# Patient Record
Sex: FEMALE | Race: WHITE | NOT HISPANIC OR LATINO | Employment: OTHER | URBAN - METROPOLITAN AREA
[De-identification: names, ages, dates, MRNs, and addresses within clinical notes are randomized per-mention and may not be internally consistent; named-entity substitution may affect disease eponyms.]

---

## 2017-05-27 ENCOUNTER — HOSPITAL ENCOUNTER (OUTPATIENT)
Facility: CLINIC | Age: 82
Setting detail: OBSERVATION
Discharge: HOME | End: 2017-05-28
Attending: EMERGENCY MEDICINE | Admitting: INTERNAL MEDICINE
Payer: MEDICARE

## 2017-05-27 ENCOUNTER — APPOINTMENT (OUTPATIENT)
Dept: CT IMAGING | Facility: CLINIC | Age: 82
End: 2017-05-27
Attending: EMERGENCY MEDICINE
Payer: MEDICARE

## 2017-05-27 ENCOUNTER — APPOINTMENT (OUTPATIENT)
Dept: MRI IMAGING | Facility: CLINIC | Age: 82
End: 2017-05-27
Attending: INTERNAL MEDICINE
Payer: MEDICARE

## 2017-05-27 DIAGNOSIS — R53.1 WEAKNESS: ICD-10-CM

## 2017-05-27 DIAGNOSIS — M54.2 NECK PAIN: Primary | ICD-10-CM

## 2017-05-27 LAB
ALBUMIN SERPL-MCNC: 2.8 G/DL (ref 3.4–5)
ALBUMIN UR-MCNC: NEGATIVE MG/DL
ALP SERPL-CCNC: 85 U/L (ref 40–150)
ALT SERPL W P-5'-P-CCNC: 42 U/L (ref 0–50)
ANION GAP SERPL CALCULATED.3IONS-SCNC: 11 MMOL/L (ref 3–14)
APPEARANCE UR: CLEAR
APTT PPP: 34 SEC (ref 22–37)
AST SERPL W P-5'-P-CCNC: 22 U/L (ref 0–45)
BASOPHILS # BLD AUTO: 0 10E9/L (ref 0–0.2)
BASOPHILS NFR BLD AUTO: 0.2 %
BILIRUB SERPL-MCNC: 0.7 MG/DL (ref 0.2–1.3)
BILIRUB UR QL STRIP: NEGATIVE
BUN SERPL-MCNC: 11 MG/DL (ref 7–30)
CALCIUM SERPL-MCNC: 9.4 MG/DL (ref 8.5–10.1)
CHLORIDE SERPL-SCNC: 100 MMOL/L (ref 94–109)
CO2 SERPL-SCNC: 26 MMOL/L (ref 20–32)
COLOR UR AUTO: ABNORMAL
CREAT SERPL-MCNC: 0.79 MG/DL (ref 0.52–1.04)
DIFFERENTIAL METHOD BLD: NORMAL
EOSINOPHIL # BLD AUTO: 0.3 10E9/L (ref 0–0.7)
EOSINOPHIL NFR BLD AUTO: 2.9 %
ERYTHROCYTE [DISTWIDTH] IN BLOOD BY AUTOMATED COUNT: 13.6 % (ref 10–15)
GFR SERPL CREATININE-BSD FRML MDRD: 69 ML/MIN/1.7M2
GLUCOSE SERPL-MCNC: 110 MG/DL (ref 70–99)
GLUCOSE UR STRIP-MCNC: NEGATIVE MG/DL
HCT VFR BLD AUTO: 36.6 % (ref 35–47)
HGB BLD-MCNC: 12.9 G/DL (ref 11.7–15.7)
HGB UR QL STRIP: NEGATIVE
IMM GRANULOCYTES # BLD: 0.1 10E9/L (ref 0–0.4)
IMM GRANULOCYTES NFR BLD: 0.7 %
INR PPP: 1.54 (ref 0.86–1.14)
INTERPRETATION ECG - MUSE: NORMAL
KETONES UR STRIP-MCNC: NEGATIVE MG/DL
LEUKOCYTE ESTERASE UR QL STRIP: NEGATIVE
LYMPHOCYTES # BLD AUTO: 0.9 10E9/L (ref 0.8–5.3)
LYMPHOCYTES NFR BLD AUTO: 9.3 %
MCH RBC QN AUTO: 32.7 PG (ref 26.5–33)
MCHC RBC AUTO-ENTMCNC: 35.2 G/DL (ref 31.5–36.5)
MCV RBC AUTO: 93 FL (ref 78–100)
MONOCYTES # BLD AUTO: 0.4 10E9/L (ref 0–1.3)
MONOCYTES NFR BLD AUTO: 3.8 %
NEUTROPHILS # BLD AUTO: 7.9 10E9/L (ref 1.6–8.3)
NEUTROPHILS NFR BLD AUTO: 83.1 %
NITRATE UR QL: NEGATIVE
NRBC # BLD AUTO: 0 10*3/UL
NRBC BLD AUTO-RTO: 0 /100
NT-PROBNP SERPL-MCNC: 1091 PG/ML (ref 0–1800)
PH UR STRIP: 7.5 PH (ref 5–7)
PLATELET # BLD AUTO: 363 10E9/L (ref 150–450)
POTASSIUM SERPL-SCNC: 3.6 MMOL/L (ref 3.4–5.3)
PROT SERPL-MCNC: 6.9 G/DL (ref 6.8–8.8)
RBC # BLD AUTO: 3.95 10E12/L (ref 3.8–5.2)
RBC #/AREA URNS AUTO: 0 /HPF (ref 0–2)
SODIUM SERPL-SCNC: 137 MMOL/L (ref 133–144)
SP GR UR STRIP: 1.01 (ref 1–1.03)
SQUAMOUS #/AREA URNS AUTO: 1 /HPF (ref 0–1)
TROPONIN I SERPL-MCNC: 0.02 UG/L (ref 0–0.04)
URN SPEC COLLECT METH UR: ABNORMAL
UROBILINOGEN UR STRIP-MCNC: NORMAL MG/DL (ref 0–2)
WBC # BLD AUTO: 9.5 10E9/L (ref 4–11)
WBC #/AREA URNS AUTO: <1 /HPF (ref 0–2)

## 2017-05-27 PROCEDURE — 96375 TX/PRO/DX INJ NEW DRUG ADDON: CPT

## 2017-05-27 PROCEDURE — 71260 CT THORAX DX C+: CPT

## 2017-05-27 PROCEDURE — 96361 HYDRATE IV INFUSION ADD-ON: CPT

## 2017-05-27 PROCEDURE — 25000132 ZZH RX MED GY IP 250 OP 250 PS 637: Mod: GY | Performed by: INTERNAL MEDICINE

## 2017-05-27 PROCEDURE — 99220 ZZC INITIAL OBSERVATION CARE,LEVL III: CPT | Performed by: INTERNAL MEDICINE

## 2017-05-27 PROCEDURE — A9270 NON-COVERED ITEM OR SERVICE: HCPCS | Mod: GY | Performed by: INTERNAL MEDICINE

## 2017-05-27 PROCEDURE — 85025 COMPLETE CBC W/AUTO DIFF WBC: CPT | Performed by: EMERGENCY MEDICINE

## 2017-05-27 PROCEDURE — 70498 CT ANGIOGRAPHY NECK: CPT

## 2017-05-27 PROCEDURE — 81001 URINALYSIS AUTO W/SCOPE: CPT | Performed by: EMERGENCY MEDICINE

## 2017-05-27 PROCEDURE — 83880 ASSAY OF NATRIURETIC PEPTIDE: CPT | Performed by: EMERGENCY MEDICINE

## 2017-05-27 PROCEDURE — 85610 PROTHROMBIN TIME: CPT | Performed by: EMERGENCY MEDICINE

## 2017-05-27 PROCEDURE — 85730 THROMBOPLASTIN TIME PARTIAL: CPT | Performed by: EMERGENCY MEDICINE

## 2017-05-27 PROCEDURE — 96374 THER/PROPH/DIAG INJ IV PUSH: CPT

## 2017-05-27 PROCEDURE — 87086 URINE CULTURE/COLONY COUNT: CPT | Performed by: EMERGENCY MEDICINE

## 2017-05-27 PROCEDURE — 70450 CT HEAD/BRAIN W/O DYE: CPT | Mod: XS

## 2017-05-27 PROCEDURE — 99285 EMERGENCY DEPT VISIT HI MDM: CPT | Mod: 25

## 2017-05-27 PROCEDURE — 72141 MRI NECK SPINE W/O DYE: CPT

## 2017-05-27 PROCEDURE — 87040 BLOOD CULTURE FOR BACTERIA: CPT | Mod: 91 | Performed by: INTERNAL MEDICINE

## 2017-05-27 PROCEDURE — 25000128 H RX IP 250 OP 636: Performed by: EMERGENCY MEDICINE

## 2017-05-27 PROCEDURE — 80053 COMPREHEN METABOLIC PANEL: CPT | Performed by: EMERGENCY MEDICINE

## 2017-05-27 PROCEDURE — 25000125 ZZHC RX 250: Performed by: EMERGENCY MEDICINE

## 2017-05-27 PROCEDURE — G0378 HOSPITAL OBSERVATION PER HR: HCPCS

## 2017-05-27 PROCEDURE — 93005 ELECTROCARDIOGRAM TRACING: CPT

## 2017-05-27 PROCEDURE — 84484 ASSAY OF TROPONIN QUANT: CPT | Performed by: EMERGENCY MEDICINE

## 2017-05-27 PROCEDURE — 36415 COLL VENOUS BLD VENIPUNCTURE: CPT | Performed by: INTERNAL MEDICINE

## 2017-05-27 RX ORDER — ONDANSETRON 2 MG/ML
4 INJECTION INTRAMUSCULAR; INTRAVENOUS EVERY 6 HOURS PRN
Status: DISCONTINUED | OUTPATIENT
Start: 2017-05-27 | End: 2017-05-28 | Stop reason: HOSPADM

## 2017-05-27 RX ORDER — CYCLOBENZAPRINE HCL 5 MG
5 TABLET ORAL 3 TIMES DAILY PRN
Status: DISCONTINUED | OUTPATIENT
Start: 2017-05-27 | End: 2017-05-28 | Stop reason: HOSPADM

## 2017-05-27 RX ORDER — BRIMONIDINE TARTRATE AND TIMOLOL MALEATE 2; 5 MG/ML; MG/ML
1 SOLUTION OPHTHALMIC 2 TIMES DAILY
Status: DISCONTINUED | OUTPATIENT
Start: 2017-05-27 | End: 2017-05-28 | Stop reason: HOSPADM

## 2017-05-27 RX ORDER — MORPHINE SULFATE 4 MG/ML
4 INJECTION, SOLUTION INTRAMUSCULAR; INTRAVENOUS ONCE
Status: COMPLETED | OUTPATIENT
Start: 2017-05-27 | End: 2017-05-27

## 2017-05-27 RX ORDER — PROCHLORPERAZINE MALEATE 5 MG
5 TABLET ORAL EVERY 6 HOURS PRN
Status: DISCONTINUED | OUTPATIENT
Start: 2017-05-27 | End: 2017-05-28 | Stop reason: HOSPADM

## 2017-05-27 RX ORDER — CALCIUM CARBONATE 500 MG/1
500-1000 TABLET, CHEWABLE ORAL
Status: DISCONTINUED | OUTPATIENT
Start: 2017-05-27 | End: 2017-05-28 | Stop reason: HOSPADM

## 2017-05-27 RX ORDER — NALOXONE HYDROCHLORIDE 0.4 MG/ML
.1-.4 INJECTION, SOLUTION INTRAMUSCULAR; INTRAVENOUS; SUBCUTANEOUS
Status: DISCONTINUED | OUTPATIENT
Start: 2017-05-27 | End: 2017-05-28 | Stop reason: HOSPADM

## 2017-05-27 RX ORDER — AMOXICILLIN 250 MG
1-2 CAPSULE ORAL 2 TIMES DAILY PRN
Status: DISCONTINUED | OUTPATIENT
Start: 2017-05-27 | End: 2017-05-28 | Stop reason: HOSPADM

## 2017-05-27 RX ORDER — IOPAMIDOL 755 MG/ML
58 INJECTION, SOLUTION INTRAVASCULAR ONCE
Status: COMPLETED | OUTPATIENT
Start: 2017-05-27 | End: 2017-05-27

## 2017-05-27 RX ORDER — POLYETHYLENE GLYCOL 3350 17 G/17G
17 POWDER, FOR SOLUTION ORAL DAILY PRN
Status: DISCONTINUED | OUTPATIENT
Start: 2017-05-27 | End: 2017-05-28 | Stop reason: HOSPADM

## 2017-05-27 RX ORDER — HYDROCODONE BITARTRATE AND ACETAMINOPHEN 5; 325 MG/1; MG/1
1-2 TABLET ORAL EVERY 8 HOURS PRN
Status: DISCONTINUED | OUTPATIENT
Start: 2017-05-27 | End: 2017-05-28 | Stop reason: HOSPADM

## 2017-05-27 RX ORDER — ONDANSETRON 2 MG/ML
4 INJECTION INTRAMUSCULAR; INTRAVENOUS ONCE
Status: COMPLETED | OUTPATIENT
Start: 2017-05-27 | End: 2017-05-27

## 2017-05-27 RX ORDER — PROCHLORPERAZINE 25 MG
12.5 SUPPOSITORY, RECTAL RECTAL EVERY 12 HOURS PRN
Status: DISCONTINUED | OUTPATIENT
Start: 2017-05-27 | End: 2017-05-28 | Stop reason: HOSPADM

## 2017-05-27 RX ORDER — HYDROMORPHONE HYDROCHLORIDE 1 MG/ML
.3-.5 INJECTION, SOLUTION INTRAMUSCULAR; INTRAVENOUS; SUBCUTANEOUS EVERY 4 HOURS PRN
Status: DISCONTINUED | OUTPATIENT
Start: 2017-05-27 | End: 2017-05-28 | Stop reason: HOSPADM

## 2017-05-27 RX ORDER — LOSARTAN POTASSIUM 50 MG/1
50 TABLET ORAL DAILY
Status: DISCONTINUED | OUTPATIENT
Start: 2017-05-27 | End: 2017-05-28 | Stop reason: HOSPADM

## 2017-05-27 RX ORDER — IOPAMIDOL 755 MG/ML
70 INJECTION, SOLUTION INTRAVASCULAR ONCE
Status: COMPLETED | OUTPATIENT
Start: 2017-05-27 | End: 2017-05-27

## 2017-05-27 RX ORDER — ACETAMINOPHEN 500 MG
1000 TABLET ORAL 3 TIMES DAILY
Status: DISCONTINUED | OUTPATIENT
Start: 2017-05-27 | End: 2017-05-28 | Stop reason: HOSPADM

## 2017-05-27 RX ORDER — SENNOSIDES 8.6 MG
2 TABLET ORAL
COMMUNITY
End: 2019-08-11 | Stop reason: DRUGHIGH

## 2017-05-27 RX ORDER — BRIMONIDINE TARTRATE AND TIMOLOL MALEATE 2; 5 MG/ML; MG/ML
1 SOLUTION OPHTHALMIC 2 TIMES DAILY
COMMUNITY
End: 2018-01-09

## 2017-05-27 RX ORDER — AMIODARONE HYDROCHLORIDE 200 MG/1
200 TABLET ORAL 2 TIMES DAILY
Status: DISCONTINUED | OUTPATIENT
Start: 2017-05-27 | End: 2017-05-28 | Stop reason: HOSPADM

## 2017-05-27 RX ORDER — TIMOLOL MALEATE 5 MG/ML
1 SOLUTION/ DROPS OPHTHALMIC 2 TIMES DAILY
Status: DISCONTINUED | OUTPATIENT
Start: 2017-05-27 | End: 2017-05-28 | Stop reason: HOSPADM

## 2017-05-27 RX ORDER — LATANOPROST 50 UG/ML
1 SOLUTION/ DROPS OPHTHALMIC AT BEDTIME
Status: DISCONTINUED | OUTPATIENT
Start: 2017-05-27 | End: 2017-05-28 | Stop reason: HOSPADM

## 2017-05-27 RX ORDER — FOLIC ACID 1 MG/1
1 TABLET ORAL DAILY
Status: DISCONTINUED | OUTPATIENT
Start: 2017-05-27 | End: 2017-05-28 | Stop reason: HOSPADM

## 2017-05-27 RX ORDER — ONDANSETRON 4 MG/1
4 TABLET, ORALLY DISINTEGRATING ORAL EVERY 6 HOURS PRN
Status: DISCONTINUED | OUTPATIENT
Start: 2017-05-27 | End: 2017-05-28 | Stop reason: HOSPADM

## 2017-05-27 RX ADMIN — ACETAMINOPHEN 1000 MG: 500 TABLET, FILM COATED ORAL at 14:23

## 2017-05-27 RX ADMIN — CALCIUM CARBONATE (ANTACID) CHEW TAB 500 MG 1000 MG: 500 CHEW TAB at 21:28

## 2017-05-27 RX ADMIN — BRIMONIDINE TARTRATE, TIMOLOL MALEATE 1 DROP: 2; 5 SOLUTION/ DROPS OPHTHALMIC at 20:56

## 2017-05-27 RX ADMIN — MORPHINE SULFATE 4 MG: 4 INJECTION, SOLUTION INTRAMUSCULAR; INTRAVENOUS at 10:53

## 2017-05-27 RX ADMIN — CYCLOBENZAPRINE HYDROCHLORIDE 5 MG: 5 TABLET, FILM COATED ORAL at 20:54

## 2017-05-27 RX ADMIN — SENNOSIDES AND DOCUSATE SODIUM 2 TABLET: 8.6; 5 TABLET ORAL at 21:01

## 2017-05-27 RX ADMIN — LOSARTAN POTASSIUM 50 MG: 50 TABLET ORAL at 14:22

## 2017-05-27 RX ADMIN — ACETAMINOPHEN 1000 MG: 500 TABLET, FILM COATED ORAL at 20:46

## 2017-05-27 RX ADMIN — SODIUM CHLORIDE 85 ML: 9 INJECTION, SOLUTION INTRAVENOUS at 10:37

## 2017-05-27 RX ADMIN — TIMOLOL MALEATE 1 DROP: 5 SOLUTION OPHTHALMIC at 20:58

## 2017-05-27 RX ADMIN — SODIUM CHLORIDE 1000 ML: 9 INJECTION, SOLUTION INTRAVENOUS at 12:00

## 2017-05-27 RX ADMIN — IOPAMIDOL 70 ML: 755 INJECTION, SOLUTION INTRAVENOUS at 10:25

## 2017-05-27 RX ADMIN — FOLIC ACID 1 MG: 1 TABLET ORAL at 14:22

## 2017-05-27 RX ADMIN — IOPAMIDOL 58 ML: 755 INJECTION, SOLUTION INTRAVENOUS at 10:37

## 2017-05-27 RX ADMIN — AMIODARONE HYDROCHLORIDE 200 MG: 200 TABLET ORAL at 20:47

## 2017-05-27 RX ADMIN — APIXABAN 2.5 MG: 2.5 TABLET, FILM COATED ORAL at 20:47

## 2017-05-27 RX ADMIN — SODIUM CHLORIDE 100 ML: 9 INJECTION, SOLUTION INTRAVENOUS at 10:25

## 2017-05-27 RX ADMIN — ONDANSETRON 4 MG: 2 SOLUTION INTRAMUSCULAR; INTRAVENOUS at 10:53

## 2017-05-27 ASSESSMENT — PAIN DESCRIPTION - DESCRIPTORS
DESCRIPTORS: ACHING
DESCRIPTORS: ACHING

## 2017-05-27 NOTE — PLAN OF CARE
Problem: Goal Outcome Summary  Goal: Goal Outcome Summary  Outcome: No Change  PRIOR TO DISCHARGE     Comments:   -diagnostic tests and consults completed and resulted - not met. MRI and ortho consult pending.   -vital signs normal or at patient baseline - partially met. VSS  Was on 2L NC, per patient she is not on O2 at baseline. Weaned to RA at 92%.        Nurse to notify provider when observation goals have been met and patient is ready for discharge.

## 2017-05-27 NOTE — PLAN OF CARE
Problem: Goal Outcome Summary  Goal: Goal Outcome Summary  Outcome: No Change  Patient A&O x4, VSS on 2L NC, tolerating regular diet, not OOB since arrival to unit, IV SL, DTV. Patient reports urinary incontinence, no incident this shift. Patient c/o neck pain, scheduled Tylenol and hot pack provided, patient declined narcotic medication at this time. LS clear, tele NSR with 1st degree AV block. Plan for MRI of cervical spine and ortho consult. Will continue to monitor.

## 2017-05-27 NOTE — IP AVS SNAPSHOT
[facility]    [address] [place] [id number]    Phone:  [phone]                                       After Visit Summary   5/27/2017    Marley Stewart    MRN: [id number]           After Visit Summary Signature Page     I have received my discharge instructions, and my questions have been answered. I have discussed any challenges I see with this plan with the nurse or doctor.    ..........................................................................................................................................  Patient/Patient Representative Signature      ..........................................................................................................................................  Patient Representative Print Name and Relationship to Patient    ..................................................               ................................................  Date                                            Time    ..........................................................................................................................................  Reviewed by Signature/Title    ...................................................              ..............................................  Date                                                            Time

## 2017-05-27 NOTE — PHARMACY-ADMISSION MEDICATION HISTORY
Admission medication history interview status for the 5/27/2017  admission is complete. See EPIC admission navigator for prior to admission medications     Medication history source reliability:Good    Actions taken by pharmacist (provider contacted, etc):Discussed PTA meds with patient     Additional medication history information not noted on PTA med list : Patient started cephalexin 250 mg po TID x 7 days on 5/22/17-5/29/17    Medication reconciliation/reorder completed by provider prior to medication history? No    Time spent in this activity: 20 minutes     Prior to Admission medications    Medication Sig Last Dose Taking? Auth Provider   ACETAMINOPHEN PO Take 650 mg by mouth every 6 hours as needed for pain Past Week at prn Yes Unknown, Entered By History   CEPHALEXIN PO Take 250 mg by mouth 3 times daily For 7 days 5/26/2017 at Unknown time Yes Unknown, Entered By History   sennosides (SENOKOT) 8.6 MG tablet Take 2 tablets by mouth 2 times daily as needed for constipation 5/26/2017 at Unknown time Yes Unknown, Entered By History   AMIODARONE HCL PO Take 200 mg by mouth 2 times daily 5/26/2017 at Unknown time Yes Unknown, Entered By History   brimonidine-timolol (COMBIGAN) 0.2-0.5 % ophthalmic solution Place 1 drop into the right eye 2 times daily 5/26/2017 at Unknown time Yes Unknown, Entered By History   cetirizine (ZYRTEC) 5 MG tablet Take 1 tablet (5 mg) by mouth daily 5/26/2017 at Unknown time Yes Preston Pierce MD   apixaban ANTICOAGULANT (ELIQUIS) 5 MG tablet Take 1 tablet (5 mg) by mouth 2 times daily 5/26/2017 at Unknown time Yes Marino Sandoval MD   furosemide (LASIX) 20 MG tablet Take 1 tablet (20 mg) by mouth every morning 5/26/2017 at Unknown time Yes Marino Sandoval MD   Methotrexate Sodium (METHOTREXATE PO) Take 15 mg by mouth once a week On Wednesdays (6 of the 2.5mg tablets). evenings 5/24/2017 at Unknown time Yes Reported, Patient   LOSARTAN POTASSIUM PO Take 50 mg by mouth daily   5/26/2017 at Unknown time Yes Reported, Patient   FOLIC ACID PO Take 1 mg by mouth daily 5/26/2017 at Unknown time Yes Reported, Patient   calcium carb 1250 mg, 500 mg [place],/vitamin D 200 units (OSCAL WITH D) 500-200 MG-UNIT per tablet Take 1 tablet by mouth 2 times daily (with meals) 5/26/2017 at Unknown time Yes Unknown, Entered By History   multivitamin, therapeutic with minerals (THERA-VIT-M) TABS Take 1 tablet by mouth daily 5/26/2017 at Unknown time Yes Unknown, Entered By History   latanoprost (XALATAN) 0.005 % ophthalmic solution Place 1 drop into the right eye At Bedtime 5/26/2017 at Unknown time Yes Unknown, Entered By History   timolol (TIMOPTIC) 0.5 % ophthalmic solution Place 1 drop into the right eye 2 times daily 5/26/2017 at Unknown time Yes Unknown, Entered By History   carboxymethylcellulose (REFRESH PLUS) 0.5 % SOLN Place 1 drop into both eyes 2 times daily  5/26/2017 at Unknown time Yes Unknown, Entered By History

## 2017-05-27 NOTE — H&P
PRIMARY CARE PHYSICIAN:  Dr. Chandra Ortiz      CHIEF COMPLAINT:  Chills with dyspnea and ongoing neck pain.      HISTORY OF PRESENT ILLNESS:  Marley Stewart is an 84-year-old female patient with history noted below, including rheumatoid arthritis, hypertension, diastolic CHF, atrial fibrillation, stroke history, hypothyroidism, dyslipidemia and chronic spine/disc disease, who presents with the above acute issue.  The patient has noted neck pain mostly on the left side over the past week.  It is in the back of her neck and does radiate up her left side into her head.  She did not really take anything for the pain except for acetaminophen which has not been enough.  Today she was noted by her  to be more pale.  The patient had chills today as well.  The patient's  noted that there is some bluish discoloration of her lips as well.  Given the multitude of symptoms, she was brought to [facility] for further evaluation.      The patient was seen in the ER by Dr. Campos.  She had vitals which showed a temperature 99 degrees, heart rate 87, blood pressure 110/75, respiratory rate 24, O2 saturations 98% on room air.  She had evaluation including EKG which did not show any acute ischemic findings.  She had laboratory evaluation which showed a troponin which was negative, an N-terminal proBNP which was within normal limits.  White count was normal.  She did have a CT head without contrast which did not show any acute findings.  CT angiogram of head and neck was also performed, which did not show any acute findings.  She went on to have a CT chest, PE protocol which did not show any acute findings.  The patient was given morphine for pain and overall given her severe uncontrolled pain, a request for observation admission was made.      The patient continues to have pain in the back of her neck.  She denies any nausea or vomiting.  No chest pain.  She does have chronic shallow breathing which has been going on for  number of years, according to the patient's .  She denies any fevers or cough.  No abdominal pain or bloody stools.  Denies any focal numbness or weakness.      The patient's son does note the patient has had multiple recent hospitalizations over the past few months.  In March of this year she was in [place], [place], and was hospitalized and therefore atrial flutter for which she underwent cardioversion.  Shortly thereafter she was started on amiodarone by her primary local electrophysiology cardiologist.  More recently, about 3 days ago, she was hospitalized at [facility] with issues including back pain and low heart rate.      PAST MEDICAL HISTORY:   1.  Rheumatoid arthritis.  Follows with Dr. Galindo, rheumatologist, but has not seen him in a few years.   2.  Hypertension.   3.  LVH with diastolic CHF.  Last echocardiogram from 03/2017 showed left ventricular EF of 65% -70% with LVH with increased thickness at the apex of a small area of mid septum with normal thickness;  normal right ventricular size and systolic function; mild to moderate MR; mild TR; severe pulmonary hypertension.  Follows with Dr. Vyas through [facility].   4.  Atrial fibrillation/flutter.  History of atrial fibrillation, also recent history of atrial flutter in 03/2017 for which she underwent cardioversion.  She was started on amiodarone shortly thereafter.   5.  Stroke history.  She has had 1 stroke about 3 years ago.   6.  Hypothyroidism.   7.  Dyslipidemia.   8.  Spine/disk disease.  She has a history of spine/disk disease including lumbar spinal stenosis noted to be moderate centrally with lateral moderate to severe lateral recess narrowing at L3-L4 and stable posterior disk protrusion with evidence of endplate osteophytes at L5-S1, contacting the traversing S1 nerve root.  Also there is a stable mild to moderate central canal narrowing with advanced right lateral recess narrowing at  the L4-L5 region with compression of the traversing right L5 nerve root with moderate right foraminal narrowing.  Apparently was scheduled for an epidural steroid injection at one point recently in the lumbar spine, but this improved on its own.  9.  Glaucoma.  10.  Osteoarthritis.  11.  History of Lyme disease 2012.      PAST SURGICAL HISTORY:   1.  History of iron deficiency anemia.   2.  History of herpes zoster.        PAST SURGICAL HISTORY:   1.  Status post  x2.   2.  Status post wisdom teeth extraction.   3.  Status post bilateral cataract surgery.   4.  Status post bunion and hammertoe repair on the left.   5.  Status post revision eyelid surgery.   6.  Status post D&C.   7.  Status post bilateral total knee arthroscopies.   8.  Status post tibia fibula fracture surgeries in 1950s.   9.  Status post femur fracture surgery in 1980s.   10.  Status post trabeculectomy on the right in 2012.   11.  Status post YAG capsulotomy in 2013 on the right.      ALLERGIES:  Amitriptyline, clonazepam and Lasix.      HOME MEDICATIONS:   1.  Acetaminophen 650 mg q.6 h. p.r.n.   2.  Amiodarone 200 mg b.i.d.    3.  Apixaban 500 mg b.i.d.   4.  Combigan eyedrops 0.2-0.5% 1 drop right eye b.i.d.   5.  Calcium carbonate with vitamin D 500 mg/200 units 1 tab b.i.d.   6.  Refresh Plus eyedrops 1 drop both eyes b.i.d.   7.  Cephalexin 250 mg 3 times a day which was started on    8.  Cetirizine 5 mg a day.   9.  Folic acid 1 mg a day.   10.  Furosemide 20 mg every morning.   11.  Xalatan eyedrops 0.005% 1 drop right eye at bedtime.   12.  Losartan 50 mg a day.   13.  Methotrexate 15 mg every Wednesday.   14.  Multivitamin daily.     15.  Sennosides 8.6 mg 2 tabs p.r.n.    16.  Timolol eyedrops 0.5% 1 drop right eye b.i.d.      SOCIAL HISTORY:  The patient does not smoke or drink.  She is .  She has 3 children.  She is retired  and formerly worked as an .      FAMILY HISTORY:  Reviewed and  not felt to be contributory.      REVIEW OF SYSTEMS:  As noted in HPI, otherwise 10-point systems negative.      PHYSICAL EXAMINATION:   VITAL SIGNS:  Temperature 97.9 degrees, heart rate 75, blood pressure 156/64, respiratory rate 18, O2 saturations 97%.   GENERAL:  This is an alert and oriented 84-year-old female patient who is lying in bed.  She is conversant and friendly.  However, she is in discomfort visibly at times.   HEENT:  Pupils equal, round, reactive.  No scleral icterus or conjunctival injection.  Oropharynx reveals no gross erythema or exudate.   NECK:  No bruits, JVD or adenopathy.   HEART:  Regular rate and rhythm without significant murmurs, rubs or gallops.   LUNGS:  Grossly clear, without crackles or wheezes.   ABDOMEN:  Soft, nondistended.  Some discomfort with palpation but no rebound or guarding tenderness.  Positive bowel sounds.  No femoral bruits.   EXTREMITIES:  No edema.  Palpable pedal pulses.   NEUROLOGIC:  No gross focal motor or sensory deficits.      LABORATORY DATA:  BMP is normal.  LFTs were normal except for slightly low albumin 2.8.  N-terminal proBNP was 1091  Troponin 0.0222.  CBC is normal.  INR 1.54.  Urinalysis did not suggest infection.        IMAGING:  As above.      EKG, which I personally reviewed, showed sinus rhythm with first degree AV block and signs of elevation with repolarization abnormality.      ASSESSMENT AND PLAN:  This is an 84-year-old female patient with history including rheumatoid arthritis, hypertension, left ventricular hypertrophy with diastolic congestive heart failure, atrial fibrillation/flutter, stroke history, dyslipidemia, hypothyroidism and chronic spine/disk disease including spinal stenosis who presents with chills with worsened dyspnea and ongoing neck pain.      1.  Neck pain.  The patient does have a history of arthritis as well as spine/disk disease including in the lumbar spine with spinal stenosis and disk disease with MRI recently in  "05/2017 as described above.  On this occasion she did have imaging evaluation including CT head without contrast as well as CT angiogram of head and neck and CT PE protocol which were all negative for any acute findings.  Suspect most likely related to her underlying cervical spine/disk disease.  She does have rheumatoid arthritis and is immunosuppressed with methotrexate and with chills this raises the concern for infection, but I feel this is probably less likely.  She does have a normal white count and is afebrile.  Plan to order MRI of the neck.  We will ask Orthopedic Surgery to see the patient.  Order scheduled acetaminophen 1000 mg 3 times a day.  Will order p.r.n. Norco 1 tablet every 8 hours.  Will order p.r.n. IV Dilaudid for breakthrough pain.  Will order p.r.n. Flexeril.  The patient did note a reluctance to take stronger pain medications, but, the problem with this was not wanting to mask any other serious disorder; and after further discussion of the workup we are pursuing, she is agreeable to take the medications as needed.  2.  Hypertension.  Prior to admission regimen consists of losartan 50 mg a day.  Continue losartan.    3.  Left ventricular hypotrophy with diastolic congestive heart failure.  Echocardiogram as described above in 03/2017 with left ventricular EF of 65%-70% with left ventricular hypotrophy; normal right ventricular size and systolic function; mild to moderate mitral regurgitation; mild tricuspid regurgitation and severe hypertension.  We will continue losartan.  Hold prior to admission furosemide for now given she appears clinically somewhat \"dry.\"  Monitor I's and O's and daily weights.    4.  Atrial fibrillation/flutter with stroke history.  The patient has a history of atrial flutter in this March of this year for which she was cardioverted.  She is maintained on amiodarone.  Continue amiodarone and apixaban.    5.  Recent dysuria/urinary tract infection.  The patient was " started on Keflex which sounds like empirically for urinary tract infection.  She has been on Keflex since .  Urinalysis today does not suggest ongoing infection.  Stop Keflex.     PROPHYLAXIS:  Pneumo boots animation and ambulation.      CODE STATUS:  Full code.         MICHELLE ALAN JR., MD             D: 2017 14:07   T: 2017 15:52   MT: [id number]      Name:     DWAINE CASTRO   MRN:      [id number]        Account:      [id number]   :      1932           Admitted:     [id number]      Document: [id number]       cc: Chandra Ortiz MD

## 2017-05-27 NOTE — ED PROVIDER NOTES
CHIEF COMPLAINT:  Neck pain, weakness.      HISTORY OF PRESENT ILLNESS:  Marley Stewart is an 84-year-old female presenting by [facility] at 9:47 a.m. with reported chief complaint of left-sided neck pain radiating to the head for 2 days, generally feeling weak with nausea but no vomiting, some shortness of breath but no chest pain, abdominal pain, no fevers or cough.  The patient herself is a poor historian; she is very anxious and not feeling well.  Medics requested a stab room for concern about EKG changes specifically the inverted T-waves in the anterolateral leads and questionable ST elevation in V2 and V3.  The patient's vital signs were reportedly normal in the field      PAST MEDICAL HISTORY:  Hypertension, arthritis, atrial fibrillation, thyroid disease, CVA, rheumatoid arthritis, glaucoma, spinal stenosis, peptic ulcer disease, CHF, mitral regurgitation, tricuspid regurgitation, aortic regurgitation, pulmonic valve regurgitation.      PAST SURGICAL HISTORY:  Orthopedic surgery.      SOCIAL HISTORY:  The patient is a nonsmoker, uses alcohol occasionally, denies illicit drug use, lives at home with her .      ALLERGIES:  Amitriptyline, clonazepam and Lasix.      MEDICATIONS:  The patient is not aware of her current medications; past medical history shows her to be on Eliquis, diltiazem, Zyrtec, methotrexate, folic acid, levothyroxine, multivitamin, Dilantin, Timoptic.        The patient does have a history of A-fib and A-flutter and is reportedly on Eliquis; she states she was cardioverted 2 months ago in [place] in the Emergency Department, she does not think she is always in A-fib.      PHYSICAL EXAMINATION:   VITAL SIGNS:  Temperature 99 orally, respiratory rate 24, blood pressure 170/75, oxygen saturations 98% on room air, heart rate 81 beats a minute, repeat oxygen saturations 95% on room air.   CONSTITUTIONAL:  Frail appearing 84-year-old female lying supine in bed, mild tachypnea.   HEENT:   Pupils equal, round, react to light, extraocular movements are intact.  Mucous membranes are moist.   CARDIOVASCULAR:  Regular rate and rhythm, no murmurs appreciated.   RESPIRATORY:  Slightly diminished breath sounds bilaterally but no wheezes, rales or rhonchi, good air movement, appears to be mildly tachypneic, possibly hyperventilating.   ABDOMEN:  Bowel sounds are present, abdomen is soft, no focal tenderness.   MUSCULOSKELETAL:  The patient indicates left-sided neck pain, normal range of motion, no neck stiffness or torticollis.   NECK:  No carotid bruits, no adenopathy or masses.   PSYCH:  Very anxious.   NEUROLOGIC:  The patient is alert, she is oriented x3, cranial nerves II-XII are grossly intact, she appears to be generally weak but no evidence of focal neurologic deficits.      EKG INTERPRETATION:  EKG shows a rate of 90 beats per minute, this is a sinus rhythm, she does have LVH with repolarization abnormality, this was seen previously an EKG on 2016.      DIAGNOSTIC TESTIN.  Urinalysis catheterized specimen is negative, urine culture is pending.   2.  White blood cell count is 9.5, hemoglobin 12.9, hematocrit 36.6, platelets are 363.   3.  INR is 1.54, PTT is 34.   4.  Sodium is 137, potassium 3.6, chloride is 100, bicarb is 26, glucose is 110, BUN is 11, creatinine is 0.79.   5.  Troponin is 0.022, BNP is 1091.   6.  CT of the head without contrast.  Impression; no acute pathology, no bleed, mass or acute infarct are seen, atrophy of the brain, white matter changes consistent with small vessel ischemic disease, chronic area of encephalomalacia are present in left inferior cerebellum and right occipital lobe, these are unchanged.   7.  CTA of the head and neck.  Impression; no occluded intracranial vessels or high-grade intracranial vascular stenosis, no significant stenoses is seen at either carotid bifurcation, no arterial dissection.     8.  CT of the chest, PE protocol.  Impression; no  evidence of PE or other findings to suggest an etiology of the patient's dyspnea.      ET INTERVENTION.  This patient was brought to [facility] for her concern about EKG changes, she was placed on a cardiac monitor, pulse oximeter, peripheral IV was established.  An EKG was obtained immediately which shows no acute change from previous EKG, vital signs were noted to be normal, patient was sent to CT.      MEDICAL DECISION MAKING:  Marley Stewart is an 84-year-old female being brought in for weakness and left-sided neck pain that radiates up to the head.  It was reported that she is on Eliquis due to atrial fibrillation so intracranial catastrophe as well as carotid dissection was certainly in the differential, CT was therefore performed immediately; CT shows no acute abnormalities.  I did CT chest as she has been in [place] recently, PE again in the diagnosis as she was tachypneic.  Everything appears to be normal; white blood cell count, hemoglobin normal, she does not have any evidence of urinary tract infection, cardiac enzyme is within normal limits, EKG again is unchanged acutely from previous.  Her  did arrive a while later and states that she has been feeling unwell for months and that he is very tired of going to the doctors with her neck pain and not finding anything wrong. She did have low back pain about a month or so ago, had an MRI which showed some chronic changes, she was scheduled for an epidural shot however, her back pain resolved so she never received this.  She has never had neck MRI although she reportedly now had these symptoms for 2 to 3 months, I certainly considered a disk herniation in her neck after her  came in and elaborated more on the history.  At this point the patient feels unwell enough to go home, there is no acute medical need for what she needs for acute intervention but I will admit her for pain control and MRI may be indicated for the neck to further evaluate for  disk herniation which could be the cause of her pain.  In any case she does not have any radicular symptoms such as focal weakness or numbness of the left upper extremity.  She does not have any concerning risk factors for diskitis, osteomyelitis and again her white blood cell count is normal, I will admit to an observation bed.      DIAGNOSES:     1.  Neck pain.   2.  Weakness.         BERNA CARNEY MD             D: 2017 14:43   T: 2017 18:30   MT: [id number]      Name:     DWAINE CASTRO   MRN:      [id number]        Account:      [id number]   :      1932           Visit Date:   2017      Document: [id number]

## 2017-05-27 NOTE — ED NOTES
[facility]  ED Nurse Handoff Report    ED Chief complaint: Generalized Weakness (left arm pain, neck pain, ekg changes. )      ED Diagnosis:   Final diagnoses:   Weakness       Code Status: Full Code    Allergies:   Allergies   Allergen Reactions     Amitriptyline      Clonazepam Other (See Comments)     Somnolence at 0.5 mg dose     Latex Rash       Activity level - Baseline/Home:  Independent    Activity Level - Current:   Stand with Assist     Needed?: No    Isolation: No  Infection: Not Applicable    Bariatric?: No    Vital Signs:   Vitals:    05/27/17 1115 05/27/17 1130 05/27/17 1145 05/27/17 1200   BP: 125/59 134/62 134/61 128/66   Resp: 9 18 22 8   Temp:       TempSrc:       SpO2: 95% 95% 95% 94%   Weight:       Height:           Cardiac Rhythm: ,   Cardiac  Cardiac Rhythm: Atrial fibrillation    Pain level: 0-10 Pain Scale: 8    Is this patient confused?: No    Patient Report: Initial Complaint: weakness at home sats 88 on RA, on 2l/nc sats mid 90s  Focused Assessment: pt with neck pain on lt side, no injury, head and neck ct neg,  Chest ct neg. Pt incont of lg amt of urine and this is normal for her at home. Pt amb at home indep. Pt with hx of afib, has spurts of afib and nsr in 70s. VSS. Pt alert and oriented  Tests Performed: labs, cts, urine  Abnormal Results: see results  Treatments provided: meds, o2    Family Comments:  here    OBS brochure/video discussed/provided to patient: Yes    ED Medications:   Medications   0.9% sodium chloride BOLUS (not administered)   iopamidol (ISOVUE-370) solution 70 mL (70 mLs Intravenous Given 5/27/17 1025)   sodium chloride 0.9 % for CT scan flush dose 100 mL (100 mLs Intravenous Given 5/27/17 1025)   iopamidol (ISOVUE-370) solution 58 mL (58 mLs Intravenous Given 5/27/17 1037)   sodium chloride 0.9 % for CT scan flush dose 85 mL (85 mLs Intravenous Given 5/27/17 1037)   ondansetron (ZOFRAN) injection 4 mg (4 mg Intravenous Given  5/27/17 1053)   morphine (PF) injection 4 mg (4 mg Intravenous Given 5/27/17 1053)       Drips infusing?:  No      ED NURSE PHONE NUMBER: [phone]

## 2017-05-27 NOTE — CONSULTS
[facility]  Orthopaedics/Foot and Ankle Surgery Consultation         Griffin Cueva MD    Marley Stewart MRN# [id number]   YOB: 1932 Age: 84 year old      Date of Admission:  5/27/2017  Date of Consult: 5/27/2017           Assessment and Plan:   83 y/o F with increasing neck and L arm pain and weakness over the past few weeks.  The patient presented to the emergency department this evening after noting shortness of breath and increased discomfort.  Imaging workup thus far has been negative for any acute cerebral event and troponin was within normal limits.  CT and MRI scans demonstrate chronic appearing degenerative disc disease involving predominantly C4-5 and C5-6 with left-sided foraminal stenosis.  There is no concern for an acute herniated disc or other acute cervical spine process  1. Recommend continued use of a heat pack and muscle relaxant for pain relief.  Corticosteroid injections could be considered in the future if needed though I do not suspect that these will be required more acutely given the patient's relief of discomfort already with a muscle relaxant.  2. Appreciate hospitalist co-management.  Agree with current pain regimen.  3. Would recommend follow-up in 1-2 weeks with [facility] cervical spine specialists ([phone] for appointment).  Stable for discharge to home from ortho standpoint.  As no further orthopedic intervention anticipated, I will sign off from the patient's care at this point but please feel free to contact me with any further questions or concerns during the patient's hospitalization.            Code Status:   Full Code         Primary Care Physician:   Chandra Ortiz [phone]         Requesting Physician:      Dr. Hurt         Chief Complaint:   neck and L arm pain    History is obtained from the patient and medical chart.         History of Present Illness:   Marley Stewart is a 84 year old female who presented to the ED on 5/27/17  with increasing pain in her neck and L arm.  The patient denies any injury to the neck or left shoulder with her increasing discomfort over the past few weeks.  The patient denies any prior history of neck problems.  The patient denies any numbness, tingling, or radiating pain in the left arm associated with her worsening discomfort.  The patient has noted some relief of mild musculoskeletal discomfort with a heat pack in the past.  The patient denies significant weakness in either upper extremity or lower extremity associated with her neck pain.  The patient had noted some chills and was somewhat short of breath at home earlier today which prompted her presentation to the emergency department.  Workup thus far has been negative for an acute vascular or cardiac process.    Due to the patient's persistent discomfort, an MRI scan was ordered and the patient was admitted to the observation unit for workup.  The patient has noted some relief of symptoms with a muscle relaxant.  The patient localizes most discomfort at this point over the paraspinal muscles around the cervical spine.           Past Medical History:     Patient Active Problem List   Diagnosis     Atrial fibrillation with RVR (H)     CHF (congestive heart failure) (H)     Stroke (H)     Respiratory failure (H)     UTI (urinary tract infection)     CVA (cerebral vascular accident) (H)     Pulmonic valve regurgitation     Aortic regurgitation     Tricuspid regurgitation     Mitral regurgitation     Hypertension     Neck pain     Vertigo     Acute respiratory failure with hypoxia (H)     Weakness generalized      Past Medical History:   Diagnosis Date     A-fib (H)      Aortic regurgitation     1-2+ per 4/2015 Echo     Arthritis      CHF (congestive heart failure) (H)     EF 65-70% on 4/2015 Echo     CVA (cerebral vascular accident) (H)      Glaucoma      Hypertension      Mitral regurgitation     2+ per 4/2015 Echo     PUD (peptic ulcer disease)       Pulmonic valve regurgitation     1+ per 4/2015 Echo     RA (rheumatoid arthritis) (H)      Spinal stenosis      Thyroid disease      Tricuspid regurgitation     2+ per 4/2015 Echo             Past Surgical History:     Past Surgical History:   Procedure Laterality Date     ORTHOPEDIC SURGERY              Home Medications:     Prior to Admission medications    Medication Sig Last Dose Taking? Auth Provider   ACETAMINOPHEN PO Take 650 mg by mouth every 6 hours as needed for pain Past Week at prn Yes Unknown, Entered By History   CEPHALEXIN PO Take 250 mg by mouth 3 times daily For 7 days 5/26/2017 at Unknown time Yes Unknown, Entered By History   sennosides (SENOKOT) 8.6 MG tablet Take 2 tablets by mouth 2 times daily as needed for constipation 5/26/2017 at Unknown time Yes Unknown, Entered By History   AMIODARONE HCL PO Take 200 mg by mouth 2 times daily 5/26/2017 at Unknown time Yes Unknown, Entered By History   brimonidine-timolol (COMBIGAN) 0.2-0.5 % ophthalmic solution Place 1 drop into the right eye 2 times daily 5/26/2017 at Unknown time Yes Unknown, Entered By History   cetirizine (ZYRTEC) 5 MG tablet Take 1 tablet (5 mg) by mouth daily 5/26/2017 at Unknown time Yes Preston Pierce MD   apixaban ANTICOAGULANT (ELIQUIS) 5 MG tablet Take 1 tablet (5 mg) by mouth 2 times daily 5/26/2017 at Unknown time Yes Marino Sandoval MD   furosemide (LASIX) 20 MG tablet Take 1 tablet (20 mg) by mouth every morning 5/26/2017 at Unknown time Yes Marino Sandoval MD   Methotrexate Sodium (METHOTREXATE PO) Take 15 mg by mouth once a week On Wednesdays (6 of the 2.5mg tablets). evenings 5/24/2017 at Unknown time Yes Reported, Patient   LOSARTAN POTASSIUM PO Take 50 mg by mouth daily  5/26/2017 at Unknown time Yes Reported, Patient   FOLIC ACID PO Take 1 mg by mouth daily 5/26/2017 at Unknown time Yes Reported, Patient   calcium carb 1250 mg, 500 mg [place],/vitamin D 200 units (OSCAL WITH D) 500-200 MG-UNIT per tablet Take 1  tablet by mouth 2 times daily (with meals) 5/26/2017 at Unknown time Yes Unknown, Entered By History   multivitamin, therapeutic with minerals (THERA-VIT-M) TABS Take 1 tablet by mouth daily 5/26/2017 at Unknown time Yes Unknown, Entered By History   latanoprost (XALATAN) 0.005 % ophthalmic solution Place 1 drop into the right eye At Bedtime 5/26/2017 at Unknown time Yes Unknown, Entered By History   timolol (TIMOPTIC) 0.5 % ophthalmic solution Place 1 drop into the right eye 2 times daily 5/26/2017 at Unknown time Yes Unknown, Entered By History   carboxymethylcellulose (REFRESH PLUS) 0.5 % SOLN Place 1 drop into both eyes 2 times daily  5/26/2017 at Unknown time Yes Unknown, Entered By History            Current Medications:           acetaminophen  1,000 mg Oral TID     amiodarone (PACERONE/CODARONE) tablet 200 mg  200 mg Oral BID     brimonidine-timolol  1 drop Right Eye BID     folic acid (FOLVITE) tablet 1 mg  1 mg Oral Daily     latanoprost  1 drop Right Eye At Bedtime     losartan (COZAAR) tablet 50 mg  50 mg Oral Daily     [START ON 5/31/2017] methotrexate tablet CHEMO 15 mg  15 mg Oral Weekly     timolol  1 drop Right Eye BID     apixaban ANTICOAGULANT  2.5 mg Oral BID     naloxone, senna-docusate, polyethylene glycol, ondansetron **OR** ondansetron, prochlorperazine **OR** prochlorperazine **OR** prochlorperazine, HYDROmorphone, HYDROcodone-acetaminophen, cyclobenzaprine         Allergies:     Allergies   Allergen Reactions     Amitriptyline      Clonazepam Other (See Comments)     Somnolence at 0.5 mg dose     Latex Rash            Social History:     Social History   Substance Use Topics     Smoking status: Never Smoker     Smokeless tobacco: Not on file     Alcohol use Yes      Comment: occasional              Family History:     Family History   Problem Relation Age of Onset     CEREBROVASCULAR DISEASE Mother               Review of Systems:   The 10 point Review of Systems is negative other than  "noted in the HPI            Physical Exam:   Blood pressure 156/64, temperature 97.9  F (36.6  C), temperature source Axillary, resp. rate 18, height 1.613 m (5' 3.5\"), weight 59.6 kg (131 lb 8 oz), SpO2 97 %.  131 lbs 8 oz    Constitutional:   Awake, alert, cooperative, no apparent distress, and appears stated age.     Lungs:   No increased work of breathing, good air exchange.  On 2 L of oxygen by nasal cannula.       Musculoskeletal:   Mild tenderness with palpation over the left paraspinal cervical muscles without tenderness directly over the spinous processes.  There is no tenderness distally in the shoulder.  The patient demonstrates full pain-free range of motion of the left shoulder, elbow, and wrist.  5/5 abduction, elbow flex/ext, thumbs-up, a-ok, and finger spreading.  SILT ax/sr/m/u nn.  Fingers all wwp w/ brisk cap refill.  Symmetric strength throughout the BUE.              Data:   All new lab and imaging data was reviewed.  CT head and CTA obtained on 5/27/17 were reviewed and noted to be without acute vascular injury or bleed.  No significant carotid or intracranial stenosis.  Severe degenerative disc disease noted at C4-5, C5-6, and to a lesser degree C6-7 with limited central canal stenosis.  MRI scan of the cervical spine obtained this evening was also personally reviewed and confirms the presence of chronic-appearing degenerative changes at the C4-5 and C5-6 levels with left-sided foraminal stenosis.  There is no evidence of acute disc herniation or other acute osseous or soft tissue injury involving the cervical spine.    Results for orders placed or performed during the hospital encounter of 05/27/17 (from the past 24 hour(s))   EKG 12 lead   Result Value Ref Range    Interpretation ECG Click View Image link to view waveform and result    CBC with platelets differential   Result Value Ref Range    WBC 9.5 4.0 - 11.0 10e9/L    RBC Count 3.95 3.8 - 5.2 10e12/L    Hemoglobin 12.9 11.7 - 15.7 g/dL "    Hematocrit 36.6 35.0 - 47.0 %    MCV 93 78 - 100 fl    MCH 32.7 26.5 - 33.0 pg    MCHC 35.2 31.5 - 36.5 g/dL    RDW 13.6 10.0 - 15.0 %    Platelet Count 363 150 - 450 10e9/L    Diff Method Automated Method     % Neutrophils 83.1 %    % Lymphocytes 9.3 %    % Monocytes 3.8 %    % Eosinophils 2.9 %    % Basophils 0.2 %    % Immature Granulocytes 0.7 %    Nucleated RBCs 0 0 /100    Absolute Neutrophil 7.9 1.6 - 8.3 10e9/L    Absolute Lymphocytes 0.9 0.8 - 5.3 10e9/L    Absolute Monocytes 0.4 0.0 - 1.3 10e9/L    Absolute Eosinophils 0.3 0.0 - 0.7 10e9/L    Absolute Basophils 0.0 0.0 - 0.2 10e9/L    Abs Immature Granulocytes 0.1 0 - 0.4 10e9/L    Absolute Nucleated RBC 0.0    INR   Result Value Ref Range    INR 1.54 (H) 0.86 - 1.14   Partial thromboplastin time   Result Value Ref Range    PTT 34 22 - 37 sec   Comprehensive metabolic panel   Result Value Ref Range    Sodium 137 133 - 144 mmol/L    Potassium 3.6 3.4 - 5.3 mmol/L    Chloride 100 94 - 109 mmol/L    Carbon Dioxide 26 20 - 32 mmol/L    Anion Gap 11 3 - 14 mmol/L    Glucose 110 (H) 70 - 99 mg/dL    Urea Nitrogen 11 7 - 30 mg/dL    Creatinine 0.79 0.52 - 1.04 mg/dL    GFR Estimate 69 >60 mL/min/1.7m2    GFR Estimate If Black 84 >60 mL/min/1.7m2    Calcium 9.4 8.5 - 10.1 mg/dL    Bilirubin Total 0.7 0.2 - 1.3 mg/dL    Albumin 2.8 (L) 3.4 - 5.0 g/dL    Protein Total 6.9 6.8 - 8.8 g/dL    Alkaline Phosphatase 85 40 - 150 U/L    ALT 42 0 - 50 U/L    AST 22 0 - 45 U/L   Troponin I   Result Value Ref Range    Troponin I ES 0.022 0.000 - 0.045 ug/L   Nt probnp inpatient (BNP)   Result Value Ref Range    N-Terminal Pro BNP Inpatient 1091 0 - 1800 pg/mL   CT Head w/o Contrast    Narrative    CT HEAD WITHOUT CONTRAST   5/27/2017 10:29 AM     HISTORY: Head and neck pain, patient on eliquis    TECHNIQUE: Axial images of the head without IV contrast material.  Radiation dose for this scan was reduced using automated exposure  control, adjustment of the mA and/or kV  according to patient size, or  iterative reconstruction technique.    COMPARISON: CT dated 11/to/2016    FINDINGS:  There is generalized atrophy of the brain.  Areas of low  attenuation are present in the white matter of the cerebral  hemispheres that are consistent with small vessel ischemic disease in  this age patient. Chronic areas of encephalomalacia are again seen in  the left inferior cerebellum and right occipital lobe. There is no  evidence of intracranial hemorrhage, mass, acute infarct or anomaly.  The visualized portions of the sinuses and mastoids appear normal.  There is no evidence of trauma.      Impression    IMPRESSION:   1. No acute pathology. No bleed, mass, or acute infarcts are seen.  2. Atrophy of the brain.  White matter changes consistent with small  vessel ischemic disease.   3. Chronic areas of encephalomalacia are present in the left inferior  cerebellum and right occipital lobe these are unchanged.    BASHIR GARRETT MD   CT Head Neck Angio w/o & w Contrast    Narrative    CT ANGIOGRAM OF THE HEAD AND NECK  5/27/2017 10:33 AM     HISTORY: Headache and dizziness.    TECHNIQUE:  Precontrast localizing scans were followed by CT  angiography with an injection of 70 mL IV contrast with scans through  the head and neck.  Images were transferred to a separate 3-D  workstation where multiplanar reformations and 3-D images were  created.  Estimates of carotid stenoses are made relative to the  distal internal carotid artery diameters except as noted. Radiation  dose for this scan was reduced using automated exposure control,  adjustment of the mA and/or kV according to patient size, or iterative  reconstruction technique.    COMPARISON: None.    FINDINGS: Estimates of stenosis at the carotid bifurcations are  relative to the distal internal carotids.    Arch: Normal arch anatomy. Calcified atherosclerotic plaque is seen in  the arch of the aorta.    Neck:  Right Carotid:  The right common carotid  artery is normal.  Calcified  atherosclerotic plaque is seen at the right carotid bifurcation. The  stenosis is less than 50%..  The right internal carotid artery is  negative.     Left Carotid:  The left common carotid artery is unremarkable.   The  left carotid bifurcation appears normal.  The left internal carotid is  negative.      Vertebrals:  The vertebral arteries are normal.    Head:  Right Carotid:No occluded vessels are seen. .    Left Carotid:  No occluded vessels are identified. .    Baslilar:  The basilar artery and its branches appear normal.     Miscellaneous: The venous sinuses appear patent.      Impression    IMPRESSION:   1. No occluded intracranial vessels or high-grade intracranial  vascular stenosis.  2. No significant stenosis is seen at either carotid bifurcation.  3. No arterial dissection.    BASHIR GARRETT MD   CT Chest Pulmonary Embolism w Contrast    Narrative    Exam: CT CHEST PULMONARY EMBOLISM W CONTRAST 5/27/2017 10:45 AM    Comparison: 5/7/2015     Clinical History: Dyspnea    Technique: Volumetric helical acquisition of the chest were obtained  following pulmonary embolism protocol. Three-dimensional (3D)  post-processed angiographic images were reconstructed, archived to  PACS and used in interpretation of this study. Radiation dose for this  scan was reduced using automated exposure control, adjustment of the  mA and/or kV according to patient size, or iterative reconstruction  technique.    Contrast: 58 mL Isovue-370    Findings:  Contrast bolus timing is adequate for evaluation for pulmonary emboli.  There is no evidence of pulmonary emboli.  There is no evidence of  right heart strain.     Dependent atelectasis. Lungs are otherwise clear. No evidence of  pleural effusion is noted.    The heart size and great vessels are normal in caliber.  Atherosclerotic calcifications in the coronary arteries. No evidence  of axillary, mediastinal or hilar lymphadenopathy is seen.     Upper  abdomen: Evaluation of the upper abdomen is limited by contrast  bolus timing and coverage.    Bones: No concerning lytic or sclerotic lesions.      Impression    Impression: No evidence of pulmonary embolus or other findings to  suggest an etiology of the patient's dyspnea.    RAO VILLARREAL   UA with Microscopic   Result Value Ref Range    Color Urine Straw     Appearance Urine Clear     Glucose Urine Negative NEG mg/dL    Bilirubin Urine Negative NEG    Ketones Urine Negative NEG mg/dL    Specific Gravity Urine 1.015 1.003 - 1.035    Blood Urine Negative NEG    pH Urine 7.5 (H) 5.0 - 7.0 pH    Protein Albumin Urine Negative NEG mg/dL    Urobilinogen mg/dL Normal 0.0 - 2.0 mg/dL    Nitrite Urine Negative NEG    Leukocyte Esterase Urine Negative NEG    Source Catheterized Urine     WBC Urine <1 0 - 2 /HPF    RBC Urine 0 0 - 2 /HPF    Squamous Epithelial /HPF Urine 1 0 - 1 /HPF   Urine Culture Aerobic Bacterial   Result Value Ref Range    Specimen Description Catheterized Urine     Special Requests Specimen received in preservative     Culture Micro Pending     Micro Report Status Pending    Blood culture   Result Value Ref Range    Specimen Description Blood Left Arm     Special Requests Aerobic and anaerobic bottles received     Culture Micro No growth after 3 hours     Micro Report Status Pending    Blood culture   Result Value Ref Range    Specimen Description Blood Right Hand     Culture Micro No growth after 3 hours     Micro Report Status Pending    MR Cervical Spine w/o Contrast    Narrative    MR CERVICAL SPINE WITHOUT CONTRAST   5/27/2017 7:15 PM     HISTORY: Neck pain, evaluate for disc disease, stenosis.    TECHNIQUE:  Multiplanar multisequence MRI of the cervical spine  without gadolinium contrast.     COMPARISON:  None.    FINDINGS:  The spinal cord appears normal. The paraspinal soft tissues  appear normal.  The bone marrow has normal signal intensity.    C2-C3:  Minimal bulge. Central canal normal.  Mild degenerative change  in the facet joints.    C3-C4:  Mild annular disc bulge. Moderate degenerative change right  facet joint. Central canal adequate. Moderate-severe right foraminal  stenosis.    C4-C5:  Degeneration of the disc. Loss of disc space height. Diffuse  annular disc bulge with associated posterior osteophytes. Central  canal mildly narrowed. Moderate right and moderate-severe left  foraminal stenosis due to loss of disc space height and uncinate  spurs.    C5-C6:  Degeneration of the disc. Loss of disc space height. Mild  annular bulge with associated posterior osteophytes. Central canal  mildly narrowed. Moderate right and moderate-severe left foraminal  stenosis due to loss of disc space height and uncinate spur.    C6-C7:  Degeneration of the disc. Central canal normal. Mild-moderate  bilateral foraminal stenosis due to loss of disc space height.    C7-T1: Normal disc, facet joints, spinal canal and neural foramina.      Impression    IMPRESSION:    1. Multilevel degenerative change.  2. No focal left-sided disc herniations are seen.  3. The most significant left-sided finding appears to be at the C4-5  level and C5-C6 levels where there is moderate-severe left foraminal  stenosis due to loss of disc space height and uncinate spurs.     BASHIR GARRETT MD

## 2017-05-27 NOTE — IP AVS SNAPSHOT
MRN:[id number]                      After Visit Summary   5/27/2017    Marley Stewart    MRN: [id number]           Thank you!     Thank you for choosing [facility] for your care. Our goal is always to provide you with excellent care. Hearing back from our patients is one way we can continue to improve our services. Please take a few minutes to complete the written survey that you may receive in the mail after you visit with us. Thank you!        Patient Information     Date Of Birth          7/17/1932        About your hospital stay     You were admitted on:  May 27, 2017 You last received care in the:  [facility]    You were discharged on:  May 28, 2017        Reason for your hospital stay       You were here for evaluation of neck pain.                  Who to Call     For medical emergencies, please call 911.  For non-urgent questions about your medical care, please call your primary care provider or clinic, [phone]          Attending Provider     Provider Specialty    Hanna Campos MD Emergency Medicine    [facility], Az William MD Internal Medicine       Primary Care Provider Office Phone # Fax #    Chandra Tiffany Ortiz [phone]       [facility] [address]  [facility] [address]        After Care Instructions     Activity       Your activity upon discharge: activity as tolerated. You should use your walker with ambulation.            Diet       Follow this diet upon discharge: Orders Placed This Encounter      Regular Diet Adult            Discharge Instructions       Use Tylenol at home for pain control. You can take a maximum of 3000mg daily and can divide that into doses of 650mg every six hours or 1000mg every 8 hours. We have been dosing this every 8 hours in the hospital.     Use flexeril as needed for discomfort at home. This can make you drowsy so be careful when taking it. You should not drive or consume alcohol with this medication.  "    Apply warm packs to your neck at home to help relax your muscles.                  Follow-up Appointments     Follow-up and recommended labs and tests        Follow up with [facility] with a cervical spine specialist. Call [phone] to make an appointment for the next 1-2 weeks.   Follow up with your primary doctor sooner if needed    You will receive a call from Physical Therapy to schedule an appointment at your preferred location.                  Additional Services     Physical Therapy Referral       *This therapy referral will be filtered to a centralized scheduling office at [facility] and the patient will receive a call to schedule an appointment at a [facility] location most convenient for them. *     [facility] provides Physical Therapy evaluation and treatment and many specialty services across the [facility] system.  If requesting a specialty program, please choose from the list below.    If you have not heard from the scheduling office within 2 business days, please call [phone] for all locations, with the exception of [place], please call [phone].  Treatment: Evaluation & Treatment  Special Instructions/Modalities: Evaluate and treat as indicated  Special Programs: None    Please be aware that coverage of these services is subject to the terms and limitations of your health insurance plan.  Call member services at your health plan with any benefit or coverage questions.      **Note to Provider:  If you are referring outside of [facility] for the therapy appointment, please list the name of the location in the \"special instructions\" above, print the referral and give to the patient to schedule the appointment.                  Pending Results     Date and Time Order Name Status Description    5/27/2017 1337 Blood culture Preliminary     5/27/2017 1337 Blood culture Preliminary     5/27/2017 1000 Urine Culture Aerobic Bacterial Preliminary " "            Statement of Approval     Ordered          17 1126  I have reviewed and agree with all the recommendations and orders detailed in this document.  EFFECTIVE NOW     Approved and electronically signed by:  Enriqueta Christie PA-C             Admission Information     Date & Time Provider Department Dept. Phone    2017 Az Hurt MD [facility] [phone]      Your Vitals Were     Blood Pressure Temperature Respirations Height Weight Pulse Oximetry    127/59 (BP Location: Left arm) 98.6  F (37  C) (Oral) 18 1.613 m (5' 3.5\") 59.6 kg (131 lb 8 oz) 92%    BMI (Body Mass Index)                   22.93 kg/m2           MyChart Information     [facility] lets you send messages to your doctor, view your test results, renew your prescriptions, schedule appointments and more. To sign up, go to [email or web address] . Click on \"Log in\" on the left side of the screen, which will take you to the Welcome page. Then click on \"Sign up Now\" on the right side of the page.     You will be asked to enter the access code listed below, as well as some personal information. Please follow the directions to create your username and password.     Your access code is: [email or web address]  Expires: 2017 11:27 AM     Your access code will  in 90 days. If you need help or a new code, please call your [facility] clinic or [phone].        Care EveryWhere ID     This is your Care EveryWhere ID. This could be used by other organizations to access your [facility] medical records  [id number]           Review of your medicines      START taking        Dose / Directions    cyclobenzaprine 5 MG tablet   Commonly known as:  FLEXERIL   Used for:  Neck pain        Dose:  5 mg   Take 1 tablet (5 mg) by mouth 3 times daily as needed for muscle spasms   Quantity:  21 tablet   Refills:  0         CONTINUE these medicines which may have CHANGED, or have new prescriptions. If we are uncertain of the size of " tablets/capsules you have at home, strength may be listed as something that might have changed.        Dose / Directions    acetaminophen 500 MG tablet   Commonly known as:  TYLENOL   This may have changed:    - medication strength  - how much to take  - when to take this  - reasons to take this   Used for:  Neck pain        Dose:  1000 mg   Take 2 tablets (1,000 mg) by mouth 3 times daily   Refills:  0         CONTINUE these medicines which have NOT CHANGED        Dose / Directions    AMIODARONE HCL PO        Dose:  200 mg   Take 200 mg by mouth 2 times daily   Refills:  0       apixaban ANTICOAGULANT 5 MG tablet   Commonly known as:  ELIQUIS   Used for:  Atrial fibrillation (H)        Dose:  5 mg   Take 1 tablet (5 mg) by mouth 2 times daily   Quantity:  60 tablet   Refills:  0       brimonidine-timolol 0.2-0.5 % ophthalmic solution   Commonly known as:  COMBIGAN        Dose:  1 drop   Place 1 drop into the right eye 2 times daily   Refills:  0       calcium carb 1250 mg (500 mg [place])/vitamin D 200 units 500-200 MG-UNIT per tablet   Commonly known as:  OSCAL with D        Dose:  1 tablet   Take 1 tablet by mouth 2 times daily (with meals)   Refills:  0       carboxymethylcellulose 0.5 % Soln ophthalmic solution   Commonly known as:  REFRESH PLUS        Dose:  1 drop   Place 1 drop into both eyes 2 times daily   Refills:  0       cetirizine 5 MG tablet   Commonly known as:  zyrTEC   Used for:  Nasal congestion        Dose:  5 mg   Take 1 tablet (5 mg) by mouth daily   Quantity:  15 tablet   Refills:  0       FOLIC ACID PO        Dose:  1 mg   Take 1 mg by mouth daily   Refills:  0       furosemide 20 MG tablet   Commonly known as:  LASIX   Used for:  Acute on chronic diastolic congestive heart failure (H)        Dose:  20 mg   Take 1 tablet (20 mg) by mouth every morning   Quantity:  30 tablet   Refills:  0       latanoprost 0.005 % ophthalmic solution   Commonly known as:  XALATAN        Dose:  1 drop   Place 1  drop into the right eye At Bedtime   Refills:  0       LOSARTAN POTASSIUM PO        Dose:  50 mg   Take 50 mg by mouth daily   Refills:  0       METHOTREXATE PO   Indication:  Rheumatoid Arthritis        Dose:  15 mg   Take 15 mg by mouth once a week On Wednesdays (6 of the 2.5mg tablets). evenings   Refills:  0       multivitamin, therapeutic with minerals Tabs tablet        Dose:  1 tablet   Take 1 tablet by mouth daily   Refills:  0       sennosides 8.6 MG tablet   Commonly known as:  SENOKOT        Dose:  2 tablet   Take 2 tablets by mouth 2 times daily as needed for constipation   Refills:  0       timolol 0.5 % ophthalmic solution   Commonly known as:  TIMOPTIC        Dose:  1 drop   Place 1 drop into the right eye 2 times daily   Refills:  0         STOP taking     CEPHALEXIN PO                Where to get your medicines      Some of these will need a paper prescription and others can be bought over the counter. Ask your nurse if you have questions.     Bring a paper prescription for each of these medications     cyclobenzaprine 5 MG tablet       You don't need a prescription for these medications     acetaminophen 500 MG tablet                Protect others around you: Learn how to safely use, store and throw away your medicines at www.disposemymeds.org.             Medication List: This is a list of all your medications and when to take them. Check marks below indicate your daily home schedule. Keep this list as a reference.      Medications           Morning Afternoon Evening Bedtime As Needed    acetaminophen 500 MG tablet   Commonly known as:  TYLENOL   Take 2 tablets (1,000 mg) by mouth 3 times daily   Last time this was given:  1,000 mg on 5/28/2017  7:40 AM                                AMIODARONE HCL PO   Take 200 mg by mouth 2 times daily   Last time this was given:  200 mg on 5/28/2017  7:40 AM                                apixaban ANTICOAGULANT 5 MG tablet   Commonly known as:  ELIQUIS   Take 1  tablet (5 mg) by mouth 2 times daily   Last time this was given:  2.5 mg on 5/28/2017  7:40 AM                                brimonidine-timolol 0.2-0.5 % ophthalmic solution   Commonly known as:  COMBIGAN   Place 1 drop into the right eye 2 times daily   Last time this was given:  1 drop on 5/28/2017  7:40 AM                                calcium carb 1250 mg (500 mg [place])/vitamin D 200 units 500-200 MG-UNIT per tablet   Commonly known as:  OSCAL with D   Take 1 tablet by mouth 2 times daily (with meals)                                carboxymethylcellulose 0.5 % Soln ophthalmic solution   Commonly known as:  REFRESH PLUS   Place 1 drop into both eyes 2 times daily                                cetirizine 5 MG tablet   Commonly known as:  zyrTEC   Take 1 tablet (5 mg) by mouth daily                                cyclobenzaprine 5 MG tablet   Commonly known as:  FLEXERIL   Take 1 tablet (5 mg) by mouth 3 times daily as needed for muscle spasms   Last time this was given:  5 mg on 5/28/2017  5:37 AM                                FOLIC ACID PO   Take 1 mg by mouth daily   Last time this was given:  1 mg on 5/28/2017  7:40 AM                                furosemide 20 MG tablet   Commonly known as:  LASIX   Take 1 tablet (20 mg) by mouth every morning                                latanoprost 0.005 % ophthalmic solution   Commonly known as:  XALATAN   Place 1 drop into the right eye At Bedtime   Last time this was given:  1 drop on 5/28/2017 12:41 AM                                LOSARTAN POTASSIUM PO   Take 50 mg by mouth daily   Last time this was given:  50 mg on 5/28/2017  7:40 AM                                METHOTREXATE PO   Take 15 mg by mouth once a week On Wednesdays (6 of the 2.5mg tablets). evenings                                multivitamin, therapeutic with minerals Tabs tablet   Take 1 tablet by mouth daily                                sennosides 8.6 MG tablet   Commonly known as:  SENOKOT    Take 2 tablets by mouth 2 times daily as needed for constipation                                timolol 0.5 % ophthalmic solution   Commonly known as:  TIMOPTIC   Place 1 drop into the right eye 2 times daily   Last time this was given:  1 drop on 5/27/2017  8:58 PM

## 2017-05-27 NOTE — ED NOTES
Pt has been incont of large amts of urine many times as well as used bedpain with large amt of urine, pt states this is normal for her at home. Pain 7/10, to lt neck after morphine

## 2017-05-27 NOTE — PLAN OF CARE
Problem: Goal Outcome Summary  Goal: Goal Outcome Summary  Outcome: No Change  PRIOR TO DISCHARGE     Comments:   -diagnostic tests and consults completed and resulted - not met. MRI and ortho consult pending.   -vital signs normal or at patient baseline - not met. VSS on 2L NC, per patient she is not on O2 at baseline.        Nurse to notify provider when observation goals have been met and patient is ready for discharge.

## 2017-05-27 NOTE — ED NOTES
Pt was brought to [facility] [address] 1 due to 12 lead variances per EMS. SOB,  said her color is off. C/o pain in her left side of neck and left arm . VSS, placed on o2 2 liters, NC, sats were 90-88% room air. Taken to CT of head and neck . Returned to ED room 3.  in room, placed patient backl onto monitor and reported to RN taking patient.

## 2017-05-27 NOTE — ED NOTES
Bed: ST01  Expected date:   Expected time:   Means of arrival:   Comments:  May 1 weakness with EKG changes.  84 female

## 2017-05-27 NOTE — PLAN OF CARE
Problem: Goal Outcome Summary  Goal: Goal Outcome Summary  Outcome: No Change  Pt a/o.VSS.continues to c/o neck pain. Heat pack given. Chronic baseline SOB. On 92% RA. Tolerating diet. Voiding. MRI checklist done. Pt taken down to MRI. NSR with PVC on tele. Hx of afib. Will monitor.

## 2017-05-28 VITALS
HEIGHT: 64 IN | RESPIRATION RATE: 18 BRPM | WEIGHT: 131.5 LBS | DIASTOLIC BLOOD PRESSURE: 59 MMHG | BODY MASS INDEX: 22.45 KG/M2 | TEMPERATURE: 98.6 F | OXYGEN SATURATION: 92 % | SYSTOLIC BLOOD PRESSURE: 127 MMHG

## 2017-05-28 LAB
ANION GAP SERPL CALCULATED.3IONS-SCNC: 7 MMOL/L (ref 3–14)
BACTERIA SPEC CULT: NO GROWTH
BASOPHILS # BLD AUTO: 0 10E9/L (ref 0–0.2)
BASOPHILS NFR BLD AUTO: 0.2 %
BUN SERPL-MCNC: 20 MG/DL (ref 7–30)
CALCIUM SERPL-MCNC: 9.3 MG/DL (ref 8.5–10.1)
CHLORIDE SERPL-SCNC: 100 MMOL/L (ref 94–109)
CO2 SERPL-SCNC: 28 MMOL/L (ref 20–32)
CREAT SERPL-MCNC: 0.83 MG/DL (ref 0.52–1.04)
DIFFERENTIAL METHOD BLD: ABNORMAL
EOSINOPHIL # BLD AUTO: 0.7 10E9/L (ref 0–0.7)
EOSINOPHIL NFR BLD AUTO: 8.7 %
ERYTHROCYTE [DISTWIDTH] IN BLOOD BY AUTOMATED COUNT: 14 % (ref 10–15)
GFR SERPL CREATININE-BSD FRML MDRD: 65 ML/MIN/1.7M2
GLUCOSE SERPL-MCNC: 91 MG/DL (ref 70–99)
HCT VFR BLD AUTO: 32.9 % (ref 35–47)
HGB BLD-MCNC: 11.2 G/DL (ref 11.7–15.7)
IMM GRANULOCYTES # BLD: 0.1 10E9/L (ref 0–0.4)
IMM GRANULOCYTES NFR BLD: 1.2 %
LYMPHOCYTES # BLD AUTO: 1 10E9/L (ref 0.8–5.3)
LYMPHOCYTES NFR BLD AUTO: 12.3 %
Lab: NORMAL
MCH RBC QN AUTO: 31.8 PG (ref 26.5–33)
MCHC RBC AUTO-ENTMCNC: 34 G/DL (ref 31.5–36.5)
MCV RBC AUTO: 94 FL (ref 78–100)
MICRO REPORT STATUS: NORMAL
MONOCYTES # BLD AUTO: 0.8 10E9/L (ref 0–1.3)
MONOCYTES NFR BLD AUTO: 9.4 %
NEUTROPHILS # BLD AUTO: 5.6 10E9/L (ref 1.6–8.3)
NEUTROPHILS NFR BLD AUTO: 68.2 %
NRBC # BLD AUTO: 0 10*3/UL
NRBC BLD AUTO-RTO: 0 /100
PLATELET # BLD AUTO: 350 10E9/L (ref 150–450)
POTASSIUM SERPL-SCNC: 4 MMOL/L (ref 3.4–5.3)
RBC # BLD AUTO: 3.52 10E12/L (ref 3.8–5.2)
SODIUM SERPL-SCNC: 135 MMOL/L (ref 133–144)
SPECIMEN SOURCE: NORMAL
WBC # BLD AUTO: 8.2 10E9/L (ref 4–11)

## 2017-05-28 PROCEDURE — A9270 NON-COVERED ITEM OR SERVICE: HCPCS | Mod: GY | Performed by: INTERNAL MEDICINE

## 2017-05-28 PROCEDURE — 80048 BASIC METABOLIC PNL TOTAL CA: CPT | Performed by: INTERNAL MEDICINE

## 2017-05-28 PROCEDURE — 36415 COLL VENOUS BLD VENIPUNCTURE: CPT | Performed by: INTERNAL MEDICINE

## 2017-05-28 PROCEDURE — 99217 ZZC OBSERVATION CARE DISCHARGE: CPT | Performed by: INTERNAL MEDICINE

## 2017-05-28 PROCEDURE — 25000132 ZZH RX MED GY IP 250 OP 250 PS 637: Mod: GY | Performed by: INTERNAL MEDICINE

## 2017-05-28 PROCEDURE — G0378 HOSPITAL OBSERVATION PER HR: HCPCS

## 2017-05-28 PROCEDURE — 85025 COMPLETE CBC W/AUTO DIFF WBC: CPT | Performed by: INTERNAL MEDICINE

## 2017-05-28 RX ORDER — ACETAMINOPHEN 500 MG
1000 TABLET ORAL 3 TIMES DAILY
COMMUNITY
Start: 2017-05-28 | End: 2018-01-09

## 2017-05-28 RX ORDER — CYCLOBENZAPRINE HCL 5 MG
5 TABLET ORAL 3 TIMES DAILY PRN
Qty: 21 TABLET | Refills: 0 | Status: SHIPPED | OUTPATIENT
Start: 2017-05-28 | End: 2019-01-04

## 2017-05-28 RX ADMIN — LATANOPROST 1 DROP: 50 SOLUTION/ DROPS OPHTHALMIC at 00:41

## 2017-05-28 RX ADMIN — ACETAMINOPHEN 1000 MG: 500 TABLET, FILM COATED ORAL at 07:40

## 2017-05-28 RX ADMIN — APIXABAN 2.5 MG: 2.5 TABLET, FILM COATED ORAL at 07:40

## 2017-05-28 RX ADMIN — CYCLOBENZAPRINE HYDROCHLORIDE 5 MG: 5 TABLET, FILM COATED ORAL at 11:41

## 2017-05-28 RX ADMIN — BRIMONIDINE TARTRATE, TIMOLOL MALEATE 1 DROP: 2; 5 SOLUTION/ DROPS OPHTHALMIC at 07:40

## 2017-05-28 RX ADMIN — LOSARTAN POTASSIUM 50 MG: 50 TABLET ORAL at 07:40

## 2017-05-28 RX ADMIN — AMIODARONE HYDROCHLORIDE 200 MG: 200 TABLET ORAL at 07:40

## 2017-05-28 RX ADMIN — FOLIC ACID 1 MG: 1 TABLET ORAL at 07:40

## 2017-05-28 RX ADMIN — CYCLOBENZAPRINE HYDROCHLORIDE 5 MG: 5 TABLET, FILM COATED ORAL at 05:37

## 2017-05-28 ASSESSMENT — PAIN DESCRIPTION - DESCRIPTORS: DESCRIPTORS: ACHING

## 2017-05-28 NOTE — PROGRESS NOTES
ADDENDUM:  Case discussed with Enriqueta Christie PA-C. Chart reviewed and patient examined. Her note reflects our joint assessment and plans.    Vitals, medications, labs, imaging reviewed.    Pt doing better. More comfortable today on exam.     ASSESSMENT AND PLAN:  - As per Enriqueta Christie's note.  - D/c home with f/u Ortho Spine.    Az Hurt Jr., MD  [phone] (p)

## 2017-05-28 NOTE — PLAN OF CARE
Problem: Discharge Planning  Goal: Discharge Planning (Adult, OB, Behavioral, Peds)  Outcome: No Change  Comments: -diagnostic tests and consults completed and resulted - not met  -vital signs normal or at patient baseline - Met  -Nurse to notify provider when observation goals have been met and patient is ready for discharge.

## 2017-05-28 NOTE — PLAN OF CARE
Problem: Discharge Planning  Goal: Discharge Planning (Adult, OB, Behavioral, Peds)  Outcome: Improving  diagnostic tests and consults completed and resulted  Partially met  -vital signs normal or at patient baseline - Met  -Nurse to notify provider when observation goals have been met and patient is ready for discharge

## 2017-05-28 NOTE — PLAN OF CARE
Problem: Discharge Planning  Goal: Discharge Planning (Adult, OB, Behavioral, Peds)  Outcome: Improving  diagnostic tests and consults completed and resulted ; met  -vital signs normal or at patient baseline - Met  -Nurse to notify provider when observation goals have been met and patient is ready for discharge

## 2017-05-28 NOTE — PLAN OF CARE
Problem: Goal Outcome Summary  Goal: Goal Outcome Summary  Outcome: Adequate for Discharge Date Met:  05/28/17  Pt doing well. A/o. VSS. Pain controlled wit tylenol, flexeril, and heat packs. Tolerating reg diet. Up SBA with walker. Ok to dc home. Dc instructions reviewed with pt. Verbalizes understanding. PIV removed. Take home meds given. Verified 5 medication rights.  Copy signed. Dc home with .

## 2017-05-28 NOTE — PLAN OF CARE
Problem: Goal Outcome Summary  Goal: Goal Outcome Summary  Outcome: Improving  Patient was A &O. C/o some pain at neck. PRN Flexeril and heat pack  administered. Declined any Narcotics this shift. SBA for transfer. Regular diet. VSS. continuous tele monitoring. Sinus felix with first degree AV Block.

## 2017-05-28 NOTE — PLAN OF CARE
Problem: Goal Outcome Summary  Goal: Goal Outcome Summary  Outcome: No Change  A&Ox4, up to the Bathroom with SBA and walker. Diet regular. Pain Tele NSR with AVB.  Patient has SOB when ambulated to the bathroom but O2 sats are 90%. Chronic SOB is baseline. MRI done and ortho consult done.  Pain in neck  receiving flexeril and scheduled Tylenol. Warm packs to the neck are helping with pain. Patient has not been incontinent of urine.

## 2017-05-29 NOTE — DISCHARGE SUMMARY
[facility]    Discharge Summary  Hospitalist    Date of Admission:  5/27/2017  Date of Discharge:  5/28/2017 11:44 AM  Discharging Provider: Enriqueta Christie PA-C    Discharge Diagnoses   Left-sided neck pain  Chronic dyspnea  Hypertension  Rheumatoid arthritis  Left ventricular hypertrophy   Diastolic CHF  Atrial fibrillation  Hypothyroidism  Chronic low back pain    History of Present Illness   Marley Stewart is an 84 year old female with past medical history significant for rheumatoid arthritis, hypertension, diastolic CHF, atrial fibrillation, stroke history, hypothyroidism, dyslipidemia and chronic spine/disc disease who presented to the Emergency Department for evaluation of left-sided neck pain and chills. The patient reported her pain started around a week prior to admission. The discomfort was mostly localized to the left side of her neck and occasionally radiated up towards the back of her head. Her  also felt the day of admission that she seemed more pale and noted a bluish color to her lips. She was thus brought to the Emergency Department for evaluation. See H&P by Az Hurt for additional information.     On day of discharge patient is feeling better. Neck pain is improved and controlled with flexeril and tylenol. She is still feeling a little fatigued but reports this is at baseline. She denies any shortness of breath or chills. She is able to ambulate with a walker.     Hospital Course   Marley Stewart was admitted on 5/27/2017.  The following problems were addressed during her hospitalization:    1.  Neck pain.  The patient does have a history of arthritis as well as spine/disk disease including in the lumbar spine with spinal stenosis and disk disease.  On this occasion she did have imaging evaluation including CT head without contrast as well as CT angiogram of head and neck and CT PE protocol which were all negative for any acute findings.  Suspect her discomfort most  likely related to her underlying cervical spine/disk disease with component of muscle spasm. MRI of the cervical spine was ordered which showed multilevel degenerative changes and C4-5 and C5-6 moderate to severe left foraminal stenosis. Patient was evaluated by Ortho Spine who agreed with management with flexeril and heat. Recommended follow up in 1-2 weeks with cervical spine specialist to discuss possible GEOFF. Discharged on regimen of scheduled tylenol with prn flexeril.     2.  Hypertension. Continued PTA Losartan.      3.  Left ventricular hypotrophy with diastolic congestive heart failure.  Echocardiogram in 03/2017 with left ventricular EF of 65%-70% with left ventricular hypotrophy; normal right ventricular size and systolic function; mild to moderate mitral regurgitation; mild tricuspid regurgitation and severe hypertension.  Losartan was continued during admission, patient advised to resume lasix at discharge.      4.  Atrial fibrillation/flutter with stroke history.  The patient has a history of atrial flutter in this March of this year for which she was cardioverted.  She is maintained on amiodarone.  Continued amiodarone and apixaban.     5.  Recent dysuria/urinary tract infection.  The patient was started on Keflex which sounds like empirically for urinary tract infection.  She has been on Keflex since 05/22.  Urinalysis on admission does not suggest ongoing infection so Kelfex was discontinued.     This patient was seen and examined with Dr. Hurt who agrees with the above plan.    Enriqueta Christie PA-C    Significant Results and Procedures   See below    Code Status   Full code       Primary Care Physician   Chandra Ortiz    Physical Exam                      Vitals:    05/27/17 0958 05/27/17 1257   Weight: 63.5 kg (140 lb) 59.6 kg (131 lb 8 oz)     Vital Signs with Ranges          Constitutional: Alert and oriented, sitting up in bed. No acute distress. Appears comfortable and is  appropriately conversant.   ENT: normal cephalic, moist mucous membranes  Respiratory: Lungs clear to auscultation bilaterally, no increased work of breathing or wheezing. No crackles.   Cardiovascular: Regular rate and rhythm, no murmur. No LE edema   MSK:  Patient moves all four extremities. Palpable muscle spasm left cervical muscles with mild tenderness to palpation. ROM of neck is intact.   Neurologic. Cranial nerves 2-12 grossly intact. No focal deficits.   Skin/Integumen: warm, dry      Discharge Disposition   Discharged to home  Condition at discharge: Stable    Consultations This Hospital Stay   ORTHOPEDIC SURGERY IP CONSULT    Time Spent on this Encounter   I, Enriqueta Christie, personally saw the patient today and spent greater than 30 minutes discharging this patient.    Discharge Orders     Physical Therapy Referral     Reason for your hospital stay   You were here for evaluation of neck pain.     Follow-up and recommended labs and tests    Follow up with [facility] with a cervical spine specialist. Call [phone] to make an appointment for the next 1-2 weeks.   Follow up with your primary doctor sooner if needed    You will receive a call from Physical Therapy to schedule an appointment at your preferred location.     Activity   Your activity upon discharge: activity as tolerated. You should use your walker with ambulation.     Discharge Instructions   Use Tylenol at home for pain control. You can take a maximum of 3000mg daily and can divide that into doses of 650mg every six hours or 1000mg every 8 hours. We have been dosing this every 8 hours in the hospital.     Use flexeril as needed for discomfort at home. This can make you drowsy so be careful when taking it. You should not drive or consume alcohol with this medication.     Apply warm packs to your neck at home to help relax your muscles.     Full Code     Diet   Follow this diet upon discharge: Orders Placed This Encounter      Regular Diet Adult       Discharge Medications   Discharge Medication List as of 5/28/2017 11:27 AM      START taking these medications    Details   cyclobenzaprine (FLEXERIL) 5 MG tablet Take 1 tablet (5 mg) by mouth 3 times daily as needed for muscle spasms, Disp-21 tablet, R-0, Local Print         CONTINUE these medications which have CHANGED    Details   acetaminophen (TYLENOL) 500 MG tablet Take 2 tablets (1,000 mg) by mouth 3 times daily, OTC         CONTINUE these medications which have NOT CHANGED    Details   sennosides (SENOKOT) 8.6 MG tablet Take 2 tablets by mouth 2 times daily as needed for constipation, Historical      AMIODARONE HCL PO Take 200 mg by mouth 2 times daily, Historical      brimonidine-timolol (COMBIGAN) 0.2-0.5 % ophthalmic solution Place 1 drop into the right eye 2 times daily, Historical      cetirizine (ZYRTEC) 5 MG tablet Take 1 tablet (5 mg) by mouth daily, Disp-15 tablet, R-0, E-Prescribe      apixaban ANTICOAGULANT (ELIQUIS) 5 MG tablet Take 1 tablet (5 mg) by mouth 2 times daily, Disp-60 tablet, R-0, E-Prescribe      furosemide (LASIX) 20 MG tablet Take 1 tablet (20 mg) by mouth every morning, Disp-30 tablet, R-0, E-Prescribe      Methotrexate Sodium (METHOTREXATE PO) Take 15 mg by mouth once a week On Wednesdays (6 of the 2.5mg tablets). evenings, Historical      LOSARTAN POTASSIUM PO Take 50 mg by mouth daily , Historical      FOLIC ACID PO Take 1 mg by mouth daily, Historical      calcium carb 1250 mg, 500 mg Lummi,/vitamin D 200 units (OSCAL WITH D) 500-200 MG-UNIT per tablet Take 1 tablet by mouth 2 times daily (with meals), Historical      multivitamin, therapeutic with minerals (THERA-VIT-M) TABS Take 1 tablet by mouth daily, Historical      latanoprost (XALATAN) 0.005 % ophthalmic solution Place 1 drop into the right eye At Bedtime, Historical      timolol (TIMOPTIC) 0.5 % ophthalmic solution Place 1 drop into the right eye 2 times daily, Historical       carboxymethylcellulose (REFRESH PLUS) 0.5 % SOLN Place 1 drop into both eyes 2 times daily , Historical         STOP taking these medications       CEPHALEXIN PO Comments:   Reason for Stopping:             Allergies   Allergies   Allergen Reactions     Amitriptyline      Clonazepam Other (See Comments)     Somnolence at 0.5 mg dose     Latex Rash     Data   Most Recent 3 CBC's:  Recent Labs   Lab Test  05/28/17   0546  05/27/17   0955  11/03/16   0826   WBC  8.2  9.5  9.4   HGB  11.2*  12.9  12.1   MCV  94  93  92   PLT  350  363  248      Most Recent 3 BMP's:  Recent Labs   Lab Test  05/28/17   0546  05/27/17   0955  11/03/16   0826   NA  135  137  137   POTASSIUM  4.0  3.6  4.2   CHLORIDE  100  100  104   CO2  28  26  26   BUN  20  11  12   CR  0.83  0.79  0.75   ANIONGAP  7  11  7   LONNIE  9.3  9.4  8.7   GLC  91  110*  92     Most Recent 2 LFT's:  Recent Labs   Lab Test  05/27/17   0955  12/05/15   0624   AST  22  24   ALT  42  34   ALKPHOS  85  65   BILITOTAL  0.7  0.6     Most Recent INR's and Anticoagulation Dosing History:  Anticoagulation Dose History     Recent Dosing and Labs Latest Ref Rng & Units 5/27/2017    INR 0.86 - 1.14 1.54(H)        Most Recent 3 Troponin's:  Recent Labs   Lab Test  05/27/17   0955  11/02/16   1042  12/05/15   0624   TROPI  0.022  0.026  0.028     Most Recent Cholesterol Panel:  Recent Labs   Lab Test  12/04/15   2210   CHOL  168   LDL  96   HDL  40*   TRIG  159*     Most Recent 6 Bacteria Isolates From Any Culture (See EPIC Reports for Culture Details):  Recent Labs   Lab Test  05/27/17   1513  05/27/17   1507  05/27/17   1049  11/02/16   1059  05/07/15   0831  05/07/15   0825   CULT  No growth after 2 days  No growth after 2 days  No growth  10,000 to 50,000 colonies/mL mixed urogenital tony Susceptibility testing not   routinely done    No growth  No growth     Most Recent TSH, T4 and A1c Labs:  Recent Labs   Lab Test  11/02/16   1042   04/28/15   1455   TSH  0.54   < >   --     A1C   --    --   5.6    < > = values in this interval not displayed.     Results for orders placed or performed during the hospital encounter of 05/27/17   CT Chest Pulmonary Embolism w Contrast    Narrative    Exam: CT CHEST PULMONARY EMBOLISM W CONTRAST 5/27/2017 10:45 AM    Comparison: 5/7/2015     Clinical History: Dyspnea    Technique: Volumetric helical acquisition of the chest were obtained  following pulmonary embolism protocol. Three-dimensional (3D)  post-processed angiographic images were reconstructed, archived to  PACS and used in interpretation of this study. Radiation dose for this  scan was reduced using automated exposure control, adjustment of the  mA and/or kV according to patient size, or iterative reconstruction  technique.    Contrast: 58 mL Isovue-[id number]    Findings:  Contrast bolus timing is adequate for evaluation for pulmonary emboli.  There is no evidence of pulmonary emboli.  There is no evidence of  right heart strain.     Dependent atelectasis. Lungs are otherwise clear. No evidence of  pleural effusion is noted.    The heart size and great vessels are normal in caliber.  Atherosclerotic calcifications in the coronary arteries. No evidence  of axillary, mediastinal or hilar lymphadenopathy is seen.     Upper abdomen: Evaluation of the upper abdomen is limited by contrast  bolus timing and coverage.    Bones: No concerning lytic or sclerotic lesions.      Impression    Impression: No evidence of pulmonary embolus or other findings to  suggest an etiology of the patient's dyspnea.    RAO VILLARREAL   CT Head w/o Contrast    Narrative    CT HEAD WITHOUT CONTRAST   5/27/2017 10:29 AM     HISTORY: Head and neck pain, patient on eliquis    TECHNIQUE: Axial images of the head without IV contrast material.  Radiation dose for this scan was reduced using automated exposure  control, adjustment of the mA and/or kV according to patient size, or  iterative reconstruction technique.    COMPARISON: CT  dated 11/to/2016    FINDINGS:  There is generalized atrophy of the brain.  Areas of low  attenuation are present in the white matter of the cerebral  hemispheres that are consistent with small vessel ischemic disease in  this age patient. Chronic areas of encephalomalacia are again seen in  the left inferior cerebellum and right occipital lobe. There is no  evidence of intracranial hemorrhage, mass, acute infarct or anomaly.  The visualized portions of the sinuses and mastoids appear normal.  There is no evidence of trauma.      Impression    IMPRESSION:   1. No acute pathology. No bleed, mass, or acute infarcts are seen.  2. Atrophy of the brain.  White matter changes consistent with small  vessel ischemic disease.   3. Chronic areas of encephalomalacia are present in the left inferior  cerebellum and right occipital lobe these are unchanged.    BASHIR GARRETT MD   CT Head Neck Angio w/o & w Contrast    Narrative    CT ANGIOGRAM OF THE HEAD AND NECK  5/27/2017 10:33 AM     HISTORY: Headache and dizziness.    TECHNIQUE:  Precontrast localizing scans were followed by CT  angiography with an injection of 70 mL IV contrast with scans through  the head and neck.  Images were transferred to a separate 3-D  workstation where multiplanar reformations and 3-D images were  created.  Estimates of carotid stenoses are made relative to the  distal internal carotid artery diameters except as noted. Radiation  dose for this scan was reduced using automated exposure control,  adjustment of the mA and/or kV according to patient size, or iterative  reconstruction technique.    COMPARISON: None.    FINDINGS: Estimates of stenosis at the carotid bifurcations are  relative to the distal internal carotids.    Arch: Normal arch anatomy. Calcified atherosclerotic plaque is seen in  the arch of the aorta.    Neck:  Right Carotid:  The right common carotid artery is normal.  Calcified  atherosclerotic plaque is seen at the right carotid  bifurcation. The  stenosis is less than 50%..  The right internal carotid artery is  negative.     Left Carotid:  The left common carotid artery is unremarkable.   The  left carotid bifurcation appears normal.  The left internal carotid is  negative.      Vertebrals:  The vertebral arteries are normal.    Head:  Right Carotid:No occluded vessels are seen. .    Left Carotid:  No occluded vessels are identified. .    Baslilar:  The basilar artery and its branches appear normal.     Miscellaneous: The venous sinuses appear patent.      Impression    IMPRESSION:   1. No occluded intracranial vessels or high-grade intracranial  vascular stenosis.  2. No significant stenosis is seen at either carotid bifurcation.  3. No arterial dissection.    BASHIR GARRETT MD   MR Cervical Spine w/o Contrast    Narrative    MR CERVICAL SPINE WITHOUT CONTRAST   5/27/2017 7:15 PM     HISTORY: Neck pain, evaluate for disc disease, stenosis.    TECHNIQUE:  Multiplanar multisequence MRI of the cervical spine  without gadolinium contrast.     COMPARISON:  None.    FINDINGS:  The spinal cord appears normal. The paraspinal soft tissues  appear normal.  The bone marrow has normal signal intensity.    C2-C3:  Minimal bulge. Central canal normal. Mild degenerative change  in the facet joints.    C3-C4:  Mild annular disc bulge. Moderate degenerative change right  facet joint. Central canal adequate. Moderate-severe right foraminal  stenosis.    C4-C5:  Degeneration of the disc. Loss of disc space height. Diffuse  annular disc bulge with associated posterior osteophytes. Central  canal mildly narrowed. Moderate right and moderate-severe left  foraminal stenosis due to loss of disc space height and uncinate  spurs.    C5-C6:  Degeneration of the disc. Loss of disc space height. Mild  annular bulge with associated posterior osteophytes. Central canal  mildly narrowed. Moderate right and moderate-severe left foraminal  stenosis due to loss of disc space  height and uncinate spur.    C6-C7:  Degeneration of the disc. Central canal normal. Mild-moderate  bilateral foraminal stenosis due to loss of disc space height.    C7-T1: Normal disc, facet joints, spinal canal and neural foramina.      Impression    IMPRESSION:    1. Multilevel degenerative change.  2. No focal left-sided disc herniations are seen.  3. The most significant left-sided finding appears to be at the C4-5  level and C5-C6 levels where there is moderate-severe left foraminal  stenosis due to loss of disc space height and uncinate spurs.     BASHIR GARRETT MD

## 2017-06-02 LAB
BACTERIA SPEC CULT: NO GROWTH
BACTERIA SPEC CULT: NO GROWTH
Lab: NORMAL
MICRO REPORT STATUS: NORMAL
MICRO REPORT STATUS: NORMAL
SPECIMEN SOURCE: NORMAL
SPECIMEN SOURCE: NORMAL

## 2018-01-09 ENCOUNTER — APPOINTMENT (OUTPATIENT)
Dept: GENERAL RADIOLOGY | Facility: CLINIC | Age: 83
DRG: 194 | End: 2018-01-09
Attending: EMERGENCY MEDICINE
Payer: MEDICARE

## 2018-01-09 ENCOUNTER — HOSPITAL ENCOUNTER (INPATIENT)
Facility: CLINIC | Age: 83
LOS: 3 days | Discharge: HOME HEALTH | DRG: 194 | End: 2018-01-13
Attending: EMERGENCY MEDICINE | Admitting: INTERNAL MEDICINE
Payer: MEDICARE

## 2018-01-09 DIAGNOSIS — J18.9 PNEUMONIA OF LEFT LOWER LOBE DUE TO INFECTIOUS ORGANISM: ICD-10-CM

## 2018-01-09 LAB
ANION GAP SERPL CALCULATED.3IONS-SCNC: 6 MMOL/L (ref 3–14)
BASOPHILS # BLD AUTO: 0 10E9/L (ref 0–0.2)
BASOPHILS NFR BLD AUTO: 0.1 %
BUN SERPL-MCNC: 26 MG/DL (ref 7–30)
CALCIUM SERPL-MCNC: 9.1 MG/DL (ref 8.5–10.1)
CHLORIDE SERPL-SCNC: 101 MMOL/L (ref 94–109)
CO2 SERPL-SCNC: 29 MMOL/L (ref 20–32)
CREAT SERPL-MCNC: 0.95 MG/DL (ref 0.52–1.04)
DIFFERENTIAL METHOD BLD: ABNORMAL
EOSINOPHIL # BLD AUTO: 0 10E9/L (ref 0–0.7)
EOSINOPHIL NFR BLD AUTO: 0 %
ERYTHROCYTE [DISTWIDTH] IN BLOOD BY AUTOMATED COUNT: 14 % (ref 10–15)
FLUAV+FLUBV AG SPEC QL: NEGATIVE
FLUAV+FLUBV AG SPEC QL: NEGATIVE
GFR SERPL CREATININE-BSD FRML MDRD: 56 ML/MIN/1.7M2
GLUCOSE SERPL-MCNC: 139 MG/DL (ref 70–99)
HCT VFR BLD AUTO: 41 % (ref 35–47)
HGB BLD-MCNC: 14.2 G/DL (ref 11.7–15.7)
IMM GRANULOCYTES # BLD: 0.1 10E9/L (ref 0–0.4)
IMM GRANULOCYTES NFR BLD: 0.6 %
LYMPHOCYTES # BLD AUTO: 2 10E9/L (ref 0.8–5.3)
LYMPHOCYTES NFR BLD AUTO: 8.6 %
MCH RBC QN AUTO: 33 PG (ref 26.5–33)
MCHC RBC AUTO-ENTMCNC: 34.6 G/DL (ref 31.5–36.5)
MCV RBC AUTO: 95 FL (ref 78–100)
MONOCYTES # BLD AUTO: 2.1 10E9/L (ref 0–1.3)
MONOCYTES NFR BLD AUTO: 9 %
NEUTROPHILS # BLD AUTO: 19.3 10E9/L (ref 1.6–8.3)
NEUTROPHILS NFR BLD AUTO: 81.7 %
NRBC # BLD AUTO: 0 10*3/UL
NRBC BLD AUTO-RTO: 0 /100
NT-PROBNP SERPL-MCNC: 1509 PG/ML (ref 0–1800)
PLATELET # BLD AUTO: 338 10E9/L (ref 150–450)
POTASSIUM SERPL-SCNC: 4.4 MMOL/L (ref 3.4–5.3)
RBC # BLD AUTO: 4.3 10E12/L (ref 3.8–5.2)
SODIUM SERPL-SCNC: 136 MMOL/L (ref 133–144)
SPECIMEN SOURCE: NORMAL
WBC # BLD AUTO: 23.7 10E9/L (ref 4–11)

## 2018-01-09 PROCEDURE — 87804 INFLUENZA ASSAY W/OPTIC: CPT | Performed by: EMERGENCY MEDICINE

## 2018-01-09 PROCEDURE — 80048 BASIC METABOLIC PNL TOTAL CA: CPT | Performed by: EMERGENCY MEDICINE

## 2018-01-09 PROCEDURE — 27210995 ZZH RX 272: Performed by: EMERGENCY MEDICINE

## 2018-01-09 PROCEDURE — 12000000 ZZH R&B MED SURG/OB

## 2018-01-09 PROCEDURE — 71046 X-RAY EXAM CHEST 2 VIEWS: CPT

## 2018-01-09 PROCEDURE — 25000132 ZZH RX MED GY IP 250 OP 250 PS 637: Performed by: EMERGENCY MEDICINE

## 2018-01-09 PROCEDURE — 85025 COMPLETE CBC W/AUTO DIFF WBC: CPT | Performed by: EMERGENCY MEDICINE

## 2018-01-09 PROCEDURE — 99285 EMERGENCY DEPT VISIT HI MDM: CPT | Mod: 25

## 2018-01-09 PROCEDURE — 87040 BLOOD CULTURE FOR BACTERIA: CPT | Performed by: EMERGENCY MEDICINE

## 2018-01-09 PROCEDURE — 83880 ASSAY OF NATRIURETIC PEPTIDE: CPT | Performed by: EMERGENCY MEDICINE

## 2018-01-09 PROCEDURE — 25000125 ZZHC RX 250: Performed by: EMERGENCY MEDICINE

## 2018-01-09 PROCEDURE — 94640 AIRWAY INHALATION TREATMENT: CPT

## 2018-01-09 PROCEDURE — 96365 THER/PROPH/DIAG IV INF INIT: CPT

## 2018-01-09 PROCEDURE — 25000128 H RX IP 250 OP 636: Performed by: EMERGENCY MEDICINE

## 2018-01-09 PROCEDURE — 96375 TX/PRO/DX INJ NEW DRUG ADDON: CPT

## 2018-01-09 RX ORDER — ALBUTEROL SULFATE 90 UG/1
2 AEROSOL, METERED RESPIRATORY (INHALATION) EVERY 6 HOURS PRN
COMMUNITY
End: 2019-01-04

## 2018-01-09 RX ORDER — IPRATROPIUM BROMIDE AND ALBUTEROL SULFATE 2.5; .5 MG/3ML; MG/3ML
3 SOLUTION RESPIRATORY (INHALATION) ONCE
Status: COMPLETED | OUTPATIENT
Start: 2018-01-09 | End: 2018-01-09

## 2018-01-09 RX ORDER — LABETALOL HYDROCHLORIDE 5 MG/ML
10 INJECTION, SOLUTION INTRAVENOUS ONCE
Status: DISCONTINUED | OUTPATIENT
Start: 2018-01-09 | End: 2018-01-13 | Stop reason: HOSPADM

## 2018-01-09 RX ORDER — FLUOCINONIDE TOPICAL SOLUTION USP, 0.05% 0.5 MG/ML
SOLUTION TOPICAL DAILY PRN
COMMUNITY
End: 2019-01-04

## 2018-01-09 RX ORDER — CEFUROXIME AXETIL 500 MG/1
500 TABLET ORAL 2 TIMES DAILY
Status: ON HOLD | COMMUNITY
Start: 2018-01-05 | End: 2018-01-13

## 2018-01-09 RX ORDER — GUAIFENESIN AND CODEINE PHOSPHATE 100; 10 MG/5ML; MG/5ML
5 SOLUTION ORAL EVERY 6 HOURS PRN
COMMUNITY
End: 2019-01-04

## 2018-01-09 RX ORDER — ACETAMINOPHEN 500 MG
1000 TABLET ORAL ONCE
Status: COMPLETED | OUTPATIENT
Start: 2018-01-09 | End: 2018-01-09

## 2018-01-09 RX ADMIN — AZITHROMYCIN 500 MG: 500 INJECTION, POWDER, LYOPHILIZED, FOR SOLUTION INTRAVENOUS at 21:27

## 2018-01-09 RX ADMIN — CEFTRIAXONE SODIUM 2 G: 10 INJECTION, POWDER, FOR SOLUTION INTRAVENOUS at 21:27

## 2018-01-09 RX ADMIN — IPRATROPIUM BROMIDE AND ALBUTEROL SULFATE 3 ML: .5; 3 SOLUTION RESPIRATORY (INHALATION) at 19:55

## 2018-01-09 RX ADMIN — ACETAMINOPHEN 1000 MG: 500 TABLET, FILM COATED ORAL at 20:57

## 2018-01-09 ASSESSMENT — ENCOUNTER SYMPTOMS
FATIGUE: 1
SHORTNESS OF BREATH: 1
FEVER: 1
WHEEZING: 1
COUGH: 1

## 2018-01-09 NOTE — Clinical Note
Admitting Physician: CARLY TIWARI [[id number]]   Special needs: Isolation [1]   Bed request comments: in-med bed

## 2018-01-09 NOTE — IP AVS SNAPSHOT
MRN:[id number]                      After Visit Summary   1/9/2018    Marley Setwart    MRN: [id number]           Thank you!     Thank you for choosing [facility] for your care. Our goal is always to provide you with excellent care. Hearing back from our patients is one way we can continue to improve our services. Please take a few minutes to complete the written survey that you may receive in the mail after you visit with us. Thank you!        Patient Information     Date Of Birth          7/17/1932        Designated Caregiver       Most Recent Value    Caregiver    Will someone help with your care after discharge? yes    Name of designated caregiver Fly    Phone number of caregiver [phone]    Caregiver address [place]      About your hospital stay     You were admitted on:  January 10, 2018 You last received care in the:  [place] [address] [facility]    You were discharged on:  January 13, 2018        Reason for your hospital stay       You presented with sob, cough and fever. Found to have a left lower lobe Pneumonia.                  Who to Call     For medical emergencies, please call 911.  For non-urgent questions about your medical care, please call your primary care provider or clinic, [phone]          Attending Provider     Provider Specialty    Bandar Loja MD Emergency Medicine    [facility], Lukas Hoyt MD Internal Medicine       Primary Care Provider Office Phone # Fax #    Chandra Tiffany Ortiz [phone]       When to contact your care team       Call your primary doctor if you have any of the following:  increased shortness of breath or recurrent fever > 101.0.                  After Care Instructions     Activity       Your activity upon discharge: activity as tolerated            Diet       Follow this diet upon discharge:   Regular Diet Adult                  Follow-up Appointments     Follow Up and recommended labs and tests       Follow up with primary  "care provider, Chandra Ortiz, within 7 days to evaluate medication change and for hospital follow- up.  No follow up labs or test are needed. Will need to follow for ongoing improvement.  Holding Methrotrexate until recovered and seen by PCP.                  Additional Services     Home Care PT Referral for Hospital Discharge       PT to eval and treat    Your provider has ordered home care - physical therapy. If you have not been contacted within 2 days of your discharge please call the department phone number listed on the top of this document.                  Further instructions from your care team       [facility] [phone] -- Home Physical Therapy    Pending Results     Date and Time Order Name Status Description    1/9/2018 1941 Blood culture Preliminary     1/9/2018 1941 Blood culture Preliminary             Statement of Approval     Ordered          01/13/18 0748  I have reviewed and agree with all the recommendations and orders detailed in this document.  EFFECTIVE NOW     Approved and electronically signed by:  Chente Pelayo MD             Admission Information     Date & Time Provider Department Dept. Phone    1/9/2018 Lukas Weber MD [place] [address] [facility] [phone]      Your Vitals Were     Blood Pressure Pulse Temperature Respirations Height Weight    166/61 (BP Location: Right arm) 60 95.8  F (35.4  C) (Axillary) 18 1.613 m (5' 3.5\") 66.4 kg (146 lb 6.4 oz)    Pulse Oximetry BMI (Body Mass Index)                97% 25.53 kg/m2          MyChart Information     [employer] gives you secure access to your electronic health record. If you see a primary care provider, you can also send messages to your care team and make appointments. If you have questions, please call your primary care clinic.  If you do not have a primary care provider, please call [phone] and they will assist you.        Care EveryWhere ID     This is your Care EveryWhere ID. This could " be used by other organizations to access your [facility] medical records  [id number]        Equal Access to Services     ALECIA MIRELES : Hadii nehal Dennison, wahalida jr, jaceyta kakimberlyrox wilburnarnoldorox, margarette daleanooplauri amador. So [facility] [phone].    ATENCIÓN: Si habla español, tiene a borjas disposición servicios gratuitos de asistencia lingüística. Llame al [phone].    We comply with applicable federal civil rights laws and [place] laws. We do not discriminate on the basis of race, color, national origin, age, disability, sex, sexual orientation, or gender identity.               Review of your medicines      START taking        Dose / Directions    amoxicillin-clavulanate 875-125 MG per tablet   Commonly known as:  AUGMENTIN        Dose:  1 tablet   Take 1 tablet by mouth 2 times daily for 4 days   Quantity:  8 tablet   Refills:  0       benzonatate 100 MG capsule   Commonly known as:  TESSALON        Dose:  100 mg   Take 1 capsule (100 mg) by mouth 3 times daily as needed for cough   Quantity:  21 capsule   Refills:  0       ipratropium - albuterol 0.5 mg/2.5 mg/3 mL 0.5-2.5 (3) MG/3ML neb solution   Commonly known as:  DUONEB        Dose:  3 mL   Take 1 vial (3 mLs) by nebulization every 4 hours (while awake) for 10 days   Quantity:  150 mL   Refills:  0         CONTINUE these medicines which may have CHANGED, or have new prescriptions. If we are uncertain of the size of tablets/capsules you have at home, strength may be listed as something that might have changed.        Dose / Directions    predniSONE 10 MG tablet   Commonly known as:  DELTASONE   This may have changed:    - medication strength  - how to take this  - when to take this  - additional instructions        Take 20 mg (2 tabs) daily x 3 days, then 1 tab (10 mg) daily x 2 days then stop.   Quantity:  8 tablet   Refills:  0         CONTINUE these medicines which have NOT CHANGED        Dose / Directions    ACETAMINOPHEN  PO        Dose:  500-1000 mg   Take 500-1,000 mg by mouth every 8 hours as needed for pain   Refills:  0       albuterol 108 (90 BASE) MCG/ACT Inhaler   Commonly known as:  PROAIR HFA/PROVENTIL HFA/VENTOLIN HFA        Dose:  2 puff   Inhale 2 puffs into the lungs every 6 hours as needed for shortness of breath / dyspnea or wheezing   Refills:  0       AMIODARONE HCL PO        Dose:  200 mg   Take 200 mg by mouth daily   Refills:  0       apixaban ANTICOAGULANT 5 MG tablet   Commonly known as:  ELIQUIS   Used for:  Atrial fibrillation (H)        Dose:  5 mg   Take 1 tablet (5 mg) by mouth 2 times daily   Quantity:  60 tablet   Refills:  0       Calcium carb-Vitamin D 500 mg [place]-200 units 500-200 MG-UNIT per tablet   Commonly known as:  OSCAL with D;Oyster Shell Calcium        Dose:  1 tablet   Take 1 tablet by mouth 2 times daily (with meals)   Refills:  0       carboxymethylcellulose 0.5 % Soln ophthalmic solution   Commonly known as:  REFRESH PLUS        Dose:  1 drop   Place 1 drop into both eyes 2 times daily   Refills:  0       cyclobenzaprine 5 MG tablet   Commonly known as:  FLEXERIL   Used for:  Neck pain        Dose:  5 mg   Take 1 tablet (5 mg) by mouth 3 times daily as needed for muscle spasms   Quantity:  21 tablet   Refills:  0       fluocinonide 0.05 % solution   Commonly known as:  LIDEX        Apply topically daily as needed (to scalp)   Refills:  0       FOLIC ACID PO        Dose:  1 mg   Take 1 mg by mouth daily   Refills:  0       furosemide 20 MG tablet   Commonly known as:  LASIX   Used for:  Acute on chronic diastolic congestive heart failure (H)        Dose:  20 mg   Take 1 tablet (20 mg) by mouth every morning   Quantity:  30 tablet   Refills:  0       guaiFENesin-codeine 100-10 MG/5ML Soln   Commonly known as:  ROBITUSSIN AC        Dose:  5 mL   Take 5 mLs by mouth every 6 hours as needed for cough   Refills:  0       latanoprost 0.005 % ophthalmic solution   Commonly known as:  XALATAN         Dose:  1 drop   Place 1 drop into the right eye At Bedtime   Refills:  0       LOSARTAN POTASSIUM PO        Dose:  50 mg   Take 50 mg by mouth daily   Refills:  0       multivitamin, therapeutic with minerals Tabs tablet        Dose:  1 tablet   Take 1 tablet by mouth daily   Refills:  0       sennosides 8.6 MG tablet   Commonly known as:  SENOKOT        Dose:  2 tablet   Take 2 tablets by mouth 2 times daily as needed for constipation   Refills:  0       timolol 0.5 % ophthalmic solution   Commonly known as:  TIMOPTIC        Dose:  1 drop   Place 1 drop into the right eye 2 times daily   Refills:  0       TOPROL XL PO        Dose:  25 mg   Take 25 mg by mouth daily   Refills:  0         STOP taking     cefuroxime 500 MG tablet   Commonly known as:  CEFTIN           METHOTREXATE PO                Where to get your medicines      These medications were sent to [facility] - [address]  [address] [place] [address]     Phone:  [phone]     amoxicillin-clavulanate 875-125 MG per tablet    benzonatate 100 MG capsule    ipratropium - albuterol 0.5 mg/2.5 mg/3 mL 0.5-2.5 (3) MG/3ML neb solution    predniSONE 10 MG tablet               ANTIBIOTIC INSTRUCTION     You've Been Prescribed an Antibiotic - Now What?  Your healthcare team thinks that you or your loved one might have an infection. Some infections can be treated with antibiotics, which are powerful, life-saving drugs. Like all medications, antibiotics have side effects and should only be used when necessary. There are some important things you should know about your antibiotic treatment.      Your healthcare team may run tests before you start taking an antibiotic.    Your team may take samples (e.g., from your blood, urine or other areas) to run tests to look for bacteria. These test can be important to determine if you need an antibiotic at all and, if you do, which antibiotic will work best.      Within a  few days, your healthcare team might change or even stop your antibiotic.    Your team may start you on an antibiotic while they are working to find out what is making you sick.    Your team might change your antibiotic because test results show that a different antibiotic would be better to treat your infection.    In some cases, once your team has more information, they learn that you do not need an antibiotic at all. They may find out that you don't have an infection, or that the antibiotic you're taking won't work against your infection. For example, an infection caused by a virus can't be treated with antibiotics. Staying on an antibiotic when you don't need it is more likely to be harmful than helpful.      You may experience side effects from your antibiotic.    Like all medications, antibiotics have side effects. Some of these can be serious.    Let you healthcare team know if you have any known allergies when you are admitted to the hospital.    One significant side effect of nearly all antibiotics is the risk of severe and sometimes deadly diarrhea caused by Clostridium difficile (C. Difficile). This occurs when a person takes antibiotics because some good germs are destroyed. Antibiotic use allows C. diificile to take over, putting patients at high risk for this serious infection.    As a patient or caregiver, it is important to understand your or your loved one's antibiotic treatment. It is especially important for caregivers to speak up when patients can't speak for themselves. Here are some important questions to ask your healthcare team.    What infection is this antibiotic treating and how do you know I have that infection?    What side effects might occur from this antibiotic?    How long will I need to take this antibiotic?    Is it safe to take this antibiotic with other medications or supplements (e.g., vitamins) that I am taking?     Are there any special directions I need to know about taking  this antibiotic? For example, should I take it with food?    How will I be monitored to know whether my infection is responding to the antibiotic?    What tests may help to make sure the right antibiotic is prescribed for me?      Information provided by:  www.cdc.gov/getsmart  U.S. Department of Health and Human Services  Centers for disease Control and Prevention  National Center for Emerging and Zoonotic Infectious Diseases  Division of Healthcare Quality Promotion         Protect others around you: Learn how to safely use, store and throw away your medicines at www.disposemymeds.org.             Medication List: This is a list of all your medications and when to take them. Check marks below indicate your daily home schedule. Keep this list as a reference.      Medications           Morning Afternoon Evening Bedtime As Needed    ACETAMINOPHEN PO   Take 500-1,000 mg by mouth every 8 hours as needed for pain   Last time this was given:  650 mg on 1/12/2018  2:15 PM                                   albuterol 108 (90 BASE) MCG/ACT Inhaler   Commonly known as:  PROAIR HFA/PROVENTIL HFA/VENTOLIN HFA   Inhale 2 puffs into the lungs every 6 hours as needed for shortness of breath / dyspnea or wheezing                                   AMIODARONE HCL PO   Take 200 mg by mouth daily   Last time this was given:  200 mg on 1/13/2018  8:20 AM            1/14/18                       amoxicillin-clavulanate 875-125 MG per tablet   Commonly known as:  AUGMENTIN   Take 1 tablet by mouth 2 times daily for 4 days                    1/13/18               apixaban ANTICOAGULANT 5 MG tablet   Commonly known as:  ELIQUIS   Take 1 tablet (5 mg) by mouth 2 times daily   Last time this was given:  5 mg on 1/13/2018  8:20 AM                    1/13/18               benzonatate 100 MG capsule   Commonly known as:  TESSALON   Take 1 capsule (100 mg) by mouth 3 times daily as needed for cough   Last time this was given:  100 mg on 1/12/2018  10:09 PM                                   Calcium carb-Vitamin D 500 mg [place]-200 units 500-200 MG-UNIT per tablet   Commonly known as:  OSCAL with D;Oyster Shell Calcium   Take 1 tablet by mouth 2 times daily (with meals)   Last time this was given:  1 tablet on 1/13/2018  8:20 AM                    1/13/18               carboxymethylcellulose 0.5 % Soln ophthalmic solution   Commonly known as:  REFRESH PLUS   Place 1 drop into both eyes 2 times daily   Last time this was given:  1 drop on 1/13/2018  8:20 AM                    1/13/18               cyclobenzaprine 5 MG tablet   Commonly known as:  FLEXERIL   Take 1 tablet (5 mg) by mouth 3 times daily as needed for muscle spasms   Last time this was given:  5 mg on 1/11/2018 11:55 PM                                   fluocinonide 0.05 % solution   Commonly known as:  LIDEX   Apply topically daily as needed (to scalp)                                   FOLIC ACID PO   Take 1 mg by mouth daily   Last time this was given:  1 mg on 1/13/2018  8:20 AM            1/14/18                       furosemide 20 MG tablet   Commonly known as:  LASIX   Take 1 tablet (20 mg) by mouth every morning   Last time this was given:  20 mg on 1/13/2018  8:20 AM            1/14/18                       guaiFENesin-codeine 100-10 MG/5ML Soln   Commonly known as:  ROBITUSSIN AC   Take 5 mLs by mouth every 6 hours as needed for cough                                   ipratropium - albuterol 0.5 mg/2.5 mg/3 mL 0.5-2.5 (3) MG/3ML neb solution   Commonly known as:  DUONEB   Take 1 vial (3 mLs) by nebulization every 4 hours (while awake) for 10 days   Last time this was given:  3 mLs on 1/13/2018  7:24 AM                1/13/18 at 4pm                   latanoprost 0.005 % ophthalmic solution   Commonly known as:  XALATAN   Place 1 drop into the right eye At Bedtime   Last time this was given:  1 drop on 1/12/2018  9:25 PM                        1/13/18           LOSARTAN POTASSIUM PO   Take 50  mg by mouth daily   Last time this was given:  50 mg on 1/13/2018  8:20 AM            1/14/18                       multivitamin, therapeutic with minerals Tabs tablet   Take 1 tablet by mouth daily   Last time this was given:  1 tablet on 1/13/2018  8:20 AM            1/14/18                       predniSONE 10 MG tablet   Commonly known as:  DELTASONE   Take 20 mg (2 tabs) daily x 3 days, then 1 tab (10 mg) daily x 2 days then stop.   Last time this was given:  20 mg on 1/13/2018  8:20 AM            1/14/18                       sennosides 8.6 MG tablet   Commonly known as:  SENOKOT   Take 2 tablets by mouth 2 times daily as needed for constipation   Last time this was given:  2 tablets on 1/11/2018 10:28 AM                                   timolol 0.5 % ophthalmic solution   Commonly known as:  TIMOPTIC   Place 1 drop into the right eye 2 times daily   Last time this was given:  1 drop on 1/13/2018  8:21 AM            1/14/18                       TOPROL XL PO   Take 25 mg by mouth daily   Last time this was given:  25 mg on 1/13/2018  8:20 AM            1/14/18

## 2018-01-09 NOTE — IP AVS SNAPSHOT
[address]    [place] [address]    Phone:  [phone]                                       After Visit Summary   1/9/2018    Marley Stewart    MRN: [id number]           After Visit Summary Signature Page     I have received my discharge instructions, and my questions have been answered. I have discussed any challenges I see with this plan with the nurse or doctor.    ..........................................................................................................................................  Patient/Patient Representative Signature      ..........................................................................................................................................  Patient Representative Print Name and Relationship to Patient    ..................................................               ................................................  Date                                            Time    ..........................................................................................................................................  Reviewed by Signature/Title    ...................................................              ..............................................  Date                                                            Time

## 2018-01-10 PROBLEM — J18.9 PNEUMONIA: Status: ACTIVE | Noted: 2018-01-10

## 2018-01-10 LAB
ERYTHROCYTE [DISTWIDTH] IN BLOOD BY AUTOMATED COUNT: 14.3 % (ref 10–15)
HCT VFR BLD AUTO: 35.9 % (ref 35–47)
HGB BLD-MCNC: 12.4 G/DL (ref 11.7–15.7)
MCH RBC QN AUTO: 33.1 PG (ref 26.5–33)
MCHC RBC AUTO-ENTMCNC: 34.5 G/DL (ref 31.5–36.5)
MCV RBC AUTO: 96 FL (ref 78–100)
PLATELET # BLD AUTO: 248 10E9/L (ref 150–450)
PROCALCITONIN SERPL-MCNC: 21.88 NG/ML
RBC # BLD AUTO: 3.75 10E12/L (ref 3.8–5.2)
WBC # BLD AUTO: 19.3 10E9/L (ref 4–11)

## 2018-01-10 PROCEDURE — 25000132 ZZH RX MED GY IP 250 OP 250 PS 637: Mod: GY | Performed by: INTERNAL MEDICINE

## 2018-01-10 PROCEDURE — 85027 COMPLETE CBC AUTOMATED: CPT | Performed by: INTERNAL MEDICINE

## 2018-01-10 PROCEDURE — 84145 PROCALCITONIN (PCT): CPT | Performed by: INTERNAL MEDICINE

## 2018-01-10 PROCEDURE — 99223 1ST HOSP IP/OBS HIGH 75: CPT | Mod: AI | Performed by: INTERNAL MEDICINE

## 2018-01-10 PROCEDURE — A9270 NON-COVERED ITEM OR SERVICE: HCPCS | Mod: GY | Performed by: INTERNAL MEDICINE

## 2018-01-10 PROCEDURE — 27210995 ZZH RX 272: Performed by: INTERNAL MEDICINE

## 2018-01-10 PROCEDURE — 12000000 ZZH R&B MED SURG/OB

## 2018-01-10 PROCEDURE — 25000128 H RX IP 250 OP 636: Performed by: INTERNAL MEDICINE

## 2018-01-10 PROCEDURE — 36415 COLL VENOUS BLD VENIPUNCTURE: CPT | Performed by: INTERNAL MEDICINE

## 2018-01-10 RX ORDER — ONDANSETRON 2 MG/ML
4 INJECTION INTRAMUSCULAR; INTRAVENOUS EVERY 6 HOURS PRN
Status: DISCONTINUED | OUTPATIENT
Start: 2018-01-10 | End: 2018-01-13 | Stop reason: HOSPADM

## 2018-01-10 RX ORDER — AMOXICILLIN 250 MG
2 CAPSULE ORAL 2 TIMES DAILY PRN
Status: DISCONTINUED | OUTPATIENT
Start: 2018-01-10 | End: 2018-01-13 | Stop reason: HOSPADM

## 2018-01-10 RX ORDER — NALOXONE HYDROCHLORIDE 0.4 MG/ML
.1-.4 INJECTION, SOLUTION INTRAMUSCULAR; INTRAVENOUS; SUBCUTANEOUS
Status: DISCONTINUED | OUTPATIENT
Start: 2018-01-09 | End: 2018-01-13 | Stop reason: HOSPADM

## 2018-01-10 RX ORDER — METOPROLOL SUCCINATE 25 MG/1
25 TABLET, EXTENDED RELEASE ORAL DAILY
Status: DISCONTINUED | OUTPATIENT
Start: 2018-01-10 | End: 2018-01-13 | Stop reason: HOSPADM

## 2018-01-10 RX ORDER — FUROSEMIDE 20 MG
20 TABLET ORAL EVERY MORNING
Status: DISCONTINUED | OUTPATIENT
Start: 2018-01-10 | End: 2018-01-10

## 2018-01-10 RX ORDER — CARBOXYMETHYLCELLULOSE SODIUM 5 MG/ML
1 SOLUTION/ DROPS OPHTHALMIC 2 TIMES DAILY
Status: DISCONTINUED | OUTPATIENT
Start: 2018-01-10 | End: 2018-01-13 | Stop reason: HOSPADM

## 2018-01-10 RX ORDER — TIMOLOL MALEATE 5 MG/ML
1 SOLUTION/ DROPS OPHTHALMIC 2 TIMES DAILY
Status: DISCONTINUED | OUTPATIENT
Start: 2018-01-10 | End: 2018-01-13 | Stop reason: HOSPADM

## 2018-01-10 RX ORDER — MULTIPLE VITAMINS W/ MINERALS TAB 9MG-400MCG
1 TAB ORAL DAILY
Status: DISCONTINUED | OUTPATIENT
Start: 2018-01-10 | End: 2018-01-13 | Stop reason: HOSPADM

## 2018-01-10 RX ORDER — SODIUM CHLORIDE 9 MG/ML
INJECTION, SOLUTION INTRAVENOUS CONTINUOUS
Status: ACTIVE | OUTPATIENT
Start: 2018-01-10 | End: 2018-01-10

## 2018-01-10 RX ORDER — CYCLOBENZAPRINE HCL 5 MG
5 TABLET ORAL 3 TIMES DAILY PRN
Status: DISCONTINUED | OUTPATIENT
Start: 2018-01-09 | End: 2018-01-13 | Stop reason: HOSPADM

## 2018-01-10 RX ORDER — SENNOSIDES 8.6 MG
2 TABLET ORAL 2 TIMES DAILY PRN
Status: DISCONTINUED | OUTPATIENT
Start: 2018-01-09 | End: 2018-01-13 | Stop reason: HOSPADM

## 2018-01-10 RX ORDER — AMOXICILLIN 250 MG
1 CAPSULE ORAL 2 TIMES DAILY PRN
Status: DISCONTINUED | OUTPATIENT
Start: 2018-01-10 | End: 2018-01-13 | Stop reason: HOSPADM

## 2018-01-10 RX ORDER — LATANOPROST 50 UG/ML
1 SOLUTION/ DROPS OPHTHALMIC AT BEDTIME
Status: DISCONTINUED | OUTPATIENT
Start: 2018-01-10 | End: 2018-01-13 | Stop reason: HOSPADM

## 2018-01-10 RX ORDER — AMIODARONE HYDROCHLORIDE 200 MG/1
200 TABLET ORAL DAILY
Status: DISCONTINUED | OUTPATIENT
Start: 2018-01-10 | End: 2018-01-13 | Stop reason: HOSPADM

## 2018-01-10 RX ORDER — FOLIC ACID 1 MG/1
1 TABLET ORAL DAILY
Status: DISCONTINUED | OUTPATIENT
Start: 2018-01-10 | End: 2018-01-13 | Stop reason: HOSPADM

## 2018-01-10 RX ORDER — ONDANSETRON 4 MG/1
4 TABLET, ORALLY DISINTEGRATING ORAL EVERY 6 HOURS PRN
Status: DISCONTINUED | OUTPATIENT
Start: 2018-01-10 | End: 2018-01-13 | Stop reason: HOSPADM

## 2018-01-10 RX ORDER — ACETAMINOPHEN 500 MG
500-1000 TABLET ORAL EVERY 8 HOURS PRN
Status: DISCONTINUED | OUTPATIENT
Start: 2018-01-09 | End: 2018-01-13 | Stop reason: HOSPADM

## 2018-01-10 RX ORDER — BISACODYL 10 MG
10 SUPPOSITORY, RECTAL RECTAL DAILY PRN
Status: DISCONTINUED | OUTPATIENT
Start: 2018-01-10 | End: 2018-01-13 | Stop reason: HOSPADM

## 2018-01-10 RX ORDER — CODEINE PHOSPHATE AND GUAIFENESIN 10; 100 MG/5ML; MG/5ML
5 SOLUTION ORAL EVERY 6 HOURS PRN
Status: DISCONTINUED | OUTPATIENT
Start: 2018-01-09 | End: 2018-01-13 | Stop reason: HOSPADM

## 2018-01-10 RX ORDER — ALBUTEROL SULFATE 90 UG/1
2 AEROSOL, METERED RESPIRATORY (INHALATION) EVERY 6 HOURS PRN
Status: DISCONTINUED | OUTPATIENT
Start: 2018-01-09 | End: 2018-01-13 | Stop reason: HOSPADM

## 2018-01-10 RX ORDER — ACETAMINOPHEN 325 MG/1
650 TABLET ORAL EVERY 4 HOURS PRN
Status: DISCONTINUED | OUTPATIENT
Start: 2018-01-10 | End: 2018-01-13 | Stop reason: HOSPADM

## 2018-01-10 RX ORDER — LOSARTAN POTASSIUM 50 MG/1
50 TABLET ORAL DAILY
Status: DISCONTINUED | OUTPATIENT
Start: 2018-01-10 | End: 2018-01-13 | Stop reason: HOSPADM

## 2018-01-10 RX ADMIN — APIXABAN 5 MG: 5 TABLET, FILM COATED ORAL at 01:00

## 2018-01-10 RX ADMIN — LATANOPROST 1 DROP: 50 SOLUTION OPHTHALMIC at 01:43

## 2018-01-10 RX ADMIN — TIMOLOL MALEATE 1 DROP: 5 SOLUTION/ DROPS OPHTHALMIC at 21:06

## 2018-01-10 RX ADMIN — GUAIFENESIN AND CODEINE PHOSPHATE 5 ML: 100; 10 SOLUTION ORAL at 21:06

## 2018-01-10 RX ADMIN — METOPROLOL SUCCINATE 25 MG: 25 TABLET, EXTENDED RELEASE ORAL at 07:53

## 2018-01-10 RX ADMIN — GUAIFENESIN AND CODEINE PHOSPHATE 5 ML: 100; 10 SOLUTION ORAL at 01:41

## 2018-01-10 RX ADMIN — APIXABAN 5 MG: 5 TABLET, FILM COATED ORAL at 07:53

## 2018-01-10 RX ADMIN — CARBOXYMETHYLCELLULOSE SODIUM 1 DROP: 5 SOLUTION/ DROPS OPHTHALMIC at 07:53

## 2018-01-10 RX ADMIN — LATANOPROST 1 DROP: 50 SOLUTION OPHTHALMIC at 22:50

## 2018-01-10 RX ADMIN — FOLIC ACID 1 MG: 1 TABLET ORAL at 07:52

## 2018-01-10 RX ADMIN — LOSARTAN POTASSIUM 50 MG: 50 TABLET ORAL at 07:52

## 2018-01-10 RX ADMIN — Medication 1 LOZENGE: at 17:42

## 2018-01-10 RX ADMIN — AMIODARONE HYDROCHLORIDE 200 MG: 200 TABLET ORAL at 07:53

## 2018-01-10 RX ADMIN — GUAIFENESIN AND CODEINE PHOSPHATE 5 ML: 100; 10 SOLUTION ORAL at 07:51

## 2018-01-10 RX ADMIN — OYSTER SHELL CALCIUM WITH VITAMIN D 1 TABLET: 500; 200 TABLET, FILM COATED ORAL at 07:53

## 2018-01-10 RX ADMIN — CEFTRIAXONE 2 G: 10 INJECTION, POWDER, FOR SOLUTION INTRAVENOUS at 22:50

## 2018-01-10 RX ADMIN — CARBOXYMETHYLCELLULOSE SODIUM 1 DROP: 5 SOLUTION/ DROPS OPHTHALMIC at 21:06

## 2018-01-10 RX ADMIN — APIXABAN 5 MG: 5 TABLET, FILM COATED ORAL at 21:06

## 2018-01-10 RX ADMIN — AZITHROMYCIN MONOHYDRATE 250 MG: 500 INJECTION, POWDER, LYOPHILIZED, FOR SOLUTION INTRAVENOUS at 21:06

## 2018-01-10 RX ADMIN — GUAIFENESIN AND CODEINE PHOSPHATE 5 ML: 100; 10 SOLUTION ORAL at 14:12

## 2018-01-10 RX ADMIN — MULTIPLE VITAMINS W/ MINERALS TAB 1 TABLET: TAB at 07:53

## 2018-01-10 RX ADMIN — SODIUM CHLORIDE: 9 INJECTION, SOLUTION INTRAVENOUS at 01:01

## 2018-01-10 RX ADMIN — TIMOLOL MALEATE 1 DROP: 5 SOLUTION/ DROPS OPHTHALMIC at 07:58

## 2018-01-10 NOTE — PROGRESS NOTES
[facility]  Hospitalist Progress Note  Date of Service (when I saw the patient): 01/10/2018    Andrae Dowd MD  [facility]  Pager [phone]    Assessment & Plan   85-year-old female who has PMH of rheumatoid arthritis for which she is on methotrexate, who has been ill now for almost a little over a week who has had symptoms of upper respiratory tract infection and was initially treated with Augmentin for 3 days and then switched over to Ceftin for another 4 days without much improvement, now with chest x-ray findings of a left lower lobe infiltrate, being admitted for further treatment as inpatient.       1.  Left lower lobe pneumonia, suspect community-acquired pneumonia.    - WBC down from 22 on admit to 19 this AM  - Procalcitonin is significantly high at 22, taken in context, her vitals are stable, she is stable on 3L NC, WBC is trending down  - I think we will continue with current plan, leave on Gen Med, continue IV Ceftriaxone and Azithromycin  - Gentle IVFs for today  - watch vitals closely, no need for IMSU or ICU at this time  - add robitussin with codeine    2.  History of paroxysmal atrial fibrillation:    - continued on her amiodarone and Eliquis.    - remains rate controlled and BP remains stable    3.  History of congestive heart failure and cardiomyopathy:    - The patient's EF is unknown.    - Is on low dose Lasix PTA but being held now due to clinical appearance of dehydration  - gently hydrate her at 75 mL an hour until tonight, consider resumption of Lasix in AM if stable BP and Cr    4.  Hypertension:    - continue the patient on Toprol and Cozaar.     5.  Glaucoma:    - We will continue the patient on her eyedrops.     DVT Prophy: Eliquis    Code status: FULL.       DISPOSITION:  Anticipate 2-3 day stay for weaning off O2, therapy eval for safe DC plan    Andrae Dowd    Interval History   Patient reports cough is better, SOB improving, no new  complaints, still feels very weak.    -Data reviewed today: I reviewed all new labs and imaging results over the last 24 hours. I personally reviewed no images or EKG's today.    Physical Exam   Temp: 98.9  F (37.2  C) Temp src: Oral BP: 130/56   Heart Rate: 52 Resp: 16 SpO2: 96 % O2 Device: Nasal cannula Oxygen Delivery: 3 LPM  Vitals:    01/09/18 2205   Weight: 66.2 kg (145 lb 15.1 oz)     Vital Signs with Ranges  Temp:  [97.6  F (36.4  C)-100.6  F (38.1  C)] 98.9  F (37.2  C)  Heart Rate:  [52-69] 52  Resp:  [16-20] 16  BP: (122-207)/() 130/56  SpO2:  [93 %-97 %] 96 %  I/O last 3 completed shifts:  In: 424 [P.O.:120; I.V.:304]  Out: -     Constitutional: NAD, afebrile, A+Ox4  Respiratory: Coarse bilateral bases, normal WOB  Cardiovascular: RRR, no murmur  GI: S, NT, ND, normal BS  Skin/Integumen: Dry, warm, no edema      Medications     - MEDICATION INSTRUCTIONS -       NaCl 75 mL/hr at 01/10/18 0101       amiodarone (PACERONE/CODARONE) tablet 200 mg  200 mg Oral Daily     apixaban ANTICOAGULANT  5 mg Oral BID     Calcium carb-Vitamin D 500 mg [place]-200 units  1 tablet Oral BID w/meals     Carboxymethylcellulose Sod PF  1 drop Both Eyes BID     folic acid (FOLVITE) tablet 1 mg  1 mg Oral Daily     latanoprost  1 drop Right Eye At Bedtime     losartan (COZAAR) tablet 50 mg  50 mg Oral Daily     metoprolol (TOPROL-XL) 24 hr tablet 25 mg  25 mg Oral Daily     multivitamin, therapeutic with minerals  1 tablet Oral Daily     timolol  1 drop Right Eye BID     cefTRIAXone  2 g Intravenous Q24H     azithromycin  250 mg Intravenous Q24H     labetalol  10 mg Intravenous Once       Data     Recent Labs  Lab 01/10/18  0719 01/09/18  1949   WBC 19.3* 23.7*   HGB 12.4 14.2   MCV 96 95    338   NA  --  136   POTASSIUM  --  4.4   CHLORIDE  --  101   CO2  --  29   BUN  --  26   CR  --  0.95   ANIONGAP  --  6   LNONIE  --  9.1   GLC  --  139*       Recent Results (from the past 24 hour(s))   XR Chest 2 Views    Narrative     CHEST TWO VIEWS  1/9/2018 8:44 PM     COMPARISON: Two view chest x-ray 11/2/2016.    HISTORY: Shortness of breath.       Impression    IMPRESSION: There is airspace opacity in the left lower lobe that may  represent pneumonia. The lungs are otherwise clear. There is no  pleural effusion or pneumothorax. Heart size is normal with no  evidence for congestive failure.    WAYNE BROWN MD

## 2018-01-10 NOTE — PLAN OF CARE
Problem: Patient Care Overview  Goal: Plan of Care/Patient Progress Review  Outcome: No Change  A/o x4. VSS on 3L, sating 97%. CHERRY. Denied pain throughout shift. LS dim. Frequent-nonproductive cough. Gave prn robitussin x1-effective. BS active, +flatus. BM on night shift. Voiding adequately via bathroom. Ambulates with assist of 1. Per pt report-pt ambulates with cane at home. IVF-infusing NS 75cc/hr. On IV abx. Continue to monitor.

## 2018-01-10 NOTE — ED PROVIDER NOTES
History     Chief Complaint:  Cough    HPI   Marley Stewart is a 85 year old female taking Eliquis who presents with a cough. The patient's daughter reports that the patient had a viral upper respiratory infection last week. She gave this virus to her  who was admitted to the hospital for COPD exacerbation. The patient's cough has persisted and worsened and she feels very fatigued. Today the patient did begin spitting up some blood following coughing. Of note the patient was seen in clinic 7 days ago and started on Augmentin which was then switched to Ceftin four days ago. She was also started on inhalers and prednisone and has had no improvement. The patient also had a negative chest x ray at that time. She denies having had any fevers but did record a fever here in the emergency department.    Allergies:  Amitriptyline  Clonazepam  Latex    Medications:    Flexeril  Senokot  Amiodarone  Combigan  Zyrtec  Eliquis  Lasix  Methotrexate  Losartan  Folic acid    Past Medical History:    Atrial fibrillation  Aortic regurgitation  Arthritis  CHF  CVA  Glaucoma  Hypertension   Mitral regurgitation  PUD  Pulmonic valve regurgitation  RA  Spinal stenosis  Thyroid disease  Tricuspid regurgitation     Past Surgical History:    Orthopedic surgery    Family History:    Cerebrovascular disease    Social History:  The patient was accompanied to the ED by her daughter and son.  Smoking Status: No  Smokeless Tobacco: No  Alcohol Use: Yes   Marital Status:   [2]    Review of Systems   Constitutional: Positive for fatigue and fever.   Respiratory: Positive for cough, shortness of breath and wheezing.    All other systems reviewed and are negative.    Physical Exam   Vitals:  Patient Vitals for the past 24 hrs:   BP Temp Temp src Heart Rate Resp SpO2   01/09/18 2114 - 100.6  F (38.1  C) Oral - - -   01/09/18 2103 158/69 - - 69 20 96 %        Physical Exam  GENERAL: well developed, pleasant  HEAD: atraumatic  EYES:  pupils reactive, extraocular muscles intact, conjunctivae normal  ENT:  mucus membranes moist  NECK:  trachea midline, normal range of motion  RESPIRATORY: no tachypnea, Coughing throughout the entirety of my exam. Course breath sounds in bases bilaterally, right worse than left. Wheezing is noted  CVS: normal S1/S2, no murmurs, intact distal pulses  ABDOMEN: soft, nontender, nondistention  MUSCULOSKELETAL: no deformities  SKIN: warm and dry, no acute rashes or ulceration. No peripheral edema  NEURO: GCS 15, cranial nerves intact, alert and oriented x3  PSYCH:  Mood/affect normal    Emergency Department Course     Imaging:  Radiology findings were communicated with the patient who voiced understanding of the findings.  XR chest 2 views:  IMPRESSION: There is airspace opacity in the left lower lobe that may  represent pneumonia. The lungs are otherwise clear. There is no  pleural effusion or pneumothorax. Heart size is normal with no  evidence for congestive failure.  Reading per radiology.     Laboratory:  Laboratory findings were communicated with the patient who voiced understanding of the findings.  CBC: WBC: 23.7(H), Neutrophil: 19.3(H), Monocytes: 2.1(H), o/w WNL (HGB 14.2, )  BMP: Glucose: 139(H), GFR: 56(L), o/w WNL (Creatinine 0.95)  Blood culture: pending   Blood culture: pending  BNP: 1509  Influenza A/B antigen: Negative    Interventions:  1955 Duoneb, 3 mL, nebulization    2057 Tylenol 1000 mg oral  2127 Rocephin 2 g IV  2127 Azithromax 500 mg IV  Labetalol 10 mg IV    Emergency Department Course:  Nursing notes and vitals reviewed.  I performed an exam of the patient as documented above.   IV was inserted and blood was drawn for laboratory testing, results above.  The patient was sent for a XR while in the emergency department, results above.      2055 I spoke with Dr. Lopez regarding the patient    I discussed the treatment plan with the patient. They expressed understanding of this plan and  consented to admission. I discussed the patient with Dr. Weber, who will admit the patient to a monitored bed for further evaluation and treatment.     I personally reviewed the laboratory results with the Patient and answered all related questions prior to admission.    Impression & Plan      Medical Decision Making:  Marley Stewart is a 85 year old female who presents to the emergency department today with cough after being on a course of Augmentin and Ceftin. Patient is coughing throughout the entire exam. Patient has had some wheezing as well. Labs show an elevated white count, possible infectious verses prednisone related. Chest x ray reads with pneumonia as above. Patient was given duonebs and antibiotics. I spoke with he hospitalist regarding admission. Patient is also noted to be hypertensive, but repeat testing does not require immediate treatment.    Diagnosis:    ICD-10-CM    1. Pneumonia of left lower lobe due to infectious organism (H) J18.1 Blood culture        Disposition:   Admitted     CMS Diagnoses: None     Scribe Disclosure:  Reji ORTIZ, am serving as a scribe at 7:33 PM on 1/9/2018 to document services personally performed by Bandar Loaj MD, based on my observations and the provider's statements to me.    EMERGENCY DEPARTMENT       Bandar Loja MD  01/10/18 0019

## 2018-01-10 NOTE — ED NOTES
[facility]  ED Nurse Handoff Report    ED Chief complaint: Cough (Cough for 2 weeks, seen at pcp last week. Given abx and steriods. Not improving)      ED Diagnosis:   Final diagnoses:   Pneumonia of left lower lobe due to infectious organism (H)       Code Status: DNR / DNI    Allergies:   Allergies   Allergen Reactions     Amitriptyline      Clonazepam Other (See Comments)     Somnolence at 0.5 mg dose     Latex Rash       Activity level - Baseline/Home:  Independent    Activity Level - Current:   Stand with Assist     Needed?: No    Isolation: No, frequent cough  Infection: Not Applicable  Other     Bariatric?: No    Vital Signs:   Vitals:    01/09/18 2103 01/09/18 2114   BP: 158/69    Resp: 20    Temp:  100.6  F (38.1  C)   TempSrc:  Oral   SpO2: 96%        Cardiac Rhythm: ,        Pain level:      Is this patient confused?: No    Patient Report: Initial Complaint: Marley Stewart is a 85 year old female taking Eliquis who presents with a cough. Pt and pt's daughter state she had a URI last week. Pt states her  was admitted to the hospital for COPD exacerbation. Since last week, pt states her cough has worsened and has had increased fatigue. Pt and son note that today, she coughed up some blood. Pt was seen in clinic 7 days ago and started on Augmentin which was then switched to Ceftin four days ago. She was also started on inhalers and prednisone and has had no improvement. Pt had a negative CXR at that time.   Focused Assessment: Cardiac: WDL, denies CP  Resp: Frequent cough, SOB, expiratory wheezes  Neuro: A&Ox3, HA, fatigue  Tests Performed: Lab, CXR  Abnormal Results: WBC: 23.7, Absolute Neutrophil: 19.3, GFR: 56  Treatments provided: Tylenol 1000mg, rocephin 2g/20ml, Zithromax 500mg/250ml    Family Comments: Family at bedside    OBS brochure/video discussed/provided to patient: No    ED Medications:   Medications   azithromycin (ZITHROMAX) 500 mg in NaCl 0.9 % 250 mL  intermittent infusion (500 mg Intravenous New Bag 1/9/18 2127)   labetalol (NORMODYNE/TRANDATE) injection 10 mg (not administered)   ipratropium - albuterol 0.5 mg/2.5 mg/3 mL (DUONEB) neb solution 3 mL (3 mLs Nebulization Given 1/9/18 1955)   acetaminophen (TYLENOL) tablet 1,000 mg (1,000 mg Oral Given 1/9/18 2057)   cefTRIAXone (ROCEPHIN) 2 g in 20 mL SWFI Premix Syringe (2 g Intravenous Given 1/9/18 2127)       Drips infusing?:  Yes      ED NURSE PHONE NUMBER: *[id number]

## 2018-01-10 NOTE — PLAN OF CARE
Problem: Patient Care Overview  Goal: Plan of Care/Patient Progress Review  Outcome: Improving  A&Ox4. VSS on 1LPM NC; weaned from 3LPM NC. Up with SB to BR. Tolerating regular diet. Denies pain. Frequent, nonproductive cough. Robitussin prn given with slight relief. IV infusing NS at 75mL/hr. Lung sounds diminished. Continue O2 weaning efforts. Discharge TBD.    0525-7766: Throat lozenge given for sore throat. Minimal nausea reported, declined zofran. No other changes.
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
## 2018-01-10 NOTE — PHARMACY-ADMISSION MEDICATION HISTORY
Admission medication history interview status for the 1/9/2018  admission is complete. See EPIC admission navigator for prior to admission medications     Medication history source reliability:Good    Actions taken by pharmacist (provider contacted, etc):removed Combigan, Zyrtec     Additional medication history information not noted on PTA med list :None    Medication reconciliation/reorder completed by provider prior to medication history? No    Time spent in this activity: 20 minutes    Prior to Admission medications    Medication Sig Last Dose Taking? Auth Provider   guaiFENesin-codeine (ROBITUSSIN AC) 100-10 MG/5ML SOLN Take 5 mLs by mouth every 6 hours as needed for cough prn Yes Unknown, Entered By History   cefuroxime (CEFTIN) 500 MG tablet Take 500 mg by mouth 2 times daily x10 days 1/9/2018 at am Yes Unknown, Entered By History   PREDNISONE PO Take by mouth daily Starting 1/5/2018. Prednisone 40 mg qd x4d, 30mg qd x3d, 20mg qd x 2d, 10mg x1day 1/9/2018 at am Yes Unknown, Entered By History   albuterol (PROAIR HFA/PROVENTIL HFA/VENTOLIN HFA) 108 (90 BASE) MCG/ACT Inhaler Inhale 2 puffs into the lungs every 6 hours as needed for shortness of breath / dyspnea or wheezing prn Yes Unknown, Entered By History   ACETAMINOPHEN PO Take 500-1,000 mg by mouth every 8 hours as needed for pain prn Yes Unknown, Entered By History   Metoprolol Succinate (TOPROL XL PO) Take 25 mg by mouth daily 1/9/2018 at am Yes Unknown, Entered By History   fluocinonide (LIDEX) 0.05 % solution Apply topically daily as needed (to scalp) prn Yes Unknown, Entered By History   cyclobenzaprine (FLEXERIL) 5 MG tablet Take 1 tablet (5 mg) by mouth 3 times daily as needed for muscle spasms prn Yes Enriqueta Christie PA-C   sennosides (SENOKOT) 8.6 MG tablet Take 2 tablets by mouth 2 times daily as needed for constipation prn Yes Unknown, Entered By History   AMIODARONE HCL PO Take 200 mg by mouth daily  1/9/2018 at am Yes Unknown, Entered By  History   apixaban ANTICOAGULANT (ELIQUIS) 5 MG tablet Take 1 tablet (5 mg) by mouth 2 times daily 1/9/2018 at am Yes Marino Sandoval MD   furosemide (LASIX) 20 MG tablet Take 1 tablet (20 mg) by mouth every morning 1/8/2018 at am Yes Marino Sandoval MD   Methotrexate Sodium (METHOTREXATE PO) Take 15 mg by mouth once a week On Wednesdays (6 of the 2.5mg tablets). evenings 1/3/2018 Yes Reported, Patient   LOSARTAN POTASSIUM PO Take 50 mg by mouth daily  1/9/2018 at am Yes Reported, Patient   FOLIC ACID PO Take 1 mg by mouth daily 1/9/2018 at am Yes Reported, Patient   calcium carb 1250 mg, 500 mg [place],/vitamin D 200 units (OSCAL WITH D) 500-200 MG-UNIT per tablet Take 1 tablet by mouth 2 times daily (with meals) 1/9/2018 at am Yes Unknown, Entered By History   multivitamin, therapeutic with minerals (THERA-VIT-M) TABS Take 1 tablet by mouth daily 1/9/2018 at am Yes Unknown, Entered By History   latanoprost (XALATAN) 0.005 % ophthalmic solution Place 1 drop into the right eye At Bedtime 1/8/2018 at pm Yes Unknown, Entered By History   timolol (TIMOPTIC) 0.5 % ophthalmic solution Place 1 drop into the right eye 2 times daily 1/9/2018 at am Yes Unknown, Entered By History   carboxymethylcellulose (REFRESH PLUS) 0.5 % SOLN Place 1 drop into both eyes 2 times daily  1/9/2018 at am Yes Unknown, Entered By History

## 2018-01-11 ENCOUNTER — APPOINTMENT (OUTPATIENT)
Dept: OCCUPATIONAL THERAPY | Facility: CLINIC | Age: 83
DRG: 194 | End: 2018-01-11
Payer: MEDICARE

## 2018-01-11 LAB
ANION GAP SERPL CALCULATED.3IONS-SCNC: 8 MMOL/L (ref 3–14)
BASOPHILS # BLD AUTO: 0 10E9/L (ref 0–0.2)
BASOPHILS NFR BLD AUTO: 0.1 %
BUN SERPL-MCNC: 10 MG/DL (ref 7–30)
CALCIUM SERPL-MCNC: 8.3 MG/DL (ref 8.5–10.1)
CHLORIDE SERPL-SCNC: 100 MMOL/L (ref 94–109)
CO2 SERPL-SCNC: 27 MMOL/L (ref 20–32)
CREAT SERPL-MCNC: 0.72 MG/DL (ref 0.52–1.04)
DIFFERENTIAL METHOD BLD: ABNORMAL
EOSINOPHIL # BLD AUTO: 0.4 10E9/L (ref 0–0.7)
EOSINOPHIL NFR BLD AUTO: 2.1 %
ERYTHROCYTE [DISTWIDTH] IN BLOOD BY AUTOMATED COUNT: 13.9 % (ref 10–15)
GFR SERPL CREATININE-BSD FRML MDRD: 77 ML/MIN/1.7M2
GLUCOSE SERPL-MCNC: 107 MG/DL (ref 70–99)
HCT VFR BLD AUTO: 34.8 % (ref 35–47)
HGB BLD-MCNC: 12.3 G/DL (ref 11.7–15.7)
IMM GRANULOCYTES # BLD: 0.1 10E9/L (ref 0–0.4)
IMM GRANULOCYTES NFR BLD: 0.8 %
LACTATE BLD-SCNC: 1.2 MMOL/L (ref 0.7–2)
LYMPHOCYTES # BLD AUTO: 1.7 10E9/L (ref 0.8–5.3)
LYMPHOCYTES NFR BLD AUTO: 10.2 %
MCH RBC QN AUTO: 33.3 PG (ref 26.5–33)
MCHC RBC AUTO-ENTMCNC: 35.3 G/DL (ref 31.5–36.5)
MCV RBC AUTO: 94 FL (ref 78–100)
MONOCYTES # BLD AUTO: 1.3 10E9/L (ref 0–1.3)
MONOCYTES NFR BLD AUTO: 7.9 %
NEUTROPHILS # BLD AUTO: 13.3 10E9/L (ref 1.6–8.3)
NEUTROPHILS NFR BLD AUTO: 78.9 %
NRBC # BLD AUTO: 0 10*3/UL
NRBC BLD AUTO-RTO: 0 /100
PLATELET # BLD AUTO: 247 10E9/L (ref 150–450)
POTASSIUM SERPL-SCNC: 3.5 MMOL/L (ref 3.4–5.3)
RBC # BLD AUTO: 3.69 10E12/L (ref 3.8–5.2)
SODIUM SERPL-SCNC: 135 MMOL/L (ref 133–144)
WBC # BLD AUTO: 16.8 10E9/L (ref 4–11)

## 2018-01-11 PROCEDURE — 25000132 ZZH RX MED GY IP 250 OP 250 PS 637: Mod: GY | Performed by: HOSPITALIST

## 2018-01-11 PROCEDURE — 27210995 ZZH RX 272: Performed by: INTERNAL MEDICINE

## 2018-01-11 PROCEDURE — 99233 SBSQ HOSP IP/OBS HIGH 50: CPT | Performed by: INTERNAL MEDICINE

## 2018-01-11 PROCEDURE — 40000275 ZZH STATISTIC RCP TIME EA 10 MIN

## 2018-01-11 PROCEDURE — 36415 COLL VENOUS BLD VENIPUNCTURE: CPT | Performed by: INTERNAL MEDICINE

## 2018-01-11 PROCEDURE — 97535 SELF CARE MNGMENT TRAINING: CPT | Mod: GO

## 2018-01-11 PROCEDURE — 40000133 ZZH STATISTIC OT WARD VISIT

## 2018-01-11 PROCEDURE — A9270 NON-COVERED ITEM OR SERVICE: HCPCS | Mod: GY | Performed by: INTERNAL MEDICINE

## 2018-01-11 PROCEDURE — 25000132 ZZH RX MED GY IP 250 OP 250 PS 637: Mod: GY | Performed by: INTERNAL MEDICINE

## 2018-01-11 PROCEDURE — 94640 AIRWAY INHALATION TREATMENT: CPT | Mod: 76

## 2018-01-11 PROCEDURE — 94640 AIRWAY INHALATION TREATMENT: CPT

## 2018-01-11 PROCEDURE — 25000125 ZZHC RX 250: Performed by: INTERNAL MEDICINE

## 2018-01-11 PROCEDURE — 97166 OT EVAL MOD COMPLEX 45 MIN: CPT | Mod: GO

## 2018-01-11 PROCEDURE — 97530 THERAPEUTIC ACTIVITIES: CPT | Mod: GO

## 2018-01-11 PROCEDURE — 85025 COMPLETE CBC W/AUTO DIFF WBC: CPT | Performed by: INTERNAL MEDICINE

## 2018-01-11 PROCEDURE — 83605 ASSAY OF LACTIC ACID: CPT | Performed by: INTERNAL MEDICINE

## 2018-01-11 PROCEDURE — 25000128 H RX IP 250 OP 636: Performed by: INTERNAL MEDICINE

## 2018-01-11 PROCEDURE — 12000000 ZZH R&B MED SURG/OB

## 2018-01-11 PROCEDURE — 80048 BASIC METABOLIC PNL TOTAL CA: CPT | Performed by: INTERNAL MEDICINE

## 2018-01-11 PROCEDURE — A9270 NON-COVERED ITEM OR SERVICE: HCPCS | Mod: GY | Performed by: HOSPITALIST

## 2018-01-11 RX ORDER — IPRATROPIUM BROMIDE AND ALBUTEROL SULFATE 2.5; .5 MG/3ML; MG/3ML
3 SOLUTION RESPIRATORY (INHALATION)
Status: DISCONTINUED | OUTPATIENT
Start: 2018-01-11 | End: 2018-01-13 | Stop reason: HOSPADM

## 2018-01-11 RX ORDER — FUROSEMIDE 20 MG
20 TABLET ORAL ONCE
Status: COMPLETED | OUTPATIENT
Start: 2018-01-11 | End: 2018-01-11

## 2018-01-11 RX ORDER — BENZONATATE 100 MG/1
100 CAPSULE ORAL 3 TIMES DAILY PRN
Status: DISCONTINUED | OUTPATIENT
Start: 2018-01-11 | End: 2018-01-13 | Stop reason: HOSPADM

## 2018-01-11 RX ORDER — HYDRALAZINE HYDROCHLORIDE 20 MG/ML
10 INJECTION INTRAMUSCULAR; INTRAVENOUS EVERY 4 HOURS PRN
Status: DISCONTINUED | OUTPATIENT
Start: 2018-01-11 | End: 2018-01-13 | Stop reason: HOSPADM

## 2018-01-11 RX ADMIN — GUAIFENESIN AND CODEINE PHOSPHATE 5 ML: 100; 10 SOLUTION ORAL at 05:16

## 2018-01-11 RX ADMIN — APIXABAN 5 MG: 5 TABLET, FILM COATED ORAL at 08:00

## 2018-01-11 RX ADMIN — IPRATROPIUM BROMIDE AND ALBUTEROL SULFATE 3 ML: .5; 3 SOLUTION RESPIRATORY (INHALATION) at 12:36

## 2018-01-11 RX ADMIN — CARBOXYMETHYLCELLULOSE SODIUM 1 DROP: 5 SOLUTION/ DROPS OPHTHALMIC at 08:00

## 2018-01-11 RX ADMIN — CYCLOBENZAPRINE HYDROCHLORIDE 5 MG: 5 TABLET, FILM COATED ORAL at 00:23

## 2018-01-11 RX ADMIN — MULTIPLE VITAMINS W/ MINERALS TAB 1 TABLET: TAB at 08:00

## 2018-01-11 RX ADMIN — Medication 2 LOZENGE: at 16:44

## 2018-01-11 RX ADMIN — AZITHROMYCIN MONOHYDRATE 250 MG: 500 INJECTION, POWDER, LYOPHILIZED, FOR SOLUTION INTRAVENOUS at 21:57

## 2018-01-11 RX ADMIN — ACETAMINOPHEN 500 MG: 500 TABLET, FILM COATED ORAL at 07:57

## 2018-01-11 RX ADMIN — CEFTRIAXONE 2 G: 10 INJECTION, POWDER, FOR SOLUTION INTRAVENOUS at 21:57

## 2018-01-11 RX ADMIN — BENZONATATE 100 MG: 100 CAPSULE, LIQUID FILLED ORAL at 23:55

## 2018-01-11 RX ADMIN — Medication 1 ML: at 21:55

## 2018-01-11 RX ADMIN — OYSTER SHELL CALCIUM WITH VITAMIN D 1 TABLET: 500; 200 TABLET, FILM COATED ORAL at 08:00

## 2018-01-11 RX ADMIN — TIMOLOL MALEATE 1 DROP: 5 SOLUTION/ DROPS OPHTHALMIC at 21:57

## 2018-01-11 RX ADMIN — FUROSEMIDE 20 MG: 20 TABLET ORAL at 12:02

## 2018-01-11 RX ADMIN — GUAIFENESIN AND CODEINE PHOSPHATE 5 ML: 100; 10 SOLUTION ORAL at 18:06

## 2018-01-11 RX ADMIN — APIXABAN 5 MG: 5 TABLET, FILM COATED ORAL at 21:57

## 2018-01-11 RX ADMIN — AMIODARONE HYDROCHLORIDE 200 MG: 200 TABLET ORAL at 08:00

## 2018-01-11 RX ADMIN — GUAIFENESIN 10 ML: 100 SOLUTION ORAL at 21:57

## 2018-01-11 RX ADMIN — TIMOLOL MALEATE 1 DROP: 5 SOLUTION/ DROPS OPHTHALMIC at 08:00

## 2018-01-11 RX ADMIN — LOSARTAN POTASSIUM 50 MG: 50 TABLET ORAL at 08:00

## 2018-01-11 RX ADMIN — IPRATROPIUM BROMIDE AND ALBUTEROL SULFATE 3 ML: .5; 3 SOLUTION RESPIRATORY (INHALATION) at 23:29

## 2018-01-11 RX ADMIN — CYCLOBENZAPRINE HYDROCHLORIDE 5 MG: 5 TABLET, FILM COATED ORAL at 23:55

## 2018-01-11 RX ADMIN — GUAIFENESIN AND CODEINE PHOSPHATE 5 ML: 100; 10 SOLUTION ORAL at 23:58

## 2018-01-11 RX ADMIN — SENNOSIDES 2 TABLET: 8.6 TABLET, FILM COATED ORAL at 10:28

## 2018-01-11 RX ADMIN — IPRATROPIUM BROMIDE AND ALBUTEROL SULFATE 3 ML: .5; 3 SOLUTION RESPIRATORY (INHALATION) at 19:46

## 2018-01-11 RX ADMIN — ONDANSETRON 4 MG: 2 SOLUTION INTRAMUSCULAR; INTRAVENOUS at 16:44

## 2018-01-11 RX ADMIN — OYSTER SHELL CALCIUM WITH VITAMIN D 1 TABLET: 500; 200 TABLET, FILM COATED ORAL at 18:06

## 2018-01-11 RX ADMIN — Medication 2 LOZENGE: at 05:17

## 2018-01-11 RX ADMIN — GUAIFENESIN AND CODEINE PHOSPHATE 5 ML: 100; 10 SOLUTION ORAL at 12:02

## 2018-01-11 RX ADMIN — METOPROLOL SUCCINATE 25 MG: 25 TABLET, EXTENDED RELEASE ORAL at 08:00

## 2018-01-11 RX ADMIN — FOLIC ACID 1 MG: 1 TABLET ORAL at 08:00

## 2018-01-11 ASSESSMENT — ACTIVITIES OF DAILY LIVING (ADL): PREVIOUS_RESPONSIBILITIES: MEAL PREP;HOUSEKEEPING;LAUNDRY;MEDICATION MANAGEMENT;FINANCES;DRIVING

## 2018-01-11 NOTE — PLAN OF CARE
Problem: Patient Care Overview  Goal: Plan of Care/Patient Progress Review  OT: Eval complete and Tx initiated. Pt admitted for pneumonia. Prior to admit, pt lives in house with  and reports mod I-I with ADL/IADLs, including household mgmt, med mgmt, and driving.    Discharge Planner PT   Patient plan for discharge: Home    Current status: Pt required min A for toilet transfer and standing at sink for grooming/hygiene tasks. O2 sats dropped to 89% on room air during activity; thus, reapplied 1L.     Barriers to return to prior living situation: Stairs to enter and within home; Bathtub; Fall risk; Supplemental O2 needs at this time    Recommendations for discharge: Anticipate discharge home with increased A from family for ADL/IADLs as needed and home OT    Rationale for recommendations: Pt limited by impaired safety, pain, and decreased balance and activity tolerance; thus, OT will proceed with daily intervention. Pt reports adequate A from  following discharge home. If level of recommended A is not available at home, pt may need short TCU stay at discharge.        Entered by: Jagdish Quijano 01/11/2018 8:48 AM

## 2018-01-11 NOTE — PLAN OF CARE
Problem: Patient Care Overview  Goal: Plan of Care/Patient Progress Review  PT: Orders received and chart reviewed. Per discussion with OT, patient presented with poor tolerance to OT session recommending PT evaluation hold until tomorrow.

## 2018-01-11 NOTE — PROGRESS NOTES
01/11/18 0753   Quick Adds   Type of Visit Initial Occupational Therapy Evaluation   Living Environment   Lives With spouse   Living Arrangements house   Home Accessibility stairs to enter home;stairs within home;tub/shower is not walk in;grab bars present (bathtub)   Number of Stairs to Enter Home 7   Number of Stairs Within Home 7   Transportation Available car;family or friend will provide  (Pt still drives; however, family can provide for now.)   Self-Care   Dominant Hand right   Usual Activity Tolerance good   Current Activity Tolerance fair   Equipment Currently Used at Home shower chair;grab bar   Functional Level Prior   Ambulation 0-->independent   Transferring 0-->independent   Toileting 0-->independent  (comfort height toilet)   Bathing 1-->assistive equipment   Dressing 0-->independent   Eating 0-->independent   Communication 0-->understands/communicates without difficulty   Swallowing 0-->swallows foods/liquids without difficulty   Cognition 0 - no cognition issues reported   Fall history within last six months no   General Information   Onset of Illness/Injury or Date of Surgery - Date 01/09/18   Referring Physician Andrae Dowd MD   Patient/Family Goals Statement Pt plans to discharge home.    Additional Occupational Profile Info/Pertinent History of Current Problem Admitted for pneumonia   Precautions/Limitations fall precautions;oxygen therapy device and L/min  (On 1L at eval. No supplemental O2 at baseline. )   Cognitive Status Examination   Orientation orientation to person, place and time   Level of Consciousness alert   Able to Follow Commands WNL/WFL   Personal Safety (Cognitive) at risk behaviors demonstrated   Visual Perception   Visual Perception Wears glasses   Pain Assessment   Patient Currently in Pain Yes, see Vital Sign flowsheet  (chest cavity due to coughing)   Range of Motion (ROM)   ROM Quick Adds No deficits were identified   Mobility   Bed Mobility Bed mobility skill:  Rolling/Turning;Bed mobility skill: Scooting/Bridging;Bed mobility skill: Sit to supine;Bed mobility skill: Supine to sit;Bed mobility analysis   Bed Mobility Skill: Rolling/Turning   Level of [place] - Bed Mobility Skill Rolling Turning stand-by assist   Bed Mobility Skill: Scooting/Bridging   Level of [place]: Scooting/Bridging stand-by assist   Bed Mobility Skill: Sit to Supine   Level of [place]: Sit/Supine stand-by assist   Bed Mobility Skill: Supine to Sit   Level of [place]: Supine/Sit stand-by assist   Bed Mobility Analysis   Impairments Contributing to Impaired Bed Mobility impaired balance;pain   Transfer Skills   Transfer Transfer Safety Analysis Bed/Chair;Transfer Skill: Stand to Sit;Transfer Safety Analysis Sit/Stand   Transfer Skill: Bed to Chair/Chair to Bed   Level of [place]: Bed to Chair minimum assist (75% patients effort)   Transfer Safety Analysis Bed/Chair   Transfer Safety Concerns Noted decreased balance during turns;losing balance backward   Impairments Contributing to Impaired Transfers impaired balance   Transfer Skill: Sit to Stand   Level of [place]: Sit/Stand minimum assist (75% patients effort)   Transfer Safety Analysis Sit/Stand   Transfer Safety Concerns Noted: Sit/Stand decreased balance during turns;losing balance backward   Impaired Transfers: Sit/Stand impaired balance   Toilet Transfer   Toilet Transfer Toilet Transfer Skill;Toilet Transfer Safety Analysis   Transfer Skill: Toilet Transfer   Level of [place]: Toilet minimum assist (75% patients effort)   Transfer Safety Analysis Toilet   Transfer Safety Concerns Noted: Toilet decreased balance during turns;losing balance backward   Transfer Safety Analysis Toilet impaired balance   Upper Body Dressing   Level of [place]: Dress Upper Body minimum assist (75% patients effort)   Lower Body Dressing   Level of [place]: Dress Lower Body minimum assist (75% patients effort)   Toileting  "  Level of [place]: Toilet stand-by assist   Grooming   Level of [place]: Grooming minimum assist (75% patients effort)   Eating/Self Feeding   Level of [place]: Eating independent   Instrumental Activities of Daily Living (IADL)   Previous Responsibilities meal prep;housekeeping;laundry;medication management;finances;driving   IADL Comments Pt and  share IADLs. Pt uses pillbox for med mgmt.    Activities of Daily Living Analysis   Impairments Contributing to Impaired Activities of Daily Living balance impaired;pain   General Therapy Interventions   Planned Therapy Interventions ADL retraining;transfer training   Clinical Impression   Criteria for Skilled Therapeutic Interventions Met yes, treatment indicated   OT Diagnosis Decreased I and safety with ADLs   Influenced by the following impairments Impaired safety; Decreased balance and activity tolerance; Pain   Assessment of Occupational Performance 3-5 Performance Deficits   Identified Performance Deficits Dressing; bathing; Toileting; IADLs   Clinical Decision Making (Complexity) Moderate complexity   Therapy Frequency daily   Predicted Duration of Therapy Intervention (days/wks) 3 days   Anticipated Discharge Disposition Home with Assist;Home with Home Therapy   Risks and Benefits of Treatment have been explained. Yes   Patient, Family & other staff in agreement with plan of care Yes   [facility] TM \"6 Clicks\"   2016, Trustees of [facility], under license to [employer].  All rights reserved.   6 Clicks Short Forms Daily Activity Inpatient Short Form   [facility]  \"6 Clicks\" Daily Activity Inpatient Short Form   1. Putting on and taking off regular lower body clothing? 3 - A Little   2. Bathing (including washing, rinsing, drying)? 3 - A Little   3. Toileting, which includes using toilet, bedpan or urinal? 3 - A Little   4. Putting on and taking off regular upper body clothing? 4 - None   5. Taking care " of personal grooming such as brushing teeth? 3 - A Little   6. Eating meals? 4 - None   Daily Activity Raw Score (Score out of 24.Lower scores equate to lower levels of function) 20   Total Evaluation Time   Total Evaluation Time (Minutes) 8

## 2018-01-11 NOTE — PLAN OF CARE
Problem: Pneumonia (Adult)  Goal: Signs and Symptoms of Listed Potential Problems Will be Absent, Minimized or Managed (Pneumonia)  Signs and symptoms of listed potential problems will be absent, minimized or managed by discharge/transition of care (reference Pneumonia (Adult) CPG).  Outcome: Improving  A&Ox4. VSS on 1LPM NC. Up with SB to BR. Tolerating regular diet, appetite poor. Denies pain. Frequent, nonproductive cough. Robitussin prn given with slight relief seems to last 4 hours. IV SL except ATB. Lung sounds diminished. Continue O2 weaning efforts. Discharge TBD.

## 2018-01-11 NOTE — PLAN OF CARE
Problem: Patient Care Overview  Goal: Plan of Care/Patient Progress Review  A&Ox4. VSS on 1LPM NC. Up with SBA to BR.  C/O of muscle spasms during coughing, flexeril given with relief. Frequent, nonproductive cough. Robitussin and throat gayle. prn given with some relief . PIV SL . Lung sounds diminished. Continue O2 weaning efforts during the day. Will cont to monitor.

## 2018-01-11 NOTE — PLAN OF CARE
Problem: Patient Care Overview  Goal: Plan of Care/Patient Progress Review  A/ox4. Tmax 102, other VSS. Weaned to room air. Tylenol given for fever, effective. Gave incentive spirometer, encouraging use. Up with SBA to BR. Denies pain. Has frequent, nonproductive cough. Unable to collect sputum sample. Robitussin and throat gayle. prn given with some relief. Poor appetite. Senna given for complaints of constipation. Continue abx. Will cont to monitor.

## 2018-01-11 NOTE — PROGRESS NOTES
[facility]    Hospitalist Progress Note    Date of Service (when I saw the patient): 01/11/2018    Assessment & Plan   Marley Stewart is a 85 year old female who has PMH of rheumatoid arthritis for which she is on methotrexate, A-fib on Eliquis, CVA, htn, and cardiomyopathy who has been ill now for almost a little over a week who has had symptoms of upper respiratory tract infection and was initially treated with Augmentin for 3 days and then switched over to Ceftin for another 4 days without much improvement, now with chest x-ray findings of a left lower lobe infiltrate, being admitted for further treatment as inpatient.       Left lower lobe pneumonia, suspect community-acquired pneumonia.    WBC down trending 22-19->16. Procalcitonin significantly high at 22.  Saturating well on 1-2 L NC since admission.   - Still with a fever of 102 1/11.   - Blood cultures with NGTD.   - Sputum cx and gram stain pending. Unable to produce any sputum thus far.    - C/w Rocephin and Azithro for now. Will recheck procal in am to ensure downward trend.   - IVF stopped 1/10.   - Encourage IS and out of bed to chair.   - C/w Robitussin with codeine prn.  - scheduled duonebs ordered 1/11.      History of paroxysmal atrial fibrillation:    - continued on her amiodarone and Eliquis.    - remains rate controlled and BP remains stable     History of congestive heart failure and cardiomyopathy:    - The patient's EF is unknown.      - Is on low dose Lasix PTA but being held now due to clinical appearance of dehydration  - Gently hydrated her at 75 mL an hour stopped 1/10.   - Will give a dose of her home 20 mg lasix 1/11 and re-eval in am for re-dosing.     Hypertension:    - continue the patient on Toprol and Cozaar.   - Prn Hydralazine ordered for some hypertension overnight.      Glaucoma:    - We will continue the patient on her eyedrops.      DVT Prophy: Eliquis  Code status: FULL.       DISPOSITION:  Pending ongoing  "improvement. Progress has been slow. Encourage IS and out of bed to chair. Anticipate another 2 days.        Chente Pelayo       Interval History   Afebrile overnight but did spike to 102 this am. Feels a little better compared to admit but overall still feeling \"lowsy\".  + cough but not productive. A little hypertensvie overnight. Did not sleep well due to her roommate keeping her up.      -Data reviewed today: I reviewed all new labs and imaging results over the last 24 hours. I personally reviewed the chest x-ray image(s) showing LLL PNA. .    Physical Exam   Temp: 99.8  F (37.7  C) Temp src: Oral BP: 162/72   Heart Rate: 66 Resp: 18 SpO2: 92 % O2 Device: Nasal cannula Oxygen Delivery: 1 LPM  Vitals:    01/09/18 2205 01/11/18 0646   Weight: 66.2 kg (145 lb 15.1 oz) 67.5 kg (148 lb 13 oz)     Vital Signs with Ranges  Temp:  [97.8  F (36.6  C)-102  F (38.9  C)] 99.8  F (37.7  C)  Heart Rate:  [58-76] 66  Resp:  [16-18] 18  BP: (144-195)/(56-79) 162/72  SpO2:  [89 %-96 %] 92 %  I/O last 3 completed shifts:  In: 1313 [P.O.:660; I.V.:653]  Out: 1075 [Urine:1075]    Gen: Patient in no acute distress.  Appears comfortable but tired.    Heart:  S1S2+, regular rate and rhythm, No murmurs.  Lungs:  Coarse BS with decreased bs at left base with light rhonchi and faint rales. No wheezing.   Abdomen:  Soft, non tender, non distended, bowel sounds positive.  Extremities:  No edema.    Medications     - MEDICATION INSTRUCTIONS -         amiodarone (PACERONE/CODARONE) tablet 200 mg  200 mg Oral Daily     apixaban ANTICOAGULANT  5 mg Oral BID     Calcium carb-Vitamin D 500 mg [place]-200 units  1 tablet Oral BID w/meals     Carboxymethylcellulose Sod PF  1 drop Both Eyes BID     folic acid (FOLVITE) tablet 1 mg  1 mg Oral Daily     latanoprost  1 drop Right Eye At Bedtime     losartan (COZAAR) tablet 50 mg  50 mg Oral Daily     metoprolol (TOPROL-XL) 24 hr tablet 25 mg  25 mg Oral Daily     multivitamin, therapeutic with " minerals  1 tablet Oral Daily     timolol  1 drop Right Eye BID     cefTRIAXone  2 g Intravenous Q24H     azithromycin  250 mg Intravenous Q24H     labetalol  10 mg Intravenous Once       Data     Recent Labs  Lab 01/11/18  0650 01/10/18  0719 01/09/18  1949   WBC 16.8* 19.3* 23.7*   HGB 12.3 12.4 14.2   MCV 94 96 95    248 338     --  136   POTASSIUM 3.5  --  4.4   CHLORIDE 100  --  101   CO2 27  --  29   BUN 10  --  26   CR 0.72  --  0.95   ANIONGAP 8  --  6   LONNIE 8.3*  --  9.1   *  --  139*       No results found for this or any previous visit (from the past 24 hour(s)).

## 2018-01-12 ENCOUNTER — APPOINTMENT (OUTPATIENT)
Dept: PHYSICAL THERAPY | Facility: CLINIC | Age: 83
DRG: 194 | End: 2018-01-12
Attending: INTERNAL MEDICINE
Payer: MEDICARE

## 2018-01-12 ENCOUNTER — APPOINTMENT (OUTPATIENT)
Dept: OCCUPATIONAL THERAPY | Facility: CLINIC | Age: 83
DRG: 194 | End: 2018-01-12
Payer: MEDICARE

## 2018-01-12 LAB
BACTERIA SPEC CULT: ABNORMAL
GRAM STN SPEC: ABNORMAL
Lab: ABNORMAL
PROCALCITONIN SERPL-MCNC: 7.09 NG/ML
SPECIMEN SOURCE: ABNORMAL
SPECIMEN SOURCE: ABNORMAL
WBC # BLD AUTO: 14.6 10E9/L (ref 4–11)

## 2018-01-12 PROCEDURE — 40000133 ZZH STATISTIC OT WARD VISIT

## 2018-01-12 PROCEDURE — 12000000 ZZH R&B MED SURG/OB

## 2018-01-12 PROCEDURE — 97530 THERAPEUTIC ACTIVITIES: CPT | Mod: GP | Performed by: PHYSICAL THERAPIST

## 2018-01-12 PROCEDURE — 40000275 ZZH STATISTIC RCP TIME EA 10 MIN

## 2018-01-12 PROCEDURE — 97116 GAIT TRAINING THERAPY: CPT | Mod: GP | Performed by: PHYSICAL THERAPIST

## 2018-01-12 PROCEDURE — 40000193 ZZH STATISTIC PT WARD VISIT: Performed by: PHYSICAL THERAPIST

## 2018-01-12 PROCEDURE — A9270 NON-COVERED ITEM OR SERVICE: HCPCS | Mod: GY | Performed by: INTERNAL MEDICINE

## 2018-01-12 PROCEDURE — 25000132 ZZH RX MED GY IP 250 OP 250 PS 637: Mod: GY | Performed by: INTERNAL MEDICINE

## 2018-01-12 PROCEDURE — 99233 SBSQ HOSP IP/OBS HIGH 50: CPT | Performed by: INTERNAL MEDICINE

## 2018-01-12 PROCEDURE — 27210995 ZZH RX 272: Performed by: INTERNAL MEDICINE

## 2018-01-12 PROCEDURE — 36415 COLL VENOUS BLD VENIPUNCTURE: CPT | Performed by: INTERNAL MEDICINE

## 2018-01-12 PROCEDURE — 25000128 H RX IP 250 OP 636: Performed by: INTERNAL MEDICINE

## 2018-01-12 PROCEDURE — 85048 AUTOMATED LEUKOCYTE COUNT: CPT | Performed by: INTERNAL MEDICINE

## 2018-01-12 PROCEDURE — 94640 AIRWAY INHALATION TREATMENT: CPT | Mod: 76

## 2018-01-12 PROCEDURE — A9270 NON-COVERED ITEM OR SERVICE: HCPCS | Mod: GY | Performed by: HOSPITALIST

## 2018-01-12 PROCEDURE — 84145 PROCALCITONIN (PCT): CPT | Performed by: INTERNAL MEDICINE

## 2018-01-12 PROCEDURE — 25000132 ZZH RX MED GY IP 250 OP 250 PS 637: Mod: GY | Performed by: HOSPITALIST

## 2018-01-12 PROCEDURE — 94640 AIRWAY INHALATION TREATMENT: CPT

## 2018-01-12 PROCEDURE — 97161 PT EVAL LOW COMPLEX 20 MIN: CPT | Mod: GP | Performed by: PHYSICAL THERAPIST

## 2018-01-12 PROCEDURE — 87205 SMEAR GRAM STAIN: CPT | Performed by: INTERNAL MEDICINE

## 2018-01-12 PROCEDURE — 97530 THERAPEUTIC ACTIVITIES: CPT | Mod: GO

## 2018-01-12 PROCEDURE — 97535 SELF CARE MNGMENT TRAINING: CPT | Mod: GO

## 2018-01-12 PROCEDURE — 25000125 ZZHC RX 250: Performed by: INTERNAL MEDICINE

## 2018-01-12 RX ORDER — PREDNISONE 20 MG/1
20 TABLET ORAL DAILY
Status: DISCONTINUED | OUTPATIENT
Start: 2018-01-12 | End: 2018-01-13 | Stop reason: HOSPADM

## 2018-01-12 RX ORDER — FUROSEMIDE 20 MG
20 TABLET ORAL DAILY
Status: DISCONTINUED | OUTPATIENT
Start: 2018-01-12 | End: 2018-01-13 | Stop reason: HOSPADM

## 2018-01-12 RX ADMIN — MULTIPLE VITAMINS W/ MINERALS TAB 1 TABLET: TAB at 08:58

## 2018-01-12 RX ADMIN — FOLIC ACID 1 MG: 1 TABLET ORAL at 08:58

## 2018-01-12 RX ADMIN — IPRATROPIUM BROMIDE AND ALBUTEROL SULFATE 3 ML: .5; 3 SOLUTION RESPIRATORY (INHALATION) at 23:06

## 2018-01-12 RX ADMIN — GUAIFENESIN 10 ML: 100 SOLUTION ORAL at 21:25

## 2018-01-12 RX ADMIN — APIXABAN 5 MG: 5 TABLET, FILM COATED ORAL at 08:58

## 2018-01-12 RX ADMIN — METOPROLOL SUCCINATE 25 MG: 25 TABLET, EXTENDED RELEASE ORAL at 08:51

## 2018-01-12 RX ADMIN — ACETAMINOPHEN 1000 MG: 500 TABLET, FILM COATED ORAL at 06:38

## 2018-01-12 RX ADMIN — CEFTRIAXONE 2 G: 10 INJECTION, POWDER, FOR SOLUTION INTRAVENOUS at 21:58

## 2018-01-12 RX ADMIN — AMIODARONE HYDROCHLORIDE 200 MG: 200 TABLET ORAL at 08:58

## 2018-01-12 RX ADMIN — OYSTER SHELL CALCIUM WITH VITAMIN D 1 TABLET: 500; 200 TABLET, FILM COATED ORAL at 08:58

## 2018-01-12 RX ADMIN — CARBOXYMETHYLCELLULOSE SODIUM 1 DROP: 5 SOLUTION/ DROPS OPHTHALMIC at 08:58

## 2018-01-12 RX ADMIN — OYSTER SHELL CALCIUM WITH VITAMIN D 1 TABLET: 500; 200 TABLET, FILM COATED ORAL at 17:47

## 2018-01-12 RX ADMIN — SENNOSIDES AND DOCUSATE SODIUM 2 TABLET: 8.6; 5 TABLET ORAL at 20:39

## 2018-01-12 RX ADMIN — IPRATROPIUM BROMIDE AND ALBUTEROL SULFATE 3 ML: .5; 3 SOLUTION RESPIRATORY (INHALATION) at 19:51

## 2018-01-12 RX ADMIN — LATANOPROST 1 DROP: 50 SOLUTION OPHTHALMIC at 21:25

## 2018-01-12 RX ADMIN — IPRATROPIUM BROMIDE AND ALBUTEROL SULFATE 3 ML: .5; 3 SOLUTION RESPIRATORY (INHALATION) at 07:30

## 2018-01-12 RX ADMIN — PREDNISONE 20 MG: 20 TABLET ORAL at 14:16

## 2018-01-12 RX ADMIN — AZITHROMYCIN MONOHYDRATE 250 MG: 500 INJECTION, POWDER, LYOPHILIZED, FOR SOLUTION INTRAVENOUS at 20:39

## 2018-01-12 RX ADMIN — TIMOLOL MALEATE 1 DROP: 5 SOLUTION/ DROPS OPHTHALMIC at 09:00

## 2018-01-12 RX ADMIN — CARBOXYMETHYLCELLULOSE SODIUM 1 DROP: 5 SOLUTION/ DROPS OPHTHALMIC at 20:39

## 2018-01-12 RX ADMIN — FUROSEMIDE 20 MG: 20 TABLET ORAL at 14:16

## 2018-01-12 RX ADMIN — BENZONATATE 100 MG: 100 CAPSULE, LIQUID FILLED ORAL at 22:09

## 2018-01-12 RX ADMIN — ACETAMINOPHEN 650 MG: 325 TABLET, FILM COATED ORAL at 14:15

## 2018-01-12 RX ADMIN — TIMOLOL MALEATE 1 DROP: 5 SOLUTION/ DROPS OPHTHALMIC at 20:39

## 2018-01-12 RX ADMIN — LOSARTAN POTASSIUM 50 MG: 50 TABLET ORAL at 08:58

## 2018-01-12 RX ADMIN — IPRATROPIUM BROMIDE AND ALBUTEROL SULFATE 3 ML: .5; 3 SOLUTION RESPIRATORY (INHALATION) at 15:21

## 2018-01-12 RX ADMIN — GUAIFENESIN AND CODEINE PHOSPHATE 5 ML: 100; 10 SOLUTION ORAL at 06:38

## 2018-01-12 RX ADMIN — APIXABAN 5 MG: 5 TABLET, FILM COATED ORAL at 20:39

## 2018-01-12 NOTE — PLAN OF CARE
Problem: Patient Care Overview  Goal: Plan of Care/Patient Progress Review  Discharge Planner OT   Patient plan for discharge: home  Current status: Pt required CGA with FWW for functional transfers. Required SBA with FWW for morning ADL routine standing at sink. O2 stats at 93% on room air following activity.  Barriers to return to prior living situation: Safety concerns; fall risk  Recommendations for discharge: Home with A for ADLs from  and home OT  Rationale for recommendations: Pt is still limited by limited balance and activity tolerance, however she is improving.       Entered by: Wesley Danielson 01/12/2018 8:23 AM

## 2018-01-12 NOTE — PLAN OF CARE
Problem: Patient Care Overview  Goal: Individualization & Mutuality  Outcome: Improving  Low degree fever, 100 tylenol given.  Feel tired, encourage walking ambulated.  Lung sounds diminished , dyspnea on exertion, non productive cough, sputum and gram stain pending. Wbc trending [place] from 22 to 14.6.   Started on prednisone and Lasix today.  Has a rash on upper lip.   Ambulated.  Oxygen level 92 to 93% room air.

## 2018-01-12 NOTE — PROGRESS NOTES
[facility]    Hospitalist Progress Note    Date of Service (when I saw the patient): 01/12/2018    Assessment & Plan   Marley Stewart is a 85 year old female who has PMH of rheumatoid arthritis for which she is on methotrexate, A-fib on Eliquis, CVA, htn, and cardiomyopathy who has been ill now for almost a little over a week who has had symptoms of upper respiratory tract infection and was initially treated with Augmentin for 3 days and then switched over to Ceftin for another 4 days without much improvement, now with chest x-ray findings of a left lower lobe infiltrate, being admitted for further treatment as inpatient.       Left lower lobe pneumonia, community-acquired pneumonia.    WBC down trending 22-19->14. Procalcitonin significantly high at 22 on admit. Was requiring 1-2 L NC since admission. Fever of 102 1/11.   - Fever improving and Tmax 100 in last 24 hours. Procal downtrending.    - Blood cultures NGTD.   - Sputum cx and gram stain pending. Unable to produce any sputum thus far.  - Primary complaints is nagging cough. Started on Prednisone 20 mg daily.     - C/w Rocephin and Azithro for now.   - Encourage IS and out of bed to chair.   - C/w Robitussin with codeine, tessalon pearls prn.  - scheduled duonebs     History of paroxysmal atrial fibrillation:    - continued on her amiodarone and Eliquis.    - remains rate controlled and BP remains stable     History of congestive heart failure and cardiomyopathy:    - The patient's EF is unknown. Is on low dose Lasix PTA but held now due to clinical appearance of dehydration  - Gently hydrated her at 75 mL an hour stopped 1/10.   - Resumde lasix 20 mg daily 1/11.      Hypertension:    - continue the patient on Toprol and Cozaar.   - Prn Hydralazine ordered for some hypertension overnight.      Glaucoma:    - We will continue the patient on her eyedrops.      DVT Prophy: Eliquis  Code status: FULL.       DISPOSITION: Pending continued  improvement. Lasix resumed. Prednisone started. Anticipate d/c home 1/13.       Chente Pelayo       Interval History   Low grade temp of 100 overnight. Ongoing cough. Did not sleep well again last night due to her roommate. A change of bed request was made.     -Data reviewed today: I reviewed all new labs and imaging results over the last 24 hours. I personally reviewed the chest x-ray image(s) showing LLL PNA. .    Physical Exam   Temp: 100  F (37.8  C) Temp src: Oral BP: 155/60 Pulse: 88 Heart Rate: 76 Resp: 20 SpO2: 92 % O2 Device: None (Room air) Oxygen Delivery: 1 LPM  Vitals:    01/09/18 2205 01/11/18 0646 01/12/18 0612   Weight: 66.2 kg (145 lb 15.1 oz) 67.5 kg (148 lb 13 oz) 66.5 kg (146 lb 9.7 oz)     Vital Signs with Ranges  Temp:  [96.4  F (35.8  C)-100  F (37.8  C)] 100  F (37.8  C)  Pulse:  [84-88] 88  Heart Rate:  [58-76] 76  Resp:  [18-20] 20  BP: (127-163)/(53-67) 155/60  SpO2:  [90 %-96 %] 92 %  I/O last 3 completed shifts:  In: 360 [P.O.:360]  Out: 575 [Urine:575]    Gen: Patient in no acute distress.  Appears comfortable but tired.    Heart:  S1S2+, regular rate and rhythm, No murmurs.  Lungs:  Coarse BS with decreased bs at left base with light rhonchi and faint rales. No wheezing.   Abdomen:  Soft, non tender, non distended, bowel sounds positive.  Extremities:  No edema.    Medications     - MEDICATION INSTRUCTIONS -         ipratropium - albuterol 0.5 mg/2.5 mg/3 mL  3 mL Nebulization Q4H While awake     amiodarone (PACERONE/CODARONE) tablet 200 mg  200 mg Oral Daily     apixaban ANTICOAGULANT  5 mg Oral BID     Calcium carb-Vitamin D 500 mg [place]-200 units  1 tablet Oral BID w/meals     Carboxymethylcellulose Sod PF  1 drop Both Eyes BID     folic acid (FOLVITE) tablet 1 mg  1 mg Oral Daily     latanoprost  1 drop Right Eye At Bedtime     losartan (COZAAR) tablet 50 mg  50 mg Oral Daily     metoprolol (TOPROL-XL) 24 hr tablet 25 mg  25 mg Oral Daily     multivitamin, therapeutic  with minerals  1 tablet Oral Daily     timolol  1 drop Right Eye BID     cefTRIAXone  2 g Intravenous Q24H     azithromycin  250 mg Intravenous Q24H     labetalol  10 mg Intravenous Once       Data     Recent Labs  Lab 01/12/18  0647 01/11/18  0650 01/10/18  0719 01/09/18  1949   WBC 14.6* 16.8* 19.3* 23.7*   HGB  --  12.3 12.4 14.2   MCV  --  94 96 95   PLT  --  247 248 338   NA  --  135  --  136   POTASSIUM  --  3.5  --  4.4   CHLORIDE  --  100  --  101   CO2  --  27  --  29   BUN  --  10  --  26   CR  --  0.72  --  0.95   ANIONGAP  --  8  --  6   LONNIE  --  8.3*  --  9.1   GLC  --  107*  --  139*       No results found for this or any previous visit (from the past 24 hour(s)).

## 2018-01-12 NOTE — PLAN OF CARE
Problem: Patient Care Overview  Goal: Plan of Care/Patient Progress Review  Discharge Planner PT    PT:  Patient admitted with pneumonia after several week history of generalized weakness.  Per  has been tired and unmotivated to mobilize a lot around the house.  Takes frequent naps.  Now admitted with pneumonia.  At baseline patient does not use AD but does have 4WW and SEC.  Lives with  who is the main one who drives and gets groceries.  Per  and patient, patient does drive though hasn't as much as of late.   Patient plan for discharge: home  Current status: Sit to and from stand with SBA.  Sit to supine I'lly. SBA provided for patient as she managed in the bathroom.  No LOB noted.  Patient spent time toileting and at the sink cleaning up and rinsing mouth.  At end of session discussed activites, including L/E exercises in supine and sitting.  Patient too fatigued to perform at this time. Patient ambulated 150' with WW and SBA, cues to stand erect x 1.  Slightly fatigued following but felt good to be up ambulating.  Declined trying stairs stating she is still too weak.  No LOB noted with WW.  Patient in agreement that WW  is beneficial at this time and patient has 4WW at home. .   Barriers to return to prior living situation: None; feel  and patient can manage with patient's current level of function  Recommendations for discharge: Recommend home PT.  Rationale for recommendations: Given patient's deconditioned state for awhile prior to admit, as well as, decreased strength given pneumonia diagnosis, feel patient will benefit from skill PT at home to increase strength and functional mobility.         Entered by: Heena Cortes 01/12/2018 2:41 PM

## 2018-01-12 NOTE — PLAN OF CARE
Problem: Patient Care Overview  Goal: Discharge Needs Assessment  Outcome: No Change  COPY BELOW FOR END OF SHIFT NOTE  Shift Update: VSS, LGT tonight, incessant cough, frustrated and exhausted! Earplugs at bedside, Sched nebs(call RT so she gets them) answer light promptly, claimed light was 2hrs last night and was incont x3. LS dim and crackles, poor PO, drinking water, has hot tea at bedside and chloraseptic spray-ANYTHING for cough. Wants to sleep if possible.

## 2018-01-12 NOTE — PLAN OF CARE
Problem: Patient Care Overview  Goal: Plan of Care/Patient Progress Review  VSS on 1 L O2.  Frustrated with incessant cough and states not getting and rest d/t roommate, earplugs at bedside. Sched nebs, states that she is not getting them. LS dim and crackles, poor PO, drinking water, has  chloraseptic spray, gave PRN ativan, prn robitussin with codeine and teselon kacey. Will continue to monitor.

## 2018-01-12 NOTE — PROGRESS NOTES
" 01/12/18 1354   Quick Adds   Type of Visit Initial PT Evaluation   Living Environment   Lives With spouse   Living Arrangements house   Home Accessibility stairs (1 railing present);stairs to enter home;stairs within home   Number of Stairs to Enter Home (3)   Number of Stairs Within Home (8)   Stair Railings at Home inside, present on left side   Transportation Available car;family or friend will provide   Living Environment Comment split-level home   Self-Care   Usual Activity Tolerance moderate   Current Activity Tolerance fair   Regular Exercise no   Equipment Currently Used at Home none  (has WW and SEC)   Activity/Exercise/Self-Care Comment self-cars but has been taking two naps a day for several weeks to month.  Per , patient is fatigued all the time; worse since lyndon pneumnia   Functional Level Prior   Ambulation 0-->independent   Transferring 0-->independent   Toileting 0-->independent   Bathing 0-->independent   Dressing 0-->independent   Eating 0-->independent   Communication 0-->understands/communicates without difficulty   Swallowing 0-->swallows foods/liquids without difficulty   Cognition 0 - no cognition issues reported   Fall history within last six months no   Which of the above functional risks had a recent onset or change? none   Prior Functional Level Comment patient has been independent with all functional mobility.  Per patient and  she has been very tired, even for several months, takes two naps a day and doesn't want to move a lot.  Daughter-in-law who is a retired nurse comes to assist with motivating patient to move around more.  Per  and patient, she does drive but it has been more limited as of late.     General Information   Onset of Illness/Injury or Date of Surgery - Date 01/09/18   Patient/Family Goals Statement \"Stop coughing.\"   Pertinent History of Current Problem (include personal factors and/or comorbidities that impact the POC) Admitted with " weakness and physical deconditioning, diagnosed with pnemonia   Precautions/Limitations fall precautions   General Observations Sitting up in chair; coughing.  Agreeable to participate.   Cognitive Status Examination   Orientation orientation to person, place and time   Level of Consciousness alert   Follows Commands and Answers Questions 100% of the time   Personal Safety and Judgment intact   Memory intact   Cognitive Comment no conerns noted   Pain Assessment   Patient Currently in Pain No   Integumentary/Edema   Integumentary/Edema no deficits were identifed   Posture    Posture Forward head position   Range of Motion (ROM)   ROM Quick Adds No deficits were identified   Strength   Strength Comments generally 4/5 throughout   Bed Mobility   Bed Mobility Comments Sit to supine I'lly without use of rail.   Transfer Skills   Transfer Comments Sit to and from stand with SBA.   Gait   Gait Comments Ambulated 20' or eval with WW and SBA, cues to stand upright.     Balance   Balance Comments No gross LOB noted.   Sensory Examination   Sensory Perception Comments not assessed   Coordination   Coordination no deficits were identified   Muscle Tone   Muscle Tone no deficits were identified   General Therapy Interventions   Planned Therapy Interventions gait training;strengthening   Clinical Impression   Criteria for Skilled Therapeutic Intervention yes, treatment indicated   PT Diagnosis decreased functional mobility   Influenced by the following impairments cough, generalized weakness and deconditioning   Functional limitations due to impairments decreased independence in functional mobility and inreased assistance of AD   Clinical Presentation Stable/Uncomplicated   Clinical Presentation Rationale per clinical judgment   Clinical Decision Making (Complexity) Low complexity   Therapy Frequency` daily   Predicted Duration of Therapy Intervention (days/wks) 2 days   Anticipated Equipment Needs at Discharge (use WW)  "  Anticipated Discharge Disposition Home with Assist;Home with Home Therapy   Risk & Benefits of therapy have been explained Yes   Patient, Family & other staff in agreement with plan of care Yes   [facility] AM-PAC  \"6 Clicks\" V.2 Basic Mobility Inpatient Short Form   1. Turning from your back to your side while in a flat bed without using bedrails? 4 - None   2. Moving from lying on your back to sitting on the side of a flat bed without using bedrails? 4 - None   3. Moving to and from a bed to a chair (including a wheelchair)? 3 - A Little   4. Standing up from a chair using your arms (e.g., wheelchair, or bedside chair)? 3 - A Little   5. To walk in hospital room? 3 - A Little   6. Climbing 3-5 steps with a railing? 3 - A Little   Basic Mobility Raw Score (Score out of 24.Lower scores equate to lower levels of function) 20   Total Evaluation Time   Total Evaluation Time (Minutes) 20     "

## 2018-01-13 ENCOUNTER — APPOINTMENT (OUTPATIENT)
Dept: PHYSICAL THERAPY | Facility: CLINIC | Age: 83
DRG: 194 | End: 2018-01-13
Payer: MEDICARE

## 2018-01-13 VITALS
DIASTOLIC BLOOD PRESSURE: 61 MMHG | WEIGHT: 146.4 LBS | OXYGEN SATURATION: 95 % | SYSTOLIC BLOOD PRESSURE: 166 MMHG | TEMPERATURE: 95.8 F | RESPIRATION RATE: 18 BRPM | BODY MASS INDEX: 25 KG/M2 | HEART RATE: 60 BPM | HEIGHT: 64 IN

## 2018-01-13 PROCEDURE — 94640 AIRWAY INHALATION TREATMENT: CPT | Mod: 76

## 2018-01-13 PROCEDURE — 99239 HOSP IP/OBS DSCHRG MGMT >30: CPT | Performed by: INTERNAL MEDICINE

## 2018-01-13 PROCEDURE — 25000132 ZZH RX MED GY IP 250 OP 250 PS 637: Mod: GY | Performed by: INTERNAL MEDICINE

## 2018-01-13 PROCEDURE — 94640 AIRWAY INHALATION TREATMENT: CPT

## 2018-01-13 PROCEDURE — 40000193 ZZH STATISTIC PT WARD VISIT

## 2018-01-13 PROCEDURE — 97116 GAIT TRAINING THERAPY: CPT | Mod: GP

## 2018-01-13 PROCEDURE — A9270 NON-COVERED ITEM OR SERVICE: HCPCS | Mod: GY | Performed by: INTERNAL MEDICINE

## 2018-01-13 PROCEDURE — 25000125 ZZHC RX 250: Performed by: INTERNAL MEDICINE

## 2018-01-13 PROCEDURE — 40000275 ZZH STATISTIC RCP TIME EA 10 MIN

## 2018-01-13 RX ORDER — PREDNISONE 10 MG/1
TABLET ORAL
Qty: 8 TABLET | Refills: 0 | Status: SHIPPED | OUTPATIENT
Start: 2018-01-13 | End: 2019-01-04

## 2018-01-13 RX ORDER — AZITHROMYCIN 250 MG/1
250 TABLET, FILM COATED ORAL ONCE
Status: COMPLETED | OUTPATIENT
Start: 2018-01-13 | End: 2018-01-13

## 2018-01-13 RX ORDER — PREDNISONE 20 MG/1
20 TABLET ORAL DAILY
Qty: 3 TABLET | Refills: 0 | Status: SHIPPED | OUTPATIENT
Start: 2018-01-13 | End: 2018-01-13

## 2018-01-13 RX ORDER — IPRATROPIUM BROMIDE AND ALBUTEROL SULFATE 2.5; .5 MG/3ML; MG/3ML
3 SOLUTION RESPIRATORY (INHALATION)
Qty: 150 ML | Refills: 0 | Status: SHIPPED | OUTPATIENT
Start: 2018-01-13 | End: 2019-01-04

## 2018-01-13 RX ORDER — BENZONATATE 100 MG/1
100 CAPSULE ORAL 3 TIMES DAILY PRN
Qty: 21 CAPSULE | Refills: 0 | Status: SHIPPED | OUTPATIENT
Start: 2018-01-13 | End: 2018-01-20

## 2018-01-13 RX ADMIN — APIXABAN 5 MG: 5 TABLET, FILM COATED ORAL at 08:20

## 2018-01-13 RX ADMIN — AZITHROMYCIN 250 MG: 250 TABLET, FILM COATED ORAL at 08:38

## 2018-01-13 RX ADMIN — IPRATROPIUM BROMIDE AND ALBUTEROL SULFATE 3 ML: .5; 3 SOLUTION RESPIRATORY (INHALATION) at 07:24

## 2018-01-13 RX ADMIN — FOLIC ACID 1 MG: 1 TABLET ORAL at 08:20

## 2018-01-13 RX ADMIN — CARBOXYMETHYLCELLULOSE SODIUM 1 DROP: 5 SOLUTION/ DROPS OPHTHALMIC at 08:20

## 2018-01-13 RX ADMIN — IPRATROPIUM BROMIDE AND ALBUTEROL SULFATE 3 ML: .5; 3 SOLUTION RESPIRATORY (INHALATION) at 12:38

## 2018-01-13 RX ADMIN — TIMOLOL MALEATE 1 DROP: 5 SOLUTION/ DROPS OPHTHALMIC at 08:21

## 2018-01-13 RX ADMIN — PREDNISONE 20 MG: 20 TABLET ORAL at 08:20

## 2018-01-13 RX ADMIN — MULTIPLE VITAMINS W/ MINERALS TAB 1 TABLET: TAB at 08:20

## 2018-01-13 RX ADMIN — METOPROLOL SUCCINATE 25 MG: 25 TABLET, EXTENDED RELEASE ORAL at 08:20

## 2018-01-13 RX ADMIN — LOSARTAN POTASSIUM 50 MG: 50 TABLET ORAL at 08:20

## 2018-01-13 RX ADMIN — FUROSEMIDE 20 MG: 20 TABLET ORAL at 08:20

## 2018-01-13 RX ADMIN — AMIODARONE HYDROCHLORIDE 200 MG: 200 TABLET ORAL at 08:20

## 2018-01-13 RX ADMIN — OYSTER SHELL CALCIUM WITH VITAMIN D 1 TABLET: 500; 200 TABLET, FILM COATED ORAL at 08:20

## 2018-01-13 NOTE — DISCHARGE SUMMARY
[facility]    Discharge Summary  Hospitalist    Date of Admission:  1/9/2018  Date of Discharge:  1/13/2018  Discharging Provider: Chente Pelayo  Date of Service (when I saw the patient): 01/13/18    Discharge Diagnoses     Left lower lobe community-acquired pneumonia - Suspected bacterial (Unspecified)      History of Present Illness   Marley Stewart is a 85 year old female who has PMH of rheumatoid arthritis for which she is on methotrexate, A-fib on Eliquis, CVA, htn, and cardiomyopathy who has been ill now for almost a little over a week who has had symptoms of upper respiratory tract infection and was initially treated with Augmentin for 3 days and then switched over to Ceftin for another 4 days without much improvement, now with chest x-ray findings of a left lower lobe infiltrate.    Hospital Course   Marley Stewart was admitted on 1/9/2018.  The following problems were addressed during her hospitalization:        Left lower lobe pneumonia, community-acquired pneumonia.    WBC 22, Fever 105, Procalcitonin significantly high at 22 on admit. Was requiring 1-2 L NC on admission. Treated with Rocephin and Azithromycin and Prednisone 20 mg daily on admission with improvement over her 4 day stay. Her Sputum and blood cultures were negative.  She was saturating well on RA with resolution of the above symptoms except for ongoing but improving cough on discharge. She was transitioned to po Augmentin on discharge for another 4 days and a short prednisone taper. She was to follow up with her pcp in one week to evaluate her ongoing improvement.           Chente Pelayo        Significant Results and Procedures   See below    Pending Results   These results will be followed up by PCP  Unresulted Labs Ordered in the Past 30 Days of this Admission     Date and Time Order Name Status Description    1/9/2018 1941 Blood culture Preliminary     1/9/2018 1941 Blood culture Preliminary            Code Status   Full Code       Primary Care Physician   Chandra Ortiz    Physical Exam   Temp: 95.8  F (35.4  C) Temp src: Axillary BP: 193/74 Pulse: 60 Heart Rate: 62 Resp: 18 SpO2: 97 % O2 Device: None (Room air)    Vitals:    01/11/18 0646 01/12/18 0612 01/13/18 0154   Weight: 67.5 kg (148 lb 13 oz) 66.5 kg (146 lb 9.7 oz) 66.4 kg (146 lb 6.4 oz)     Vital Signs with Ranges  Temp:  [95.7  F (35.4  C)-100  F (37.8  C)] 95.8  F (35.4  C)  Pulse:  [60-88] 60  Heart Rate:  [57-62] 62  Resp:  [18-20] 18  BP: (154-193)/(60-74) 193/74  SpO2:  [92 %-97 %] 97 %  I/O last 3 completed shifts:  In: 600 [P.O.:600]  Out: 2050 [Urine:2050]    Gen: Patient in no acute distress.  Appears comfortable.  Heart:  S1S2+, regular rate and rhythm, No murmurs.  Lungs:  Clear to auscultation at apices, Mildly coarse at left base, no wheezing, no rales. MIl  Abdomen:  Soft, non tender, non distended, bowel sounds positive.  Extremities:  No edema.    Discharge Disposition   Discharged to home  Condition at discharge: Stable    Consultations This Hospital Stay   PHYSICAL THERAPY ADULT IP CONSULT  OCCUPATIONAL THERAPY ADULT IP CONSULT    Time Spent on this Encounter   I, Chente Pelayo, personally saw the patient today and spent greater than 30 minutes discharging this patient.    Discharge Orders     Home Care PT Referral for Hospital Discharge     Reason for your hospital stay   You presented with sob, cough and fever. Found to have a left lower lobe Pneumonia.     Activity   Your activity upon discharge: activity as tolerated     When to contact your care team   Call your primary doctor if you have any of the following:  increased shortness of breath or recurrent fever > 101.0.     MD face to face encounter   Documentation of Face to Face and Certification for Home Health Services    I certify that patient: Marley Stewart is under my care and that I, or a nurse practitioner or physician's assistant working with me, had a  face-to-face encounter that meets the physician face-to-face encounter requirements with this patient on: 1/13/2018.    This encounter with the patient was in whole, or in part, for the following medical condition, which is the primary reason for home health care: Pneumonia.    I certify that, based on my findings, the following services are medically necessary home health services: Physical Therapy.    My clinical findings support the need for the above services because: Physical Therapy Services are needed to assess and treat the following functional impairments: balance and strength.    Further, I certify that my clinical findings support that this patient is homebound (i.e. absences from home require considerable and taxing effort and are for medical reasons or [employer] services or infrequently or of short duration when for other reasons) because: Requires assistance of another person or specialized equipment to access medical services because patient: Is unable to operate assistive equipment on their own...    Based on the above findings. I certify that this patient is confined to the home and needs intermittent skilled nursing care, physical therapy and/or speech therapy.  The patient is under my care, and I have initiated the establishment of the plan of care.  This patient will be followed by a physician who will periodically review the plan of care.  Physician/Provider to provide follow up care: Chandra Ortiz    Attending hospital physician (the Medicare certified [facility] provider): Chente Pelayo  Physician Signature: See electronic signature associated with these discharge orders.  Date: 1/13/2018     Follow Up and recommended labs and tests   Follow up with primary care provider, Chandra Ortiz, within 7 days to evaluate medication change and for hospital follow- up.  No follow up labs or test are needed. Will need to follow for ongoing improvement.  Holding Methrotrexate until  recovered and seen by PCP.     Full Code     Diet   Follow this diet upon discharge:   Regular Diet Adult       Discharge Medications   Current Discharge Medication List      START taking these medications    Details   benzonatate (TESSALON) 100 MG capsule Take 1 capsule (100 mg) by mouth 3 times daily as needed for cough  Qty: 21 capsule, Refills: 0    Associated Diagnoses: Pneumonia of left lower lobe due to infectious organism (H)      amoxicillin-clavulanate (AUGMENTIN) 875-125 MG per tablet Take 1 tablet by mouth 2 times daily for 4 days  Qty: 8 tablet, Refills: 0    Associated Diagnoses: Pneumonia of left lower lobe due to infectious organism (H)      ipratropium - albuterol 0.5 mg/2.5 mg/3 mL (DUONEB) 0.5-2.5 (3) MG/3ML neb solution Take 1 vial (3 mLs) by nebulization every 4 hours (while awake) for 10 days  Qty: 150 mL, Refills: 0    Associated Diagnoses: Pneumonia of left lower lobe due to infectious organism (H)         CONTINUE these medications which have CHANGED    Details   predniSONE (DELTASONE) 10 MG tablet Take 20 mg (2 tabs) daily x 3 days, then 1 tab (10 mg) daily x 2 days then stop.  Qty: 8 tablet, Refills: 0    Associated Diagnoses: Pneumonia of left lower lobe due to infectious organism (H)         CONTINUE these medications which have NOT CHANGED    Details   guaiFENesin-codeine (ROBITUSSIN AC) 100-10 MG/5ML SOLN Take 5 mLs by mouth every 6 hours as needed for cough      albuterol (PROAIR HFA/PROVENTIL HFA/VENTOLIN HFA) 108 (90 BASE) MCG/ACT Inhaler Inhale 2 puffs into the lungs every 6 hours as needed for shortness of breath / dyspnea or wheezing      ACETAMINOPHEN PO Take 500-1,000 mg by mouth every 8 hours as needed for pain      Metoprolol Succinate (TOPROL XL PO) Take 25 mg by mouth daily      fluocinonide (LIDEX) 0.05 % solution Apply topically daily as needed (to scalp)      cyclobenzaprine (FLEXERIL) 5 MG tablet Take 1 tablet (5 mg) by mouth 3 times daily as needed for muscle  spasms  Qty: 21 tablet, Refills: 0    Associated Diagnoses: Neck pain      sennosides (SENOKOT) 8.6 MG tablet Take 2 tablets by mouth 2 times daily as needed for constipation      AMIODARONE HCL PO Take 200 mg by mouth daily       apixaban ANTICOAGULANT (ELIQUIS) 5 MG tablet Take 1 tablet (5 mg) by mouth 2 times daily  Qty: 60 tablet, Refills: 0    Associated Diagnoses: Atrial fibrillation (H)      furosemide (LASIX) 20 MG tablet Take 1 tablet (20 mg) by mouth every morning  Qty: 30 tablet, Refills: 0    Associated Diagnoses: Acute on chronic diastolic congestive heart failure (H)      LOSARTAN POTASSIUM PO Take 50 mg by mouth daily       FOLIC ACID PO Take 1 mg by mouth daily      calcium carb 1250 mg, 500 mg Robinson,/vitamin D 200 units (OSCAL WITH D) 500-200 MG-UNIT per tablet Take 1 tablet by mouth 2 times daily (with meals)      multivitamin, therapeutic with minerals (THERA-VIT-M) TABS Take 1 tablet by mouth daily      latanoprost (XALATAN) 0.005 % ophthalmic solution Place 1 drop into the right eye At Bedtime      timolol (TIMOPTIC) 0.5 % ophthalmic solution Place 1 drop into the right eye 2 times daily      carboxymethylcellulose (REFRESH PLUS) 0.5 % SOLN Place 1 drop into both eyes 2 times daily          STOP taking these medications       cefuroxime (CEFTIN) 500 MG tablet Comments:   Reason for Stopping:         Methotrexate Sodium (METHOTREXATE PO) Comments:   Reason for Stopping:             Allergies   Allergies   Allergen Reactions     Amitriptyline      Clonazepam Other (See Comments)     Somnolence at 0.5 mg dose     Latex Rash     Data   Most Recent 3 CBC's:  Recent Labs   Lab Test  01/12/18   0647  01/11/18   0650  01/10/18   0719  01/09/18 1949   WBC  14.6*  16.8*  19.3*  23.7*   HGB   --   12.3  12.4  14.2   MCV   --   94  96  95   PLT   --   247  248  338      Most Recent 3 BMP's:  Recent Labs   Lab Test  01/11/18   0650  01/09/18 1949 05/28/17   0546   NA  135  136  135   POTASSIUM  3.5  4.4   4.0   CHLORIDE  100  101  100   CO2  27  29  28   BUN  10  26  20   CR  0.72  0.95  0.83   ANIONGAP  8  6  7   LONNIE  8.3*  9.1  9.3   GLC  107*  139*  91     Most Recent 2 LFT's:  Recent Labs   Lab Test  05/27/17   0955  12/05/15   0624   AST  22  24   ALT  42  34   ALKPHOS  85  65   BILITOTAL  0.7  0.6     Most Recent INR's and Anticoagulation Dosing History:  Anticoagulation Dose History     Recent Dosing and Labs Latest Ref Rng & Units 5/27/2017    INR 0.86 - 1.14 1.54(H)        Most Recent 3 Troponin's:  Recent Labs   Lab Test  05/27/17   0955  11/02/16   1042  12/05/15   0624   TROPI  0.022  0.026  0.028     Most Recent Cholesterol Panel:  Recent Labs   Lab Test  12/04/15   2210   CHOL  168   LDL  96   HDL  40*   TRIG  159*     Most Recent 6 Bacteria Isolates From Any Culture (See EPIC Reports for Culture Details):  Recent Labs   Lab Test  01/12/18   2134  01/09/18   2102  01/09/18   1949  05/27/17   1513  05/27/17   1507  05/27/17   1049   CULT  >10 Squamous epithelial cells/low power field indicates oral contamination. Please   recollect.  *  Canceled, Test credited  Notification of test cancellation was given to  Yun Mittal RN, @5131 01/12/18.DH.    No growth after 4 days  No growth after 4 days  No growth  No growth  No growth     Most Recent TSH, T4 and A1c Labs:  Recent Labs   Lab Test  11/02/16   1042   04/28/15   1455   TSH  0.54   < >   --    A1C   --    --   5.6    < > = values in this interval not displayed.       Results for orders placed or performed during the hospital encounter of 01/09/18   XR Chest 2 Views    Narrative    CHEST TWO VIEWS  1/9/2018 8:44 PM     COMPARISON: Two view chest x-ray 11/2/2016.    HISTORY: Shortness of breath.       Impression    IMPRESSION: There is airspace opacity in the left lower lobe that may  represent pneumonia. The lungs are otherwise clear. There is no  pleural effusion or pneumothorax. Heart size is normal with no  evidence for congestive  failure.    WAYNE BROWN MD

## 2018-01-13 NOTE — PROGRESS NOTES
Social Work Services Discharge Note      Patient Name:  Marley Stewart     Anticipated Discharge Date: 1/13/18     Discharge Disposition:    Home with [facility]- PT        Additional Services/Equipment Arranged: [facility] Home PT     Patient / Family response to discharge plan:  Agreement     Persons notified of above discharge plan: Pt, FV      Amanda Wallace MSW, [facility]  Phone [phone]

## 2018-01-13 NOTE — PLAN OF CARE
Problem: Patient Care Overview  Goal: Plan of Care/Patient Progress Review  Discharge Planner PT   Patient plan for discharge: home, pt reports she may discharge today (no notes in chart indicating at this time)  Current status: Pt IND with sit<>stand and toilet transfers, IND with pericares and grooming at sink. Pt ambulates with FWW 1x215' and SBA progressing to ModIND. Pt refuses any attempts at stairs (reports does not feel comfortable as they are concrete) increased time providing education for modifications for ascend/descending stairs to accommodate for weakness and decreased activity tolerance.  Barriers to return to prior living situation: decreased activity tolerance  Recommendations for discharge: home with assist and HHPT, pt also requesting home RN (discussed with SW)  Rationale for recommendations: pt would benefit from HHPT to progress strength and activity tolerance to maximize safety and IND with functional mobility towards PLOF.       Entered by: [place] Campuzano 01/13/2018 10:24 AM         Physical Therapy Discharge Summary    Reason for therapy discharge:    Discharged to home with home therapy.    Progress towards therapy goal(s). See goals on Care Plan in [facility] health record for goal details.  Goals partially met.  Barriers to achieving goals:   discharge from facility.    Therapy recommendation(s):    Continued therapy is recommended.  Rationale/Recommendations:  see above.

## 2018-01-13 NOTE — PLAN OF CARE
Problem: Patient Care Overview  Goal: Plan of Care/Patient Progress Review  Outcome: Adequate for Discharge Date Met: 01/13/18    Patient discharged at 1:05 PM to Discharged to home  IV was discontinued. Pain at time of discharge was 0/10. Belongings returned to patient.  Discharge instructions and medications reviewed with patient, son and .  Patient verbalized understanding and all questions were answered.  Prescriptions given to patient.  At time of discharge, patient condition was stable and left the unit escorted by CNA.

## 2018-01-13 NOTE — PLAN OF CARE
Problem: Patient Care Overview  Goal: Plan of Care/Patient Progress Review  Outcome: No Change   Pt A&Ox4. VSS on RA. Up with SB to BR. Regular diet. Denies pain. Frequent, nonproductive cough. Robitussin prn given with slight relief seems to last 4 hours. IV SL except ATB. Lung sounds diminished. Discharge TBD. Will continue to monitor.

## 2018-01-13 NOTE — PLAN OF CARE
Problem: Patient Care Overview  Goal: Plan of Care/Patient Progress Review  Outcome: No Change  A&Ox4. VSS on RA. Denies pain. Up with SBA to BR. Regular diet. Frequent, nonproductive cough. Sputum sample contaminated, new specimen cup placed in room, pt feels she cannot cough anything up. LS diminished. Discharge pending. Will continue to monitor.

## 2018-01-15 LAB
BACTERIA SPEC CULT: NO GROWTH
BACTERIA SPEC CULT: NO GROWTH
Lab: NORMAL
Lab: NORMAL
SPECIMEN SOURCE: NORMAL
SPECIMEN SOURCE: NORMAL

## 2018-06-27 ENCOUNTER — HOSPITAL ENCOUNTER (EMERGENCY)
Facility: CLINIC | Age: 83
Discharge: HOME | End: 2018-06-27
Attending: EMERGENCY MEDICINE | Admitting: EMERGENCY MEDICINE
Payer: MEDICARE

## 2018-06-27 ENCOUNTER — APPOINTMENT (OUTPATIENT)
Dept: CT IMAGING | Facility: CLINIC | Age: 83
End: 2018-06-27
Attending: EMERGENCY MEDICINE
Payer: MEDICARE

## 2018-06-27 VITALS
HEART RATE: 59 BPM | DIASTOLIC BLOOD PRESSURE: 78 MMHG | HEIGHT: 64 IN | SYSTOLIC BLOOD PRESSURE: 173 MMHG | OXYGEN SATURATION: 97 % | RESPIRATION RATE: 16 BRPM | TEMPERATURE: 97.5 F

## 2018-06-27 DIAGNOSIS — R10.31 RLQ ABDOMINAL PAIN: ICD-10-CM

## 2018-06-27 DIAGNOSIS — K57.32 DIVERTICULITIS OF LARGE INTESTINE WITHOUT PERFORATION OR ABSCESS WITHOUT BLEEDING: ICD-10-CM

## 2018-06-27 LAB
ALBUMIN SERPL-MCNC: 3.7 G/DL (ref 3.4–5)
ALBUMIN SERPL-MCNC: 4 G/DL (ref 3.4–5)
ALBUMIN UR-MCNC: NEGATIVE MG/DL
ALP SERPL-CCNC: 79 U/L (ref 40–150)
ALP SERPL-CCNC: 84 U/L (ref 40–150)
ALT SERPL W P-5'-P-CCNC: 36 U/L (ref 0–50)
ALT SERPL W P-5'-P-CCNC: 37 U/L (ref 0–50)
ANION GAP SERPL CALCULATED.3IONS-SCNC: 5 MMOL/L (ref 3–14)
ANION GAP SERPL CALCULATED.3IONS-SCNC: 6 MMOL/L (ref 3–14)
APPEARANCE UR: CLEAR
APTT PPP: 28 SEC (ref 22–37)
AST SERPL W P-5'-P-CCNC: 23 U/L (ref 0–45)
AST SERPL W P-5'-P-CCNC: 29 U/L (ref 0–45)
BASOPHILS # BLD AUTO: 0 10E9/L (ref 0–0.2)
BASOPHILS NFR BLD AUTO: 0.4 %
BILIRUB SERPL-MCNC: 0.4 MG/DL (ref 0.2–1.3)
BILIRUB SERPL-MCNC: 0.6 MG/DL (ref 0.2–1.3)
BILIRUB UR QL STRIP: NEGATIVE
BUN SERPL-MCNC: 18 MG/DL (ref 7–30)
BUN SERPL-MCNC: 19 MG/DL (ref 7–30)
CALCIUM SERPL-MCNC: 9.4 MG/DL (ref 8.5–10.1)
CALCIUM SERPL-MCNC: 9.7 MG/DL (ref 8.5–10.1)
CHLORIDE SERPL-SCNC: 105 MMOL/L (ref 94–109)
CHLORIDE SERPL-SCNC: 105 MMOL/L (ref 94–109)
CO2 SERPL-SCNC: 30 MMOL/L (ref 20–32)
CO2 SERPL-SCNC: 31 MMOL/L (ref 20–32)
COLOR UR AUTO: YELLOW
CREAT SERPL-MCNC: 1.11 MG/DL (ref 0.52–1.04)
CREAT SERPL-MCNC: 1.11 MG/DL (ref 0.52–1.04)
DIFFERENTIAL METHOD BLD: ABNORMAL
EOSINOPHIL # BLD AUTO: 0.3 10E9/L (ref 0–0.7)
EOSINOPHIL NFR BLD AUTO: 3.9 %
ERYTHROCYTE [DISTWIDTH] IN BLOOD BY AUTOMATED COUNT: 14.2 % (ref 10–15)
GFR SERPL CREATININE-BSD FRML MDRD: 47 ML/MIN/1.7M2
GFR SERPL CREATININE-BSD FRML MDRD: 47 ML/MIN/1.7M2
GLUCOSE SERPL-MCNC: 110 MG/DL (ref 70–99)
GLUCOSE SERPL-MCNC: 119 MG/DL (ref 70–99)
GLUCOSE UR STRIP-MCNC: NEGATIVE MG/DL
HCT VFR BLD AUTO: 39.8 % (ref 35–47)
HGB BLD-MCNC: 14 G/DL (ref 11.7–15.7)
HGB UR QL STRIP: ABNORMAL
HYALINE CASTS #/AREA URNS LPF: 6 /LPF (ref 0–2)
IMM GRANULOCYTES # BLD: 0 10E9/L (ref 0–0.4)
IMM GRANULOCYTES NFR BLD: 0.5 %
INR PPP: 1.29 (ref 0.86–1.14)
KETONES UR STRIP-MCNC: NEGATIVE MG/DL
LACTATE BLD-SCNC: 1.3 MMOL/L (ref 0.7–2)
LEUKOCYTE ESTERASE UR QL STRIP: NEGATIVE
LIPASE SERPL-CCNC: 194 U/L (ref 73–393)
LIPASE SERPL-CCNC: 199 U/L (ref 73–393)
LYMPHOCYTES # BLD AUTO: 2.3 10E9/L (ref 0.8–5.3)
LYMPHOCYTES NFR BLD AUTO: 30.5 %
MCH RBC QN AUTO: 33.9 PG (ref 26.5–33)
MCHC RBC AUTO-ENTMCNC: 35.2 G/DL (ref 31.5–36.5)
MCV RBC AUTO: 96 FL (ref 78–100)
MONOCYTES # BLD AUTO: 0.7 10E9/L (ref 0–1.3)
MONOCYTES NFR BLD AUTO: 8.9 %
MUCOUS THREADS #/AREA URNS LPF: PRESENT /LPF
NEUTROPHILS # BLD AUTO: 4.1 10E9/L (ref 1.6–8.3)
NEUTROPHILS NFR BLD AUTO: 55.8 %
NITRATE UR QL: NEGATIVE
NRBC # BLD AUTO: 0 10*3/UL
NRBC BLD AUTO-RTO: 0 /100
PH UR STRIP: 6 PH (ref 5–7)
PLATELET # BLD AUTO: 261 10E9/L (ref 150–450)
POTASSIUM SERPL-SCNC: 3.9 MMOL/L (ref 3.4–5.3)
POTASSIUM SERPL-SCNC: 4.1 MMOL/L (ref 3.4–5.3)
PROT SERPL-MCNC: 7 G/DL (ref 6.8–8.8)
PROT SERPL-MCNC: 7.4 G/DL (ref 6.8–8.8)
RBC # BLD AUTO: 4.13 10E12/L (ref 3.8–5.2)
RBC #/AREA URNS AUTO: 2 /HPF (ref 0–2)
SODIUM SERPL-SCNC: 141 MMOL/L (ref 133–144)
SODIUM SERPL-SCNC: 141 MMOL/L (ref 133–144)
SOURCE: ABNORMAL
SP GR UR STRIP: 1.01 (ref 1–1.03)
SQUAMOUS #/AREA URNS AUTO: <1 /HPF (ref 0–1)
UROBILINOGEN UR STRIP-MCNC: NORMAL MG/DL (ref 0–2)
WBC # BLD AUTO: 7.4 10E9/L (ref 4–11)
WBC #/AREA URNS AUTO: 3 /HPF (ref 0–5)

## 2018-06-27 PROCEDURE — 83690 ASSAY OF LIPASE: CPT | Performed by: EMERGENCY MEDICINE

## 2018-06-27 PROCEDURE — 81001 URINALYSIS AUTO W/SCOPE: CPT | Performed by: EMERGENCY MEDICINE

## 2018-06-27 PROCEDURE — 99285 EMERGENCY DEPT VISIT HI MDM: CPT | Mod: 25

## 2018-06-27 PROCEDURE — 25000128 H RX IP 250 OP 636: Performed by: EMERGENCY MEDICINE

## 2018-06-27 PROCEDURE — A9270 NON-COVERED ITEM OR SERVICE: HCPCS | Mod: GY | Performed by: EMERGENCY MEDICINE

## 2018-06-27 PROCEDURE — 83605 ASSAY OF LACTIC ACID: CPT | Performed by: EMERGENCY MEDICINE

## 2018-06-27 PROCEDURE — 25000125 ZZHC RX 250: Performed by: EMERGENCY MEDICINE

## 2018-06-27 PROCEDURE — 25000132 ZZH RX MED GY IP 250 OP 250 PS 637: Mod: GY | Performed by: EMERGENCY MEDICINE

## 2018-06-27 PROCEDURE — 85025 COMPLETE CBC W/AUTO DIFF WBC: CPT | Performed by: EMERGENCY MEDICINE

## 2018-06-27 PROCEDURE — 96360 HYDRATION IV INFUSION INIT: CPT | Mod: 59

## 2018-06-27 PROCEDURE — 74177 CT ABD & PELVIS W/CONTRAST: CPT

## 2018-06-27 PROCEDURE — 80053 COMPREHEN METABOLIC PANEL: CPT | Performed by: EMERGENCY MEDICINE

## 2018-06-27 RX ORDER — IOPAMIDOL 755 MG/ML
73 INJECTION, SOLUTION INTRAVASCULAR ONCE
Status: COMPLETED | OUTPATIENT
Start: 2018-06-27 | End: 2018-06-27

## 2018-06-27 RX ORDER — CIPROFLOXACIN 500 MG/1
500 TABLET, FILM COATED ORAL ONCE
Status: COMPLETED | OUTPATIENT
Start: 2018-06-27 | End: 2018-06-27

## 2018-06-27 RX ORDER — ONDANSETRON 2 MG/ML
4 INJECTION INTRAMUSCULAR; INTRAVENOUS EVERY 30 MIN PRN
Status: DISCONTINUED | OUTPATIENT
Start: 2018-06-27 | End: 2018-06-27 | Stop reason: HOSPADM

## 2018-06-27 RX ORDER — METRONIDAZOLE 500 MG/1
500 TABLET ORAL 2 TIMES DAILY
Qty: 20 TABLET | Refills: 0 | Status: SHIPPED | OUTPATIENT
Start: 2018-06-27 | End: 2019-02-10

## 2018-06-27 RX ORDER — CIPROFLOXACIN 500 MG/1
500 TABLET, FILM COATED ORAL 2 TIMES DAILY
Qty: 20 TABLET | Refills: 0 | Status: SHIPPED | OUTPATIENT
Start: 2018-06-27 | End: 2019-02-10

## 2018-06-27 RX ORDER — ONDANSETRON 4 MG/1
4 TABLET, ORALLY DISINTEGRATING ORAL EVERY 8 HOURS PRN
Qty: 20 TABLET | Refills: 0 | Status: SHIPPED | OUTPATIENT
Start: 2018-06-27 | End: 2019-02-10

## 2018-06-27 RX ORDER — METRONIDAZOLE 500 MG/1
500 TABLET ORAL ONCE
Status: COMPLETED | OUTPATIENT
Start: 2018-06-27 | End: 2018-06-27

## 2018-06-27 RX ADMIN — SODIUM CHLORIDE 62 ML: 9 INJECTION, SOLUTION INTRAVENOUS at 17:19

## 2018-06-27 RX ADMIN — CIPROFLOXACIN HYDROCHLORIDE 500 MG: 500 TABLET, FILM COATED ORAL at 18:28

## 2018-06-27 RX ADMIN — IOPAMIDOL 73 ML: 755 INJECTION, SOLUTION INTRAVENOUS at 17:18

## 2018-06-27 RX ADMIN — METRONIDAZOLE 500 MG: 500 TABLET ORAL at 18:28

## 2018-06-27 RX ADMIN — SODIUM CHLORIDE, POTASSIUM CHLORIDE, SODIUM LACTATE AND CALCIUM CHLORIDE 1000 ML: 600; 310; 30; 20 INJECTION, SOLUTION INTRAVENOUS at 16:59

## 2018-06-27 ASSESSMENT — ENCOUNTER SYMPTOMS
VOMITING: 0
NAUSEA: 1
ABDOMINAL PAIN: 1
SHORTNESS OF BREATH: 0
FEVER: 0

## 2018-06-27 NOTE — ED AVS SNAPSHOT
Emergency Department    [address] [facility] [address]    Phone:  [phone]    Fax:  [phone]                                       Marley Stewart   MRN: [id number]    Department:   Emergency Department   Date of Visit:  6/27/2018           Patient Information     Date Of Birth          7/17/1932        Your diagnoses for this visit were:     Diverticulitis of large intestine without perforation or abscess without bleeding     RLQ abdominal pain        You were seen by Isaiah Looney MD.      Follow-up Information     Follow up with Chandra Ortiz MD. Schedule an appointment as soon as possible for a visit in 2 days.    Specialty:  Family Practice    Why:  For close follow up    Contact information:    [address]   [address]  [facility] [address] [phone]          Go to  Emergency Department.    Specialty:  EMERGENCY MEDICINE    Why:  If symptoms worsen    Contact information:    [address] [facility] [address] [phone]        Discharge Instructions         TODAY YOU TOOK THIS MEDICATIONS:  Ciprofloxacin 500 mg at 6:30 pm.  MetroNIDAZOLE (FLAGYL) 500 mg at 6:30 pm.      Diverticulitis    Some people get pouches along the wall of the colon as they get older. The pouches, called diverticuli, usually cause no symptoms. If the pouches become blocked, you can get an infection. This infection is called diverticulitis. It causes pain in your lower abdomen and fever. If not treated, it can become a serious condition, causing an abscess to form inside the pouch. The abscess may block the intestinal tract even or rupture, spreading infection throughout the abdomen.  When treatment is started early, oral antibiotics alone may be enough to cure diverticulitis. This method is tried first. But, if you don't improve or if your condition gets worse while using oral antibiotics, you may need to be admitted to the hospital for IV antibiotics. Severe cases may  require surgery.  Home care  The following guidelines will help you care for yourself at home:    During the acute illness, rest and follow your healthcare provider's instructions about diet. Sometimes you will need to follow a clear liquid diet to rest your bowel. Once your symptoms are better, you may be told to follow a low-fiber diet for some time. Include foods like:  ? Flake cereal, mashed potatoes, pancakes, waffles, pasta, white bread, rice, applesauce, bananas, eggs, fish, poultry, tofu, and cooked soft vegetables    Take antibiotics exactly as instructed. Don't miss any doses or stop taking the medication, even if you feel better.    Monitor your temperature and tell your healthcare provider if you have rising temperatures.  Preventing future attacks  Once you have an episode of diverticulitis, you are at risk for having it again. After you have recovered from this episode, you may be able to lower your risk by eating a high-fiber diet (20 gm/day to 35 gm/day of fiber). This cleans out the colon pouches that already exist and may prevent new ones from forming. Foods high in fiber include fresh fruits and edible peelings, raw or lightly cooked vegetables, whole grain cereals and breads, dried beans and peas, and bran.  Other steps that can help prevent future attacks include:    Take your medicines, such as antibiotics, as your healthcare provider says.    Drink 6 to 8 glasses of water every day, unless told otherwise.    Use a heating pad or hot water bottle to help abdominal cramping or pain.    Begin an exercise program. Ask your healthcare provider how to get started. You can benefit from simple activities such as walking or gardening.    Treat diarrhea with a bland diet. Start with liquids only; then slowly add fiber over time.    Watch for changes in your bowel movements (constipation to diarrhea). Avoid constipation by eating a high fiber diet and taking a stool softener if needed.    Get plenty of  rest and sleep.  Follow-up care  Follow up with your healthcare provider as advised or sooner if you are not getting better in the next 2 days.  When to seek medical advice  Call your healthcare provider right away if any of these occur:    Fever of 100.4 F (38 C) or higher, or as directed by your healthcare provider    Repeated vomiting or swelling of the abdomen    Weakness, dizziness, light-headedness    Pain in your abdomen that gets worse, severe, or spreads to your back    Pain that moves to the right lower abdomen    Rectal bleeding (stools that are red, black or maroon color)    Unexpected vaginal bleeding  Date Last Reviewed: 9/1/2016 2000-2017 [employer] [address] [place] [address], [place] [address]. All rights reserved. This information is not intended as a substitute for professional medical care. Always follow your healthcare professional's instructions.          24 Hour Appointment Hotline       To make an appointment at any [facility], call [phone] ([phone]). If you don't have a family doctor or clinic, we will help you find one. [facility] are conveniently located to serve the needs of you and your family.             Review of your medicines      START taking        Dose / Directions Last dose taken    ciprofloxacin 500 MG tablet   Commonly known as:  CIPRO   Dose:  500 mg   Quantity:  20 tablet        Take 1 tablet (500 mg) by mouth 2 times daily for 10 days   Refills:  0        metroNIDAZOLE 500 MG tablet   Commonly known as:  FLAGYL   Dose:  500 mg   Quantity:  20 tablet        Take 1 tablet (500 mg) by mouth 2 times daily for 10 days   Refills:  0        ondansetron 4 MG ODT tab   Commonly known as:  ZOFRAN ODT   Dose:  4 mg   Quantity:  20 tablet        Take 1 tablet (4 mg) by mouth every 8 hours as needed for nausea or vomiting   Refills:  0          Our records show that you are taking the medicines listed below. If these are incorrect, please call your  family doctor or clinic.        Dose / Directions Last dose taken    ACETAMINOPHEN PO   Dose:  500-1000 mg        Take 500-1,000 mg by mouth every 8 hours as needed for pain   Refills:  0        albuterol 108 (90 Base) MCG/ACT Inhaler   Commonly known as:  PROAIR HFA/PROVENTIL HFA/VENTOLIN HFA   Dose:  2 puff        Inhale 2 puffs into the lungs every 6 hours as needed for shortness of breath / dyspnea or wheezing   Refills:  0        AMIODARONE HCL PO   Dose:  200 mg        Take 200 mg by mouth daily   Refills:  0        apixaban ANTICOAGULANT 5 MG tablet   Commonly known as:  ELIQUIS   Dose:  5 mg   Quantity:  60 tablet        Take 1 tablet (5 mg) by mouth 2 times daily   Refills:  0        Calcium carb-Vitamin D 500 mg [place]-200 units 500-200 MG-UNIT per tablet   Commonly known as:  OSCAL with D;Oyster Shell Calcium   Dose:  1 tablet        Take 1 tablet by mouth 2 times daily (with meals)   Refills:  0        carboxymethylcellulose 0.5 % Soln ophthalmic solution   Commonly known as:  REFRESH PLUS   Dose:  1 drop        Place 1 drop into both eyes 2 times daily   Refills:  0        cyclobenzaprine 5 MG tablet   Commonly known as:  FLEXERIL   Dose:  5 mg   Quantity:  21 tablet        Take 1 tablet (5 mg) by mouth 3 times daily as needed for muscle spasms   Refills:  0        fluocinonide 0.05 % solution   Commonly known as:  LIDEX        Apply topically daily as needed (to scalp)   Refills:  0        FOLIC ACID PO   Dose:  1 mg        Take 1 mg by mouth daily   Refills:  0        furosemide 20 MG tablet   Commonly known as:  LASIX   Dose:  20 mg   Quantity:  30 tablet        Take 1 tablet (20 mg) by mouth every morning   Refills:  0        guaiFENesin-codeine 100-10 MG/5ML Soln   Commonly known as:  ROBITUSSIN AC   Dose:  5 mL        Take 5 mLs by mouth every 6 hours as needed for cough   Refills:  0        ipratropium - albuterol 0.5 mg/2.5 mg/3 mL 0.5-2.5 (3) MG/3ML neb solution   Commonly known as:  DUONEB    Dose:  3 mL   Quantity:  150 mL        Take 1 vial (3 mLs) by nebulization every 4 hours (while awake) for 10 days   Refills:  0        latanoprost 0.005 % ophthalmic solution   Commonly known as:  XALATAN   Dose:  1 drop        Place 1 drop into the right eye At Bedtime   Refills:  0        LOSARTAN POTASSIUM PO   Dose:  50 mg        Take 50 mg by mouth daily   Refills:  0        multivitamin, therapeutic with minerals Tabs tablet   Dose:  1 tablet        Take 1 tablet by mouth daily   Refills:  0        predniSONE 10 MG tablet   Commonly known as:  DELTASONE   Quantity:  8 tablet        Take 20 mg (2 tabs) daily x 3 days, then 1 tab (10 mg) daily x 2 days then stop.   Refills:  0        sennosides 8.6 MG tablet   Commonly known as:  SENOKOT   Dose:  2 tablet        Take 2 tablets by mouth 2 times daily as needed for constipation   Refills:  0        timolol 0.5 % ophthalmic solution   Commonly known as:  TIMOPTIC   Dose:  1 drop        Place 1 drop into the right eye 2 times daily   Refills:  0        TOPROL XL PO   Dose:  25 mg        Take 25 mg by mouth daily   Refills:  0                Prescriptions were sent or printed at these locations (3 Prescriptions)                   Other Prescriptions                Printed at Department/Unit printer (3 of 3)         ciprofloxacin (CIPRO) 500 MG tablet               metroNIDAZOLE (FLAGYL) 500 MG tablet               ondansetron (ZOFRAN ODT) 4 MG ODT tab                Procedures and tests performed during your visit     Procedure/Test Number of Times Performed    CBC with platelets + differential 1    CT Abdomen Pelvis w Contrast 1    Cardiac Continuous Monitoring 1    Comprehensive metabolic panel 2    Give 20 ounces of water 15 minutes before CT of abdomen 1    INR 1    Lactic acid whole blood 1    Lipase 2    Partial thromboplastin time 1    Peripheral IV: Standard 1    Pulse oximetry nursing 1    UA reflex to Microscopic and Culture 1      Orders Needing  Specimen Collection     None      Pending Results     Date and Time Order Name Status Description    6/27/2018 1620 CT Abdomen Pelvis w Contrast Preliminary             Pending Culture Results     No orders found from 6/25/2018 to 6/28/2018.            Pending Results Instructions     If you had any lab results that were not finalized at the time of your Discharge, you can call the ED Lab Result RN at [phone]. You will be contacted by this team for any positive Lab results or changes in treatment. The nurses are available 7 days a week from 10A to 6:30P.  You can leave a message 24 hours per day and they will return your call.        Test Results From Your Hospital Stay        6/27/2018  4:35 PM      Component Results     Component Value Ref Range & Units Status    Sodium 141 133 - 144 mmol/L Final    Potassium 4.1 3.4 - 5.3 mmol/L Final    Chloride 105 94 - 109 mmol/L Final    Carbon Dioxide 31 20 - 32 mmol/L Final    Anion Gap 5 3 - 14 mmol/L Final    Glucose 119 (H) 70 - 99 mg/dL Final    Urea Nitrogen 18 7 - 30 mg/dL Final    Creatinine 1.11 (H) 0.52 - 1.04 mg/dL Final    GFR Estimate 47 (L) >60 mL/min/1.7m2 Final    Non  GFR Calc    GFR Estimate If Black 56 (L) >60 mL/min/1.7m2 Final    African American GFR Calc    Calcium 9.7 8.5 - 10.1 mg/dL Final    Bilirubin Total 0.6 0.2 - 1.3 mg/dL Final    Albumin 4.0 3.4 - 5.0 g/dL Final    Protein Total 7.4 6.8 - 8.8 g/dL Final    Alkaline Phosphatase 84 40 - 150 U/L Final    ALT 37 0 - 50 U/L Final    AST 23 0 - 45 U/L Final         6/27/2018  4:33 PM      Component Results     Component Value Ref Range & Units Status    Lipase 199 73 - 393 U/L Final         6/27/2018  4:51 PM      Component Results     Component Value Ref Range & Units Status    INR 1.29 (H) 0.86 - 1.14 Final         6/27/2018  4:51 PM      Component Results     Component Value Ref Range & Units Status    PTT 28 22 - 37 sec Final         6/27/2018  5:11 PM      Component Results      Component Value Ref Range & Units Status    Sodium 141 133 - 144 mmol/L Final    Potassium 3.9 3.4 - 5.3 mmol/L Final    Chloride 105 94 - 109 mmol/L Final    Carbon Dioxide 30 20 - 32 mmol/L Final    Anion Gap 6 3 - 14 mmol/L Final    Glucose 110 (H) 70 - 99 mg/dL Final    Urea Nitrogen 19 7 - 30 mg/dL Final    Creatinine 1.11 (H) 0.52 - 1.04 mg/dL Final    GFR Estimate 47 (L) >60 mL/min/1.7m2 Final    Non  GFR Calc    GFR Estimate If Black 56 (L) >60 mL/min/1.7m2 Final    African American GFR Calc    Calcium 9.4 8.5 - 10.1 mg/dL Final    Bilirubin Total 0.4 0.2 - 1.3 mg/dL Final    Albumin 3.7 3.4 - 5.0 g/dL Final    Protein Total 7.0 6.8 - 8.8 g/dL Final    Alkaline Phosphatase 79 40 - 150 U/L Final    ALT 36 0 - 50 U/L Final    AST 29 0 - 45 U/L Final         6/27/2018  5:09 PM      Component Results     Component Value Ref Range & Units Status    Lipase 194 73 - 393 U/L Final         6/27/2018  4:48 PM      Component Results     Component Value Ref Range & Units Status    Lactic Acid 1.3 0.7 - 2.0 mmol/L Final         6/27/2018  5:21 PM      Component Results     Component Value Ref Range & Units Status    Color Urine Yellow  Final    Appearance Urine Clear  Final    Glucose Urine Negative NEG^Negative mg/dL Final    Bilirubin Urine Negative NEG^Negative Final    Ketones Urine Negative NEG^Negative mg/dL Final    Specific Gravity Urine 1.015 1.003 - 1.035 Final    Blood Urine Trace (A) NEG^Negative Final    pH Urine 6.0 5.0 - 7.0 pH Final    Protein Albumin Urine Negative NEG^Negative mg/dL Final    Urobilinogen mg/dL Normal 0.0 - 2.0 mg/dL Final    Nitrite Urine Negative NEG^Negative Final    Leukocyte Esterase Urine Negative NEG^Negative Final    Source Midstream Urine  Final    RBC Urine 2 0 - 2 /HPF Final    WBC Urine 3 0 - 5 /HPF Final    Squamous Epithelial /HPF Urine <1 0 - 1 /HPF Final    Mucous Urine Present (A) NEG^Negative /LPF Final    Hyaline Casts 6 (H) 0 - 2 /LPF Final          6/27/2018  5:56 PM      Narrative     CT ABDOMEN AND PELVIS WITH CONTRAST   6/27/2018 5:27 PM     HISTORY: Right lower quadrant pain, evaluate for appendicitis vs  colitis.    TECHNIQUE:   73mL Isovue-370. Radiation dose for this scan was reduced  using automated exposure control, adjustment of the mA and/or kV  according to patient size, or iterative reconstruction technique.    COMPARISON: None.    FINDINGS: There is mild atelectasis or fibrosis in the lung bases.  There is diffuse fatty infiltration of the liver. No focal hepatic  lesions. The spleen and pancreas are unremarkable. There is bilateral  adrenal gland thickening. There is a 5.6 cm cyst in the lower pole of  the right kidney. The right kidney is hypertrophic relative to the  left. The left kidney is unremarkable. A small calcification in the  right pelvis adjacent to the right common iliac vessels is likely a  vascular calcification (image 45). There is no evidence of bowel  obstruction. The appendix is not seen. There are multiple colonic  diverticula. Mild pericolonic inflammatory change in the right colon  which may represent mild diverticulitis. No convincing bowel wall  thickening. No fluid collections are identified. There are probable  uterine fibroids. There is no free intraperitoneal air.  Atherosclerotic changes of the aorta but no evidence of aneurysm.        Impression     IMPRESSION:   1. Mild pericolonic inflammatory changes of the right colon suspicious  for mild diverticulitis. No evidence of bowel obstruction or abscess.  2. Fatty infiltration of the liver.  3. Large right renal cyst.         6/27/2018  5:17 PM      Component Results     Component Value Ref Range & Units Status    WBC 7.4 4.0 - 11.0 10e9/L Final    RBC Count 4.13 3.8 - 5.2 10e12/L Final    Hemoglobin 14.0 11.7 - 15.7 g/dL Final    Hematocrit 39.8 35.0 - 47.0 % Final    MCV 96 78 - 100 fl Final    MCH 33.9 (H) 26.5 - 33.0 pg Final    MCHC 35.2 31.5 - 36.5 g/dL Final     RDW 14.2 10.0 - 15.0 % Final    Platelet Count 261 150 - 450 10e9/L Final    Diff Method Automated Method  Final    % Neutrophils 55.8 % Final    % Lymphocytes 30.5 % Final    % Monocytes 8.9 % Final    % Eosinophils 3.9 % Final    % Basophils 0.4 % Final    % Immature Granulocytes 0.5 % Final    Nucleated RBCs 0 0 /100 Final    Absolute Neutrophil 4.1 1.6 - 8.3 10e9/L Final    Absolute Lymphocytes 2.3 0.8 - 5.3 10e9/L Final    Absolute Monocytes 0.7 0.0 - 1.3 10e9/L Final    Absolute Eosinophils 0.3 0.0 - 0.7 10e9/L Final    Absolute Basophils 0.0 0.0 - 0.2 10e9/L Final    Abs Immature Granulocytes 0.0 0 - 0.4 10e9/L Final    Absolute Nucleated RBC 0.0  Final                Clinical Quality Measure: Blood Pressure Screening     Your blood pressure was checked while you were in the emergency department today. The last reading we obtained was  BP: 173/78 . Please read the guidelines below about what these numbers mean and what you should do about them.  If your systolic blood pressure (the top number) is less than 120 and your diastolic blood pressure (the bottom number) is less than 80, then your blood pressure is normal. There is nothing more that you need to do about it.  If your systolic blood pressure (the top number) is 120-139 or your diastolic blood pressure (the bottom number) is 80-89, your blood pressure may be higher than it should be. You should have your blood pressure rechecked within a year by a primary care provider.  If your systolic blood pressure (the top number) is 140 or greater or your diastolic blood pressure (the bottom number) is 90 or greater, you may have high blood pressure. High blood pressure is treatable, but if left untreated over time it can put you at risk for heart attack, stroke, or kidney failure. You should have your blood pressure rechecked by a primary care provider within the next 4 weeks.  If your provider in the emergency department today gave you specific instructions  to follow-up with your doctor or provider even sooner than that, you should follow that instruction and not wait for up to 4 weeks for your follow-up visit.        Thank you for choosing [facility]       Thank you for choosing [facility] for your care. Our goal is always to provide you with excellent care. Hearing back from our patients is one way we can continue to improve our services. Please take a few minutes to complete the written survey that you may receive in the mail after you visit with us. Thank you!        [employer] Information     [employer] gives you secure access to your electronic health record. If you see a primary care provider, you can also send messages to your care team and make appointments. If you have questions, please call your primary care clinic.  If you do not have a primary care provider, please call [phone] and they will assist you.        Care EveryWhere ID     This is your Care EveryWhere ID. This could be used by other organizations to access your [facility] medical records  [id number]        Equal Access to Services     ALECIA MIRELES : Hernando Dennison, nila norris, slim pope, margarette amador. So [facility] [phone].    ATENCIÓN: Si habla español, tiene a borjas disposición servicios gratuitos de asistencia lingüística. Llame al [phone].    We comply with applicable federal civil rights laws and [place] laws. We do not discriminate on the basis of race, color, national origin, age, disability, sex, sexual orientation, or gender identity.            After Visit Summary       This is your record. Keep this with you and show to your community pharmacist(s) and doctor(s) at your next visit.

## 2018-06-27 NOTE — ED PROVIDER NOTES
History     Chief Complaint:  Abdominal Pain       HPI   Marley Stewart is a 85 year old female who presents to the emergency department today for evaluation of abdominal pain. The patient reports for the last couple of days she has been having diffuse abdominal pain. She notes her pain is greatest in the RLQ and she is concerned for appendicitis. Her abdominal pain worsened so she presented to the emergency department for further evaluation. She reports today the pain has concentrated on her right side. She says her pain worsens with movement. Additionally, she notes she fell a couple of weeks ago landing on her back, and originally she attributed her abdominal pain to this. She endorses nausea but no vomiting. She denies chest pain, shortness of breath, emesis, and fever. She denies urinary symptoms, diarrhea, BRBPR. She has been having normal bowel movements.    Allergies:  Amitriptyline  Clonazepam  Latex        Medications:    albuterol (PROAIR HFA/PROVENTIL HFA/VENTOLIN HFA) 108 (90 BASE) MCG/ACT Inhaler  AMIODARONE HCL PO  apixaban ANTICOAGULANT (ELIQUIS) 5 MG tablet  calcium carb 1250 mg, 500 mg [place],/vitamin D 200 units (OSCAL WITH D) 500-200 MG-UNIT per tablet  carboxymethylcellulose (REFRESH PLUS) 0.5 % SOLN  cyclobenzaprine (FLEXERIL) 5 MG tablet  fluocinonide (LIDEX) 0.05 % solution  FOLIC ACID PO  furosemide (LASIX) 20 MG tablet  guaiFENesin-codeine (ROBITUSSIN AC) 100-10 MG/5ML SOLN  ipratropium - albuterol 0.5 mg/2.5 mg/3 mL (DUONEB) 0.5-2.5 (3) MG/3ML neb solution  latanoprost (XALATAN) 0.005 % ophthalmic solution  LOSARTAN POTASSIUM PO  Metoprolol Succinate (TOPROL XL PO)  multivitamin, therapeutic with minerals (THERA-VIT-M) TABS  predniSONE (DELTASONE) 10 MG tablet  sennosides (SENOKOT) 8.6 MG tablet  timolol (TIMOPTIC) 0.5 % ophthalmic solution    Past Medical History:    A-fib  Aortic regurgitation  Arthritis  CHF  CVA  Glaucoma  Hypertension  PUD  Mitral regurgitation  Pulmonic valve  "regurgitation  RA  Spinal stenosis  Thyroid disease  Tricuspid regurgitation      Past Surgical History:    Orthopedic Surgery      Family History:    Cerebrovascular disease      Social History:  The patient was accompanied to the ED by .  Smoking Status: Never Smoker  Smokeless Tobacco: Never Used  Alcohol Use: Yes   Marital Status:   [2]       Review of Systems   Constitutional: Negative for fever.   Respiratory: Negative for shortness of breath.    Cardiovascular: Negative for chest pain.   Gastrointestinal: Positive for abdominal pain (right) and nausea. Negative for vomiting.   All other systems reviewed and are negative.      Physical Exam   First Vitals:  BP: 160/53  Pulse: 59  Temp: 97.5  F (36.4  C)  Resp: 16  Height: 161.3 cm (5' 3.5\")  SpO2: 96 %      Physical Exam  General: Nontoxic. Appears mildly uncomfortable.   Head:  Scalp, face, and head appear normal  Eyes:  Pupils are equal, round, and reactive to light    Conjunctivae non-injected and sclerae white  ENT:    The external nose is normal    Pinnae are normal    The oropharynx is normal, mucous membranes moist    Uvula is in the midline  Neck:  Normal range of motion    There is no rigidity noted    Trachea is in the midline  CV:  Regular rate and rhythm     Normal S1/S2, no S3/S4    No murmur or rub  Resp:  Lungs are clear and equal bilaterally    There is no tachypnea    No increased work of breathing    No rales, wheezing, or rhonchi  GI:  Abdomen is soft, no rigidity or guarding    Mild distension. No mass    Diffuse tenderness with focal tenderness to palpation in the RLQ and suprapubic region. [place] neg. [place] neg. No rebound tenderness.  MS:  Normal muscular tone    Symmetric motor strength    No lower extremity edema  Skin:  No rash or acute skin lesions noted  Neuro: Awake and alert    Speech is normal and fluent    Moves all extremities spontaneously  Psych:  Normal affect.  Appropriate interactions.     Emergency " Department Course     Imaging:  Radiology findings were communicated with the patient who voiced understanding of the findings.    CT Abdomen/Pelvis w Contrast:  IMPRESSION:   1. Mild pericolonic inflammatory changes of the right colon suspicious  for mild diverticulitis. No evidence of bowel obstruction or abscess.  2. Fatty infiltration of the liver.  3. Large right renal cyst.  Report per radiology       Laboratory:  Laboratory findings were communicated with the patient who voiced understanding of the findings.    CBC: WBC 7.4, HGB 14.0,    INR: 1.29 (H)   1) Lipase: 199  2)Lipase: 194   Lactic Acid: 1.3   CMP: Glucose 119 (H), Creatinine 1.11 (H), GFR Estimate 47 (L) o/w WNL.    UA: Urine Blood Trace, Mucous Urine Present, Hyaline Casts 6 (H) o/w WNL      Interventions:  1659 NS Bolus 1,000mL IV   1719 Saline 62 mLs IV     Emergency Department Course:  Nursing notes and vitals reviewed.  1612: I performed an exam of the patient as documented above.   IV was inserted and blood was drawn for laboratory testing, results above.   The patient provided a urine sample here in the emergency department. This was sent for laboratory testing, findings above.   The patient was sent for a CT Abdomen/Pelvis while in the emergency department, results above.  1757 Patient rechecked and updated.    Findings and plan explained to the Patient. Patient discharged home with instructions regarding supportive care, medications, and reasons to return. The importance of close follow-up was reviewed.   I personally reviewed the laboratory and imaging results with the Patient and answered all related questions prior to discharge.   Impression & Plan      Medical Decision Making:  Marley Stewart is a 85 year old female who presents for evaluation of abdominal pain.  A broad differential diagnosis was considered and CT confirms mild right sided probable diverticulitis without abscess or perforation; this appears consistent with the  history and physical exam so I doubt another underlying etiology is also present.  Appendicitis also considered but felt to be less likely. Patient is afebrile and without signs of systemic infection or sepsis. The patient's pain has been controlled by interventions in the emergency department.  This represents uncomplicated diverticulitis at this time.  There are no signs of sepsis, shock or bacteremia. No abscess or perforation.  The natural history of this problem was discussed, and I educated the patient regarding the symptoms and signs that should prompt return to the Emergency Department. Initial dose of abx given in the ED prior to discharge. Patient will be continued on cipro/flagyl. I recommended close follow up and recheck with her PCP in 2-3 days. I counseled her to return immediately to the ED for any worsening of her condition.  This would include worsening fevers, chills, vomiting, and more intense pain.  The patient is to take antibiotics and pain medications as directed.  Patient agreeable with plan of care and was discharged in stable condition.    Diagnosis:    ICD-10-CM    1. Diverticulitis of large intestine without perforation or abscess without bleeding K57.32    2. RLQ abdominal pain R10.31        Disposition:  Discharged to home with the below prescription.    Discharge Medications:  New Prescriptions    CIPROFLOXACIN (CIPRO) 500 MG TABLET    Take 1 tablet (500 mg) by mouth 2 times daily for 10 days    METRONIDAZOLE (FLAGYL) 500 MG TABLET    Take 1 tablet (500 mg) by mouth 2 times daily for 10 days       Scribe Disclosure:  I, Janis Brambila, am serving as a scribe at 4:21 PM on 6/27/2018 to document services personally performed by Isaiah Looney MD based on my observations and the provider's statements to me.    Janis Brambila  6/27/2018    EMERGENCY DEPARTMENT       Isaiah Looney MD  06/28/18 [id number]

## 2018-06-27 NOTE — ED AVS SNAPSHOT
Emergency Department    [address] [place] [id number]    Phone:  [phone]    Fax:  [phone]                                       Marley Stewart   MRN: [id number]    Department:   Emergency Department   Date of Visit:  6/27/2018           After Visit Summary Signature Page     I have received my discharge instructions, and my questions have been answered. I have discussed any challenges I see with this plan with the nurse or doctor.    ..........................................................................................................................................  Patient/Patient Representative Signature      ..........................................................................................................................................  Patient Representative Print Name and Relationship to Patient    ..................................................               ................................................  Date                                            Time    ..........................................................................................................................................  Reviewed by Signature/Title    ...................................................              ..............................................  Date                                                            Time

## 2018-06-28 NOTE — ED NOTES
[facility] received a call about concerns between interactions of Amio with the antibiotics prescribed.  There are moderate concerns for increased arrhythmia between these. Will change abx to augmentin as there are no known interactions.  MD Mary Garcia, Miesha Kaufman MD  06/28/18 7239

## 2018-07-23 ENCOUNTER — HOSPITAL ENCOUNTER (EMERGENCY)
Facility: CLINIC | Age: 83
Discharge: HOME | End: 2018-07-23
Attending: EMERGENCY MEDICINE | Admitting: EMERGENCY MEDICINE
Payer: MEDICARE

## 2018-07-23 ENCOUNTER — APPOINTMENT (OUTPATIENT)
Dept: CT IMAGING | Facility: CLINIC | Age: 83
End: 2018-07-23
Attending: EMERGENCY MEDICINE
Payer: MEDICARE

## 2018-07-23 VITALS
SYSTOLIC BLOOD PRESSURE: 171 MMHG | RESPIRATION RATE: 18 BRPM | WEIGHT: 150 LBS | OXYGEN SATURATION: 97 % | HEIGHT: 63 IN | DIASTOLIC BLOOD PRESSURE: 98 MMHG | TEMPERATURE: 97.8 F | BODY MASS INDEX: 26.58 KG/M2

## 2018-07-23 DIAGNOSIS — M54.50 ACUTE BILATERAL LOW BACK PAIN WITHOUT SCIATICA: ICD-10-CM

## 2018-07-23 LAB
ALBUMIN SERPL-MCNC: 4.1 G/DL (ref 3.4–5)
ALBUMIN UR-MCNC: NEGATIVE MG/DL
ALP SERPL-CCNC: 89 U/L (ref 40–150)
ALT SERPL W P-5'-P-CCNC: 43 U/L (ref 0–50)
ANION GAP SERPL CALCULATED.3IONS-SCNC: 8 MMOL/L (ref 3–14)
APPEARANCE UR: CLEAR
AST SERPL W P-5'-P-CCNC: 33 U/L (ref 0–45)
BASOPHILS # BLD AUTO: 0 10E9/L (ref 0–0.2)
BASOPHILS NFR BLD AUTO: 0.3 %
BILIRUB SERPL-MCNC: 0.7 MG/DL (ref 0.2–1.3)
BILIRUB UR QL STRIP: NEGATIVE
BUN SERPL-MCNC: 16 MG/DL (ref 7–30)
CALCIUM SERPL-MCNC: 9.7 MG/DL (ref 8.5–10.1)
CHLORIDE SERPL-SCNC: 104 MMOL/L (ref 94–109)
CO2 SERPL-SCNC: 29 MMOL/L (ref 20–32)
COLOR UR AUTO: NORMAL
CREAT SERPL-MCNC: 1.03 MG/DL (ref 0.52–1.04)
DIFFERENTIAL METHOD BLD: ABNORMAL
EOSINOPHIL # BLD AUTO: 0.2 10E9/L (ref 0–0.7)
EOSINOPHIL NFR BLD AUTO: 1.9 %
ERYTHROCYTE [DISTWIDTH] IN BLOOD BY AUTOMATED COUNT: 13.9 % (ref 10–15)
GFR SERPL CREATININE-BSD FRML MDRD: 51 ML/MIN/1.7M2
GLUCOSE SERPL-MCNC: 87 MG/DL (ref 70–99)
GLUCOSE UR STRIP-MCNC: NEGATIVE MG/DL
HCT VFR BLD AUTO: 45.3 % (ref 35–47)
HGB BLD-MCNC: 16.2 G/DL (ref 11.7–15.7)
HGB UR QL STRIP: NEGATIVE
IMM GRANULOCYTES # BLD: 0 10E9/L (ref 0–0.4)
IMM GRANULOCYTES NFR BLD: 0.4 %
KETONES UR STRIP-MCNC: NEGATIVE MG/DL
LEUKOCYTE ESTERASE UR QL STRIP: NEGATIVE
LIPASE SERPL-CCNC: 203 U/L (ref 73–393)
LYMPHOCYTES # BLD AUTO: 1.9 10E9/L (ref 0.8–5.3)
LYMPHOCYTES NFR BLD AUTO: 23.7 %
MCH RBC QN AUTO: 34.2 PG (ref 26.5–33)
MCHC RBC AUTO-ENTMCNC: 35.8 G/DL (ref 31.5–36.5)
MCV RBC AUTO: 96 FL (ref 78–100)
MONOCYTES # BLD AUTO: 0.8 10E9/L (ref 0–1.3)
MONOCYTES NFR BLD AUTO: 9.7 %
NEUTROPHILS # BLD AUTO: 5.1 10E9/L (ref 1.6–8.3)
NEUTROPHILS NFR BLD AUTO: 64 %
NITRATE UR QL: NEGATIVE
NRBC # BLD AUTO: 0 10*3/UL
NRBC BLD AUTO-RTO: 0 /100
PH UR STRIP: 7 PH (ref 5–7)
PLATELET # BLD AUTO: 255 10E9/L (ref 150–450)
POTASSIUM SERPL-SCNC: 3.7 MMOL/L (ref 3.4–5.3)
PROT SERPL-MCNC: 7.6 G/DL (ref 6.8–8.8)
RBC # BLD AUTO: 4.74 10E12/L (ref 3.8–5.2)
SODIUM SERPL-SCNC: 141 MMOL/L (ref 133–144)
SOURCE: NORMAL
SP GR UR STRIP: 1 (ref 1–1.03)
UROBILINOGEN UR STRIP-MCNC: NORMAL MG/DL (ref 0–2)
WBC # BLD AUTO: 7.9 10E9/L (ref 4–11)

## 2018-07-23 PROCEDURE — 25000128 H RX IP 250 OP 636: Performed by: EMERGENCY MEDICINE

## 2018-07-23 PROCEDURE — 99285 EMERGENCY DEPT VISIT HI MDM: CPT | Mod: 25

## 2018-07-23 PROCEDURE — 96375 TX/PRO/DX INJ NEW DRUG ADDON: CPT

## 2018-07-23 PROCEDURE — 72131 CT LUMBAR SPINE W/O DYE: CPT

## 2018-07-23 PROCEDURE — 85025 COMPLETE CBC W/AUTO DIFF WBC: CPT | Performed by: EMERGENCY MEDICINE

## 2018-07-23 PROCEDURE — 80053 COMPREHEN METABOLIC PANEL: CPT | Performed by: EMERGENCY MEDICINE

## 2018-07-23 PROCEDURE — 74177 CT ABD & PELVIS W/CONTRAST: CPT

## 2018-07-23 PROCEDURE — 81003 URINALYSIS AUTO W/O SCOPE: CPT | Performed by: EMERGENCY MEDICINE

## 2018-07-23 PROCEDURE — 25000125 ZZHC RX 250: Performed by: EMERGENCY MEDICINE

## 2018-07-23 PROCEDURE — 96374 THER/PROPH/DIAG INJ IV PUSH: CPT | Mod: 59

## 2018-07-23 PROCEDURE — 25000132 ZZH RX MED GY IP 250 OP 250 PS 637: Mod: GY | Performed by: EMERGENCY MEDICINE

## 2018-07-23 PROCEDURE — 83690 ASSAY OF LIPASE: CPT | Performed by: EMERGENCY MEDICINE

## 2018-07-23 PROCEDURE — A9270 NON-COVERED ITEM OR SERVICE: HCPCS | Mod: GY | Performed by: EMERGENCY MEDICINE

## 2018-07-23 PROCEDURE — 96361 HYDRATE IV INFUSION ADD-ON: CPT

## 2018-07-23 RX ORDER — ACETAMINOPHEN 325 MG/1
975 TABLET ORAL ONCE
Status: COMPLETED | OUTPATIENT
Start: 2018-07-23 | End: 2018-07-23

## 2018-07-23 RX ORDER — ONDANSETRON 2 MG/ML
4 INJECTION INTRAMUSCULAR; INTRAVENOUS EVERY 30 MIN PRN
Status: DISCONTINUED | OUTPATIENT
Start: 2018-07-23 | End: 2018-07-23 | Stop reason: HOSPADM

## 2018-07-23 RX ORDER — SODIUM CHLORIDE 9 MG/ML
1000 INJECTION, SOLUTION INTRAVENOUS CONTINUOUS
Status: DISCONTINUED | OUTPATIENT
Start: 2018-07-23 | End: 2018-07-23 | Stop reason: HOSPADM

## 2018-07-23 RX ORDER — HYDROMORPHONE HYDROCHLORIDE 1 MG/ML
0.5 INJECTION, SOLUTION INTRAMUSCULAR; INTRAVENOUS; SUBCUTANEOUS
Status: COMPLETED | OUTPATIENT
Start: 2018-07-23 | End: 2018-07-23

## 2018-07-23 RX ORDER — IOPAMIDOL 755 MG/ML
75 INJECTION, SOLUTION INTRAVASCULAR ONCE
Status: COMPLETED | OUTPATIENT
Start: 2018-07-23 | End: 2018-07-23

## 2018-07-23 RX ADMIN — Medication 0.5 MG: at 15:10

## 2018-07-23 RX ADMIN — ONDANSETRON 4 MG: 2 INJECTION INTRAMUSCULAR; INTRAVENOUS at 15:10

## 2018-07-23 RX ADMIN — SODIUM CHLORIDE 1000 ML: 9 INJECTION, SOLUTION INTRAVENOUS at 15:01

## 2018-07-23 RX ADMIN — IOPAMIDOL 75 ML: 755 INJECTION, SOLUTION INTRAVENOUS at 15:37

## 2018-07-23 RX ADMIN — ACETAMINOPHEN 975 MG: 325 TABLET, FILM COATED ORAL at 15:09

## 2018-07-23 RX ADMIN — SODIUM CHLORIDE 63 ML: 900 INJECTION, SOLUTION INTRAVENOUS at 15:37

## 2018-07-23 ASSESSMENT — ENCOUNTER SYMPTOMS
HEMATURIA: 0
NAUSEA: 0
ACTIVITY CHANGE: 1
FATIGUE: 0
FLANK PAIN: 1
DYSURIA: 0

## 2018-07-23 NOTE — ED PROVIDER NOTES
History     Chief Complaint:  Back pain     HPI   Marley Stewart is a 86 year old female who presents to the emergency department with concerns for flank pain. The patient reports that after her family reunion two days ago she began feeling nausea and tired. Today she went to see her hairdresser and felt fine but when she came home she began feeling sharp right flank pain, which is exacerbated by movement. She states that her pain is not present when she sits still, but is worse with movement. . She attempted to take a nap but was unable to on account of the pain, and so decided to present to the emergency department. She denies change in bowel consistency, dysuria, hematuria, fever, current nausea, history of kidney stones, falls or recent trauma. Of note, she was seen on June 27th of this year, one month ago, for right sided flank pain and was diagnosed with diverticulitis and treated with Cipro and Flagyl. She denies any known trauma.       Allergies:  Amitiptyline  Clonazepam  Latex      Medications:    Albuterol  Amiodarone  Eliquis  Flexeril  Lidex  Lasix  Xalatan  Toprol  Deltasone  Senokot  Timoptic  Flagyl     Past Medical History:    Afib  Aortic regurgitation  Arthritis  CHF  CVA  Glaucoma  Hypertension  Mitral regurgitation  PUD  Pulmonic valve regurgitation  RA  Spinal stenosis  Thyroid disease  Tricuspid regurgitation   Respiratory failure  Aortic regurgitation  Vertigo  Weakness  Pulmonic valve regurgitation    Past Surgical History:    Orthopedic surgery      Family History:    CVD      Social History:  Marital Status:   [2]  Negative for tobacco use.  Alcohol: rarely  Lives independently   Walks on her own  Primary Dr. Ortiz  Here with  Fly and daughter in law Ashley. Lives in an apartment and walks independently . No home care.       Review of Systems   Constitutional: Positive for activity change. Negative for fatigue.   Gastrointestinal: Negative for nausea.        No change in  "stool consistency   Genitourinary: Positive for flank pain. Negative for dysuria and hematuria.   All other systems reviewed and are negative.      Physical Exam     Patient Vitals for the past 24 hrs:   BP Temp Temp src Heart Rate Resp SpO2 Height Weight   07/23/18 1416 (!) 171/98 97.8  F (36.6  C) Oral 112 18 97 % 1.6 m (5' 3\") 68 kg (150 lb)         Physical Exam  Constitutional:  Oriented to person, place, and time.      Appears well-developed and well-nourished. No pain at rest, has pain with movement.    HENT:   Head:    Normocephalic and atraumatic.   Right Ear:   Tympanic membrane and external ear normal.   Left Ear:   Tympanic membrane and external ear normal.   Mouth/Throat:   Oropharynx is clear and moist.      Mucous membranes are normal.   Eyes:    Conjunctivae normal and EOM are normal.      Pupils are equal, round, and reactive to light.   Neck:    Normal range of motion. Neck supple.   Cardiovascular:  Normal rate, Irregular rhythm, S1 normal and S2 normal.      No gallop and no friction rub. No murmur heard.  Pulmonary/Chest:  Breath sounds normal. No respiratory distress.      No wheezes. No rhonchi. No rales.   Abdominal:   Soft. No hepatosplenomegaly. Diffuse abdominal tenderness without guarding, .      No rebound and no CVA tenderness.   Musculoskeletal:  Normal range of motion. Bilateral upper paralumbar spasm, tender to palpation,   Neurological:   Alert and oriented to person, place, and time. Normal strength.      GCS eye subscore is 4. GCS verbal subscore is 5.      GCS motor subscore is 6.   Skin:    Skin is warm and dry.   Psychiatric:   Normal mood and affect.      Speech is normal and behavior is normal.      Judgment and thought content normal.      Cognition and memory are normal.       Emergency Department Course     Imaging:  Radiographic findings were communicated with the patient who voiced understanding of the findings.    CT Abdomen/Pelvis with IV contrast:   1. No acute " abnormality in the abdomen or pelvis. No definite cause  for abdominal pain is identified.  2. Colonic diverticulosis, without evidence for diverticulitis.  3. Diffuse fatty infiltration of the liver as per radiology.       CT Lumbar Spine without contrast:   1. Degenerative changes.  2. Chronic-appearing disc herniation at L4-L5 causes moderate spinal  canal stenosis and moderate to severe right foraminal stenosis.  3. L3-L4 moderate spinal canal stenosis as per radiology.      Laboratory:     CBC: WBC: 7.9, HGB: 16.2 (H), PLT: 255  CMP:  GFR estimate: 51 (L), o/w WNL (Creatinine: 1.03)    Lipase: 203    UA with Microscopic: All o/w WNL    Interventions:    The patient's symptoms were improved with parenteral narcotics.  1501 NS 1L IV    1509 Tylenol 975 mg IV  1510 Zofran 4 mg IV   Dilaudid 0.5 mg IV    Emergency Department Course:    Nursing notes and vitals reviewed. (1442) I performed an exam of the patient as documented above.     IV inserted. Medicine administered as documented above. Blood drawn. This was sent to the lab for further testing, results above.    The patient was sent for a Abdomen/pelvis CT while in the emergency department, findings above.     (1615) I rechecked the patient and discussed the results of her workup thus far.   (1652) I rechecked the patient and discussed the results of her workup thus far.   (1753) I rechecked the patient and discussed the results of her workup thus far. She is able to ambulate with a cane.     Findings and plan explained to the Patient. Patient discharged home with instructions regarding supportive care, medications, and reasons to return. The importance of close follow-up was reviewed.     I personally reviewed the laboratory results with the Patient and answered all related questions prior to discharge.     Impression & Plan      Medical Decision Making:  The patient is an 86-year-old who comes in with acute back pain today which has been more on the right  side.  The pain is not present at rest, only with movement.  She describes some abdominal pain and has had some nausea and decreased appetite over the weekend as well.  She did have paralumbar spasm which is tender to palpation and was noted to have pain worse with movement.  However in light of recent visit for diverticulitis which is on the right side I did end up ordering a CT here which did not show any significant pathology.  She did feel better with medications given here.  She does not recall any trauma.  I did do a lumbar reconstruction to make sure that they did not see any compression fractures.  She does have disc disease and spinal stenosis both of which are probably more chronic.  I discussed with the patient  and daughter-in-law regarding conservative management.  She will take Tylenol and use heat to the area and follow-up with her primary in 2 days.  I do not see any significant intra-abdominal pathology at this time.  They should follow-up sooner if she is worse.    Diagnosis:    ICD-10-CM    1. Acute bilateral low back pain without sciatica M54.5        Disposition:  discharged to home    Scribe Disclosure:  I, Bandar Valdivia, am serving as a scribe on 7/23/2018 at 2:37 PM to personally document services performed by Liz Matute MD based on my observations and the provider's statements to me.       Bandar Valdivia  7/23/2018    EMERGENCY DEPARTMENT       Liz Matute MD  07/24/18 [id number]

## 2018-07-23 NOTE — ED AVS SNAPSHOT
Emergency Department    [address] [facility] [address]    Phone:  [phone]    Fax:  [phone]                                       Marley Stewart   MRN: [id number]    Department:   Emergency Department   Date of Visit:  7/23/2018           Patient Information     Date Of Birth          7/17/1932        Your diagnoses for this visit were:     Acute bilateral low back pain without sciatica        You were seen by Liz Matute MD.      Follow-up Information     Follow up with Chandra Ortiz MD.    Specialty:  Family Practice    Why:  2 days    Contact information:    [employer] Manager  [address]  [facility] [place] [address] [phone]          Discharge Instructions       Use Tylenol up to 1000 mg three times a day.   Discharge Instructions  Back Pain  You were seen today for back pain. Back pain can have many causes, but most will get better without surgery or other specific treatment. Sometimes there is a herniated ( slipped ) disc. We do not usually do MRI scans to look for these right away, since most herniated discs will get better on their own with time.  Today, we did not find any evidence that your back pain was caused by a serious condition. However, sometimes symptoms develop over time and cannot be found during an emergency visit, so it is very important that you follow up with your primary provider.  Generally, every Emergency Department visit should have a follow-up clinic visit with either a primary or a specialty clinic/provider. Please follow-up as instructed by your emergency provider today.    Return to the Emergency Department if:    You develop a fever with your back pain.     You have weakness or change in sensation in one or both legs.    You lose control of your bowels or bladder, or cannot empty your bladder (cannot pee).    Your pain gets much worse.     Follow-up with your provider:    Unless your pain has completely gone away, please make an appointment  with your provider within one week. Most of the routine care for back pain is available in a clinic and not the Emergency Department. You may need further management of your back pain, such as more pain medication, imaging such as an X-ray or MRI, or physical therapy.    What can I do to help myself?    Remain Active -- People are often afraid that they will hurt their back further or delay recovery by remaining active, but this is one of the best things you can do for your back. In fact, staying in bed for a long time to rest is not recommended. Studies have shown that people with low back pain recover faster when they remain active. Movement helps to bring blood flow to the muscles and relieve muscle spasms as well as preventing loss of muscle strength.    Heat -- Using a heating pad can help with low back pain during the first few weeks. Do not sleep with a heating pad, as you can be burned.     Pain medications - You may take a pain medication such as Tylenol  (acetaminophen), Advil , Motrin  (ibuprofen) or Aleve  (naproxen).  If you were given a prescription for medicine here today, be sure to read all of the information (including the package insert) that comes with your prescription.  This will include important information about the medicine, its side effects, and any warnings that you need to know about.  The pharmacist who fills the prescription can provide more information and answer questions you may have about the medicine.  If you have questions or concerns that the pharmacist cannot address, please call or return to the Emergency Department.   Remember that you can always come back to the Emergency Department if you are not able to see your regular provider in the amount of time listed above, if you get any new symptoms, or if there is anything that worries you.      24 Hour Appointment Hotline       To make an appointment at any [facility], call [phone] ([phone]). If you don't have  a family doctor or clinic, we will help you find one. [place] clinics are conveniently located to serve the needs of you and your family.             Review of your medicines      Our records show that you are taking the medicines listed below. If these are incorrect, please call your family doctor or clinic.        Dose / Directions Last dose taken    ACETAMINOPHEN PO   Dose:  500-1000 mg        Take 500-1,000 mg by mouth every 8 hours as needed for pain   Refills:  0        albuterol 108 (90 Base) MCG/ACT Inhaler   Commonly known as:  PROAIR HFA/PROVENTIL HFA/VENTOLIN HFA   Dose:  2 puff        Inhale 2 puffs into the lungs every 6 hours as needed for shortness of breath / dyspnea or wheezing   Refills:  0        AMIODARONE HCL PO   Dose:  200 mg        Take 200 mg by mouth daily   Refills:  0        apixaban ANTICOAGULANT 5 MG tablet   Commonly known as:  ELIQUIS   Dose:  5 mg   Quantity:  60 tablet        Take 1 tablet (5 mg) by mouth 2 times daily   Refills:  0        Calcium carb-Vitamin D 500 mg [place]-200 units 500-200 MG-UNIT per tablet   Commonly known as:  OSCAL with D;Oyster Shell Calcium   Dose:  1 tablet        Take 1 tablet by mouth 2 times daily (with meals)   Refills:  0        carboxymethylcellulose 0.5 % Soln ophthalmic solution   Commonly known as:  REFRESH PLUS   Dose:  1 drop        Place 1 drop into both eyes 2 times daily   Refills:  0        cyclobenzaprine 5 MG tablet   Commonly known as:  FLEXERIL   Dose:  5 mg   Quantity:  21 tablet        Take 1 tablet (5 mg) by mouth 3 times daily as needed for muscle spasms   Refills:  0        fluocinonide 0.05 % solution   Commonly known as:  LIDEX        Apply topically daily as needed (to scalp)   Refills:  0        FOLIC ACID PO   Dose:  1 mg        Take 1 mg by mouth daily   Refills:  0        furosemide 20 MG tablet   Commonly known as:  LASIX   Dose:  20 mg   Quantity:  30 tablet        Take 1 tablet (20 mg) by mouth every morning   Refills:  0         guaiFENesin-codeine 100-10 MG/5ML Soln   Commonly known as:  ROBITUSSIN AC   Dose:  5 mL        Take 5 mLs by mouth every 6 hours as needed for cough   Refills:  0        ipratropium - albuterol 0.5 mg/2.5 mg/3 mL 0.5-2.5 (3) MG/3ML neb solution   Commonly known as:  DUONEB   Dose:  3 mL   Quantity:  150 mL        Take 1 vial (3 mLs) by nebulization every 4 hours (while awake) for 10 days   Refills:  0        latanoprost 0.005 % ophthalmic solution   Commonly known as:  XALATAN   Dose:  1 drop        Place 1 drop into the right eye At Bedtime   Refills:  0        LOSARTAN POTASSIUM PO   Dose:  50 mg        Take 50 mg by mouth daily   Refills:  0        multivitamin, therapeutic with minerals Tabs tablet   Dose:  1 tablet        Take 1 tablet by mouth daily   Refills:  0        predniSONE 10 MG tablet   Commonly known as:  DELTASONE   Quantity:  8 tablet        Take 20 mg (2 tabs) daily x 3 days, then 1 tab (10 mg) daily x 2 days then stop.   Refills:  0        sennosides 8.6 MG tablet   Commonly known as:  SENOKOT   Dose:  2 tablet        Take 2 tablets by mouth 2 times daily as needed for constipation   Refills:  0        timolol 0.5 % ophthalmic solution   Commonly known as:  TIMOPTIC   Dose:  1 drop        Place 1 drop into the right eye 2 times daily   Refills:  0        TOPROL XL PO   Dose:  25 mg        Take 25 mg by mouth daily   Refills:  0                Procedures and tests performed during your visit     CBC with platelets differential    CT Abdomen Pelvis w Contrast    Comprehensive metabolic panel    Lipase    Lumbar spine CT w/o contrast    UA reflex to Microscopic      Orders Needing Specimen Collection     None      Pending Results     Date and Time Order Name Status Description    7/23/2018 1623 Lumbar spine CT w/o contrast Preliminary             Pending Culture Results     No orders found from 7/21/2018 to 7/24/2018.            Pending Results Instructions     If you had any lab results  that were not finalized at the time of your Discharge, you can call the ED Lab Result RN at [phone]. You will be contacted by this team for any positive Lab results or changes in treatment. The nurses are available 7 days a week from 10A to 6:30P.  You can leave a message 24 hours per day and they will return your call.        Test Results From Your Hospital Stay        7/23/2018  2:51 PM      Component Results     Component Value Ref Range & Units Status    Color Urine Straw  Final    Appearance Urine Clear  Final    Glucose Urine Negative NEG^Negative mg/dL Final    Bilirubin Urine Negative NEG^Negative Final    Ketones Urine Negative NEG^Negative mg/dL Final    Specific Gravity Urine 1.005 1.003 - 1.035 Final    Blood Urine Negative NEG^Negative Final    pH Urine 7.0 5.0 - 7.0 pH Final    Protein Albumin Urine Negative NEG^Negative mg/dL Final    Urobilinogen mg/dL Normal 0.0 - 2.0 mg/dL Final    Nitrite Urine Negative NEG^Negative Final    Leukocyte Esterase Urine Negative NEG^Negative Final    Source Midstream Urine  Final         7/23/2018  3:23 PM      Component Results     Component Value Ref Range & Units Status    WBC 7.9 4.0 - 11.0 10e9/L Final    RBC Count 4.74 3.8 - 5.2 10e12/L Final    Hemoglobin 16.2 (H) 11.7 - 15.7 g/dL Final    Hematocrit 45.3 35.0 - 47.0 % Final    MCV 96 78 - 100 fl Final    MCH 34.2 (H) 26.5 - 33.0 pg Final    MCHC 35.8 31.5 - 36.5 g/dL Final    RDW 13.9 10.0 - 15.0 % Final    Platelet Count 255 150 - 450 10e9/L Final    Diff Method Automated Method  Final    % Neutrophils 64.0 % Final    % Lymphocytes 23.7 % Final    % Monocytes 9.7 % Final    % Eosinophils 1.9 % Final    % Basophils 0.3 % Final    % Immature Granulocytes 0.4 % Final    Nucleated RBCs 0 0 /100 Final    Absolute Neutrophil 5.1 1.6 - 8.3 10e9/L Final    Absolute Lymphocytes 1.9 0.8 - 5.3 10e9/L Final    Absolute Monocytes 0.8 0.0 - 1.3 10e9/L Final    Absolute Eosinophils 0.2 0.0 - 0.7 10e9/L Final     Absolute Basophils 0.0 0.0 - 0.2 10e9/L Final    Abs Immature Granulocytes 0.0 0 - 0.4 10e9/L Final    Absolute Nucleated RBC 0.0  Final         7/23/2018  3:48 PM      Component Results     Component Value Ref Range & Units Status    Sodium 141 133 - 144 mmol/L Final    Potassium 3.7 3.4 - 5.3 mmol/L Final    Chloride 104 94 - 109 mmol/L Final    Carbon Dioxide 29 20 - 32 mmol/L Final    Anion Gap 8 3 - 14 mmol/L Final    Glucose 87 70 - 99 mg/dL Final    Urea Nitrogen 16 7 - 30 mg/dL Final    Creatinine 1.03 0.52 - 1.04 mg/dL Final    GFR Estimate 51 (L) >60 mL/min/1.7m2 Final    Non  GFR Calc    GFR Estimate If Black 61 >60 mL/min/1.7m2 Final    African American GFR Calc    Calcium 9.7 8.5 - 10.1 mg/dL Final    Bilirubin Total 0.7 0.2 - 1.3 mg/dL Final    Albumin 4.1 3.4 - 5.0 g/dL Final    Protein Total 7.6 6.8 - 8.8 g/dL Final    Alkaline Phosphatase 89 40 - 150 U/L Final    ALT 43 0 - 50 U/L Final    AST 33 0 - 45 U/L Final         7/23/2018  3:45 PM      Component Results     Component Value Ref Range & Units Status    Lipase 203 73 - 393 U/L Final         7/23/2018  4:43 PM      Narrative     CT ABDOMEN AND PELVIS WITH CONTRAST  7/23/2018 3:44 PM     HISTORY:  Right flank pain. Diffuse abdominal pain.    TECHNIQUE: 75 mL Isovue-370 IV were administered. After contrast  administration, volumetric helical sections were acquired from the  lung bases to the ischial tuberosities. Coronal images were also  reconstructed. Radiation dose for this scan was reduced using  automated exposure control, adjustment of the mA and/or kV according  to patient size, or iterative reconstruction technique.    COMPARISON: None.     FINDINGS: No bowel obstruction. Colonic diverticulosis, without  convincing evidence for diverticulitis. The appendix is not  visualized; however, there is no significant inflammatory change noted  in the right lower quadrant. A left ovarian cystic lesion measures 2.8  cm, and is not  significantly changed. No free fluid in the pelvis.  Mild atherosclerotic aortoiliac calcification.     Cyst in the lower pole of the right kidney measures 5.5 cm.  Nonobstructing stone in the lower pole of the right kidney measures  0.2 cm. There is diffuse fatty infiltration of the liver. The liver,  gallbladder, spleen, adrenal glands, pancreas, and kidneys are  otherwise unremarkable. No hydronephrosis. Mild fibrotic changes about  the periphery of both lung bases. The visualized lung bases are  otherwise clear. Degenerative changes are noted in the visualized  thoracolumbar spine.        Impression     IMPRESSION:   1. No acute abnormality in the abdomen or pelvis. No definite cause  for abdominal pain is identified.  2. Colonic diverticulosis, without evidence for diverticulitis.  3. Diffuse fatty infiltration of the liver.    CAMMIE SOLIMAN MD         7/23/2018  5:37 PM      Narrative     CT LUMBAR SPINE WITHOUT CONTRAST  7/23/2018 5:24 PM     HISTORY: Back pain.     TECHNIQUE: Axial images of the lumbar spine were reconstructed from  contrast enhanced CT scan of the abdomen. Multiplanar reformations  were performed.  Radiation dose for this scan was reduced using  automated exposure control, adjustment of the mA and/or kV according  to patient size, or iterative reconstruction technique.    COMPARISON: None.    FINDINGS:  There is no fracture or subluxation.  The distal spinal  cord and conus medullaris appear normal.  The paraspinous soft tissues  appear normal.    There is multilevel degenerative disc disease, severe at L5-S1 and  moderate at the other 4 lumbar levels. There is grade 1  spondylolisthesis at L3-L4. No destructive lesion is seen. There is  severe spinal canal stenosis at L4-L5. Right lateral disc herniation  at L4-L5 causes moderate to severe right foraminal stenosis. There is  moderate spinal canal stenosis at L3-L4.        Impression     IMPRESSION:  1. Degenerative changes.  2.  Chronic-appearing disc herniation at L4-L5 causes moderate spinal  canal stenosis and moderate to severe right foraminal stenosis.  3. L3-L4 moderate spinal canal stenosis.                Clinical Quality Measure: Blood Pressure Screening     Your blood pressure was checked while you were in the emergency department today. The last reading we obtained was  BP: (!) 171/98 . Please read the guidelines below about what these numbers mean and what you should do about them.  If your systolic blood pressure (the top number) is less than 120 and your diastolic blood pressure (the bottom number) is less than 80, then your blood pressure is normal. There is nothing more that you need to do about it.  If your systolic blood pressure (the top number) is 120-139 or your diastolic blood pressure (the bottom number) is 80-89, your blood pressure may be higher than it should be. You should have your blood pressure rechecked within a year by a primary care provider.  If your systolic blood pressure (the top number) is 140 or greater or your diastolic blood pressure (the bottom number) is 90 or greater, you may have high blood pressure. High blood pressure is treatable, but if left untreated over time it can put you at risk for heart attack, stroke, or kidney failure. You should have your blood pressure rechecked by a primary care provider within the next 4 weeks.  If your provider in the emergency department today gave you specific instructions to follow-up with your doctor or provider even sooner than that, you should follow that instruction and not wait for up to 4 weeks for your follow-up visit.        Thank you for choosing [facility]       Thank you for choosing [facility] for your care. Our goal is always to provide you with excellent care. Hearing back from our patients is one way we can continue to improve our services. Please take a few minutes to complete the written survey that you may receive in the mail after you visit  with us. Thank you!        [employer] Information     [facility] gives you secure access to your electronic health record. If you see a primary care provider, you can also send messages to your care team and make appointments. If you have questions, please call your primary care clinic.  If you do not have a primary care provider, please call [phone] and they will assist you.        Care EveryWhere ID     This is your Care EveryWhere ID. This could be used by other organizations to access your [facility] medical records  [id number]        Equal Access to Services     ALECIA MIRELES : Hernando Dennison, nila norris, slim waldronalalbert pope, margarette amador. So [facility] [phone].    ATENCIÓN: Si habla español, tiene a borjas disposición servicios gratuitos de asistencia lingüística. Llame al [phone].    We comply with applicable federal civil rights laws and [place] laws. We do not discriminate on the basis of race, color, national origin, age, disability, sex, sexual orientation, or gender identity.            After Visit Summary       This is your record. Keep this with you and show to your community pharmacist(s) and doctor(s) at your next visit.

## 2018-07-23 NOTE — DISCHARGE INSTRUCTIONS
Use Tylenol up to 1000 mg three times a day.   Discharge Instructions  Back Pain  You were seen today for back pain. Back pain can have many causes, but most will get better without surgery or other specific treatment. Sometimes there is a herniated ( slipped ) disc. We do not usually do MRI scans to look for these right away, since most herniated discs will get better on their own with time.  Today, we did not find any evidence that your back pain was caused by a serious condition. However, sometimes symptoms develop over time and cannot be found during an emergency visit, so it is very important that you follow up with your primary provider.  Generally, every Emergency Department visit should have a follow-up clinic visit with either a primary or a specialty clinic/provider. Please follow-up as instructed by your emergency provider today.    Return to the Emergency Department if:    You develop a fever with your back pain.     You have weakness or change in sensation in one or both legs.    You lose control of your bowels or bladder, or cannot empty your bladder (cannot pee).    Your pain gets much worse.     Follow-up with your provider:    Unless your pain has completely gone away, please make an appointment with your provider within one week. Most of the routine care for back pain is available in a clinic and not the Emergency Department. You may need further management of your back pain, such as more pain medication, imaging such as an X-ray or MRI, or physical therapy.    What can I do to help myself?    Remain Active -- People are often afraid that they will hurt their back further or delay recovery by remaining active, but this is one of the best things you can do for your back. In fact, staying in bed for a long time to rest is not recommended. Studies have shown that people with low back pain recover faster when they remain active. Movement helps to bring blood flow to the muscles and relieve muscle  spasms as well as preventing loss of muscle strength.    Heat -- Using a heating pad can help with low back pain during the first few weeks. Do not sleep with a heating pad, as you can be burned.     Pain medications - You may take a pain medication such as Tylenol  (acetaminophen), Advil , Motrin  (ibuprofen) or Aleve  (naproxen).  If you were given a prescription for medicine here today, be sure to read all of the information (including the package insert) that comes with your prescription.  This will include important information about the medicine, its side effects, and any warnings that you need to know about.  The pharmacist who fills the prescription can provide more information and answer questions you may have about the medicine.  If you have questions or concerns that the pharmacist cannot address, please call or return to the Emergency Department.   Remember that you can always come back to the Emergency Department if you are not able to see your regular provider in the amount of time listed above, if you get any new symptoms, or if there is anything that worries you.

## 2018-07-23 NOTE — ED AVS SNAPSHOT
Emergency Department    [address] [place] [address]    Phone:  [phone]    Fax:  [phone]                                       Marley Stewart   MRN: [id number]    Department:   Emergency Department   Date of Visit:  7/23/2018           After Visit Summary Signature Page     I have received my discharge instructions, and my questions have been answered. I have discussed any challenges I see with this plan with the nurse or doctor.    ..........................................................................................................................................  Patient/Patient Representative Signature      ..........................................................................................................................................  Patient Representative Print Name and Relationship to Patient    ..................................................               ................................................  Date                                            Time    ..........................................................................................................................................  Reviewed by Signature/Title    ...................................................              ..............................................  Date                                                            Time

## 2018-12-26 ENCOUNTER — APPOINTMENT (OUTPATIENT)
Dept: CT IMAGING | Facility: CLINIC | Age: 83
End: 2018-12-26
Attending: EMERGENCY MEDICINE
Payer: MEDICARE

## 2018-12-26 ENCOUNTER — HOSPITAL ENCOUNTER (EMERGENCY)
Facility: CLINIC | Age: 83
Discharge: HOME | End: 2018-12-26
Attending: EMERGENCY MEDICINE | Admitting: EMERGENCY MEDICINE
Payer: MEDICARE

## 2018-12-26 VITALS
DIASTOLIC BLOOD PRESSURE: 97 MMHG | TEMPERATURE: 97.6 F | OXYGEN SATURATION: 93 % | HEART RATE: 59 BPM | RESPIRATION RATE: 18 BRPM | SYSTOLIC BLOOD PRESSURE: 121 MMHG | BODY MASS INDEX: 26.4 KG/M2 | HEIGHT: 63 IN | WEIGHT: 149 LBS

## 2018-12-26 DIAGNOSIS — K57.32 DIVERTICULITIS OF COLON: ICD-10-CM

## 2018-12-26 LAB
ALBUMIN SERPL-MCNC: 3.7 G/DL (ref 3.4–5)
ALBUMIN UR-MCNC: NEGATIVE MG/DL
ALP SERPL-CCNC: 84 U/L (ref 40–150)
ALT SERPL W P-5'-P-CCNC: 37 U/L (ref 0–50)
ANION GAP SERPL CALCULATED.3IONS-SCNC: 10 MMOL/L (ref 3–14)
APPEARANCE UR: CLEAR
AST SERPL W P-5'-P-CCNC: 47 U/L (ref 0–45)
BASOPHILS # BLD AUTO: 0 10E9/L (ref 0–0.2)
BASOPHILS NFR BLD AUTO: 0.3 %
BILIRUB SERPL-MCNC: 0.4 MG/DL (ref 0.2–1.3)
BILIRUB UR QL STRIP: NEGATIVE
BUN SERPL-MCNC: 21 MG/DL (ref 7–30)
CALCIUM SERPL-MCNC: 9.2 MG/DL (ref 8.5–10.1)
CHLORIDE SERPL-SCNC: 105 MMOL/L (ref 94–109)
CO2 SERPL-SCNC: 25 MMOL/L (ref 20–32)
COLOR UR AUTO: YELLOW
CREAT BLD-MCNC: 1.2 MG/DL (ref 0.52–1.04)
CREAT SERPL-MCNC: 1.14 MG/DL (ref 0.52–1.04)
DIFFERENTIAL METHOD BLD: ABNORMAL
EOSINOPHIL # BLD AUTO: 0.2 10E9/L (ref 0–0.7)
EOSINOPHIL NFR BLD AUTO: 2.6 %
ERYTHROCYTE [DISTWIDTH] IN BLOOD BY AUTOMATED COUNT: 14 % (ref 10–15)
GFR SERPL CREATININE-BSD FRML MDRD: 43 ML/MIN/{1.73_M2}
GFR SERPL CREATININE-BSD FRML MDRD: 43 ML/MIN/{1.73_M2}
GLUCOSE SERPL-MCNC: 81 MG/DL (ref 70–99)
GLUCOSE UR STRIP-MCNC: NEGATIVE MG/DL
HCT VFR BLD AUTO: 41.6 % (ref 35–47)
HGB BLD-MCNC: 14.6 G/DL (ref 11.7–15.7)
HGB UR QL STRIP: NEGATIVE
IMM GRANULOCYTES # BLD: 0 10E9/L (ref 0–0.4)
IMM GRANULOCYTES NFR BLD: 0.4 %
INTERPRETATION ECG - MUSE: NORMAL
KETONES UR STRIP-MCNC: NEGATIVE MG/DL
LACTATE SERPL-SCNC: 0.8 MMOL/L (ref 0.4–2)
LEUKOCYTE ESTERASE UR QL STRIP: NEGATIVE
LIPASE SERPL-CCNC: 170 U/L (ref 73–393)
LYMPHOCYTES # BLD AUTO: 1.9 10E9/L (ref 0.8–5.3)
LYMPHOCYTES NFR BLD AUTO: 23.7 %
MCH RBC QN AUTO: 33.9 PG (ref 26.5–33)
MCHC RBC AUTO-ENTMCNC: 35.1 G/DL (ref 31.5–36.5)
MCV RBC AUTO: 97 FL (ref 78–100)
MONOCYTES # BLD AUTO: 0.9 10E9/L (ref 0–1.3)
MONOCYTES NFR BLD AUTO: 10.8 %
NEUTROPHILS # BLD AUTO: 5 10E9/L (ref 1.6–8.3)
NEUTROPHILS NFR BLD AUTO: 62.2 %
NITRATE UR QL: NEGATIVE
NRBC # BLD AUTO: 0 10*3/UL
NRBC BLD AUTO-RTO: 0 /100
PH UR STRIP: 5.5 PH (ref 5–7)
PLATELET # BLD AUTO: 280 10E9/L (ref 150–450)
POTASSIUM SERPL-SCNC: 4 MMOL/L (ref 3.4–5.3)
PROT SERPL-MCNC: 7.7 G/DL (ref 6.8–8.8)
RBC # BLD AUTO: 4.31 10E12/L (ref 3.8–5.2)
SODIUM SERPL-SCNC: 140 MMOL/L (ref 133–144)
SOURCE: NORMAL
SP GR UR STRIP: 1.01 (ref 1–1.03)
UROBILINOGEN UR STRIP-MCNC: NORMAL MG/DL (ref 0–2)
WBC # BLD AUTO: 8 10E9/L (ref 4–11)

## 2018-12-26 PROCEDURE — 83605 ASSAY OF LACTIC ACID: CPT | Performed by: EMERGENCY MEDICINE

## 2018-12-26 PROCEDURE — 82565 ASSAY OF CREATININE: CPT

## 2018-12-26 PROCEDURE — 80053 COMPREHEN METABOLIC PANEL: CPT | Performed by: EMERGENCY MEDICINE

## 2018-12-26 PROCEDURE — 25000128 H RX IP 250 OP 636: Performed by: EMERGENCY MEDICINE

## 2018-12-26 PROCEDURE — 96374 THER/PROPH/DIAG INJ IV PUSH: CPT | Mod: 59

## 2018-12-26 PROCEDURE — 74177 CT ABD & PELVIS W/CONTRAST: CPT

## 2018-12-26 PROCEDURE — 81003 URINALYSIS AUTO W/O SCOPE: CPT | Performed by: EMERGENCY MEDICINE

## 2018-12-26 PROCEDURE — 96375 TX/PRO/DX INJ NEW DRUG ADDON: CPT

## 2018-12-26 PROCEDURE — 25000125 ZZHC RX 250: Performed by: EMERGENCY MEDICINE

## 2018-12-26 PROCEDURE — 25000132 ZZH RX MED GY IP 250 OP 250 PS 637: Mod: GY | Performed by: EMERGENCY MEDICINE

## 2018-12-26 PROCEDURE — 96361 HYDRATE IV INFUSION ADD-ON: CPT

## 2018-12-26 PROCEDURE — A9270 NON-COVERED ITEM OR SERVICE: HCPCS | Mod: GY | Performed by: EMERGENCY MEDICINE

## 2018-12-26 PROCEDURE — 99285 EMERGENCY DEPT VISIT HI MDM: CPT | Mod: 25

## 2018-12-26 PROCEDURE — 83690 ASSAY OF LIPASE: CPT | Performed by: EMERGENCY MEDICINE

## 2018-12-26 PROCEDURE — 96376 TX/PRO/DX INJ SAME DRUG ADON: CPT

## 2018-12-26 PROCEDURE — 85025 COMPLETE CBC W/AUTO DIFF WBC: CPT | Performed by: EMERGENCY MEDICINE

## 2018-12-26 RX ORDER — AMOXICILLIN AND CLAVULANATE POTASSIUM 562.5; 437.5; 62.5 MG/1; MG/1; MG/1
2 TABLET, MULTILAYER, EXTENDED RELEASE ORAL 2 TIMES DAILY
Qty: 40 TABLET | Refills: 0 | Status: SHIPPED | OUTPATIENT
Start: 2018-12-26 | End: 2019-01-04

## 2018-12-26 RX ORDER — IOPAMIDOL 755 MG/ML
75 INJECTION, SOLUTION INTRAVASCULAR ONCE
Status: COMPLETED | OUTPATIENT
Start: 2018-12-26 | End: 2018-12-26

## 2018-12-26 RX ORDER — OXYCODONE HYDROCHLORIDE 5 MG/1
5 TABLET ORAL EVERY 6 HOURS PRN
Qty: 12 TABLET | Refills: 0 | Status: SHIPPED | OUTPATIENT
Start: 2018-12-26 | End: 2019-02-10

## 2018-12-26 RX ORDER — METOPROLOL SUCCINATE 25 MG/1
25 TABLET, EXTENDED RELEASE ORAL ONCE
Status: COMPLETED | OUTPATIENT
Start: 2018-12-26 | End: 2018-12-26

## 2018-12-26 RX ORDER — ACETAMINOPHEN 325 MG/1
650 TABLET ORAL ONCE
Status: COMPLETED | OUTPATIENT
Start: 2018-12-26 | End: 2018-12-26

## 2018-12-26 RX ORDER — ONDANSETRON 2 MG/ML
4 INJECTION INTRAMUSCULAR; INTRAVENOUS ONCE
Status: COMPLETED | OUTPATIENT
Start: 2018-12-26 | End: 2018-12-26

## 2018-12-26 RX ORDER — POLYETHYLENE GLYCOL 3350 17 G/17G
1 POWDER, FOR SOLUTION ORAL DAILY
Qty: 527 G | Refills: 0 | Status: SHIPPED | OUTPATIENT
Start: 2018-12-26 | End: 2019-01-04

## 2018-12-26 RX ORDER — MORPHINE SULFATE 4 MG/ML
4 INJECTION, SOLUTION INTRAMUSCULAR; INTRAVENOUS
Status: DISCONTINUED | OUTPATIENT
Start: 2018-12-26 | End: 2018-12-26 | Stop reason: HOSPADM

## 2018-12-26 RX ADMIN — IOPAMIDOL 75 ML: 755 INJECTION, SOLUTION INTRAVENOUS at 15:27

## 2018-12-26 RX ADMIN — SODIUM CHLORIDE, PRESERVATIVE FREE 63 ML: 5 INJECTION INTRAVENOUS at 15:30

## 2018-12-26 RX ADMIN — MORPHINE SULFATE 4 MG: 4 INJECTION INTRAVENOUS at 15:57

## 2018-12-26 RX ADMIN — METOPROLOL SUCCINATE 25 MG: 25 TABLET, EXTENDED RELEASE ORAL at 17:15

## 2018-12-26 RX ADMIN — ONDANSETRON 4 MG: 2 INJECTION INTRAMUSCULAR; INTRAVENOUS at 15:05

## 2018-12-26 RX ADMIN — MORPHINE SULFATE 4 MG: 4 INJECTION INTRAVENOUS at 15:05

## 2018-12-26 RX ADMIN — ACETAMINOPHEN 650 MG: 325 TABLET, FILM COATED ORAL at 16:18

## 2018-12-26 RX ADMIN — SODIUM CHLORIDE 500 ML: 9 INJECTION, SOLUTION INTRAVENOUS at 15:03

## 2018-12-26 ASSESSMENT — ENCOUNTER SYMPTOMS
WEAKNESS: 1
APPETITE CHANGE: 1
ABDOMINAL PAIN: 1
VOMITING: 0
FEVER: 0
NAUSEA: 1
CHILLS: 0

## 2018-12-26 ASSESSMENT — MIFFLIN-ST. JEOR: SCORE: 1084.99

## 2018-12-26 NOTE — DISCHARGE INSTRUCTIONS
You should make an appointment to follow-up with your primary care doctor for recheck later this week or first thing next week.  You must take your antibiotics as prescribed as these will treat the infection in your intestine.  You should return the emergency department if you have worsening pain, fevers, or nausea and/or vomiting to the point you are unable to take your antibiotics.  You can use 650 mg of Tylenol every 4-6 hours for the pain and can add in oxycodone, the stronger pain medicine as needed for severe pain.  If you do take the oxycodone should take the MiraLAX to help keep you from getting constipated.    You do not need to take your metoprolol today as this was given to him otherwise, you can resume taking all of your medications that you normally take in the evening and all your normal medications tomorrow.    Discharge Instructions  Diverticulitis  Your provider has diagnosed you with diverticulitis.  Diverticulitis is an infection of a diverticulum, which is a tiny sack-like structure that protrudes off the wall of the colon (large intestine).  These sacks are created over years of increased pressure in the colon (this is diverticulosis), usually as a result of a diet without enough fruits, vegetables, and whole grains. Because these sacks are small, bacteria can get trapped inside them and cause an infection (this is divericulitis).  This infection often causes abdominal (belly) pain, fever, nausea (sick to stomach), and vomiting (throwing up). Diverticulitis is usually treated at home.  However, sometimes diverticulitis needs treatment in the hospital and may even need surgery.    Generally, every Emergency Department visit should have a follow-up clinic visit with either a primary or a specialty clinic/provider. Please follow-up as instructed by your emergency provider today.    Return to the Emergency Department if:   You get an oral temperature above 102oF or as directed by your provider.  You  "have blood in your stools (bright red or black, tarry stools), or have blood in your vomit.  You keep vomiting or cannot drink liquids or cannot keep your medicine down.  You cannot have a bowel movement or you cannot pass gas.  Your stomach gets bloated or bigger.  You faint or become very weak.  Your pain is too bad to tolerate.  You have new symptoms or anything that worries you.    What can I do to help myself?  Fill any antibiotic prescriptions the provider gave you and take them right away. Be sure to finish the whole antibiotic prescription.  For the first day or two at home drink only clear liquids.  This lets your intestines rest.  If your pain has improved after one or two days, you may start eating mild foods. Soda crackers, toast, plain noodles, gelatin, applesauce and bananas are good first choices.  Avoid foods that have acid, are spicy, fatty or fibrous (such as meats, coarse grains, vegetables). You may start eating these foods again in about 3-4 days when you are better.  Once you are back to normal, eat a high fiber diet of fruits, vegetables and whole grains. Some people think you should avoid eating nuts, seeds, and corn, but there is no definite proof this makes any difference in whether you will get diverticulitis again.   Pain and fever can be treated with Tylenol  (acetaminophen) or you might have been prescribed a pain medication.    Probiotics: If you have been given an antibiotic, you may want to also take a probiotic pill or eat yogurt with live cultures. Probiotics have \"good bacteria\" to help your intestines stay healthy. Studies have shown that probiotics help prevent diarrhea and other intestine problems (including C. diff infection) when you take antibiotics. You can buy these without a prescription in the pharmacy section of the store.  If you were given a prescription for medicine here today, be sure to read all of the information (including the package insert) that comes with your " prescription.  This will include important information about the medicine, its side effects, and any warnings that you need to know about.  The pharmacist who fills the prescription can provide more information and answer questions you may have about the medicine.  If you have questions or concerns that the pharmacist cannot address, please call or return to the Emergency Department.     Remember that you can always come back to the Emergency Department if you are not able to see your regular provider in the amount of time listed above, if you get any new symptoms, or if there is anything that worries you.

## 2018-12-26 NOTE — ED PROVIDER NOTES
History     Chief Complaint:  Abdominal Pain     HPI   Marley Stewart is a 86 year old female, with a history of diverticulitis, peptic ulcer disease, a-fib, and on Eliquis, who presents with her  to the ED for evaluation of right lower quadrant abdominal pain. The patient reports she developed sudden onset of constant abdominal pain 3 days ago. The abdomen is tender to touch. Resting alleviates the pain while movement worsens the pain. Eating does not change the pain. She has never had similar pain before. She has not taken pain medication. The patient notes she has associated mild nausea and weakness. The patient's  reports she also has a decreased appetite. The patient denies any recent travel, sick contacts, injuries, trauma, fever, chills, vomiting, urinary symptoms, or bowel changes.      Allergies:  Amitriptyline  Clonazepam: somnolence   Latex: rash     Medications:    Albuterol inhaler  Amiodarone  Eliquis   Oscal w/ D  Folic acid  Lasix  Duoneb  Losartan  Metoprolol   Thera-vit-m   Senokot    Past Medical History:    A-fib  Aortic regurgitation  Arthritis  CHF  Stroke   Glaucoma  HTN  Mitral regurgitation  Peptic ulcer disease   Pulmonic valve regurgitation  RA  Spinal stenosis  Thyroid disease  Tricuspid regurgitation   Diverticulitis     Past Surgical History:    Knee replacement  Femur plate placement     Family History:    Cerebrovascular disease     Social History:  Smoking status: Never smoker    Alcohol use: Rare  Presents to ED with     Marital Status:   [2]    Review of Systems   Constitutional: Positive for appetite change. Negative for chills and fever.   Gastrointestinal: Positive for abdominal pain and nausea. Negative for vomiting.   Neurological: Positive for weakness.   All other systems reviewed and are negative.    Physical Exam     Patient Vitals for the past 24 hrs:   BP Temp Temp src Pulse Heart Rate Resp SpO2 Height Weight   12/26/18 1743 -- -- -- -- -- --  "93 % -- --   12/26/18 1741 (!) 121/97 -- -- 59 -- -- -- -- --   12/26/18 1504 (!) 120/93 -- -- 124 124 -- -- -- --   12/26/18 1334 (!) 145/103 97.6  F (36.4  C) Oral 117 117 18 96 % 1.6 m (5' 3\") 67.6 kg (149 lb)     Physical Exam  Constitutional: Alert, attentive, GCS 15  HENT:    Nose: Nose normal.    Mouth/Throat: Oropharynx is clear, mucous membranes are moist   Eyes: EOM are normal, anicteric, conjugate gaze  CV: regular rate and rhythm; no murmurs  Chest: Effort normal and breath sounds clear without wheezing or rales, symmetric bilaterally   GI: No distension. No guarding or rebound. Focal right lower quadrant tenderness.   MSK: No LE edema, no tenderness to palpation of BLE.  Neurological: Alert, attentive, moving all extremities equally.   Skin: Skin is warm and dry.    Emergency Department Course     ECG (16:13:54):  Rate 120 bpm. CO interval *. QRS duration 104. QT/QTc 344/486. P-R-T axes * 23 185. Atrial fibrillation with rapid ventricular response. Anteroseptal infarct, age undetermined. LVH. Abnormal ECG. No significant change compared to EKG dated 5/27/17. Interpreted at 1613 by Juan Ferrara MD.    Imaging:  Radiographic findings were communicated with the patient and family who voiced understanding of the findings.    CT Abdomen Pelvis w Contrast  IMPRESSION:  1. Fatty infiltration of the liver. No bowel obstruction,  diverticulitis or appendicitis.  2. No urinary tract calculi or hydronephrosis. Right renal cyst is  stable. Left ovarian cyst is unchanged. No further follow-up required.  As read by Radiology.     Laboratory:  Lactic acid (1639): 0.8    Lipase: 170  Creatinine POCT (1453): Creatinine 1.2(H), GFR estimate 43(L),  CBC: o/w WNL (WBC 8.0, HGB 14.6, )  CMP: GFR estimate 43(L), AST 47(H), Creatinine 1.14(H), o/w WNL     UA: Negative     Interventions:  1503: NS 500mLs Bolus IV  1505: Zofran 4mg IV  1505: Morphine 4mg IV  1557: Morphine 4mg IV  1618: Tylenol 650mg PO  1715: " Metoprolol 25mg PO    Emergency Department Course:  Past medical records, nursing notes, and vitals reviewed.  1559: I performed an exam of the patient and obtained history, as documented above.    IV inserted and blood drawn.    The patient was sent for an abdomen pelvis CT while in the emergency department, findings above.    1653: I spoke with radiology.    1712: I rechecked the patient. Explained findings to patient and .    Findings and plan explained to the Patient and spouse. Patient discharged home with instructions regarding supportive care, medications, and reasons to return. The importance of close follow-up was reviewed.     Impression & Plan    Medical Decision Makin-year-old female with past medical history significant for A. fib, CHF, stroke, aortic regurgitation, peptic ulcer disease and diverticulitis presenting for evaluation of 2 days near constant right lower quadrant pain with mild nausea and weakness but no vomiting, diarrhea or melenic stools.  Vital signs on arrival notable for tachycardia and elevated blood pressure, afebrile.  On exam, patient has focal right lower quadrant tenderness just above the right superior iliac crest most concerning for possible diverticulitis versus appendicitis with lower suspicion for pelvic etiology.  CT scan was obtained which initially showed no clear cause of abdominal pain however upon correlating intact location of her tenderness with radiologist they are able to see a small amount of stranding and very early diverticulitis   Without abscess or perforation.  Patient's white count is within normal limits, UA without evidence of UTI, lactate and lipase within normal limits.  Her pain was improved with morphine, IV fluids Tylenol.  Cr slightly above baseline, 1.2 vs 1.1. She was given her PTA metoprolol due to RVR secondary to A. fib as she had not taken her dose today and her heart rate was in the low 100s at time of discharge.  She was felt  safe for discharge home and desired discharge home with plan for outpatient oral treatment of comp gated diverticulitis.  Return precautions were reviewed including inability to take antibiotics, worsening pain, fevers or inability or drink.  Plan was initially for Augmentin XR, however there chosen pharmacy does not carry this and she was switched to Cipro Flagyl.  I did prescribe short course of oxycodone along with MiraLAX for severe pain.  Recommended close follow-up with PCP for recheck.    Diagnosis:    ICD-10-CM   1. Diverticulitis of colon Acute K57.32     Disposition: Patient discharged to home with       Discharge Medications:   amoxicillin-clavulanate 100-62.5 MG capsule  Commonly known as:  AUGMENTIN   2 TIMES DAILY      Oxycodone 5 MG  Commonly known as:  ROXICODONE  EVERY 6 HOURS PRN     Polyethylene glycol powder  Commonly known as:  MIRALAX   DAILY       Juan Ferrara MD   [employer]  11:46 AM 12/27/18       Hanna Moyer  12/26/2018   EMERGENCY DEPARTMENT     Scribe Disclosure:  I, Hanna Moyer, am serving as a scribe at 3:59 PM on 12/26/2018 to document services personally performed by Juan Ferrara MD based on my observations and the provider's statements to me.        Juan Ferrara MD  12/27/18 [id number]

## 2018-12-26 NOTE — ED AVS SNAPSHOT
Emergency Department  [address] [place] [id number]  Phone:  [phone]  Fax:  [phone]                                    Marley Stewart   MRN: [id number]    Department:   Emergency Department   Date of Visit:  12/26/2018           After Visit Summary Signature Page    I have received my discharge instructions, and my questions have been answered. I have discussed any challenges I see with this plan with the nurse or doctor.    ..........................................................................................................................................  Patient/Patient Representative Signature      ..........................................................................................................................................  Patient Representative Print Name and Relationship to Patient    ..................................................               ................................................  Date                                   Time    ..........................................................................................................................................  Reviewed by Signature/Title    ...................................................              ..............................................  Date                                               Time          22EPIC Rev 08/18

## 2019-01-04 ENCOUNTER — HOSPITAL ENCOUNTER (OUTPATIENT)
Facility: CLINIC | Age: 84
Setting detail: OBSERVATION
Discharge: HOME | End: 2019-01-05
Attending: EMERGENCY MEDICINE | Admitting: INTERNAL MEDICINE
Payer: COMMERCIAL

## 2019-01-04 ENCOUNTER — APPOINTMENT (OUTPATIENT)
Dept: GENERAL RADIOLOGY | Facility: CLINIC | Age: 84
End: 2019-01-04
Payer: COMMERCIAL

## 2019-01-04 DIAGNOSIS — I48.91 ATRIAL FIBRILLATION WITH RVR (H): ICD-10-CM

## 2019-01-04 PROBLEM — R41.3 MEMORY LOSS: Status: ACTIVE | Noted: 2019-01-04

## 2019-01-04 PROBLEM — E06.3 HYPOTHYROIDISM DUE TO HASHIMOTO'S THYROIDITIS: Status: ACTIVE | Noted: 2019-01-04

## 2019-01-04 PROBLEM — N18.30 CRF (CHRONIC RENAL FAILURE), STAGE 3 (MODERATE) (H): Status: ACTIVE | Noted: 2019-01-04

## 2019-01-04 PROBLEM — K57.32 DIVERTICULITIS OF COLON: Status: ACTIVE | Noted: 2019-01-04

## 2019-01-04 LAB
ALBUMIN SERPL-MCNC: 4 G/DL (ref 3.4–5)
ALBUMIN UR-MCNC: NEGATIVE MG/DL
ALP SERPL-CCNC: 81 U/L (ref 40–150)
ALT SERPL W P-5'-P-CCNC: 47 U/L (ref 0–50)
ANION GAP SERPL CALCULATED.3IONS-SCNC: 9 MMOL/L (ref 3–14)
APPEARANCE UR: CLEAR
AST SERPL W P-5'-P-CCNC: 39 U/L (ref 0–45)
BASOPHILS # BLD AUTO: 0 10E9/L (ref 0–0.2)
BASOPHILS NFR BLD AUTO: 0.2 %
BILIRUB SERPL-MCNC: 0.7 MG/DL (ref 0.2–1.3)
BILIRUB UR QL STRIP: NEGATIVE
BUN SERPL-MCNC: 14 MG/DL (ref 7–30)
CALCIUM SERPL-MCNC: 9.6 MG/DL (ref 8.5–10.1)
CHLORIDE SERPL-SCNC: 105 MMOL/L (ref 94–109)
CO2 SERPL-SCNC: 28 MMOL/L (ref 20–32)
COLOR UR AUTO: NORMAL
CREAT SERPL-MCNC: 1.06 MG/DL (ref 0.52–1.04)
DIFFERENTIAL METHOD BLD: ABNORMAL
EOSINOPHIL # BLD AUTO: 0.1 10E9/L (ref 0–0.7)
EOSINOPHIL NFR BLD AUTO: 1.5 %
ERYTHROCYTE [DISTWIDTH] IN BLOOD BY AUTOMATED COUNT: 14.4 % (ref 10–15)
GFR SERPL CREATININE-BSD FRML MDRD: 47 ML/MIN/{1.73_M2}
GLUCOSE SERPL-MCNC: 77 MG/DL (ref 70–99)
GLUCOSE UR STRIP-MCNC: NEGATIVE MG/DL
HCT VFR BLD AUTO: 41.5 % (ref 35–47)
HGB BLD-MCNC: 14.8 G/DL (ref 11.7–15.7)
HGB UR QL STRIP: NEGATIVE
IMM GRANULOCYTES # BLD: 0 10E9/L (ref 0–0.4)
IMM GRANULOCYTES NFR BLD: 0.4 %
INTERPRETATION ECG - MUSE: NORMAL
INTERPRETATION ECG - MUSE: NORMAL
KETONES UR STRIP-MCNC: NEGATIVE MG/DL
LEUKOCYTE ESTERASE UR QL STRIP: NEGATIVE
LIPASE SERPL-CCNC: 102 U/L (ref 73–393)
LYMPHOCYTES # BLD AUTO: 2.4 10E9/L (ref 0.8–5.3)
LYMPHOCYTES NFR BLD AUTO: 25.4 %
MCH RBC QN AUTO: 33.8 PG (ref 26.5–33)
MCHC RBC AUTO-ENTMCNC: 35.7 G/DL (ref 31.5–36.5)
MCV RBC AUTO: 95 FL (ref 78–100)
MONOCYTES # BLD AUTO: 1 10E9/L (ref 0–1.3)
MONOCYTES NFR BLD AUTO: 10.4 %
NEUTROPHILS # BLD AUTO: 6 10E9/L (ref 1.6–8.3)
NEUTROPHILS NFR BLD AUTO: 62.1 %
NITRATE UR QL: NEGATIVE
NRBC # BLD AUTO: 0 10*3/UL
NRBC BLD AUTO-RTO: 0 /100
PH UR STRIP: 7 PH (ref 5–7)
PLATELET # BLD AUTO: 260 10E9/L (ref 150–450)
POTASSIUM SERPL-SCNC: 3.4 MMOL/L (ref 3.4–5.3)
PROT SERPL-MCNC: 7.9 G/DL (ref 6.8–8.8)
RBC # BLD AUTO: 4.38 10E12/L (ref 3.8–5.2)
SODIUM SERPL-SCNC: 142 MMOL/L (ref 133–144)
SOURCE: NORMAL
SP GR UR STRIP: 1 (ref 1–1.03)
T4 FREE SERPL-MCNC: 0.99 NG/DL (ref 0.76–1.46)
TSH SERPL DL<=0.005 MIU/L-ACNC: 9.77 MU/L (ref 0.4–4)
UROBILINOGEN UR STRIP-MCNC: NORMAL MG/DL (ref 0–2)
WBC # BLD AUTO: 9.6 10E9/L (ref 4–11)

## 2019-01-04 PROCEDURE — G0378 HOSPITAL OBSERVATION PER HR: HCPCS

## 2019-01-04 PROCEDURE — 96374 THER/PROPH/DIAG INJ IV PUSH: CPT

## 2019-01-04 PROCEDURE — 84439 ASSAY OF FREE THYROXINE: CPT | Performed by: EMERGENCY MEDICINE

## 2019-01-04 PROCEDURE — 93005 ELECTROCARDIOGRAM TRACING: CPT | Mod: 76

## 2019-01-04 PROCEDURE — 25000125 ZZHC RX 250: Performed by: EMERGENCY MEDICINE

## 2019-01-04 PROCEDURE — 84443 ASSAY THYROID STIM HORMONE: CPT | Performed by: EMERGENCY MEDICINE

## 2019-01-04 PROCEDURE — A9270 NON-COVERED ITEM OR SERVICE: HCPCS | Mod: GY | Performed by: INTERNAL MEDICINE

## 2019-01-04 PROCEDURE — 83690 ASSAY OF LIPASE: CPT | Performed by: EMERGENCY MEDICINE

## 2019-01-04 PROCEDURE — 93005 ELECTROCARDIOGRAM TRACING: CPT

## 2019-01-04 PROCEDURE — 99285 EMERGENCY DEPT VISIT HI MDM: CPT | Mod: 25

## 2019-01-04 PROCEDURE — 80053 COMPREHEN METABOLIC PANEL: CPT | Performed by: EMERGENCY MEDICINE

## 2019-01-04 PROCEDURE — 99219 ZZC INITIAL OBSERVATION CARE,LEVL II: CPT | Performed by: INTERNAL MEDICINE

## 2019-01-04 PROCEDURE — 25000128 H RX IP 250 OP 636: Performed by: EMERGENCY MEDICINE

## 2019-01-04 PROCEDURE — 96376 TX/PRO/DX INJ SAME DRUG ADON: CPT

## 2019-01-04 PROCEDURE — 71046 X-RAY EXAM CHEST 2 VIEWS: CPT

## 2019-01-04 PROCEDURE — 25000125 ZZHC RX 250: Performed by: INTERNAL MEDICINE

## 2019-01-04 PROCEDURE — 85025 COMPLETE CBC W/AUTO DIFF WBC: CPT | Performed by: EMERGENCY MEDICINE

## 2019-01-04 PROCEDURE — 81003 URINALYSIS AUTO W/O SCOPE: CPT | Performed by: EMERGENCY MEDICINE

## 2019-01-04 PROCEDURE — 96361 HYDRATE IV INFUSION ADD-ON: CPT

## 2019-01-04 PROCEDURE — 25000132 ZZH RX MED GY IP 250 OP 250 PS 637: Mod: GY | Performed by: INTERNAL MEDICINE

## 2019-01-04 RX ORDER — TIMOLOL MALEATE 5 MG/ML
1 SOLUTION/ DROPS OPHTHALMIC 2 TIMES DAILY
Status: DISCONTINUED | OUTPATIENT
Start: 2019-01-04 | End: 2019-01-05 | Stop reason: HOSPADM

## 2019-01-04 RX ORDER — SODIUM CHLORIDE 9 MG/ML
1000 INJECTION, SOLUTION INTRAVENOUS CONTINUOUS
Status: DISCONTINUED | OUTPATIENT
Start: 2019-01-04 | End: 2019-01-04

## 2019-01-04 RX ORDER — NALOXONE HYDROCHLORIDE 0.4 MG/ML
.1-.4 INJECTION, SOLUTION INTRAMUSCULAR; INTRAVENOUS; SUBCUTANEOUS
Status: DISCONTINUED | OUTPATIENT
Start: 2019-01-04 | End: 2019-01-05 | Stop reason: HOSPADM

## 2019-01-04 RX ORDER — METOPROLOL TARTRATE 1 MG/ML
5 INJECTION, SOLUTION INTRAVENOUS ONCE
Status: COMPLETED | OUTPATIENT
Start: 2019-01-04 | End: 2019-01-04

## 2019-01-04 RX ORDER — AMIODARONE HYDROCHLORIDE 200 MG/1
200 TABLET ORAL DAILY
Status: DISCONTINUED | OUTPATIENT
Start: 2019-01-05 | End: 2019-01-05 | Stop reason: HOSPADM

## 2019-01-04 RX ORDER — FUROSEMIDE 20 MG
20 TABLET ORAL EVERY MORNING
Status: DISCONTINUED | OUTPATIENT
Start: 2019-01-05 | End: 2019-01-05 | Stop reason: HOSPADM

## 2019-01-04 RX ORDER — ONDANSETRON 4 MG/1
4 TABLET, ORALLY DISINTEGRATING ORAL EVERY 6 HOURS PRN
Status: DISCONTINUED | OUTPATIENT
Start: 2019-01-04 | End: 2019-01-05 | Stop reason: HOSPADM

## 2019-01-04 RX ORDER — LEVOTHYROXINE SODIUM 50 UG/1
50 TABLET ORAL DAILY
Status: DISCONTINUED | OUTPATIENT
Start: 2019-01-05 | End: 2019-01-05 | Stop reason: HOSPADM

## 2019-01-04 RX ORDER — LEVOTHYROXINE SODIUM 50 UG/1
50 TABLET ORAL DAILY
COMMUNITY

## 2019-01-04 RX ORDER — METRONIDAZOLE 500 MG/1
500 TABLET ORAL 3 TIMES DAILY
COMMUNITY
End: 2019-01-26

## 2019-01-04 RX ORDER — ACETAMINOPHEN 650 MG/1
650 SUPPOSITORY RECTAL EVERY 4 HOURS PRN
Status: DISCONTINUED | OUTPATIENT
Start: 2019-01-04 | End: 2019-01-05 | Stop reason: HOSPADM

## 2019-01-04 RX ORDER — CARBOXYMETHYLCELLULOSE SODIUM 5 MG/ML
1 SOLUTION/ DROPS OPHTHALMIC 2 TIMES DAILY
Status: DISCONTINUED | OUTPATIENT
Start: 2019-01-04 | End: 2019-01-05 | Stop reason: HOSPADM

## 2019-01-04 RX ORDER — ONDANSETRON 2 MG/ML
4 INJECTION INTRAMUSCULAR; INTRAVENOUS EVERY 6 HOURS PRN
Status: DISCONTINUED | OUTPATIENT
Start: 2019-01-04 | End: 2019-01-05 | Stop reason: HOSPADM

## 2019-01-04 RX ORDER — CIPROFLOXACIN 500 MG/1
500 TABLET, FILM COATED ORAL 2 TIMES DAILY
COMMUNITY
End: 2019-01-26

## 2019-01-04 RX ORDER — ACETAMINOPHEN 325 MG/1
650 TABLET ORAL EVERY 4 HOURS PRN
Status: DISCONTINUED | OUTPATIENT
Start: 2019-01-04 | End: 2019-01-05 | Stop reason: HOSPADM

## 2019-01-04 RX ORDER — LATANOPROST 50 UG/ML
1 SOLUTION/ DROPS OPHTHALMIC AT BEDTIME
Status: DISCONTINUED | OUTPATIENT
Start: 2019-01-04 | End: 2019-01-05 | Stop reason: HOSPADM

## 2019-01-04 RX ADMIN — LATANOPROST 1 DROP: 50 SOLUTION OPHTHALMIC at 21:03

## 2019-01-04 RX ADMIN — SODIUM CHLORIDE 1000 ML: 9 INJECTION, SOLUTION INTRAVENOUS at 13:30

## 2019-01-04 RX ADMIN — METOPROLOL TARTRATE 12.5 MG: 25 TABLET, FILM COATED ORAL at 20:57

## 2019-01-04 RX ADMIN — METOPROLOL TARTRATE 5 MG: 5 INJECTION, SOLUTION INTRAVENOUS at 15:37

## 2019-01-04 RX ADMIN — APIXABAN 5 MG: 5 TABLET, FILM COATED ORAL at 20:57

## 2019-01-04 RX ADMIN — CARBOXYMETHYLCELLULOSE SODIUM 1 DROP: 5 SOLUTION/ DROPS OPHTHALMIC at 21:08

## 2019-01-04 RX ADMIN — METOPROLOL TARTRATE 5 MG: 5 INJECTION, SOLUTION INTRAVENOUS at 14:17

## 2019-01-04 RX ADMIN — TIMOLOL MALEATE 1 DROP: 5 SOLUTION/ DROPS OPHTHALMIC at 21:03

## 2019-01-04 RX ADMIN — METOPROLOL TARTRATE 5 MG: 5 INJECTION, SOLUTION INTRAVENOUS at 14:54

## 2019-01-04 ASSESSMENT — ENCOUNTER SYMPTOMS
WEAKNESS: 1
CHEST TIGHTNESS: 0
FEVER: 0
ABDOMINAL PAIN: 0
SHORTNESS OF BREATH: 0
FATIGUE: 1

## 2019-01-04 ASSESSMENT — MIFFLIN-ST. JEOR: SCORE: 1089.53

## 2019-01-04 NOTE — ED PROVIDER NOTES
History     Chief Complaint:  Irregular Heart Beat    HPI   Marley Stewart is a 86 year old female with a history of stroke, hypertension, atrial flutter, atrial fibrillation, on Eliquis, who presents the emergency department for evaluation in the setting of an irregular heart beat. The patient was recently seen in the emergency department on 12/26 for abdominal pain, and at that time was placed on antibiotics for treatment of a colon infection. Her pain is resolved today, but during follow-up with [facility] today, she was found to be in atrial flutter w/ RVR, prompting her return to the emergency department today. The patient has a history of atrial flutter and fibrillation, which present intermittently. She follows with Dr. Durham through [facility]. Today she was asymptomatic, denying chest pain or shortness of breath, and was unable to detect this irregularity. She notes that as of recent she has felt more fatigued and generally weak, but denies any fevers. She has been compliant with her Eliquis. She is unable to recall if she took her metoprolol today. No history of stent placement or cardiac surgery. The patient lives independently with her .     Allergies:  Amitriptyline  Clonazepam  Latex    Medications:    Albuterol inhaler  Amiodarone  Eliquis   Oscal w/ D  Folic acid  Lasix  Duoneb  Losartan  Metoprolol   Thera-vit-m   Senokot     Past Medical History:    A-fib  Aortic regurgitation  Arthritis  CHF  Stroke   Glaucoma  HTN  Mitral regurgitation  Peptic ulcer disease   Pulmonic valve regurgitation  RA  Spinal stenosis  Thyroid disease  Tricuspid regurgitation   Diverticulitis  Hypertropic cardiac myopathy     Past Surgical History:    Knee replacement  Femur plate placement     Family History:    Cerebrovascular disease    Social History:  The patient was accompanied to the ED by her .  Smoking Status: Never  Smokeless Tobacco: Never  Alcohol Use: Yes  Marital Status:   [2]  "    Review of Systems   Constitutional: Positive for fatigue. Negative for fever.   Respiratory: Negative for chest tightness and shortness of breath.    Cardiovascular: Negative for chest pain.   Gastrointestinal: Negative for abdominal pain.   Neurological: Positive for weakness.   All other systems reviewed and are negative.    Physical Exam     Patient Vitals for the past 24 hrs:   BP Temp Temp src Pulse Heart Rate Resp SpO2 Height Weight   01/04/19 1700 (!) 152/100 -- -- 90 101 17 -- -- --   01/04/19 1630 -- -- -- -- 97 10 95 % -- --   01/04/19 1615 -- -- -- -- 113 15 98 % -- --   01/04/19 1600 (!) 123/93 -- -- 110 100 26 96 % -- --   01/04/19 1545 (!) 145/107 -- -- -- -- -- -- -- --   01/04/19 1530 -- -- -- -- 112 16 98 % -- --   01/04/19 1500 -- -- -- -- 110 30 -- -- --   01/04/19 1430 -- -- -- -- 106 13 -- -- --   01/04/19 1420 -- -- -- -- 112 14 97 % -- --   01/04/19 1410 -- -- -- -- 107 25 99 % -- --   01/04/19 1350 -- -- -- -- 120 8 97 % -- --   01/04/19 1340 -- -- -- -- 113 20 96 % -- --   01/04/19 1330 -- -- -- -- 120 14 98 % -- --   01/04/19 1320 (!) 145/105 -- -- 107 108 22 98 % -- --   01/04/19 1310 -- -- -- -- 128 18 -- -- --   01/04/19 1300 -- -- -- -- 110 26 -- -- --   01/04/19 1216 (!) 153/91 98  F (36.7  C) Temporal -- 108 20 97 % 1.6 m (5' 3\") 68 kg (150 lb)     Physical Exam  General: Alert and cooperative with exam. Patient in mild distress. Normal mentation.  Head:  Scalp is NC/AT  Eyes:  No scleral icterus, PERRL  ENT:  The external nose and ears are normal. The oropharynx is normal and without erythema; mucus membranes are moist. Uvula midline, no evidence of deep space infection.  Neck:  Normal range of motion without rigidity.  CV:  Tachycardic, irregular rhythm  Resp:  Breath sounds are clear bilaterally    Non-labored, no retractions or accessory muscle use  GI:  Abdomen is soft, no distension, no tenderness. No peritoneal signs  MS:  No lower extremity edema   Skin:  Warm and dry, No " rash or lesions noted.  Neuro: Oriented x 3. No gross motor deficits.    Emergency Department Course     ECG (12:20:41):  Rate 120 bpm. AL interval 248. QRS duration 106. QT/QTc 342/483. P-R-T axes 57 3 166. A fib RVR. Anterolateral infarct. Marked ST abnormality, possible lateral subendocardial injury. No changes as of 12/26/18. Interpreted at 1230 by Og Valles DO.    ECG (16:21:06):  Rate 108 bpm. AL interval *. QRS duration 108. QT/QTc 418/560. P-R-T axes * 34 200. Atrial fibrillation with rapid ventricular response. Anterolateral infarct. ST & T wave abnormality, consider inferolateral ischemia. Prolonged QT. No significant changes from previous. Interpreted at 1625 by Og Valles DO.     Imaging:  Radiology findings were communicated with the patient who voiced understanding of the findings.    Chest XR, PA & LAT:  IMPRESSION: There is mild cardiomegaly. The lungs are hyperinflated.  No airspace consolidation or pleural effusion.    BASHIR PEDROZA MD    Laboratory:  Laboratory findings were communicated with the patient who voiced understanding of the findings.    CBC: WNL (WBC 9.6, HGB 14.8, )  CMP: Creatinine 1.06 (H), GFR 47 (L), o/w WNL    Lipase: 102    TSH with free T4 reflex: 9.77 (H)    T4 free (1330): 0.99    Interventions:  1330 - NS Bolus 1,000mL IV  1417 - Lopressor injection 5 mg IV  1454 - Lopressor injection 5 mg IV  1537 - Lopressor injection 5 mg IV    Emergency Department Course:  Nursing notes and vitals reviewed.    1247: I performed an exam of the patient as documented above.     1347: Patient rechecked and updated.     1442: Patient rechecked and updated.     1612: Patient rechecked and updated.     1648: I spoke with Dr. Wen of the hospitalist service regarding patient's presentation, findings, and plan of care.    Findings and plan explained to the Patient who consents to admission.     Discussed the patient with Dr. Wen, who will admit the patient to a  obs bed for further monitoring, evaluation, and treatment.     Impression & Plan      Medical Decision Making:  Patient is a 86-year-old female with a history of atrial fibrillation who presents with tachycardia.  Patient's medical history and records were reviewed.  Initial consideration for, not limited to, arrhythmia, electrolyte abnormality, infectious process, metabolic disturbance, among others.  Labs, EKG, and imaging was obtained.  Chest x-ray demonstrates mild cardiomegaly.  Initial EKG demonstrates evidence of A. fib with RVR; not significant change from previous.  Patient is unsure if she took her morning metoprolol and is unable to provide a reliable history.  She was provided IV metoprolol (5 mg x3) with only mild improvement in heart rate; rate between 100-120.  Additionally received 1 L IV fluid.  Labs notable only for elevated TSH with normal T4.  Patient's abdominal exam was benign (recently treated for diverticulitis with resolution of previously present abdominal pain).  UA without evidence of infection.  Patient is afebrile.  Given ongoing A. fib with RVR and patient's age she will be admitted to observation with the hospitalist service for further evaluation and care.    Diagnosis:    ICD-10-CM    1. Atrial fibrillation with RVR (H) I48.91        Disposition:  Admitted to obs    Lexy Overton  1/4/2019    EMERGENCY DEPARTMENT  Lexy ORTIZ am serving as a scribe at 12:47 PM on 1/4/2019 to document services personally performed by Og Valles DO based on my observations and the provider's statements to me.       Og Valles DO  01/04/19 2000

## 2019-01-04 NOTE — H&P
[facility]    History and Physical  Hospitalist       Date of Admission:  1/4/2019    Assessment & Plan   Marley Stewart is a 86 year old female with chronic afib who is sent from the clinic with afib and RVR:    Summary:    Active Problems:    Atrial fibrillation with RVR -- on Metoprolol, ?compliant with medication (Followed by Dr. Campoverde)   -- resume home metoprolol dose and monitor heart rate   -- chronically in AFib, may want to discontinue Amiodarone (it's not working)      Aortic regurgitation, 1-2+ on Echo 11/2/16      Mitral regurgitation, 1-2+ on Echo 11/2/16      Hypertension      CRF (chronic renal failure), stage 3 (moderate) (H)      Memory loss   -- possibly related to low TSH (Vit B12 and Vit D normal 4/28/15)      Diverticulitis of colon -- 12/26/18, appears resolved after 1 week Cipro/Flagyl       Hypothyroidism -- currently under replaced, ?2nd to medication compliance vs worsening  hypothyroidsm   -- will give Synthroid 0.05 mg today and then resume regular dose of 0.05 mg daily        Plan: Admit to observation, resume home metoprolol dose and monitor heart rate.  Desires DNR/DNI,  and DIL (Ashley) in agreement.  May benefit from Home Care nurse for home safety and med set up weekly. Note sent to Dr. Campoverde.      Addendum -- Basically she needs someone to make sure she takes her medications as directed, and that they are doing what they are suppose to.  Her Daughter-in-law, Ashley, is the person they look to but she is not at all the visits and is not sure what is getting decided at those visits.      DVT Prophylaxis: Eliquis  Code Status: DNR / DNI    Disposition: Expected discharge in 1 days once heart rate stable.    Torrey Ford MD    Primary Care Physician   Chandra Ortiz    Chief Complaint   Afib with rapid rate     History is obtained from Patient and     History of Present Illness   86 year old female with Chronic Afib on Eliquis, HTN, CRF  stage 3, and forgetful -- who had an episode of diverticulitis on 12/26/18 and was treated with Cipro and Flagyl, returned to the clinic for follow up today and noted to be in afib with heart rate of 120 and sent to the ER, and there received IV Metoprolol 5 mg IV x 3 and heart rate still 100 to 120.  Denies SOB or palpitations, and she can not tell when her heart is racing.  Her main symptom now is fatigue which she has had for several months.  Patient takes Metoprolol, she is not sure if she took it this AM,  not sure either -- says his daughter in law sets up her meds.  Spoke with the Daughter in law (Ashley) who says she does not set up the medications, and says both  and wife are mildly confused, family has urged moving to assisted living the last few months but they are resistant to it.  They currently do not have home care.     She was diagnosed with diverticulitis with RLQ pain on 12/26, but abdominal CT normal.  She was treated with oral Cipro and Flagyl for one week, followed up [facility] today and that is when afib showed up.  She has no pain today.     TSH today also 9.77, she is on thyroid replacement and she can't tell me for sure she took it today.     Past Medical History    I have reviewed this patient's medical history and updated it with pertinent information if needed.   Past Medical History:   Diagnosis Date     A-fib -- Chronic      Aortic regurgitation     1-2+ per 4/2015 Echo     Arthritis      CHF (congestive heart failure) (H)     EF 65-70% on 4/2015 Echo     CVA (cerebral vascular accident) (H)      Glaucoma      Hypertension      Mitral regurgitation     2+ per 4/2015 Echo     PUD (peptic ulcer disease)      Pulmonic valve regurgitation     1+ per 4/2015 Echo     RA (rheumatoid arthritis) (H)      Spinal stenosis      Thyroid disease      Tricuspid regurgitation     2+ per 4/2015 Echo       Past Surgical History   I have reviewed this patient's surgical history and updated  it with pertinent information if needed.  Past Surgical History:   Procedure Laterality Date     ORTHOPEDIC SURGERY         Prior to Admission Medications   Prior to Admission Medications   Prescriptions Last Dose Informant Patient Reported? Taking?   ACETAMINOPHEN PO   Yes No   Sig: Take 500-1,000 mg by mouth every 8 hours as needed for pain   AMIODARONE HCL PO  Self Yes No   Sig: Take 200 mg by mouth daily    FOLIC ACID PO  Self Yes No   Sig: Take 1 mg by mouth daily   LOSARTAN POTASSIUM PO  Self Yes No   Sig: Take 50 mg by mouth daily    Metoprolol Succinate (TOPROL XL PO)   Yes No   Sig: Take 25 mg by mouth daily   albuterol (PROAIR HFA/PROVENTIL HFA/VENTOLIN HFA) 108 (90 BASE) MCG/ACT Inhaler   Yes No   Sig: Inhale 2 puffs into the lungs every 6 hours as needed for shortness of breath / dyspnea or wheezing   amoxicillin-clavulanate (AUGMENTIN XR) 1000-62.5 MG 12 hr tablet   No No   Sig: Take 2 tablets by mouth 2 times daily for 10 days   apixaban ANTICOAGULANT (ELIQUIS) 5 MG tablet  Self No No   Sig: Take 1 tablet (5 mg) by mouth 2 times daily   calcium carb 1250 mg, 500 mg [place],/vitamin D 200 units (OSCAL WITH D) 500-200 MG-UNIT per tablet  Self Yes No   Sig: Take 1 tablet by mouth 2 times daily (with meals)   carboxymethylcellulose (REFRESH PLUS) 0.5 % SOLN  Self Yes No   Sig: Place 1 drop into both eyes 2 times daily    cyclobenzaprine (FLEXERIL) 5 MG tablet   No No   Sig: Take 1 tablet (5 mg) by mouth 3 times daily as needed for muscle spasms   fluocinonide (LIDEX) 0.05 % solution   Yes No   Sig: Apply topically daily as needed (to scalp)   furosemide (LASIX) 20 MG tablet  Self No No   Sig: Take 1 tablet (20 mg) by mouth every morning   guaiFENesin-codeine (ROBITUSSIN AC) 100-10 MG/5ML SOLN   Yes No   Sig: Take 5 mLs by mouth every 6 hours as needed for cough   ipratropium - albuterol 0.5 mg/2.5 mg/3 mL (DUONEB) 0.5-2.5 (3) MG/3ML neb solution   No No   Sig: Take 1 vial (3 mLs) by nebulization every 4 hours  "(while awake) for 10 days   latanoprost (XALATAN) 0.005 % ophthalmic solution  Self Yes No   Sig: Place 1 drop into the right eye At Bedtime   multivitamin, therapeutic with minerals (THERA-VIT-M) TABS  Self Yes No   Sig: Take 1 tablet by mouth daily   polyethylene glycol (MIRALAX) powder   No No   Sig: Take 17 g (1 capful) by mouth daily   predniSONE (DELTASONE) 10 MG tablet   No No   Sig: Take 20 mg (2 tabs) daily x 3 days, then 1 tab (10 mg) daily x 2 days then stop.   sennosides (SENOKOT) 8.6 MG tablet  Self Yes No   Sig: Take 2 tablets by mouth 2 times daily as needed for constipation   timolol (TIMOPTIC) 0.5 % ophthalmic solution  Self Yes No   Sig: Place 1 drop into the right eye 2 times daily      Facility-Administered Medications: None     Allergies   Allergies   Allergen Reactions     Amitriptyline      Clonazepam Other (See Comments)     Somnolence at 0.5 mg dose     Latex Rash       Social History   I have reviewed this patient's social history and updated it with pertinent information if needed. Marley Stewart  reports that  has never smoked. she has never used smokeless tobacco. She reports that she drinks alcohol. She reports that she does not use drugs.    Family History   I have reviewed this patient's family history and updated it with pertinent information if needed.   Family History   Problem Relation Age of Onset     Cerebrovascular Disease Mother      Cancer Father         \"Bone Cancer\"       Review of Systems   The 10 point Review of Systems is negative other than noted in the HPI or here.  Is chronically fatigued.     # Pain Assessment:  Current Pain Score 12/26/2018   Patient currently in pain? -   Pain score (0-10) 0   Pain location -   Pain descriptors -   Marley lewis pain level was assessed and she currently denies pain.        Physical Exam   Temp: 98  F (36.7  C) Temp src: Temporal BP: (!) 152/100 Pulse: 90 Heart Rate: 101 Resp: 17 SpO2: 95 %      Vital Signs with Ranges  Temp:  [98  F (36.7 "  C)] 98  F (36.7  C)  Pulse:  [] 90  Heart Rate:  [] 101  Resp:  [8-30] 17  BP: (123-153)/() 152/100  SpO2:  [95 %-99 %] 95 %  150 lbs 0 oz    Constitutional: Awake, alert, cooperative, no apparent distress.  Eyes: Conjunctiva and pupils examined and normal.  HEENT: Moist mucous membranes, normal dentition.  Respiratory: Clear to auscultation bilaterally, no crackles or wheezing.  Cardiovascular: Regular rate and rhythm, normal S1 and S2, and no murmur noted,   No carotid bruits.  Trace bilateral ankle edema.   GI: Soft, non-distended, non-tender, normal bowel sounds.  Lymph/Hematologic: No anterior cervical, supraclavicular or axillary adenopathy.  Skin: No rashes, no cyanosis.   Musculoskeletal: No joint swelling, erythema or tenderness.  Neurologic: Cranial nerves 2-12 intact, no focal weakness or numbness  Psychiatric: Alert, Ox3 but took about 20 seconds to come up with the date, no obvious anxiety or depression.    Data   Data reviewed today:  I personally reviewed the EKG tracing showing afib with rate of 120.  Recent Labs   Lab 01/04/19  1330   WBC 9.6   HGB 14.8   MCV 95         POTASSIUM 3.4   CHLORIDE 105   CO2 28   BUN 14   CR 1.06*   ANIONGAP 9   LONNIE 9.6   GLC 77   ALBUMIN 4.0   PROTTOTAL 7.9   BILITOTAL 0.7   ALKPHOS 81   ALT 47   AST 39   LIPASE 102       Imaging:  Recent Results (from the past 24 hour(s))   Chest XR,  PA & LAT    Narrative    XR CHEST 2 VW 1/4/2019 2:04 PM     HISTORY: palpitations    COMPARISON: 1/9/2018      Impression    IMPRESSION: There is mild cardiomegaly. The lungs are hyperinflated.  No airspace consolidation or pleural effusion.    BASHIR PEDROZA MD

## 2019-01-04 NOTE — PHARMACY-ADMISSION MEDICATION HISTORY
Admission Medication History    Admission medication history interview status for the 1/4/2019 admission is complete. See EPIC admission navigator for prior to admission medications     Medication history source reliability:Good    Actions taken by pharmacist (provider contacted, etc):None     Additional medication history information not noted on PTA med list :None    Medication reconciliation/reorder completed by provider prior to medication history? No    Time spent in this activity: 30 minutes    Prior to Admission medications    Medication Sig Last Dose Taking? Auth Provider   AMIODARONE HCL PO Take 200 mg by mouth daily  1/4/2019 at AM Yes Unknown, Entered By History   apixaban ANTICOAGULANT (ELIQUIS) 5 MG tablet Take 1 tablet (5 mg) by mouth 2 times daily 1/4/2019 at AM Yes Marino Sandoval MD   calcium carb 1250 mg, 500 mg [place],/vitamin D 200 units (OSCAL WITH D) 500-200 MG-UNIT per tablet Take 1 tablet by mouth 2 times daily (with meals) 1/4/2019 at AM Yes Unknown, Entered By History   carboxymethylcellulose (REFRESH PLUS) 0.5 % SOLN Place 1 drop into both eyes 2 times daily  1/4/2019 at AM Yes Unknown, Entered By History   ciprofloxacin (CIPRO) 500 MG tablet Take 500 mg by mouth 2 times daily 10 day regimen prescribed 12/26/2018 1/4/2019 at Unknown time Yes Unknown, Entered By History   FOLIC ACID PO Take 1 mg by mouth daily 1/4/2019 at AM Yes Reported, Patient   furosemide (LASIX) 20 MG tablet Take 1 tablet (20 mg) by mouth every morning 1/4/2019 at AM Yes Marino Sandoval MD   latanoprost (XALATAN) 0.005 % ophthalmic solution Place 1 drop into the right eye At Bedtime Past Week at Unknown time Yes Unknown, Entered By History   levothyroxine (SYNTHROID/LEVOTHROID) 50 MCG tablet Take 50 mcg by mouth daily 1/4/2019 at AM Yes Unknown, Entered By History   methotrexate 2.5 MG tablet Take 15 mg by mouth every 7 days Wednesday 1/2/2019 Yes Unknown, Entered By History   Metoprolol Succinate (TOPROL XL PO) Take 25  mg by mouth daily 1/4/2019 at AM Yes Unknown, Entered By History   metroNIDAZOLE (FLAGYL) 500 MG tablet Take 500 mg by mouth 3 times daily 10 day regimen started 12/26/2018 1/4/2019 at Unknown time Yes Unknown, Entered By History   timolol (TIMOPTIC) 0.5 % ophthalmic solution Place 1 drop into the right eye 2 times daily 1/4/2019 at AM Yes Unknown, Entered By History   LOSARTAN POTASSIUM PO Take 50 mg by mouth daily    Reported, Patient   sennosides (SENOKOT) 8.6 MG tablet Take 2 tablets by mouth 2 times daily as needed for constipation   Unknown, Entered By History       Samy Shanks, PharmD

## 2019-01-04 NOTE — ED NOTES
"[facility]  ED Nurse Handoff Report    ED Chief complaint: Irregular Heart Beat (at clinic today for f/u apt from colon infection, found to be in aflutter)      ED Diagnosis:   Final diagnoses:   None       Code Status: Full Code    Allergies:   Allergies   Allergen Reactions     Amitriptyline      Clonazepam Other (See Comments)     Somnolence at 0.5 mg dose     Latex Rash       Activity level - Baseline/Home:  Independent, uses cane    Activity Level - Current:   Stand with Assist     Needed?: No    Isolation: No  Infection: Not Applicable  Bariatric?: No    Vital Signs:   Vitals:    01/04/19 1430 01/04/19 1500 01/04/19 1530 01/04/19 1600   BP:    (!) 123/93   Pulse:    110   Resp: 13 30 16 26   Temp:       TempSrc:       SpO2:   98% 96%   Weight:       Height:           Cardiac Rhythm: ,   Cardiac  Cardiac Rhythm: Atrial fibrillation    Pain level:      Is this patient confused?: No   Does this patient have a guardian?  No         If yes, is there guardianship documents in the Epic \"Code/ACP\" activity?  N/A         Guardian Notified?  N/A  [place] - Suicide Severity Rating Scale Completed?  Yes  If yes, what color did the patient score?  White    Patient Report: Initial Complaint: Pt presents w/a-fib. Unable to control rate after metropolol dose x3. MD to admit for OBS status. Neuro, respiratory systems WDL. No CP. Afebrile. Slightly hypertensive.   Tests Performed: EKGs, labs, UA  Abnormal Results:   Results for orders placed or performed during the hospital encounter of 01/04/19   Chest XR,  PA & LAT    Narrative    XR CHEST 2 VW 1/4/2019 2:04 PM     HISTORY: palpitations    COMPARISON: 1/9/2018      Impression    IMPRESSION: There is mild cardiomegaly. The lungs are hyperinflated.  No airspace consolidation or pleural effusion.    BASHIR PEDROZA MD   CBC with platelets differential   Result Value Ref Range    WBC 9.6 4.0 - 11.0 10e9/L    RBC Count 4.38 3.8 - 5.2 10e12/L    " Hemoglobin 14.8 11.7 - 15.7 g/dL    Hematocrit 41.5 35.0 - 47.0 %    MCV 95 78 - 100 fl    MCH 33.8 (H) 26.5 - 33.0 pg    MCHC 35.7 31.5 - 36.5 g/dL    RDW 14.4 10.0 - 15.0 %    Platelet Count 260 150 - 450 10e9/L    Diff Method Automated Method     % Neutrophils 62.1 %    % Lymphocytes 25.4 %    % Monocytes 10.4 %    % Eosinophils 1.5 %    % Basophils 0.2 %    % Immature Granulocytes 0.4 %    Nucleated RBCs 0 0 /100    Absolute Neutrophil 6.0 1.6 - 8.3 10e9/L    Absolute Lymphocytes 2.4 0.8 - 5.3 10e9/L    Absolute Monocytes 1.0 0.0 - 1.3 10e9/L    Absolute Eosinophils 0.1 0.0 - 0.7 10e9/L    Absolute Basophils 0.0 0.0 - 0.2 10e9/L    Abs Immature Granulocytes 0.0 0 - 0.4 10e9/L    Absolute Nucleated RBC 0.0    Comprehensive metabolic panel   Result Value Ref Range    Sodium 142 133 - 144 mmol/L    Potassium 3.4 3.4 - 5.3 mmol/L    Chloride 105 94 - 109 mmol/L    Carbon Dioxide 28 20 - 32 mmol/L    Anion Gap 9 3 - 14 mmol/L    Glucose 77 70 - 99 mg/dL    Urea Nitrogen 14 7 - 30 mg/dL    Creatinine 1.06 (H) 0.52 - 1.04 mg/dL    GFR Estimate 47 (L) >60 mL/min/[1.73_m2]    GFR Estimate If Black 55 (L) >60 mL/min/[1.73_m2]    Calcium 9.6 8.5 - 10.1 mg/dL    Bilirubin Total 0.7 0.2 - 1.3 mg/dL    Albumin 4.0 3.4 - 5.0 g/dL    Protein Total 7.9 6.8 - 8.8 g/dL    Alkaline Phosphatase 81 40 - 150 U/L    ALT 47 0 - 50 U/L    AST 39 0 - 45 U/L   Lipase   Result Value Ref Range    Lipase 102 73 - 393 U/L   TSH with free T4 reflex   Result Value Ref Range    TSH 9.77 (H) 0.40 - 4.00 mU/L   T4 free   Result Value Ref Range    T4 Free 0.99 0.76 - 1.46 ng/dL   UA reflex to Microscopic   Result Value Ref Range    Color Urine Straw     Appearance Urine Clear     Glucose Urine Negative NEG^Negative mg/dL    Bilirubin Urine Negative NEG^Negative    Ketones Urine Negative NEG^Negative mg/dL    Specific Gravity Urine 1.005 1.003 - 1.035    Blood Urine Negative NEG^Negative    pH Urine 7.0 5.0 - 7.0 pH    Protein Albumin Urine Negative  "NEG^Negative mg/dL    Urobilinogen mg/dL Normal 0.0 - 2.0 mg/dL    Nitrite Urine Negative NEG^Negative    Leukocyte Esterase Urine Negative NEG^Negative    Source Urine    EKG 12-lead, tracing only   Result Value Ref Range    Interpretation ECG Click View Image link to view waveform and result    EKG 12 lead   Result Value Ref Range    Interpretation ECG Click View Image link to view waveform and result        Treatments provided: metropolol, 1L NS bolus    Family Comments:  present, hx \"memory problems\" per daughter (who is not present)    OBS brochure/video discussed/provided to patient/family: Yes              Name of person given brochure if not patient: NA              Relationship to patient: NA    ED Medications:   Medications   sodium chloride 0.9% infusion (not administered)   0.9% sodium chloride BOLUS (0 mLs Intravenous Stopped 1/4/19 1535)   metoprolol (LOPRESSOR) injection 5 mg (5 mg Intravenous Given 1/4/19 1417)   metoprolol (LOPRESSOR) injection 5 mg (5 mg Intravenous Given 1/4/19 1454)   metoprolol (LOPRESSOR) injection 5 mg (5 mg Intravenous Given 1/4/19 1537)       Drips infusing?:  No    For the majority of the shift this patient was yellow.   Interventions performed were frequent comfort measures required.    Severe Sepsis OR Septic Shock Diagnosis Present: No    To be done/followed up on inpatient unit:  see inpatient orders    ED NURSE PHONE NUMBER: [phone]         "

## 2019-01-05 VITALS
RESPIRATION RATE: 16 BRPM | BODY MASS INDEX: 26.58 KG/M2 | WEIGHT: 150 LBS | DIASTOLIC BLOOD PRESSURE: 70 MMHG | HEART RATE: 90 BPM | TEMPERATURE: 96.4 F | OXYGEN SATURATION: 96 % | SYSTOLIC BLOOD PRESSURE: 136 MMHG | HEIGHT: 63 IN

## 2019-01-05 PROCEDURE — 25000132 ZZH RX MED GY IP 250 OP 250 PS 637: Performed by: INTERNAL MEDICINE

## 2019-01-05 PROCEDURE — 99217 ZZC OBSERVATION CARE DISCHARGE: CPT | Performed by: INTERNAL MEDICINE

## 2019-01-05 PROCEDURE — 99207 ZZC CDG-CODE CATEGORY CHANGED: CPT | Performed by: INTERNAL MEDICINE

## 2019-01-05 PROCEDURE — G0378 HOSPITAL OBSERVATION PER HR: HCPCS

## 2019-01-05 RX ADMIN — APIXABAN 5 MG: 5 TABLET, FILM COATED ORAL at 09:16

## 2019-01-05 RX ADMIN — METOPROLOL TARTRATE 12.5 MG: 25 TABLET, FILM COATED ORAL at 11:12

## 2019-01-05 RX ADMIN — FUROSEMIDE 20 MG: 20 TABLET ORAL at 09:16

## 2019-01-05 RX ADMIN — LEVOTHYROXINE SODIUM 50 MCG: 50 TABLET ORAL at 09:16

## 2019-01-05 RX ADMIN — AMIODARONE HYDROCHLORIDE 200 MG: 200 TABLET ORAL at 09:16

## 2019-01-05 RX ADMIN — CARBOXYMETHYLCELLULOSE SODIUM 1 DROP: 5 SOLUTION/ DROPS OPHTHALMIC at 09:15

## 2019-01-05 RX ADMIN — TIMOLOL MALEATE 1 DROP: 5 SOLUTION/ DROPS OPHTHALMIC at 09:17

## 2019-01-05 RX ADMIN — METOPROLOL TARTRATE 12.5 MG: 25 TABLET, FILM COATED ORAL at 09:16

## 2019-01-05 NOTE — PROVIDER NOTIFICATION
MD Notification    Notified Person: MD    Notified Person Name: Dr. Ventura,     Notification Date/Time: 1/5/19 10:55 a.m.    Notification Interaction: FYI page per MD note patient would benefit from HC Rn for medication management and teaching.     Purpose of Notification: FYI to see if MD would want to order HC RN and f2f at discharge for medication Management.  SW to meet with patient for choice.     Orders Received: per discharging provider the patient declined HC RN for Med. Management.  NO orders for homecare RN to be placed.     Comments:

## 2019-01-05 NOTE — PLAN OF CARE
A&Ox4. SBA with cane. Denies any pain. Tele A fib CVR-RVR, HR from 100s-110s. Cardiac diet. Lung sounds diminished. Voiding in bathroom, passing gas.

## 2019-01-05 NOTE — PLAN OF CARE
Patient discharged home at 1130 via transport from . Discharge instructions reviewed and given to patient, questions answered. PIV removed. All belongings sent with patient at time of discharge.

## 2019-01-05 NOTE — PLAN OF CARE
A&Ox4, HR maintaining in low 100s, BP elevated, other VSS on RA. Tele aflutter CVR, asymptomatic. Metoprolol given, starting PO amiodarone in am. SBA, regular diet.

## 2019-01-05 NOTE — DISCHARGE SUMMARY
[facility]    Discharge Summary  Hospitalist    Date of Admission:  1/4/2019  Date of Discharge:  1/5/2019  Discharging Provider: Shawn Ventura MD  Date of Service (when I saw the patient): 01/05/19    Discharge Diagnoses   Atrial fibrillation/ flutter  Diverticulitis  HTN    History of Present Illness   87 y/o female with h/o CVA, HTN, afib/ flutter on apixaban who presented to the ED with c/o irregular heart rhythm    Hospital Course   Marley Stewart was admitted on 1/4/2019.  The following problems were addressed during her hospitalization:    Atrial fibrillation/ flutter  Admitted after noted tachycardia in clinic. Given metoprolol in the ED, continued on prior to admission meds during her hospital stay. Her heart rate was in the 's range at the time of discharge. Will continue on PTA meds, follow up with Dr. Durham with cardiology the week following admission. Stable at the time of admission    Diverticulitis  Recently to ED 2/2 this. Has completed abx as well. Follow up with primary prn.     HTN  Resume PTA management    # Discharge Pain Plan:   - Patient currently has NO PAIN and is not being prescribed pain medications on discharge.    Shawn Ventura M.D.  Hospitalist  Pager [phone]    Significant Results and Procedures   None    Pending Results   None    Code Status   DNR / DNI       Primary Care Physician   Chandra Ortiz    Physical Exam   Temp: 96.4  F (35.8  C) Temp src: Axillary BP: 136/70 Pulse: 90 Heart Rate: 106 Resp: 16 SpO2: 96 % O2 Device: None (Room air)    Vitals:    01/04/19 1216   Weight: 68 kg (150 lb)     Vital Signs with Ranges  Temp:  [96.4  F (35.8  C)-98.5  F (36.9  C)] 96.4  F (35.8  C)  Pulse:  [] 90  Heart Rate:  [] 106  Resp:  [8-30] 16  BP: (123-153)/() 136/70  SpO2:  [95 %-99 %] 96 %  No intake/output data recorded.    Constitutional: Alert, oriented, no acute distress  Respiratory: Lungs clear to auscultation bilaterally,  no wheezes, no crackles  Cardiovascular: irregular rhythm, mildly tachy  GI: Soft, non-tender, non-disteneded, good bowel sounds  Skin/Integumen: No erythema, cyanosis or edema  Other:      Discharge Disposition   Discharged to home  Condition at discharge: Stable    Consultations This Hospital Stay   SOCIAL WORK IP CONSULT    Time Spent on this Encounter   Shawn ORTIZ, personally saw the patient today and spent less than or equal to 30 minutes discharging this patient.    Discharge Orders      Reason for your hospital stay    You were hospitalized secondary to a rapid heart rate     Follow-up and recommended labs and tests     Follow up with Dr. Campoverde (cardiology) , at (location with clinic name or city) [facility],  for hospital follow- up. No follow up labs or test are needed.     Activity    Your activity upon discharge: activity as tolerated     When to contact your care team    Call your primary doctor if you have any of the following:  increased shortness of breath, increased pain or dizziness/ lightheadedness.     Discharge Instructions    Heart rate in the 90's-100's at the time of discharge. Continue prior to admission medications (metoprolol, amiodarone, apixaban (eliquis). Follow up with Dr. Campoverde at previously scheduled appointment.     DNR/DNI     Diet    Follow this diet upon discharge: resume prior to admission diet     Discharge Medications   Current Discharge Medication List      CONTINUE these medications which have NOT CHANGED    Details   AMIODARONE HCL PO Take 200 mg by mouth daily       apixaban ANTICOAGULANT (ELIQUIS) 5 MG tablet Take 1 tablet (5 mg) by mouth 2 times daily  Qty: 60 tablet, Refills: 0    Associated Diagnoses: Atrial fibrillation (H)      calcium carb 1250 mg, 500 mg [place],/vitamin D 200 units (OSCAL WITH D) 500-200 MG-UNIT per tablet Take 1 tablet by mouth 2 times daily (with meals)      carboxymethylcellulose (REFRESH PLUS) 0.5 % SOLN Place 1 drop into both eyes 2  times daily       ciprofloxacin (CIPRO) 500 MG tablet Take 500 mg by mouth 2 times daily 10 day regimen prescribed 12/26/2018      FOLIC ACID PO Take 1 mg by mouth daily      furosemide (LASIX) 20 MG tablet Take 1 tablet (20 mg) by mouth every morning  Qty: 30 tablet, Refills: 0    Associated Diagnoses: Acute on chronic diastolic congestive heart failure (H)      latanoprost (XALATAN) 0.005 % ophthalmic solution Place 1 drop into the right eye At Bedtime      levothyroxine (SYNTHROID/LEVOTHROID) 50 MCG tablet Take 50 mcg by mouth daily      methotrexate 2.5 MG tablet Take 15 mg by mouth every 7 days Wednesday      Metoprolol Succinate (TOPROL XL PO) Take 25 mg by mouth daily      metroNIDAZOLE (FLAGYL) 500 MG tablet Take 500 mg by mouth 3 times daily 10 day regimen started 12/26/2018      timolol (TIMOPTIC) 0.5 % ophthalmic solution Place 1 drop into the right eye 2 times daily      LOSARTAN POTASSIUM PO Take 50 mg by mouth daily       sennosides (SENOKOT) 8.6 MG tablet Take 2 tablets by mouth 2 times daily as needed for constipation           Allergies   Allergies   Allergen Reactions     Amitriptyline      Clonazepam Other (See Comments)     Somnolence at 0.5 mg dose     Latex Rash     Data   Most Recent 3 CBC's:  Recent Labs   Lab Test 01/04/19  1330 12/26/18  1445 07/23/18  1501   WBC 9.6 8.0 7.9   HGB 14.8 14.6 16.2*   MCV 95 97 96    280 255      Most Recent 3 BMP's:  Recent Labs   Lab Test 01/04/19  1330 12/26/18  1445 07/23/18  1501    140 141   POTASSIUM 3.4 4.0 3.7   CHLORIDE 105 105 104   CO2 28 25 29   BUN 14 21 16   CR 1.06* 1.14* 1.03   ANIONGAP 9 10 8   LONNIE 9.6 9.2 9.7   GLC 77 81 87     Most Recent 2 LFT's:  Recent Labs   Lab Test 01/04/19  1330 12/26/18  1445   AST 39 47*   ALT 47 37   ALKPHOS 81 84   BILITOTAL 0.7 0.4     Most Recent INR's and Anticoagulation Dosing History:  Anticoagulation Dose History     Recent Dosing and Labs Latest Ref Rng & Units 5/27/2017 6/27/2018    INR 0.86  - 1.14 1.54(H) 1.29(H)        Most Recent 3 Troponin's:  Recent Labs   Lab Test 05/27/17  0955 11/02/16  1042 12/05/15  0624   TROPI 0.022 0.026 0.028     Most Recent Cholesterol Panel:  Recent Labs   Lab Test 12/04/15  2210   CHOL 168   LDL 96   HDL 40*   TRIG 159*     Most Recent 6 Bacteria Isolates From Any Culture (See EPIC Reports for Culture Details):  Recent Labs   Lab Test 01/12/18  2134 01/09/18  2102 01/09/18  1949 05/27/17  1513 05/27/17  1507 05/27/17  1049   CULT >10 Squamous epithelial cells/low power field indicates oral contamination. Please   recollect.  *  Canceled, Test credited  Notification of test cancellation was given to  Yun Mittal RN, @1296 01/12/18..   No growth No growth No growth No growth No growth     Most Recent TSH, T4 and A1c Labs:  Recent Labs   Lab Test 01/04/19  1330  04/28/15  1455   TSH 9.77*   < >  --    T4 0.99  --   --    A1C  --   --  5.6    < > = values in this interval not displayed.     Results for orders placed or performed during the hospital encounter of 01/04/19   Chest XR,  PA & LAT    Narrative    XR CHEST 2 VW 1/4/2019 2:04 PM     HISTORY: palpitations    COMPARISON: 1/9/2018      Impression    IMPRESSION: There is mild cardiomegaly. The lungs are hyperinflated.  No airspace consolidation or pleural effusion.    BASHIR PEDROZA MD

## 2019-01-26 ENCOUNTER — APPOINTMENT (OUTPATIENT)
Dept: GENERAL RADIOLOGY | Facility: CLINIC | Age: 84
End: 2019-01-26
Payer: COMMERCIAL

## 2019-01-26 ENCOUNTER — APPOINTMENT (OUTPATIENT)
Dept: CT IMAGING | Facility: CLINIC | Age: 84
End: 2019-01-26
Payer: COMMERCIAL

## 2019-01-26 ENCOUNTER — HOSPITAL ENCOUNTER (OUTPATIENT)
Facility: CLINIC | Age: 84
Setting detail: OBSERVATION
Discharge: NURSING, REHAB OR LONG-TERM CARE | End: 2019-01-28
Attending: EMERGENCY MEDICINE | Admitting: INTERNAL MEDICINE
Payer: COMMERCIAL

## 2019-01-26 DIAGNOSIS — S32.512A CLOSED FRACTURE OF LEFT SUPERIOR PUBIC RAMUS, INITIAL ENCOUNTER: ICD-10-CM

## 2019-01-26 DIAGNOSIS — K59.03 DRUG-INDUCED CONSTIPATION: Primary | ICD-10-CM

## 2019-01-26 PROBLEM — S32.9XXA PELVIC FRACTURE (H): Status: ACTIVE | Noted: 2019-01-26

## 2019-01-26 LAB
ABO + RH BLD: NORMAL
ABO + RH BLD: NORMAL
ANION GAP SERPL CALCULATED.3IONS-SCNC: 10 MMOL/L (ref 3–14)
BASOPHILS # BLD AUTO: 0 10E9/L (ref 0–0.2)
BASOPHILS NFR BLD AUTO: 0.4 %
BLD GP AB SCN SERPL QL: NORMAL
BLOOD BANK CMNT PATIENT-IMP: NORMAL
BUN SERPL-MCNC: 17 MG/DL (ref 7–30)
CALCIUM SERPL-MCNC: 8.8 MG/DL (ref 8.5–10.1)
CHLORIDE SERPL-SCNC: 109 MMOL/L (ref 94–109)
CO2 SERPL-SCNC: 25 MMOL/L (ref 20–32)
CREAT SERPL-MCNC: 0.91 MG/DL (ref 0.52–1.04)
DIFFERENTIAL METHOD BLD: ABNORMAL
EOSINOPHIL # BLD AUTO: 0.1 10E9/L (ref 0–0.7)
EOSINOPHIL NFR BLD AUTO: 1.9 %
ERYTHROCYTE [DISTWIDTH] IN BLOOD BY AUTOMATED COUNT: 13.8 % (ref 10–15)
GFR SERPL CREATININE-BSD FRML MDRD: 57 ML/MIN/{1.73_M2}
GLUCOSE SERPL-MCNC: 79 MG/DL (ref 70–99)
HCT VFR BLD AUTO: 39.8 % (ref 35–47)
HGB BLD-MCNC: 13.9 G/DL (ref 11.7–15.7)
IMM GRANULOCYTES # BLD: 0 10E9/L (ref 0–0.4)
IMM GRANULOCYTES NFR BLD: 0.5 %
LYMPHOCYTES # BLD AUTO: 2 10E9/L (ref 0.8–5.3)
LYMPHOCYTES NFR BLD AUTO: 27.6 %
MCH RBC QN AUTO: 33.4 PG (ref 26.5–33)
MCHC RBC AUTO-ENTMCNC: 34.9 G/DL (ref 31.5–36.5)
MCV RBC AUTO: 96 FL (ref 78–100)
MONOCYTES # BLD AUTO: 0.6 10E9/L (ref 0–1.3)
MONOCYTES NFR BLD AUTO: 8.2 %
NEUTROPHILS # BLD AUTO: 4.5 10E9/L (ref 1.6–8.3)
NEUTROPHILS NFR BLD AUTO: 61.4 %
NRBC # BLD AUTO: 0 10*3/UL
NRBC BLD AUTO-RTO: 0 /100
PLATELET # BLD AUTO: 248 10E9/L (ref 150–450)
POTASSIUM SERPL-SCNC: 3.4 MMOL/L (ref 3.4–5.3)
RBC # BLD AUTO: 4.16 10E12/L (ref 3.8–5.2)
SODIUM SERPL-SCNC: 144 MMOL/L (ref 133–144)
SPECIMEN EXP DATE BLD: NORMAL
WBC # BLD AUTO: 7.3 10E9/L (ref 4–11)

## 2019-01-26 PROCEDURE — 99220 ZZC INITIAL OBSERVATION CARE,LEVL III: CPT | Performed by: INTERNAL MEDICINE

## 2019-01-26 PROCEDURE — 25000128 H RX IP 250 OP 636: Performed by: EMERGENCY MEDICINE

## 2019-01-26 PROCEDURE — 96374 THER/PROPH/DIAG INJ IV PUSH: CPT

## 2019-01-26 PROCEDURE — G0378 HOSPITAL OBSERVATION PER HR: HCPCS

## 2019-01-26 PROCEDURE — 73502 X-RAY EXAM HIP UNI 2-3 VIEWS: CPT | Mod: LT

## 2019-01-26 PROCEDURE — 85025 COMPLETE CBC W/AUTO DIFF WBC: CPT | Performed by: EMERGENCY MEDICINE

## 2019-01-26 PROCEDURE — 80048 BASIC METABOLIC PNL TOTAL CA: CPT | Performed by: EMERGENCY MEDICINE

## 2019-01-26 PROCEDURE — 25000131 ZZH RX MED GY IP 250 OP 636 PS 637: Performed by: INTERNAL MEDICINE

## 2019-01-26 PROCEDURE — 86900 BLOOD TYPING SEROLOGIC ABO: CPT | Performed by: EMERGENCY MEDICINE

## 2019-01-26 PROCEDURE — 86901 BLOOD TYPING SEROLOGIC RH(D): CPT | Performed by: EMERGENCY MEDICINE

## 2019-01-26 PROCEDURE — 99285 EMERGENCY DEPT VISIT HI MDM: CPT | Mod: 25

## 2019-01-26 PROCEDURE — 86850 RBC ANTIBODY SCREEN: CPT | Performed by: EMERGENCY MEDICINE

## 2019-01-26 PROCEDURE — 72192 CT PELVIS W/O DYE: CPT

## 2019-01-26 PROCEDURE — 25000132 ZZH RX MED GY IP 250 OP 250 PS 637: Performed by: INTERNAL MEDICINE

## 2019-01-26 PROCEDURE — 25000128 H RX IP 250 OP 636: Performed by: INTERNAL MEDICINE

## 2019-01-26 PROCEDURE — 96376 TX/PRO/DX INJ SAME DRUG ADON: CPT

## 2019-01-26 RX ORDER — METOPROLOL SUCCINATE 50 MG/1
50 TABLET, EXTENDED RELEASE ORAL DAILY
Status: DISCONTINUED | OUTPATIENT
Start: 2019-01-26 | End: 2019-01-28 | Stop reason: HOSPADM

## 2019-01-26 RX ORDER — ACETAMINOPHEN 650 MG/1
650 SUPPOSITORY RECTAL EVERY 4 HOURS PRN
Status: DISCONTINUED | OUTPATIENT
Start: 2019-01-26 | End: 2019-01-28 | Stop reason: HOSPADM

## 2019-01-26 RX ORDER — NALOXONE HYDROCHLORIDE 0.4 MG/ML
.1-.4 INJECTION, SOLUTION INTRAMUSCULAR; INTRAVENOUS; SUBCUTANEOUS
Status: DISCONTINUED | OUTPATIENT
Start: 2019-01-26 | End: 2019-01-28 | Stop reason: HOSPADM

## 2019-01-26 RX ORDER — PROCHLORPERAZINE 25 MG
12.5 SUPPOSITORY, RECTAL RECTAL EVERY 12 HOURS PRN
Status: DISCONTINUED | OUTPATIENT
Start: 2019-01-26 | End: 2019-01-28 | Stop reason: HOSPADM

## 2019-01-26 RX ORDER — LEVOTHYROXINE SODIUM 50 UG/1
50 TABLET ORAL DAILY
Status: DISCONTINUED | OUTPATIENT
Start: 2019-01-26 | End: 2019-01-28 | Stop reason: HOSPADM

## 2019-01-26 RX ORDER — METOPROLOL SUCCINATE 50 MG/1
50 TABLET, EXTENDED RELEASE ORAL DAILY
COMMUNITY
Start: 2019-01-10 | End: 2019-08-05

## 2019-01-26 RX ORDER — DILTIAZEM HYDROCHLORIDE 180 MG/1
180 CAPSULE, EXTENDED RELEASE ORAL DAILY
Status: DISCONTINUED | OUTPATIENT
Start: 2019-01-26 | End: 2019-01-28 | Stop reason: HOSPADM

## 2019-01-26 RX ORDER — ACETAMINOPHEN 325 MG/1
650 TABLET ORAL EVERY 4 HOURS PRN
Status: DISCONTINUED | OUTPATIENT
Start: 2019-01-26 | End: 2019-01-28 | Stop reason: HOSPADM

## 2019-01-26 RX ORDER — HYDROMORPHONE HYDROCHLORIDE 1 MG/ML
0.5 INJECTION, SOLUTION INTRAMUSCULAR; INTRAVENOUS; SUBCUTANEOUS
Status: COMPLETED | OUTPATIENT
Start: 2019-01-26 | End: 2019-01-26

## 2019-01-26 RX ORDER — ONDANSETRON 4 MG/1
4 TABLET, ORALLY DISINTEGRATING ORAL EVERY 6 HOURS PRN
Status: DISCONTINUED | OUTPATIENT
Start: 2019-01-26 | End: 2019-01-28 | Stop reason: HOSPADM

## 2019-01-26 RX ORDER — BISACODYL 10 MG
10 SUPPOSITORY, RECTAL RECTAL DAILY PRN
Status: DISCONTINUED | OUTPATIENT
Start: 2019-01-26 | End: 2019-01-28 | Stop reason: HOSPADM

## 2019-01-26 RX ORDER — ONDANSETRON 2 MG/ML
4 INJECTION INTRAMUSCULAR; INTRAVENOUS EVERY 6 HOURS PRN
Status: DISCONTINUED | OUTPATIENT
Start: 2019-01-26 | End: 2019-01-28 | Stop reason: HOSPADM

## 2019-01-26 RX ORDER — DILTIAZEM HYDROCHLORIDE 180 MG/1
180 CAPSULE, EXTENDED RELEASE ORAL DAILY
COMMUNITY
Start: 2019-01-10 | End: 2019-08-05

## 2019-01-26 RX ORDER — OXYCODONE HYDROCHLORIDE 5 MG/1
5-10 TABLET ORAL
Status: DISCONTINUED | OUTPATIENT
Start: 2019-01-26 | End: 2019-01-27

## 2019-01-26 RX ORDER — ACETAMINOPHEN 500 MG
1000 TABLET ORAL EVERY 8 HOURS
Status: DISCONTINUED | OUTPATIENT
Start: 2019-01-26 | End: 2019-01-28 | Stop reason: HOSPADM

## 2019-01-26 RX ORDER — PROCHLORPERAZINE MALEATE 5 MG
5 TABLET ORAL EVERY 6 HOURS PRN
Status: DISCONTINUED | OUTPATIENT
Start: 2019-01-26 | End: 2019-01-28 | Stop reason: HOSPADM

## 2019-01-26 RX ORDER — POLYETHYLENE GLYCOL 3350 17 G/17G
17 POWDER, FOR SOLUTION ORAL DAILY PRN
Status: DISCONTINUED | OUTPATIENT
Start: 2019-01-26 | End: 2019-01-28 | Stop reason: HOSPADM

## 2019-01-26 RX ORDER — HYDROMORPHONE HYDROCHLORIDE 1 MG/ML
.3-.5 INJECTION, SOLUTION INTRAMUSCULAR; INTRAVENOUS; SUBCUTANEOUS
Status: DISCONTINUED | OUTPATIENT
Start: 2019-01-26 | End: 2019-01-28 | Stop reason: HOSPADM

## 2019-01-26 RX ADMIN — ACETAMINOPHEN 1000 MG: 500 TABLET, FILM COATED ORAL at 16:57

## 2019-01-26 RX ADMIN — Medication 0.5 MG: at 13:34

## 2019-01-26 RX ADMIN — ONDANSETRON 4 MG: 4 TABLET, ORALLY DISINTEGRATING ORAL at 18:57

## 2019-01-26 RX ADMIN — OXYCODONE HYDROCHLORIDE 10 MG: 5 TABLET ORAL at 16:57

## 2019-01-26 RX ADMIN — METOPROLOL SUCCINATE 50 MG: 50 TABLET, EXTENDED RELEASE ORAL at 16:56

## 2019-01-26 RX ADMIN — Medication 0.5 MG: at 21:23

## 2019-01-26 RX ADMIN — DILTIAZEM HYDROCHLORIDE 180 MG: 180 CAPSULE, EXTENDED RELEASE ORAL at 16:56

## 2019-01-26 ASSESSMENT — ENCOUNTER SYMPTOMS
ARTHRALGIAS: 1
NECK PAIN: 0
HEADACHES: 0

## 2019-01-26 ASSESSMENT — MIFFLIN-ST. JEOR: SCORE: 1089.53

## 2019-01-26 NOTE — ED NOTES
Bed: ED12  Expected date:   Expected time:   Means of arrival:   Comments:  Amy 1 Fall hip pain 86 female

## 2019-01-26 NOTE — ED PROVIDER NOTES
History     Chief Complaint:  Hip Pain      HPI   Marley Stewart is an 86 year old female on Eliquis for A-fib with a history of CHF, pulmonic valve regurgitation, aortic regurgitation, tricuspid regurgitation, mitral regurgitation, and CRF stage 3 who presents with left hip pain. The patient reports placing her hand on her love seat and subsequently fell. She subsequently developed left hip pain, prompting her call for EMS. She describes her non radiative pain as located on the ventral aspect of her left hip, adjacent to her groin. She denies loss of consciousness, hitting her head, neck pain, and headache. Of note, she lives with her spouse in a double house.  She was unable to get up after she fell.      Allergies:  Amitriptyline   Clonazepam   Latex      Medications:    AMIODARONE HCL PO  apixaban ANTICOAGULANT (ELIQUIS) 5 MG tablet  calcium carb 1250 mg, 500 mg [place],/vitamin D 200 units (OSCAL WITH D) 500-200 MG-UNIT per tablet  carboxymethylcellulose (REFRESH PLUS) 0.5 % SOLN  FOLIC ACID PO  furosemide (LASIX) 20 MG tablet  levothyroxine (SYNTHROID/LEVOTHROID) 50 MCG tablet  LOSARTAN POTASSIUM PO  methotrexate 2.5 MG tablet  Metoprolol Succinate (TOPROL XL PO)  sennosides (SENOKOT) 8.6 MG tablet    Past Medical History:    A-fib -- Chronic   Aortic regurgitation   Arthritis   CHF (congestive heart failure) (H)   CVA (cerebral vascular accident) (H)   Glaucoma   Hypertension   Mitral regurgitation   PUD (peptic ulcer disease)   Pulmonic valve regurgitation   RA (rheumatoid arthritis) (H)   Spinal stenosis   Thyroid disease   Tricuspid regurgitation  CRF (chronic renal failure), stage 3 (moderate) (H)   Memory loss   Diverticulitis of colon -- 12/26/18   Hypothyroidism -- currently underreplaced (TSH 9.77)   Pneumonia   Acute respiratory failure with hypoxia (H)   Vertigo   UTI (urinary tract infection)   Aortic regurgitation, 1-2+ on Echo 11/2/16    Mitral regurgitation, 1-2+ on Echo 11/2/16   Respiratory  "failure (H)   Stroke (H)   Atrial fibrillation with RVR (H)     Past Surgical History:    ORTHOPEDIC SURGERY    Family History:    Mother: CVD  \"Bone Cancer\"    Social History:  Marital Status:   [2]  Tobacco Status: Never Used  Alcohol Use: < 1 drink a month  Drug Use: No  Presents: Alone by EMS        Review of Systems   Musculoskeletal: Positive for arthralgias. Negative for neck pain.   Neurological: Negative for syncope and headaches.   All other systems reviewed and are negative.        Physical Exam     Patient Vitals for the past 24 hrs:   BP Temp Temp src Pulse Resp SpO2 Height Weight   01/26/19 1415 (!) 143/92 -- -- 92 -- 96 % -- --   01/26/19 1400 (!) 138/95 -- -- 83 -- 95 % -- --   01/26/19 1356 (!) 133/99 -- -- 90 -- 97 % -- --   01/26/19 1337 -- -- -- -- -- 98 % -- --   01/26/19 1336 (!) 137/96 -- -- 92 -- -- -- --   01/26/19 1330 (!) 146/134 98.6  F (37  C) Oral 85 20 96 % 1.6 m (5' 3\") 68 kg (150 lb)       Physical Exam  Nursing note and vitals reviewed.  Constitutional:  Oriented to person, place, and time. Cooperative.   HENT:   Nose:    Nose normal.   Mouth/Throat:   Mucous membranes are normal.   Eyes:    Conjunctivae normal and EOM are normal.      Pupils are equal, round, and reactive to light.   Neck:    Trachea normal.   Cardiovascular:  Normal rate, regular rhythm, normal heart sounds and normal pulses. No murmur heard.  Pulmonary/Chest:  Effort normal and breath sounds normal.   Abdominal:   Soft. Normal appearance and bowel sounds are normal.      There is no tenderness.      There is no rebound and no CVA tenderness.   Musculoskeletal:  Tenderness to palpation over the left pelvic region.  Legs are of equal length and no significant tenderness to palpation over the greater trochanter on the left.  Range of motion of the left hip appears intact as well.  Extremities otherwise atraumatic x 4.   Lymphadenopathy:  No cervical adenopathy.   Neurological:   Alert and oriented to person, " place, and time. Normal strength.      No cranial nerve deficit or sensory deficit. GCS eye subscore is 4. GCS verbal subscore is 5. GCS motor subscore is 6.  Distal CMS intact in the left foot.  Skin:    Skin is intact. No rash noted.   Psychiatric:   Normal mood and affect.      Emergency Department Course     Imaging:  Radiographic findings were communicated with the patient and Admitting MD who voiced understanding of the findings.  X-ray Pelvis and Hip Left, 2 views:  FINDINGS: There is cortical irregularity of the left superior pubic  ramus suspicious for a nondisplaced fracture. This was not present on  a CT of the abdomen and pelvis on 12/26/2018 and is therefore a recent  fracture. No other definite fracture is seen. There are mild to  moderate degenerative changes in both hips. No other abnormality is  seen.  Result per radiology.     Laboratory:  ABO/Rh Type and Screen: B Positive,  Antibody Screen Negative  CBC: MCH 33.4 (H) o/w WNL (WBC 7.3, HGB 13.9, )  BMP: GFR 57 (L) o/w WNL (Creatinine 0.91)    Interventions:  1334: Dilaudid 0.5 mg IV Injection  The patient's symptoms were improved with parenteral narcotics.    Emergency Department Course:  The patient arrived in the emergency department via EMS.  Past medical records, nursing notes, and vitals reviewed.  1322: I performed an exam of the patient and obtained history, as documented above. GCS 15.  IV inserted and blood drawn. The patient was placed on continuous blood pressure monitoring and pulse oximetry.  The patient was sent for a Pelvis and Left Hip X-ray and while in the emergency department, findings above.  1426: I discussed the case with Dr. Pineda of Hospitalist Service regarding the patient.  Findings and plan explained to the Patient who consents to admission.   1428: Discussed the patient with Dr. Pineda of Hospitalist Service, who will admit the patient to a OBS bed for further monitoring, evaluation, and treatment.     Impression &  Plan      Medical Decision Making:  This is an 86-year-old female who was brought in by EMS after she fell injuring her left hip.  Based on her exam, I was suspicious she would have pubic rami fractures.  However, I considered other injuries as well and specifically hip fracture.  I proceeded with the above workup, and she has a left superior pubic ramus fracture.  She does not have any other apparent injuries.  She is unable to get up or to walk.  Therefore, she will need to be brought into the hospital for further evaluation and management and possibly TCU placement.  I subsequently spoke with Dr. Pineda, who will be taking care of the patient.    Diagnosis:    ICD-10-CM    1. Closed fracture of left superior pubic ramus, initial encounter (H) S32.512A        Disposition:  Admitted to Observation Unit under care of Dr. Pineda of Hospitalist Service      Sudhakar Monterroso  1/26/2019    EMERGENCY DEPARTMENT  I, Sudhakar Monterroso, am serving as a scribe at 1:22 PM on 1/26/2019 to document services personally performed by Jeffrey Chamorro MD based on my observations and the provider's statements to me.         Jeffrey Chamorro MD  01/26/19 1635

## 2019-01-26 NOTE — PHARMACY-ADMISSION MEDICATION HISTORY
Admission medication history interview status for the 1/26/2019  admission is complete. See EPIC admission navigator for prior to admission medications     Medication history source reliability:Good    Actions taken by pharmacist (provider contacted, etc):Spoke with patient -- was not able to verify if she took any of her medications today. She had a medication list with her - verified using medication list and care everywhere.     Additional medication history information not noted on PTA med list:  - Deleted amiodarone, ciprofloxacin, and metronidazole based on home medication list.     Medication reconciliation/reorder completed by provider prior to medication history? No    Time spent in this activity: 30 min    Prior to Admission medications    Medication Sig Last Dose Taking? Auth Provider   apixaban ANTICOAGULANT (ELIQUIS) 5 MG tablet Take 1 tablet (5 mg) by mouth 2 times daily  Yes Marino Sandoval MD   calcium carb 1250 mg, 500 mg [place],/vitamin D 200 units (OSCAL WITH D) 500-200 MG-UNIT per tablet Take 1 tablet by mouth 2 times daily (with meals)  Yes Unknown, Entered By History   carboxymethylcellulose (REFRESH PLUS) 0.5 % SOLN Place 1 drop into both eyes 2 times daily   Yes Unknown, Entered By History   diltiazem ER (DILT-XR) 180 MG 24 hr capsule Take 180 mg by mouth daily  Yes Unknown, Entered By History   FOLIC ACID PO Take 1 mg by mouth daily  Yes Reported, Patient   furosemide (LASIX) 20 MG tablet Take 1 tablet (20 mg) by mouth every morning  Yes Marino Sandoval MD   latanoprost (XALATAN) 0.005 % ophthalmic solution Place 1 drop into the right eye At Bedtime  Yes Unknown, Entered By History   levothyroxine (SYNTHROID/LEVOTHROID) 50 MCG tablet Take 50 mcg by mouth daily  Yes Unknown, Entered By History   LOSARTAN POTASSIUM PO Take 50 mg by mouth daily   Yes Reported, Patient   methotrexate 2.5 MG tablet Take 15 mg by mouth every 7 days Wednesday  Yes Unknown, Entered By History   metoprolol succinate ER  (TOPROL-XL) 50 MG 24 hr tablet Take 50 mg by mouth daily  Yes Unknown, Entered By History   sennosides (SENOKOT) 8.6 MG tablet Take 2 tablets by mouth 2 times daily as needed for constipation  Yes Unknown, Entered By History   timolol (TIMOPTIC) 0.5 % ophthalmic solution Place 1 drop into the right eye 2 times daily  Yes Unknown, Entered By History     Pati Jernigan, PharmD IV Student

## 2019-01-26 NOTE — PLAN OF CARE
Comments: -diagnostic tests and consults completed and resulted-not met  -vital signs normal or at patient baseline-not met   Nurse to notify provider when observation goals have been met and patient is ready for discharge.    Alert and oriented x4. VSS. Pain so far controled with oxy and tylenol. Has refused to get out of bed, though is WBAT. CMS intact. Plan to continue to monitor, PT and ortho consults for tomorrow. Will continue to monitor.

## 2019-01-26 NOTE — PLAN OF CARE
RECEIVING UNIT ED HANDOFF REVIEW    ED Nurse Handoff Report was reviewed by: Kyler Juarez on January 26, 2019 at 3:55 PM

## 2019-01-26 NOTE — ED NOTES
"[facility]  ED Nurse Handoff Report    ED Chief complaint: Fall (pt at home leaning against love seat - fell thinking landing on left side - complaining of left hip pain )      ED Diagnosis:   Final diagnoses:   Closed fracture of left superior pubic ramus, initial encounter (H)       Code Status: Full Code    Allergies:   Allergies   Allergen Reactions     Amitriptyline      Clonazepam Other (See Comments)     Somnolence at 0.5 mg dose     Latex Rash       Activity level - Baseline/Home:  Independent    Activity Level - Current:   Independent     Needed?: No    Isolation: No  Infection: Not Applicable  Bariatric?: No    Vital Signs:   Vitals:    01/26/19 1356 01/26/19 1400 01/26/19 1415 01/26/19 1430   BP: (!) 133/99 (!) 138/95 (!) 143/92 137/85   Pulse: 90 83 92 102   Resp: 20 16 16 16   Temp:       TempSrc:       SpO2: 97% 95% 96% 97%   Weight:       Height:           Cardiac Rhythm: ,        Pain level: 0-10 Pain Scale: 2    Is this patient confused?: No   Does this patient have a guardian?  No         If yes, is there guardianship documents in the Epic \"Code/ACP\" activity?  No         Guardian Notified?  N/A  [place] - Suicide Severity Rating Scale Completed?  Yes  If yes, what color did the patient score?  White    Patient Report: Initial Complaint:   86 year old female on Eliquis for A-fib with a history of  CHF, pulmonic valve regurgitation, aortic regurgitation, tricuspid regurgitation, mitral regurgitation, and CRF stage 3 who presents with hip pain. The patient reports to placing her hand on her loveseat and subsequently fell. She subsequently developed left hip, prompting her call for EMS. She describes her non radiative pain as located on the ventral aspect of her left hip, adjacent to groin.  Denies hitting head, no LOC, denies neck or back pain.     Focused Assessment:   Alert and oriented times 3 -  Neuro's intact  CMS intact to left leg  Little to no pain when lying on " cart just with movement   VSS    Tests Performed:   Labs  X-rays  CT    Abnormal Results:   Labs Ordered and Resulted from Time of ED Arrival Up to the Time of Departure from the ED   CBC WITH PLATELETS DIFFERENTIAL   BASIC METABOLIC PANEL   PERIPHERAL IV CATHETER   ABO/RH TYPE AND SCREEN     Treatments provided:   Dilaudid     Family Comments:   Son and     OBS brochure/video discussed/provided to patient/family: Yes              Name of person given brochure if not patient:                 Relationship to patient:       ED Medications:   Medications   HYDROmorphone (PF) (DILAUDID) injection 0.5 mg (0.5 mg Intravenous Given 1/26/19 3611)       Drips infusing?:  No    For the majority of the shift this patient was Green.   Interventions performed were none.    Severe Sepsis OR Septic Shock Diagnosis Present: No    To be done/followed up on inpatient unit:        ED NURSE PHONE NUMBER:   [phone]

## 2019-01-26 NOTE — H&P
Admitted:     01/26/2019      PRIMARY CARE PHYSICIAN:  Dr. Angel Artis.        CHIEF COMPLAINT:  Status post fall.      HISTORY OF PRESENT ILLNESS:  Ms. Marley Stewart is an 86-year-old female with history of CVA and history of atrial fibrillation/flutter on anticoagulation with apixaban, hypertension and history of diastolic congestive heart failure, who presented to the Emergency Room after a fall.  She reports placing her hand on her loveseat and then subsequently, the next thing she remembers is that she fell to the floor, was on the floor, and was having pain in her left hip, prompting her to call EMS.  She rates the pain in the hip at 1 to 2/10 at rest but with movement, it increases to 9/10.  She otherwise denies any loss of consciousness or head injury with this fall.  No neck pain, no fevers or chills.  No nausea, vomiting, diarrhea or constipation issues.  No cough or dysuria.  No other complaints today.  No dizziness, shortness of breath or palpitations with this fall.  In the Emergency Room, she was evaluated by Dr. Chamorro.  An x-ray of the pelvis was done, and it showed there is cortical irregularity of the left superior pubic ramus, suspicious for a nondisplaced fracture.  CT pelvis without contrast done showed nondisplaced fractures of the left superior pubic ramus as well, so a request for hospitalization was made for pain control, and she is getting admitted as observation.      PAST MEDICAL HISTORY:  Significant for:   1.  Hypertension.   2.  Chronic atrial fibrillation, on anticoagulation with Eliquis.   3.  History of diastolic congestive heart failure.   4.  History of cerebrovascular accident.   5.  History of peptic ulcer disease.   6.  History of rheumatoid arthritis, on methotrexate.   7.  Spinal stenosis.   8.  Hypothyroidism.   9.  History of UTI.      SOCIAL HISTORY:  She is  and lives with her .  Not a smoker, no alcohol use.  She lives in [place] in a double house.       FAMILY HISTORY:  Mother  of a stroke with blood pressure issues.  Father had bone cancer.    MEDICATIONS:  Reviewed in Epic       REVIEW OF SYSTEMS:  Ten-point review of systems was done and is negative except as in HPI.      PHYSICAL EXAMINATION:   VITAL SIGNS:  Her temperature is 98.6 degrees Fahrenheit, pulse rate ranging from , blood pressure 137/85, initially elevated at 146/134, most likely from pain.  Respiratory rate is 16-20.  She is satting 97% on room air.   GENERAL:  She is an alert, awake and pleasant female in mild distress due to the pain.   HEENT:  Atraumatic, normocephalic.   NECK:  Supple.   HEART:  S1, S2 heard.  Regular rate and rhythm.  No murmurs.   LUNGS:  Clear to auscultation.   ABDOMEN:  Soft, nontender, nondistended, no hepatosplenomegaly.   EXTREMITIES:  No clubbing, cyanosis or edema.   NEUROLOGIC:  Alert, oriented x3.   PSYCHIATRIC:  Mood and affect are normal.      LABORATORY AND RADIOLOGICAL INVESTIGATIONS:  CT pelvis showed the left pubic ramus fracture as dictated above.  CBC and BMP are reviewed and returned normal      ASSESSMENT AND PLAN:     1.  An 86-year-old female status post a mechanical fall, resulting in left pubic ramus fracture and inability to ambulate due to the pain.  She will be admitted.  Will control her pain with Tylenol 1000 mg p.o. t.i.d., oxycodone p.r.n. and IV Dilaudid for breakthrough pain.  A physical therapy consultation will be obtained for a safe discharge disposition.  An Orthopedic Surgery consultation will be obtained.  It is a pubic ramus fracture, so weightbearing as tolerated is recommended with pain control.   2.  History of atrial fibrillation.  Will hold the Eliquis for now until we make sure that the hemoglobin stays stable tomorrow morning, as there is a risk of bleeding with pelvic fractures.   3.  Atrial fibrillation.  Will continue Toprol-XL and diltiazem with hold parameters.   4.  History of diastolic congestive heart failure.   Will hold the Lasix for now.     5.  History of rheumatoid arthritis.  Will hold the methotrexate.   6.  Code status is discussed with the patient, and she wants to be DNR/DNI.     7.  Deep vein thrombosis prophylaxis.  Ambulation and will plan on restarting Eliquis tomorrow if hemoglobin stays stable.     8.  She is getting admitted as observation and as per discharge stay.         JOHNNIE CHAVEZ MD             D: 2019   T: 2019   MT:       Name:     DWAINE CASTRO   MRN:      [id number]        Account:      [id number]   :      1932        Admitted:     2019                   Document: [id number]

## 2019-01-27 ENCOUNTER — APPOINTMENT (OUTPATIENT)
Dept: PHYSICAL THERAPY | Facility: CLINIC | Age: 84
End: 2019-01-27
Payer: COMMERCIAL

## 2019-01-27 LAB
ANION GAP SERPL CALCULATED.3IONS-SCNC: 7 MMOL/L (ref 3–14)
BUN SERPL-MCNC: 17 MG/DL (ref 7–30)
CALCIUM SERPL-MCNC: 8.8 MG/DL (ref 8.5–10.1)
CHLORIDE SERPL-SCNC: 105 MMOL/L (ref 94–109)
CO2 SERPL-SCNC: 29 MMOL/L (ref 20–32)
CREAT SERPL-MCNC: 0.92 MG/DL (ref 0.52–1.04)
ERYTHROCYTE [DISTWIDTH] IN BLOOD BY AUTOMATED COUNT: 13.6 % (ref 10–15)
GFR SERPL CREATININE-BSD FRML MDRD: 56 ML/MIN/{1.73_M2}
GLUCOSE SERPL-MCNC: 83 MG/DL (ref 70–99)
HCT VFR BLD AUTO: 37.8 % (ref 35–47)
HGB BLD-MCNC: 13.4 G/DL (ref 11.7–15.7)
MCH RBC QN AUTO: 33.5 PG (ref 26.5–33)
MCHC RBC AUTO-ENTMCNC: 35.4 G/DL (ref 31.5–36.5)
MCV RBC AUTO: 95 FL (ref 78–100)
PLATELET # BLD AUTO: 221 10E9/L (ref 150–450)
POTASSIUM SERPL-SCNC: 3.4 MMOL/L (ref 3.4–5.3)
RBC # BLD AUTO: 4 10E12/L (ref 3.8–5.2)
SODIUM SERPL-SCNC: 141 MMOL/L (ref 133–144)
WBC # BLD AUTO: 7.6 10E9/L (ref 4–11)

## 2019-01-27 PROCEDURE — 25000132 ZZH RX MED GY IP 250 OP 250 PS 637: Performed by: PHYSICIAN ASSISTANT

## 2019-01-27 PROCEDURE — 36415 COLL VENOUS BLD VENIPUNCTURE: CPT | Performed by: INTERNAL MEDICINE

## 2019-01-27 PROCEDURE — 80048 BASIC METABOLIC PNL TOTAL CA: CPT | Performed by: INTERNAL MEDICINE

## 2019-01-27 PROCEDURE — 40000193 ZZH STATISTIC PT WARD VISIT

## 2019-01-27 PROCEDURE — G0378 HOSPITAL OBSERVATION PER HR: HCPCS

## 2019-01-27 PROCEDURE — 99226 ZZC SUBSEQUENT OBSERVATION CARE,LEVEL III: CPT | Performed by: PHYSICIAN ASSISTANT

## 2019-01-27 PROCEDURE — 85027 COMPLETE CBC AUTOMATED: CPT | Performed by: INTERNAL MEDICINE

## 2019-01-27 PROCEDURE — 25000132 ZZH RX MED GY IP 250 OP 250 PS 637: Performed by: INTERNAL MEDICINE

## 2019-01-27 PROCEDURE — 97530 THERAPEUTIC ACTIVITIES: CPT | Mod: GP

## 2019-01-27 PROCEDURE — 25000132 ZZH RX MED GY IP 250 OP 250 PS 637: Performed by: ORTHOPAEDIC SURGERY

## 2019-01-27 PROCEDURE — 97161 PT EVAL LOW COMPLEX 20 MIN: CPT | Mod: GP

## 2019-01-27 RX ORDER — AMOXICILLIN 250 MG
2 CAPSULE ORAL 2 TIMES DAILY PRN
Status: DISCONTINUED | OUTPATIENT
Start: 2019-01-27 | End: 2019-01-28 | Stop reason: HOSPADM

## 2019-01-27 RX ORDER — AMOXICILLIN 250 MG
1 CAPSULE ORAL 2 TIMES DAILY
Status: DISCONTINUED | OUTPATIENT
Start: 2019-01-27 | End: 2019-01-28 | Stop reason: HOSPADM

## 2019-01-27 RX ORDER — AMOXICILLIN 250 MG
2 CAPSULE ORAL 2 TIMES DAILY
Status: DISCONTINUED | OUTPATIENT
Start: 2019-01-27 | End: 2019-01-28 | Stop reason: HOSPADM

## 2019-01-27 RX ORDER — TRAMADOL HYDROCHLORIDE 50 MG/1
50 TABLET ORAL EVERY 6 HOURS PRN
Status: DISCONTINUED | OUTPATIENT
Start: 2019-01-27 | End: 2019-01-28 | Stop reason: HOSPADM

## 2019-01-27 RX ORDER — AMOXICILLIN 250 MG
1 CAPSULE ORAL 2 TIMES DAILY PRN
Status: DISCONTINUED | OUTPATIENT
Start: 2019-01-27 | End: 2019-01-28 | Stop reason: HOSPADM

## 2019-01-27 RX ORDER — FUROSEMIDE 20 MG
20 TABLET ORAL EVERY MORNING
Status: DISCONTINUED | OUTPATIENT
Start: 2019-01-27 | End: 2019-01-28 | Stop reason: HOSPADM

## 2019-01-27 RX ORDER — LOSARTAN POTASSIUM 50 MG/1
50 TABLET ORAL DAILY
Status: DISCONTINUED | OUTPATIENT
Start: 2019-01-27 | End: 2019-01-28 | Stop reason: HOSPADM

## 2019-01-27 RX ADMIN — ACETAMINOPHEN 1000 MG: 500 TABLET, FILM COATED ORAL at 17:34

## 2019-01-27 RX ADMIN — SENNOSIDES AND DOCUSATE SODIUM 1 TABLET: 8.6; 5 TABLET ORAL at 08:13

## 2019-01-27 RX ADMIN — METOPROLOL SUCCINATE 50 MG: 50 TABLET, EXTENDED RELEASE ORAL at 08:13

## 2019-01-27 RX ADMIN — DILTIAZEM HYDROCHLORIDE 180 MG: 180 CAPSULE, EXTENDED RELEASE ORAL at 08:12

## 2019-01-27 RX ADMIN — SENNOSIDES AND DOCUSATE SODIUM 1 TABLET: 8.6; 5 TABLET ORAL at 19:56

## 2019-01-27 RX ADMIN — TRAMADOL HYDROCHLORIDE 50 MG: 50 TABLET, COATED ORAL at 08:18

## 2019-01-27 RX ADMIN — LEVOTHYROXINE SODIUM 50 MCG: 50 TABLET ORAL at 08:50

## 2019-01-27 RX ADMIN — ACETAMINOPHEN 1000 MG: 500 TABLET, FILM COATED ORAL at 10:22

## 2019-01-27 RX ADMIN — ACETAMINOPHEN 1000 MG: 500 TABLET, FILM COATED ORAL at 02:20

## 2019-01-27 RX ADMIN — LOSARTAN POTASSIUM 50 MG: 50 TABLET, FILM COATED ORAL at 08:13

## 2019-01-27 RX ADMIN — POLYETHYLENE GLYCOL 3350 17 G: 17 POWDER, FOR SOLUTION ORAL at 21:56

## 2019-01-27 RX ADMIN — APIXABAN 5 MG: 5 TABLET, FILM COATED ORAL at 19:57

## 2019-01-27 RX ADMIN — APIXABAN 5 MG: 5 TABLET, FILM COATED ORAL at 08:50

## 2019-01-27 RX ADMIN — TRAMADOL HYDROCHLORIDE 50 MG: 50 TABLET, COATED ORAL at 19:56

## 2019-01-27 RX ADMIN — FUROSEMIDE 20 MG: 20 TABLET ORAL at 08:12

## 2019-01-27 NOTE — PLAN OF CARE
Comments: -diagnostic tests and consults completed and resulted-met  -vital signs normal or at patient baseline-met   Nurse to notify provider when observation goals have been met and patient is ready for discharge.

## 2019-01-27 NOTE — PROGRESS NOTES
[facility]    Medicine Progress Note - Hospitalist Service       Date of Admission:  1/26/2019  Assessment & Plan   Marley Stewart is an 86-year-old female with PMHx of hypertension, chronic atrial fibrillation on anticoagulation with Eliquis, diastolic CHF, CVA, PUD, RA, and hypothyroidism who presented to the ED 1/261/19 after sustaining a fall with resultant left pubic ramus fracture and inability to ambulate. Registered to the observation unit for pain control. Vitals currently WNL. Pt currently stable.      Mechanical fall with subsequent left pubic ramus fracture: CBC and BMP WNL. CT pelvis with nondisplaced fractures of the central and medial aspects of the left superior pubic ramus.   -- Pain control with Tylenol 1000 mg po TID, tramadol 50 mg q6 hrs PRN, Dilaudid 0.3-0.5 mg q2 hrs PRN, ice/heat; IV option only to be used for severe, intractable pain after oral dosing. Last dose of Dilaudid 2300 the day of admission.   -- Bowel regimen in place while on narcotics   -- Ortho formally consulted on admission; see formal note; WBAT  -- PT recommending TCU   -- SW sent out referrals     Atrial fibrillation/flutter s/p cardioversion (2017)  Chronic anticoagulation use: [id number] (Age, gender, CVA)  -- Continue PTA Diltiazem 180 mg po every day, Toprol XL 50 mg po every day  -- Eliquis initially on hold to ensure Hgb remained stable overnight, reorder 1/27    Recent Labs   Lab 01/27/19  0620 01/26/19  1330   HGB 13.4 13.9     Hypertension: Continue PTA Diltiazem, Toprol XL. Losartan 50 mg po every day restarted 1/27.     Diastolic congestive heart failure, not in acute exacerbation: Echocardiogram from 11/2016 with EF 60-65%, no RWMA, mild to moderate MR and TR.   -- Resume Lasix 20 mg po every day 1/27  -- Intake and output, daily weights   -- Monitor for signs of volume overload     Intake/Output Summary (Last 24 hours) at 1/27/2019 1414  Last data filed at 1/27/2019 0628  Gross per 24 hour    Intake 240 ml   Output 200 ml   Net 40 ml     Rheumatoid arthritis:  Hold PTA methotrexate during obs stay    Hypothyroidism: Continue PTA Synthroid 50 mcg po every day      Hx of CVA: Chronic left cerebellar and right occipital infarcts noted on MRI from 12/2015. Stable    Diet: Regular Diet Adult    DVT Prophylaxis: Ambulate every shift and Eliquis   Armstrong Catheter: not present  Code Status: DNR/DNI      Disposition Plan   Expected discharge: Tomorrow, recommended to transitional care unit once adequate pain management/ tolerating PO medications and safe disposition plan/ TCU bed available.  Entered: Mirta Solares PA-C 01/27/2019, 7:35 AM     The patient's care was discussed with the Attending Physician, Dr. Back.    Mirta Solares PA-C  Hospitalist Service  [facility]  __________________________________________________________________    Interval History   Ongoing pain with ambulation, rates 8/10 when up and moving. No numbness tingling, saddle anesthesia. PT assessed, recommending TCU at discharge, pt agreeable. Referrals sent. Afebrile. Bowel and bladder output adequate.     Data reviewed today: See below.     Physical Exam   Vital Signs: Temp: 96.6  F (35.9  C) Temp src: Oral BP: 112/58 Pulse: 102 Heart Rate: 84 Resp: 16 SpO2: 97 % O2 Device: None (Room air)    Weight: 150 lbs 0 oz    CONSTITUTIONAL: Pt laying in bed, dressed in hospital garb. Appears comfortable. Cooperative with interview.   HEENT: Normocephalic, atraumatic. Pupils equal, round, and reactive to light. Negative for conjunctival redness or scleral icterus.  Oral mucosa pink and moist; negative for ulcerations, erythema, or exudates.  Dentition in good repair.   CARDIOVASCULAR: RRR, no murmurs, rubs, or extra heart sounds appreciated. Pulses +2/4 and regular in upper and lower extremities, bilaterally.   RESPIRATORY: No increased work of breathing.  CTA, bilat; no wheezes, rales, or  rhonchi appreciated.  GASTROINTESTINAL:  Abdomen soft, non-distended. BS auscultated in all four quadrants. Negative for tenderness to percussion or light and deep palpation.  No masses or organomegaly noted.  MUSCULOSKELETAL: No gross deformities noted. Normal muscle tone.   HEMATOLOGIC/LYMPHATIC/IMMUNOLOGIC: No anterior or posterior cervical LAD, bilaterally. Negative for lower extremity edema, bilaterally.  NEUROLOGIC: Alert and oriented to person, place, and time.  strength intact.   SKIN: Warm, dry, intact. No jaundice noted. Negative for suspicious lesions, rashes, bruising, open sores or abrasions.     Data   Recent Labs   Lab 01/27/19  0620 01/26/19  1330   WBC 7.6 7.3   HGB 13.4 13.9   MCV 95 96    248    144   POTASSIUM 3.4 3.4   CHLORIDE 105 109   CO2 29 25   BUN 17 17   CR 0.92 0.91   ANIONGAP 7 10   LONNIE 8.8 8.8   GLC 83 79     Recent Results (from the past 24 hour(s))   CT Pelvis Bone wo Contrast    Narrative    CT PELVIS AND LEFT HIP WITHOUT CONTRAST  January 26, 2019 2:53 PM    HISTORY: Left hip pain after falling.    COMPARISON: Radiographs earlier today and a CT on 12/26/2018.    TECHNIQUE: CT imaging is performed through the pelvis and both hips  without contrast. Multiplanar reformatting was performed with  attention given to the left hip. Radiation dose for this scan was  reduced using automated exposure control, adjustment of the mA and/or  kV according to patient size, or iterative reconstruction technique.     FINDINGS: There are nondisplaced fractures of the central and medial  aspects of the left superior pubic ramus. No other fracture or osseous  lesion is demonstrated. There are moderate degenerative changes of the  hips and degenerative changes of the visualized lower lumbar spine.  There is calcification in the vascular structures. There are  diverticula of the sigmoid colon with no evidence of diverticulitis.  No other soft tissue abnormality is seen.      Impression     IMPRESSION: Nondisplaced fractures of the left superior pubic ramus.     POOL ALBERTO MD     Medications       acetaminophen  1,000 mg Oral Q8H     apixaban ANTICOAGULANT  5 mg Oral BID     diltiazem ER  180 mg Oral Daily     furosemide  20 mg Oral QAM     levothyroxine  50 mcg Oral Daily     losartan  50 mg Oral Daily     metoprolol succinate ER  50 mg Oral Daily     senna-docusate  1 tablet Oral BID    Or     senna-docusate  2 tablet Oral BID

## 2019-01-27 NOTE — CONSULTS
"Care Transition Initial Assessment - BJ     Met with: Patient    Active Problems:    Pelvic fracture (H)       DATA  Lives With: spouse   Living Arrangements: house  Who is your support system?: , Children    ASSESSMENT  Cognitive Status:  awake, alert and oriented    SW has reviewed pt records. Pt is Marley, an 87yo female who was admitted under observation on 1/26/19 due to a mechanical fall at home. Pt typically lives independently with her  in a house in [place], [place]. PT evaluated pt this morning and the current recommendation if TCU. BJ discussed with PA and plan will be to discharge tomorrow once pain better controled. BJ met with pt to introduce self/role, perform assessment, and discuss discharge planning. Pt shared that she is agreeable to TCU as recognizes she will not be able to function in her home environment. SW discussed observation status and that due to the Medicare guideline of needed a three day inpatient stay for TCU coverage, her stay will be private pay. Pt asked clarifying questions and verbalized understanding. BJ discussed that facilities will require an upfront deposit, typically ranging from $3,000-$5,000. SW discussed having more specific information once discharge location known. SW provided TCU list and pt would like the following facilities pursued: [facility], [place], and [facility]. Pt will \"absolutely not\" go to [facility] as went yeats ago and expressed that it was a \"horrific experience\". SW faxed referrals via DOD. BJ offered to contact her daughter-in-law, Ashley, who was at [facility] earlier however has now left for [place]. Pt shared that it is not necessary to carly her until we know which facilities will be able to accept. SW to update pt as able.     PLAN  Financial costs for the patient includes: Private pay TCU due to obs status.  Patient given options and choices for discharge: Yes, list provided.  Patient/family is agreeable to the plan?  Yes  Patient " Goals and Preferences: TCU  Patient anticipates discharging to:  TCU    MESFIN Bell, LICSW  Daytime (8:00am-4:30pm): [phone]  After-Hours  Pager (4:30pm-11:30pm): [phone]

## 2019-01-27 NOTE — CONSULTS
[facility]  Orthopedics Consultation         Dick Lamas MD    Marley Stewart MRN# [id number]   YOB: 1932 Age: 86 year old      Date of Admission:  1/26/2019  Date of Consult: 1/27/2019         Assessment and Plan:   This is a stable fracture, and the patient may weight-bear as tolerated.  Physical therapy is already started.  Assessment should be made today as to whether or not she will need temporary placement in a TCU.  This will be based on her functional abilities.            Code Status:   Per primary service         Primary Care Physician:   Chandra Ortiz [phone]         Requesting Physician:      Dr. Pineda         Chief Complaint:   Left-sided pelvic and hip pain    History is obtained from the patient         History of Present Illness:   Marley Stewart is a 86 year old female who presented with left-sided pelvic and hip pain.    This patient is well-known to me.  I repaired her left distal femur fracture over 20 years ago, and I also replaced her left knee.    She was injured yesterday.  She was in her own home.  She recalls reaching to put one hand on a love seat, and then falling.  She does not recall any chest pain, shortness of breath, or lightheadedness.  She is normally an independent ambulator, and uses a cane outside of the home.    After she fell, she was unable to get up and weight-bear.  She was brought into the emergency room, both plain x-rays and a CT were obtained and a fracture was noted.    She is quite comfortable at rest.  It hurts to roll over.  It hurts to bear weight.  She denies numbness or tingling.  She feels a bit weak.           Past Medical History:     Past Medical History:   Diagnosis Date     A-fib -- Chronic      Aortic regurgitation     1-2+ per 4/2015 Echo     Arthritis      CHF (congestive heart failure) (H)     EF 65-70% on 4/2015 Echo     CVA (cerebral vascular accident) (H)      Glaucoma      Hypertension       Mitral regurgitation     2+ per 4/2015 Echo     PUD (peptic ulcer disease)      Pulmonic valve regurgitation     1+ per 4/2015 Echo     RA (rheumatoid arthritis) (H)      Spinal stenosis      Thyroid disease      Tricuspid regurgitation     2+ per 4/2015 Echo               Past Surgical History:     Past Surgical History:   Procedure Laterality Date     ORTHOPEDIC SURGERY       Bilateral knee replacements, internal fixation left distal femur fracture         Home Medications:     Prior to Admission medications    Medication Sig Last Dose Taking? Auth Provider   apixaban ANTICOAGULANT (ELIQUIS) 5 MG tablet Take 1 tablet (5 mg) by mouth 2 times daily  Yes Marino Sandoval MD   calcium carb 1250 mg, 500 mg [place],/vitamin D 200 units (OSCAL WITH D) 500-200 MG-UNIT per tablet Take 1 tablet by mouth 2 times daily (with meals)  Yes Unknown, Entered By History   carboxymethylcellulose (REFRESH PLUS) 0.5 % SOLN Place 1 drop into both eyes 2 times daily   Yes Unknown, Entered By History   diltiazem ER (DILT-XR) 180 MG 24 hr capsule Take 180 mg by mouth daily  Yes Unknown, Entered By History   FOLIC ACID PO Take 1 mg by mouth daily  Yes Reported, Patient   furosemide (LASIX) 20 MG tablet Take 1 tablet (20 mg) by mouth every morning  Yes Marino Sandoval MD   latanoprost (XALATAN) 0.005 % ophthalmic solution Place 1 drop into the right eye At Bedtime  Yes Unknown, Entered By History   levothyroxine (SYNTHROID/LEVOTHROID) 50 MCG tablet Take 50 mcg by mouth daily  Yes Unknown, Entered By History   LOSARTAN POTASSIUM PO Take 50 mg by mouth daily   Yes Reported, Patient   methotrexate 2.5 MG tablet Take 15 mg by mouth every 7 days Wednesday  Yes Unknown, Entered By History   metoprolol succinate ER (TOPROL-XL) 50 MG 24 hr tablet Take 50 mg by mouth daily  Yes Unknown, Entered By History   sennosides (SENOKOT) 8.6 MG tablet Take 2 tablets by mouth 2 times daily as needed for constipation  Yes Unknown, Entered By History   timolol  "(TIMOPTIC) 0.5 % ophthalmic solution Place 1 drop into the right eye 2 times daily  Yes Unknown, Entered By History            Current Medications:           acetaminophen  1,000 mg Oral Q8H     apixaban ANTICOAGULANT  5 mg Oral BID     diltiazem ER  180 mg Oral Daily     furosemide  20 mg Oral QAM     levothyroxine  50 mcg Oral Daily     losartan  50 mg Oral Daily     metoprolol succinate ER  50 mg Oral Daily     acetaminophen, acetaminophen, bisacodyl, HYDROmorphone, melatonin, naloxone, ondansetron **OR** ondansetron, oxyCODONE, polyethylene glycol, prochlorperazine **OR** prochlorperazine **OR** prochlorperazine, traMADol         Allergies:     Allergies   Allergen Reactions     Amitriptyline      Clonazepam Other (See Comments)     Somnolence at 0.5 mg dose     Latex Rash            Social History:     Social History     Tobacco Use     Smoking status: Never Smoker     Smokeless tobacco: Never Used   Substance Use Topics     Alcohol use: Yes     Comment: < 1 drink a month               Family History:     Family History   Problem Relation Age of Onset     Cerebrovascular Disease Mother      Cancer Father         \"Bone Cancer\"               Review of Systems:   The 10 point Review of Systems is negative other than noted in the HPI or below.  In particular, she did not have any symptoms that would predispose her to a fall.           Physical Exam:   Blood pressure 134/76, pulse 102, temperature 97.1  F (36.2  C), temperature source Oral, resp. rate 16, height 1.6 m (5' 3\"), weight 68 kg (150 lb), SpO2 97 %.  150 lbs 0 oz    She is alert and oriented and in no apparent distress.  She is breathing comfortably on room air.  Her affect is appropriate.    She appears quite comfortable at rest.  She has good pulses in her feet.  She can wiggle her toes up and down and she can plantarflex and dorsiflex at the ankle and she can tiara both feet.    She has well-healed scars of the anterior knees, and she has a lateral " scar on the left distal femur area and knee.  She is quite tender to palpation of the anterior pelvis on the left side.         Data:   All new lab and imaging data was reviewed.   Recent Labs   Lab Test 01/27/19  0620  06/27/18  1615   WBC 7.6   < >  --    HGB 13.4   < >  --    MCV 95   < >  --       < >  --    INR  --   --  1.29*    < > = values in this interval not displayed.      Recent Labs   Lab Test 01/27/19  0620 01/26/19  1330    144   POTASSIUM 3.4 3.4   CHLORIDE 105 109   CO2 29 25   BUN 17 17   CR 0.92 0.91   GLC 83 79     Recent Results (from the past 48 hour(s))   XR Pelvis and Hip Left 2 Views    Narrative    PELVIS AND LEFT HIP TWO VIEWS January 26, 2019 1:53 PM    HISTORY: Pain after falling.    COMPARISON: A CT of the abdomen and pelvis on 12/26/2018.    FINDINGS: There is cortical irregularity of the left superior pubic  ramus suspicious for a nondisplaced fracture. This was not present on  a CT of the abdomen and pelvis on 12/26/2018 and is therefore a recent  fracture. No other definite fracture is seen. There are mild to  moderate degenerative changes in both hips. No other abnormality is  seen.    POOL ALBERTO MD   CT Pelvis Bone wo Contrast    Narrative    CT PELVIS AND LEFT HIP WITHOUT CONTRAST  January 26, 2019 2:53 PM    HISTORY: Left hip pain after falling.    COMPARISON: Radiographs earlier today and a CT on 12/26/2018.    TECHNIQUE: CT imaging is performed through the pelvis and both hips  without contrast. Multiplanar reformatting was performed with  attention given to the left hip. Radiation dose for this scan was  reduced using automated exposure control, adjustment of the mA and/or  kV according to patient size, or iterative reconstruction technique.     FINDINGS: There are nondisplaced fractures of the central and medial  aspects of the left superior pubic ramus. No other fracture or osseous  lesion is demonstrated. There are moderate degenerative changes of  the  hips and degenerative changes of the visualized lower lumbar spine.  There is calcification in the vascular structures. There are  diverticula of the sigmoid colon with no evidence of diverticulitis.  No other soft tissue abnormality is seen.      Impression    IMPRESSION: Nondisplaced fractures of the left superior pubic ramus.     POOL ALBERTO MD

## 2019-01-27 NOTE — PLAN OF CARE
Comments: -diagnostic tests and consults completed and resulted-not met  -vital signs normal or at patient baseline-met   Nurse to notify provider when observation goals have been met and patient is ready for discharge.

## 2019-01-27 NOTE — PROGRESS NOTES
01/27/19 0900   Quick Adds   Type of Visit Initial PT Evaluation   Living Environment   Lives With spouse   Living Arrangements house   Home Accessibility stairs to enter home;stairs within home   Living Environment Comment split level house with 9+9 steps to bedroom, kitchen, bathroom; railing on 1 side    Self-Care   Usual Activity Tolerance good   Current Activity Tolerance poor   Activity/Exercise/Self-Care Comment Uses three pronged cane outside of the home; has FWW at home to use as well    Functional Level Prior   Ambulation 1-->assistive equipment   Transferring 1-->assistive equipment   Toileting 0-->independent   Bathing 0-->independent   Communication 0-->understands/communicates without difficulty   Swallowing 0-->swallows foods/liquids without difficulty   Cognition 0 - no cognition issues reported   Fall history within last six months yes   Number of times patient has fallen within last six months 1   Which of the above functional risks had a recent onset or change? ambulation;transferring   General Information   Onset of Illness/Injury or Date of Surgery - Date 01/26/19   Referring Physician Cassandra Pineda MD   Patient/Family Goals Statement Open to TCU if needed    Pertinent History of Current Problem (include personal factors and/or comorbidities that impact the POC) Patient admitted on 1/26/19 after a fall at home and found to have left pubic ramus fracture with orders for WBAT on L LE.    Precautions/Limitations fall precautions   Weight-Bearing Status - LLE weight-bearing as tolerated   Weight-Bearing Status - RLE full weight-bearing   General Observations Patient supine in bed upon arrival of therapist, agreeable to working with PT    Cognitive Status Examination   Orientation orientation to person, place and time   Level of Consciousness alert   Follows Commands and Answers Questions 100% of the time;able to follow multistep instructions   Pain Assessment   Patient Currently in Pain Yes, see  "Vital Sign flowsheet  (not rated on scale of 0-10. )   Integumentary/Edema   Integumentary/Edema no deficits were identifed   Posture    Posture Not impaired   Range of Motion (ROM)   ROM Comment B LE ROM WNL, L LE slightly limited due to pain    Strength   Strength Comments Not formally assessed but at least 3+/5 with functional transfers    Bed Mobility   Bed Mobility Comments Supine<>sit with SBA; patient in supine at end of session with all needs in reach and bed alarm on.    Transfer Skills   Transfer Comments Sit>stand with Min A and FWW   Gait   Gait Comments Patient able to take a few steps forward and backward from EOB with use of FWW, minimal WB on L LE noted and patient noting dizzines in standing; required seated rest break and then able to take a few steps alone the side of bed towards the HOB with Min A and cues for sequencing. Patient unable to tolerate further ambulation 2/2 to pain and dizziness.    Balance   Balance Comments Slightly unsteady in standing 2/2 to pain in standing    Clinical Impression   Criteria for Skilled Therapeutic Intervention evaluation only   Clinical Presentation Stable/Uncomplicated   Clinical Presentation Rationale Based on PMH, current presentation, and social support    Clinical Decision Making (Complexity) Low complexity   Anticipated Discharge Disposition Transitional Care Facility   Risk & Benefits of therapy have been explained Yes   Patient, Family & other staff in agreement with plan of care Yes   [facility] [place]-[facility] TM \"6 Clicks\"   2016, Trustees of [facility], under license to [employer].  All rights reserved.   6 Clicks Short Forms Basic Mobility Inpatient Short Form   [facility] AM-PAC  \"6 Clicks\" V.2 Basic Mobility Inpatient Short Form   1. Turning from your back to your side while in a flat bed without using bedrails? 3 - A Little   2. Moving from lying on your back to sitting on the side of a flat bed without using bedrails? 3 - A Little "   3. Moving to and from a bed to a chair (including a wheelchair)? 2 - A Lot   4. Standing up from a chair using your arms (e.g., wheelchair, or bedside chair)? 3 - A Little   5. To walk in hospital room? 2 - A Lot   6. Climbing 3-5 steps with a railing? 1 - Total   Basic Mobility Raw Score (Score out of 24.Lower scores equate to lower levels of function) 14   Total Evaluation Time   Total Evaluation Time (Minutes) 18

## 2019-01-27 NOTE — PLAN OF CARE
Discharge Planner PT   Patient plan for discharge: Open to TCU   Current status: Orders received, chart reviewed, PT evaluation completed. Patient admitted on 1/26/19 following a fall at home and found to have left pubic ramus fracture with orders for WBAT on L LE. Patient lives in a house with her spouse with no steps to enter and 18-20 steps to main level (bedroom, bathroom, kitchen). Patient states she is independent at baseline with no AD within the home and a 3 pronged cane outside of the home.     Patient supine in bed upon arrival of therapist, agreeable to working with PT. Educated on role of PT and PT POC while admitted under observation status. Supine>sit with CGA and bed rails, patient able to sit at EOB with B UE support and supervision. Sit>stand from EOB with Min A and FWW, patient needing 2 attempts to achieve full standing 2/2 to pain. Patient able to stand x 2 for ~1-2 minutes each trial with CGA to Min A for support and FWW. Patient unable to bear weight through L LE without significant increase in pain and noting dizziness after prolonged standing. Patient able to take a few steps up towards the HOB with Min A, moderate B UE use of FWW and minimal WB through L LE noted. Patient in supine at end of session with all needs in reach and bed alarm on. Discussed discharge recommendations, patient in agreement. Will defer further skilled PT to next level of care as patient admitted under observation status. Will complete orders at this time.     Barriers to return to prior living situation: pain, current level of assist, decreased tolerance to activity, stairs within home to access main level   Recommendations for discharge: TCU   Rationale for recommendations: Patient would benefit from continued skilled therapy to further improve strength, balance, and independence with mobility and ambulation to address functional limitations and decrease falls risk. Will defer further skilled PT to next level of care  as patient admitted under observation status. Will complete orders at this time.        Entered by: Leeann Parkinson 01/27/2019 10:09 AM

## 2019-01-27 NOTE — PLAN OF CARE
Comments: -diagnostic tests and consults completed and resulted-not met  -vital signs normal or at patient baseline-met   Nurse to notify provider when observation goals have been met and patient is ready for discharge.    Pt A&Ox4.  VSS on RA.  Pain managed w/IV Dilaudid and scheduled Tylenol.  CMS intact.  Pt has refused to get out of bed during the night.  Voiding adequately.  Plan for therapy today.  Nursing to continue to monitor.

## 2019-01-28 VITALS
HEART RATE: 75 BPM | BODY MASS INDEX: 25.76 KG/M2 | WEIGHT: 145.4 LBS | SYSTOLIC BLOOD PRESSURE: 155 MMHG | HEIGHT: 63 IN | TEMPERATURE: 96.3 F | RESPIRATION RATE: 18 BRPM | DIASTOLIC BLOOD PRESSURE: 79 MMHG | OXYGEN SATURATION: 97 %

## 2019-01-28 PROCEDURE — 25000132 ZZH RX MED GY IP 250 OP 250 PS 637: Performed by: INTERNAL MEDICINE

## 2019-01-28 PROCEDURE — G0378 HOSPITAL OBSERVATION PER HR: HCPCS

## 2019-01-28 PROCEDURE — 25000132 ZZH RX MED GY IP 250 OP 250 PS 637: Performed by: PHYSICIAN ASSISTANT

## 2019-01-28 PROCEDURE — 99217 ZZC OBSERVATION CARE DISCHARGE: CPT | Performed by: HOSPITALIST

## 2019-01-28 RX ORDER — ACETAMINOPHEN 500 MG
1000 TABLET ORAL EVERY 8 HOURS
Qty: 60 TABLET | Refills: 0 | Status: ON HOLD | DISCHARGE
Start: 2019-01-28 | End: 2019-08-05

## 2019-01-28 RX ORDER — TRAMADOL HYDROCHLORIDE 50 MG/1
50 TABLET ORAL EVERY 6 HOURS PRN
Qty: 24 TABLET | Refills: 0 | Status: SHIPPED | OUTPATIENT
Start: 2019-01-28 | End: 2019-02-10

## 2019-01-28 RX ORDER — POLYETHYLENE GLYCOL 3350 17 G/17G
17 POWDER, FOR SOLUTION ORAL DAILY PRN
Status: ON HOLD | DISCHARGE
Start: 2019-01-28 | End: 2019-08-05

## 2019-01-28 RX ADMIN — POLYETHYLENE GLYCOL 3350 17 G: 17 POWDER, FOR SOLUTION ORAL at 08:12

## 2019-01-28 RX ADMIN — ACETAMINOPHEN 1000 MG: 500 TABLET, FILM COATED ORAL at 10:06

## 2019-01-28 RX ADMIN — APIXABAN 5 MG: 5 TABLET, FILM COATED ORAL at 08:12

## 2019-01-28 RX ADMIN — LEVOTHYROXINE SODIUM 50 MCG: 50 TABLET ORAL at 08:12

## 2019-01-28 RX ADMIN — ACETAMINOPHEN 1000 MG: 500 TABLET, FILM COATED ORAL at 02:03

## 2019-01-28 RX ADMIN — DILTIAZEM HYDROCHLORIDE 180 MG: 180 CAPSULE, EXTENDED RELEASE ORAL at 08:12

## 2019-01-28 RX ADMIN — SENNOSIDES AND DOCUSATE SODIUM 2 TABLET: 8.6; 5 TABLET ORAL at 08:11

## 2019-01-28 RX ADMIN — METOPROLOL SUCCINATE 50 MG: 50 TABLET, EXTENDED RELEASE ORAL at 08:11

## 2019-01-28 RX ADMIN — FUROSEMIDE 20 MG: 20 TABLET ORAL at 08:12

## 2019-01-28 ASSESSMENT — MIFFLIN-ST. JEOR: SCORE: 1068.66

## 2019-01-28 NOTE — PLAN OF CARE
A&Ox4. VSS on RA. L hip pain controlled w/ scheduled tylenol. CMS intact. Up w/ Ax1, walker, GB to BS. Voiding adequately. Constipation, pt to continue bowel regimen at TCU. Tolerating regular diet. Pt to discharge to [place] [id number]. Continue to monitor.

## 2019-01-28 NOTE — PLAN OF CARE
OBSERVATION GOALS    -diagnostic tests and consults completed and resulted: MET  -vital signs normal or at patient baseline: MET

## 2019-01-28 NOTE — PLAN OF CARE
Comments: -diagnostic tests and consults completed and resulted-met  -vital signs normal or at patient baseline-met   Nurse to notify provider when observation goals have been met and patient is ready for discharge.    Alert and oriented x4. VSS. Pain controled with ultram and tylenol. Up with one to BSC. CMS intact. Plan to discharge to TCU possibly tomorrow. Will continue to monitor.

## 2019-01-28 NOTE — DISCHARGE SUMMARY
[facility]    Discharge Summary  Hospitalist    Date of Admission:  1/26/2019  Date of Discharge:  1/28/2019  Discharging Provider: Patty Back DO  Date of Service (when I saw the patient): 01/28/19    Discharge Diagnoses   Mechanical fall with subsequent left pubic ramus fracture  Atrial fibrillation/flutter s/p cardioversion (2017)  Chronic anticoagulation use  Hypertension  Diastolic congestive heart failure  Rheumatoid arthritis  Hypothyroidism  Hx CVA    History of Present Illness   Marley Stewart is an 86 year old female who presented with fall resulting in left pubic ramus fracture.    Hospital Course   Marley Stewart was admitted on 1/26/2019.  The following problems were addressed during her hospitalization:    Mechanical fall with subsequent left pubic ramus fracture  CBC and BMP WNL. CT pelvis with nondisplaced fractures of the central and medial aspects of the left superior pubic ramus. Orthopedic surgery consulted and recommended weight bear as tolerated, TCU on discharge and no surgical intervention  - Pain control with Tylenol 1000 mg po TID, tramadol 50 mg q6 hrs PRN-- prescriptions written and placed in chart  - Bowel regimen in place while on narcotics, continue regimen as ordered on discharge (as long as taking tramadol as well)  - PT recommending TCU, accepted at [facility].     Constipation  Patient complained of no bowel movement since admission. Received miralax, and senna today, recommend continuing miralax and senna on discharge  - okay to add on TCU orders for bowel regimen as needed    Atrial fibrillation/flutter s/p cardioversion (2017)  Chronic anticoagulation use   [id number] (Age, gender, CVA)  -- Continue PTA Diltiazem 180 mg po every day, Toprol XL 50 mg po every day  -- Eliquis initially on hold to ensure Hgb remained stable overnight, resumed 1/27 and Hgb stable this AM  - Repeat CBC in 3 days to monitor for any bleeding     Hypertension  Continue PTA Diltiazem,  Toprol XL, Losartan 50 mg on discharge     Diastolic congestive heart failure, not in acute exacerbation Echocardiogram from 11/2016 with EF 60-65%, no RWMA, mild to moderate MR and TR.   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 Stable      Patty Back,     Significant Results and Procedures   See below    Pending Results   These results will be followed up by PCP  Unresulted Labs Ordered in the Past 30 Days of this Admission     Date and Time Order Name Status Description    1/4/2019 1330 T4 free In process           Code Status   DNR / DNI       Primary Care Physician   Chandra Ortiz    Physical Exam   Temp: 96.3  F (35.7  C) Temp src: Axillary BP: 155/79 Pulse: 75 Heart Rate: 92 Resp: 18 SpO2: 97 % O2 Device: None (Room air)    Vitals:    01/26/19 1330 01/28/19 0700   Weight: 68 kg (150 lb) 66 kg (145 lb 6.4 oz)     Vital Signs with Ranges  Temp:  [96.2  F (35.7  C)-97.4  F (36.3  C)] 96.3  F (35.7  C)  Pulse:  [75] 75  Heart Rate:  [79-92] 92  Resp:  [16-20] 18  BP: ()/(52-79) 155/79  SpO2:  [96 %-97 %] 97 %  I/O last 3 completed shifts:  In: 200 [P.O.:200]  Out: 200 [Urine:200]    Constitutional: Awake, alert, cooperative, no apparent distress.  Eyes: Conjunctiva and pupils examined and normal.  HEENT: Moist mucous membranes, normal dentition.  Respiratory: Clear to auscultation bilaterally, no crackles or wheezing.  Cardiovascular: Regular rate and rhythm, normal S1 and S2, and no murmur noted.  GI: Soft, non-distended, non-tender, bowel sounds active.  Lymph/Hematologic: No anterior cervical or supraclavicular adenopathy.  Skin: No rashes, no cyanosis, no edema.  Musculoskeletal: No joint swelling, erythema or tenderness.  Neurologic: Cranial nerves 2-12  intact, normal strength and sensation.  Psychiatric: Alert, oriented to person, place and time, no obvious anxiety or depression.    Discharge Disposition   Discharged to TCU ([facility])  Condition at discharge: Stable    Consultations This Hospital Stay   SOCIAL WORK IP CONSULT  PHYSICAL THERAPY ADULT IP CONSULT  ORTHOPEDIC SURGERY IP CONSULT  PHYSICAL THERAPY ADULT IP CONSULT  OCCUPATIONAL THERAPY ADULT IP CONSULT    Time Spent on this Encounter   I Patty Wong, personally saw the patient today and spent less than or equal to 30 minutes discharging this patient.    Discharge Orders      CBC with platelets    Last Lab Result: Hemoglobin (g/dL)       Date                     Value                 01/27/2019               13.4             ----------     General info for SNF    Length of Stay Estimate: Short Term Care: Estimated # of Days <30  Condition at Discharge: Stable  Level of care:skilled   Rehabilitation Potential: Good  Admission H&P remains valid and up-to-date: Yes  Recent Chemotherapy: N/A  Use Nursing Home Standing Orders: Yes     Mantoux instructions    Give two-step Mantoux (PPD) Per Facility Policy Yes     Reason for your hospital stay    Fall with pubic ramus fracture     Daily weights    Call Provider for weight gain of more than 2 pounds per day or 5 pounds per week.     Follow Up and recommended labs and tests    Follow up with longterm physician.  The following labs/tests are recommended: CBC in 3 days to monitor Hgb (pelvic fractures have tendency for bleed, monitor on eliquis).     Activity - Up with assistive device     Activity - Up with nursing assistance     Weight bearing status    Full weight bearing     DNR/DNI     Physical Therapy Adult Consult    Evaluate and treat as clinically indicated.    Reason:  Pubic ramus fracture, decreased strength/mobility     Occupational Therapy Adult Consult    Evaluate and treat as clinically indicated.    Reason:  Pubic ramus fracture, decreased  mobility, strength     Fall precautions     Advance Diet as Tolerated    Follow this diet upon discharge: Regular Diet Adult     Discharge Medications   Current Discharge Medication List      START taking these medications    Details   acetaminophen (TYLENOL) 500 MG tablet Take 2 tablets (1,000 mg) by mouth every 8 hours  Qty: 60 tablet, Refills: 0    Associated Diagnoses: Closed fracture of left superior pubic ramus, initial encounter (H)      polyethylene glycol (MIRALAX/GLYCOLAX) packet Take 17 g by mouth daily as needed for constipation    Associated Diagnoses: Drug-induced constipation      traMADol (ULTRAM) 50 MG tablet Take 1 tablet (50 mg) by mouth every 6 hours as needed for moderate pain  Qty: 24 tablet, Refills: 0    Associated Diagnoses: Closed fracture of left superior pubic ramus, initial encounter (H)         CONTINUE these medications which have NOT CHANGED    Details   apixaban ANTICOAGULANT (ELIQUIS) 5 MG tablet Take 1 tablet (5 mg) by mouth 2 times daily  Qty: 60 tablet, Refills: 0    Associated Diagnoses: Atrial fibrillation (H)      calcium carb 1250 mg, 500 mg [place],/vitamin D 200 units (OSCAL WITH D) 500-200 MG-UNIT per tablet Take 1 tablet by mouth 2 times daily (with meals)      carboxymethylcellulose (REFRESH PLUS) 0.5 % SOLN Place 1 drop into both eyes 2 times daily       diltiazem ER (DILT-XR) 180 MG 24 hr capsule Take 180 mg by mouth daily      FOLIC ACID PO Take 1 mg by mouth daily      furosemide (LASIX) 20 MG tablet Take 1 tablet (20 mg) by mouth every morning  Qty: 30 tablet, Refills: 0    Associated Diagnoses: Acute on chronic diastolic congestive heart failure (H)      latanoprost (XALATAN) 0.005 % ophthalmic solution Place 1 drop into the right eye At Bedtime      levothyroxine (SYNTHROID/LEVOTHROID) 50 MCG tablet Take 50 mcg by mouth daily      LOSARTAN POTASSIUM PO Take 50 mg by mouth daily       methotrexate 2.5 MG tablet Take 15 mg by mouth every 7 days Wednesday       metoprolol succinate ER (TOPROL-XL) 50 MG 24 hr tablet Take 50 mg by mouth daily      sennosides (SENOKOT) 8.6 MG tablet Take 2 tablets by mouth 2 times daily as needed for constipation      timolol (TIMOPTIC) 0.5 % ophthalmic solution Place 1 drop into the right eye 2 times daily           Allergies   Allergies   Allergen Reactions     Amitriptyline      Clonazepam Other (See Comments)     Somnolence at 0.5 mg dose     Latex Rash     Data   Most Recent 3 CBC's:  Recent Labs   Lab Test 01/27/19  0620 01/26/19  1330 01/04/19  1330   WBC 7.6 7.3 9.6   HGB 13.4 13.9 14.8   MCV 95 96 95    248 260      Most Recent 3 BMP's:  Recent Labs   Lab Test 01/27/19  0620 01/26/19  1330 01/04/19  1330    144 142   POTASSIUM 3.4 3.4 3.4   CHLORIDE 105 109 105   CO2 29 25 28   BUN 17 17 14   CR 0.92 0.91 1.06*   ANIONGAP 7 10 9   LONNIE 8.8 8.8 9.6   GLC 83 79 77     Most Recent 2 LFT's:  Recent Labs   Lab Test 01/04/19  1330 12/26/18  1445   AST 39 47*   ALT 47 37   ALKPHOS 81 84   BILITOTAL 0.7 0.4     Most Recent INR's and Anticoagulation Dosing History:  Anticoagulation Dose History     Recent Dosing and Labs Latest Ref Rng & Units 5/27/2017 6/27/2018    INR 0.86 - 1.14 1.54(H) 1.29(H)        Most Recent 3 Troponin's:  Recent Labs   Lab Test 05/27/17  0955 11/02/16  1042 12/05/15  0624   TROPI 0.022 0.026 0.028     Most Recent Cholesterol Panel:  Recent Labs   Lab Test 12/04/15  2210   CHOL 168   LDL 96   HDL 40*   TRIG 159*     Most Recent 6 Bacteria Isolates From Any Culture (See EPIC Reports for Culture Details):  Recent Labs   Lab Test 01/12/18  2134 01/09/18  2102 01/09/18  1949 05/27/17  1513 05/27/17  1507 05/27/17  1049   CULT >10 Squamous epithelial cells/low power field indicates oral contamination. Please   recollect.  *  Canceled, Test credited  Notification of test cancellation was given to  Yun Mittal RN, @9841 01/12/18.DH.   No growth No growth No growth No growth No growth     Most Recent TSH,  T4 and A1c Labs:  Recent Labs   Lab Test 01/04/19  1330  04/28/15  1455   TSH 9.77*   < >  --    T4 0.99  --   --    A1C  --   --  5.6    < > = values in this interval not displayed.     Results for orders placed or performed during the hospital encounter of 01/26/19   XR Pelvis and Hip Left 2 Views    Narrative    PELVIS AND LEFT HIP TWO VIEWS January 26, 2019 1:53 PM    HISTORY: Pain after falling.    COMPARISON: A CT of the abdomen and pelvis on 12/26/2018.    FINDINGS: There is cortical irregularity of the left superior pubic  ramus suspicious for a nondisplaced fracture. This was not present on  a CT of the abdomen and pelvis on 12/26/2018 and is therefore a recent  fracture. No other definite fracture is seen. There are mild to  moderate degenerative changes in both hips. No other abnormality is  seen.    POOL ALBERTO MD   CT Pelvis Bone wo Contrast    Narrative    CT PELVIS AND LEFT HIP WITHOUT CONTRAST  January 26, 2019 2:53 PM    HISTORY: Left hip pain after falling.    COMPARISON: Radiographs earlier today and a CT on 12/26/2018.    TECHNIQUE: CT imaging is performed through the pelvis and both hips  without contrast. Multiplanar reformatting was performed with  attention given to the left hip. Radiation dose for this scan was  reduced using automated exposure control, adjustment of the mA and/or  kV according to patient size, or iterative reconstruction technique.     FINDINGS: There are nondisplaced fractures of the central and medial  aspects of the left superior pubic ramus. No other fracture or osseous  lesion is demonstrated. There are moderate degenerative changes of the  hips and degenerative changes of the visualized lower lumbar spine.  There is calcification in the vascular structures. There are  diverticula of the sigmoid colon with no evidence of diverticulitis.  No other soft tissue abnormality is seen.      Impression    IMPRESSION: Nondisplaced fractures of the left superior pubic  elaina.     POOL ALBERTO MD

## 2019-01-28 NOTE — PLAN OF CARE
Comments: -diagnostic tests and consults completed and resulted-met  -vital signs normal or at patient baseline-met   Nurse to notify provider when observation goals have been met and patient is ready for discharge.      A&O. VSS on RA. Left pelvis pain managed w/ scheduled tylenol and prn tramadol. CMS intact. Up w/ assist x1 and walker to commode. Voiding adequately. PT and SW following. Pt c/o constipation, on scheduled senokot, prn miralax given x1. Continue to monitor.

## 2019-01-28 NOTE — PLAN OF CARE
OBSERVATION GOALS    -diagnostic tests and consults completed and resulted: MET  -vital signs normal or at patient baseline: MET    SW helping w/ discharge planning

## 2019-01-28 NOTE — PROGRESS NOTES
MD Notification    Notified Person: MD    Notified Person Name: Dr Back    Notification Date/Time: 01/28/19 1327    Notification Interaction: text page    Purpose of Notification: SW has discharge set up to [place] @ [facility]

## 2019-01-28 NOTE — PROGRESS NOTES
Discharge Planner   Discharge Plans in progress: TCU referrals pending. SW LM for 1-[facility] (1st ch)  2-[facility]  3-[facility] to follow up on TCU referrals and awaiting return calls.  Barriers to discharge plan: None noted  Follow up plan: Michael from [facility] to transport at 1500 via w/c  SW to continue to follow and assist with discharge planning.         Entered by: Tashia Sainz 01/28/2019 11:20 AM         DC orders: MD epaged for orders  Scripts:  PAS:  Trans: Michael from [facility] to transport at 1500.

## 2019-01-29 ENCOUNTER — NURSING HOME VISIT (OUTPATIENT)
Dept: GERIATRICS | Facility: CLINIC | Age: 84
End: 2019-01-29
Payer: COMMERCIAL

## 2019-01-29 VITALS
TEMPERATURE: 97.6 F | SYSTOLIC BLOOD PRESSURE: 148 MMHG | HEIGHT: 63 IN | HEART RATE: 80 BPM | WEIGHT: 150 LBS | OXYGEN SATURATION: 97 % | RESPIRATION RATE: 20 BRPM | DIASTOLIC BLOOD PRESSURE: 85 MMHG | BODY MASS INDEX: 26.58 KG/M2

## 2019-01-29 DIAGNOSIS — M06.9 RHEUMATOID ARTHRITIS, INVOLVING UNSPECIFIED SITE, UNSPECIFIED RHEUMATOID FACTOR PRESENCE: ICD-10-CM

## 2019-01-29 DIAGNOSIS — E03.9 HYPOTHYROIDISM, UNSPECIFIED TYPE: ICD-10-CM

## 2019-01-29 DIAGNOSIS — I10 HYPERTENSION, UNSPECIFIED TYPE: ICD-10-CM

## 2019-01-29 DIAGNOSIS — Z71.89 ADVANCED DIRECTIVES, COUNSELING/DISCUSSION: ICD-10-CM

## 2019-01-29 DIAGNOSIS — Z86.73 HISTORY OF CVA (CEREBROVASCULAR ACCIDENT): ICD-10-CM

## 2019-01-29 DIAGNOSIS — R53.81 PHYSICAL DECONDITIONING: ICD-10-CM

## 2019-01-29 DIAGNOSIS — I48.91 ATRIAL FIBRILLATION, UNSPECIFIED TYPE (H): ICD-10-CM

## 2019-01-29 DIAGNOSIS — K59.00 CONSTIPATION, UNSPECIFIED CONSTIPATION TYPE: ICD-10-CM

## 2019-01-29 DIAGNOSIS — I50.30 DIASTOLIC CONGESTIVE HEART FAILURE, UNSPECIFIED HF CHRONICITY (H): ICD-10-CM

## 2019-01-29 DIAGNOSIS — Z79.01 CHRONIC ANTICOAGULATION: ICD-10-CM

## 2019-01-29 DIAGNOSIS — S32.592D CLOSED FRACTURE OF RAMUS OF LEFT PUBIS WITH ROUTINE HEALING, SUBSEQUENT ENCOUNTER: Primary | ICD-10-CM

## 2019-01-29 PROCEDURE — 99310 SBSQ NF CARE HIGH MDM 45: CPT | Performed by: NURSE PRACTITIONER

## 2019-01-29 ASSESSMENT — MIFFLIN-ST. JEOR: SCORE: 1089.53

## 2019-01-29 NOTE — PROGRESS NOTES
[place] [facility]  PRIMARY CARE PROVIDER AND CLINIC:  Chandra Ortiz [facility] [address] / [facility] [address]  Chief Complaint   Patient presents with     Hospital F/U     [place] Medical Record Number:  [id number]  Place of Service where encounter took place:  [facility] ([place] [[id number]]    HPI:    Marley Stewart is a 86 year old  (7/17/1932),admitted to the above facility from  [facility].  Hospital stay 1/26/19 through 1/28/19.  Admitted to this facility for  rehab, medical management and nursing care.  HPI information obtained from: facility chart records, facility staff, patient report and [facility] chart review.      Hospital Course  Marley Stewart was admitted on 1/26/2019.  The following problems were addressed during her hospitalization:  Mechanical fall with subsequent left pubic ramus fracture  CBC and BMP WNL. CT pelvis with nondisplaced fractures of the central and medial aspects of the left superior pubic ramus. Orthopedic surgery consulted and recommended weight bear as tolerated, TCU on discharge and no surgical intervention  - Pain control with Tylenol 1000 mg po TID, tramadol 50 mg q6 hrs PRN-- prescriptions written and placed in chart  - Bowel regimen in place while on narcotics, continue regimen as ordered on discharge (as long as taking tramadol as well)  - PT recommending TCU, accepted at [place].  Constipation  Patient complained of no bowel movement since admission. Received miralax, and senna today, recommend continuing miralax and senna on discharge  - okay to add on TCU orders for bowel regimen as needed  Atrial fibrillation/flutter s/p cardioversion (2017)  Chronic anticoagulation use   [id number] (Age, gender, CVA)  -- Continue PTA Diltiazem 180 mg po every day, Toprol XL 50 mg po every day  -- Eliquis initially on hold to ensure Hgb remained stable overnight, resumed 1/27 and Hgb stable this AM  - Repeat CBC in 3 days to  monitor for any bleeding  Hypertension  Continue PTA Diltiazem, Toprol XL, Losartan 50 mg on discharge  Diastolic congestive heart failure, not in acute exacerbation Echocardiogram from 11/2016 with EF 60-65%, no RWMA, mild to moderate MR and TR.   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 Stable    Current issues are:      Closed fracture of ramus of left pubis with routine healing, subsequent encounter  Physical deconditioning  Patient with pain 8/10 with movement and none at rest. Has scheduled Tylenol and PRN ultram. Feels comfortable with Tylenol and reports ultram makes her very constipated so she tries to avoid. On Ca/Vit d supplements. Ortho f/u PRN and WBAT.     Constipation, unspecified constipation type  Constipated at hospital now c/o loose stools x 3 days, at times has been incontinent. Denies nausea. Believes she has taken too much laxative. No abdominal pain.     Atrial fibrillation, unspecified type (H)  History of CVA (cerebrovascular accident)  Chronic anticoagulation  On Eliquis.   Lab Results   Component Value Date    HGB 13.4 01/27/2019    HGB 13.9 01/26/2019       Hypertension, unspecified type  Diastolic congestive heart failure, unspecified HF chronicity (H)  Since admission -155. HR . 97% on room air and no edema. Patient takes Diltiazem  mg daily, Lasix 20 mg daily, losartan 50 mg daily, metoprolol xl 50 mg daily.   BP: 117-155/67-85 mmHg, HR: bpm, Wt: 150lbs 1/29  Results for DWAINE CASTRO (MRN [id number]) as of 1/29/2019 11:06   Ref. Range 1/27/2019 06:20   Sodium Latest Ref Range: 133 - 144 mmol/L 141   Potassium Latest Ref Range: 3.4 - 5.3 mmol/L 3.4   Chloride Latest Ref Range: 94 - 109 mmol/L 105   Carbon Dioxide Latest Ref  Range: 20 - 32 mmol/L 29   Urea Nitrogen Latest Ref Range: 7 - 30 mg/dL 17   Creatinine Latest Ref Range: 0.52 - 1.04 mg/dL 0.92         Rheumatoid arthritis, involving unspecified site, unspecified rheumatoid factor presence (H)  Back on methotrexate. No complaints.     Hypothyroidism, unspecified type  On synthroid.   Lab Results   Component Value Date    TSH 9.77 01/04/2019    TSH 0.54 11/02/2016       Advanced directives, counseling/discussion  Reviewed and completed POLST - DNR/DNI.        CODE STATUS/ADVANCE DIRECTIVES DISCUSSION:   DNR / DNI  Patient's living condition: lives with spouse    ALLERGIES:Amitriptyline; Clonazepam; and Latex  PAST MEDICAL HISTORY:  has a past medical history of A-fib -- Chronic, Aortic regurgitation, Arthritis, CHF (congestive heart failure) (H), CVA (cerebral vascular accident) (H), Glaucoma, Hypertension, Mitral regurgitation, PUD (peptic ulcer disease), Pulmonic valve regurgitation, RA (rheumatoid arthritis) (H), Spinal stenosis, Thyroid disease, and Tricuspid regurgitation.  PAST SURGICAL HISTORY:  has a past surgical history that includes orthopedic surgery.  FAMILY HISTORY: family history includes Cancer in her father; Cerebrovascular Disease in her mother.  SOCIAL HISTORY:  reports that  has never smoked. she has never used smokeless tobacco. She reports that she drinks alcohol. She reports that she does not use drugs.    Post Discharge Medication Reconciliation Status: discharge medications reconciled, continue medications without change.  Current Outpatient Medications   Medication Sig Dispense Refill     acetaminophen (TYLENOL) 500 MG tablet Take 2 tablets (1,000 mg) by mouth every 8 hours 60 tablet 0     apixaban ANTICOAGULANT (ELIQUIS) 5 MG tablet Take 1 tablet (5 mg) by mouth 2 times daily 60 tablet 0     calcium carb 1250 mg, 500 mg [place],/vitamin D 200 units (OSCAL WITH D) 500-200 MG-UNIT per tablet Take 1 tablet by mouth 2 times daily (with meals)        "carboxymethylcellulose (REFRESH PLUS) 0.5 % SOLN Place 1 drop into both eyes 2 times daily        diltiazem ER (DILT-XR) 180 MG 24 hr capsule Take 180 mg by mouth daily       FOLIC ACID PO Take 1 mg by mouth daily       furosemide (LASIX) 20 MG tablet Take 1 tablet (20 mg) by mouth every morning 30 tablet 0     latanoprost (XALATAN) 0.005 % ophthalmic solution Place 1 drop into the right eye At Bedtime       levothyroxine (SYNTHROID/LEVOTHROID) 50 MCG tablet Take 50 mcg by mouth daily       LOSARTAN POTASSIUM PO Take 50 mg by mouth daily        methotrexate 2.5 MG tablet Take 15 mg by mouth every 7 days Wednesday       metoprolol succinate ER (TOPROL-XL) 50 MG 24 hr tablet Take 50 mg by mouth daily       polyethylene glycol (MIRALAX/GLYCOLAX) packet Take 17 g by mouth daily as needed for constipation       sennosides (SENOKOT) 8.6 MG tablet Take 2 tablets by mouth 2 times daily as needed for constipation       timolol (TIMOPTIC) 0.5 % ophthalmic solution Place 1 drop into the right eye 2 times daily       traMADol (ULTRAM) 50 MG tablet Take 1 tablet (50 mg) by mouth every 6 hours as needed for moderate pain 24 tablet 0       ROS:  10 point ROS of systems including Constitutional, Eyes, Respiratory, Cardiovascular, Gastroenterology, Genitourinary, Integumentary, Musculoskeletal, Psychiatric were all negative except for pertinent positives noted in my HPI.    Exam:  /85   Pulse 80   Temp 97.6  F (36.4  C)   Resp 20   Ht 1.6 m (5' 3\")   Wt 68 kg (150 lb)   SpO2 97%   BMI 26.57 kg/m    GENERAL APPEARANCE:  Alert, in no distress, pleasant, cooperative, oriented x 4  EYES:  EOM, lids, pupils and irises normal, sclera clear and conjunctiva normal, no discharge or mattering on lids or lashes noted  ENT:  Mouth normal, moist mucous membranes, nose normal without drainage or crusting, external ears without lesions, hearing acuity intact  RESP:  respiratory effort and palpation of chest normal, no chest wall " tenderness, no respiratory distress, Lung sounds clear, patient is on room air  CV:  Palpation and auscultation of heart done, rate and rhythm controlled and regular, no murmur, no rub or gallop. Edema none bilateral lower extremities.   ABDOMEN:  normal bowel sounds, soft, nontender.  M/S:   Gait and station walks with assist , no tenderness or swelling of the joints; able to move all extremities   SKIN:  Inspection and palpation of skin and subcutaneous tissue: has a scattered raised red rash on upper back   NEURO: cranial nerves 2-12 grossly intact, no facial asymmetry, no speech deficits and able to follow directions, moves all extremities symmetrically  PSYCH:  insight and judgement intact, memory intact, affect and mood normal     Lab/Diagnostic data:  CBC RESULTS:   Recent Labs   Lab Test 01/27/19  0620 01/26/19  1330   WBC 7.6 7.3   RBC 4.00 4.16   HGB 13.4 13.9   HCT 37.8 39.8   MCV 95 96   MCH 33.5* 33.4*   MCHC 35.4 34.9   RDW 13.6 13.8    248       Last Basic Metabolic Panel:  Recent Labs   Lab Test 01/27/19  0620 01/26/19  1330    144   POTASSIUM 3.4 3.4   CHLORIDE 105 109   LONNIE 8.8 8.8   CO2 29 25   BUN 17 17   CR 0.92 0.91   GLC 83 79       Liver Function Studies -   Recent Labs   Lab Test 01/04/19  1330 12/26/18  1445   PROTTOTAL 7.9 7.7   ALBUMIN 4.0 3.7   BILITOTAL 0.7 0.4   ALKPHOS 81 84   AST 39 47*   ALT 47 37       TSH   Date Value Ref Range Status   01/04/2019 9.77 (H) 0.40 - 4.00 mU/L Final   11/02/2016 0.54 0.40 - 4.00 mU/L Final     Lab Results   Component Value Date    A1C 5.6 04/28/2015       ASSESSMENT/PLAN:  Closed fracture of ramus of left pubis with routine healing, subsequent encounter  Physical deconditioning  Acute onset - at TCU, continue Tylenol and PRN ultram for pain management. Continue therapies - f/u with progress next week. Ortho f/u PRN.     Constipation, unspecified constipation type  Resolved, now c/o diarrhea. Patient has PRN laxatives - hold until no stool  x 2 days. Stool sample for c diff and norovirus today.     Atrial fibrillation, unspecified type (H)  History of CVA (cerebrovascular accident)  Chronic anticoagulation  Chronic issues, on Eliquis. Repeat CBC on 1/31, meds as above.     Hypertension, unspecified type  Diastolic congestive heart failure, unspecified HF chronicity (H)  Chronic, stable. Meds, vs, wt as ordered. BMP on 1/31.     Rheumatoid arthritis, involving unspecified site, unspecified rheumatoid factor presence (H)  Chronic, meds as PTA.     Hypothyroidism, unspecified type  Chronic, meds as PTA.     Advanced directives, counseling/discussion  Completed POLST - DNR/DNI.      Orders:  DNR/DNI  Stool for c diff and norovirus  CBC, BMP on 1/31 diagnosis HTN  HC 1% cream to rash on back TID x 7 days    Total time spent with patient visit at the skilled nursing facility was 35 min including patient visit and review of past records. Greater than 50% of total time spent with counseling and coordinating care due to review of history, current issues, status and concerns and POC to address them as above    Electronically signed by:  HUEY Koenig CNP

## 2019-01-29 NOTE — LETTER
1/29/2019        RE: Marley Stewart  [address]  [place] [address]        [place] [facility]  PRIMARY CARE PROVIDER AND CLINIC:  Chandra Ortiz [facility] [address] / [facility] [address]  Chief Complaint   Patient presents with     Hospital F/U     [place] Medical Record Number:  [id number]  Place of Service where encounter took place:  [facility] ([facility]) [[id number]]    HPI:    Marley Stewart is a 86 year old  (7/17/1932),admitted to the above facility from  [facility].  Hospital stay 1/26/19 through 1/28/19.  Admitted to this facility for  rehab, medical management and nursing care.  HPI information obtained from: facility chart records, facility staff, patient report and [facility] chart review.      Hospital Course  Marley Stewart was admitted on 1/26/2019.  The following problems were addressed during her hospitalization:  Mechanical fall with subsequent left pubic ramus fracture  CBC and BMP WNL. CT pelvis with nondisplaced fractures of the central and medial aspects of the left superior pubic ramus. Orthopedic surgery consulted and recommended weight bear as tolerated, TCU on discharge and no surgical intervention  - Pain control with Tylenol 1000 mg po TID, tramadol 50 mg q6 hrs PRN-- prescriptions written and placed in chart  - Bowel regimen in place while on narcotics, continue regimen as ordered on discharge (as long as taking tramadol as well)  - PT recommending TCU, accepted at [place].  Constipation  Patient complained of no bowel movement since admission. Received miralax, and senna today, recommend continuing miralax and senna on discharge  - okay to add on TCU orders for bowel regimen as needed  Atrial fibrillation/flutter s/p cardioversion (2017)  Chronic anticoagulation use   [id number] 5 (Age, gender, CVA)  -- Continue PTA Diltiazem 180 mg po every day, Toprol XL 50 mg po every day  -- Eliquis initially on hold to ensure Hgb  remained stable overnight, resumed 1/27 and Hgb stable this AM  - Repeat CBC in 3 days to monitor for any bleeding  Hypertension  Continue PTA Diltiazem, Toprol XL, Losartan 50 mg on discharge  Diastolic congestive heart failure, not in acute exacerbation Echocardiogram from 11/2016 with EF 60-65%, no RWMA, mild to moderate MR and TR.   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 Stable    Current issues are:      Closed fracture of ramus of left pubis with routine healing, subsequent encounter  Physical deconditioning  Patient with pain 8/10 with movement and none at rest. Has scheduled Tylenol and PRN ultram. Feels comfortable with Tylenol and reports ultram makes her very constipated so she tries to avoid. On Ca/Vit d supplements. Ortho f/u PRN and WBAT.     Constipation, unspecified constipation type  Constipated at hospital now c/o loose stools x 3 days, at times has been incontinent. Denies nausea. Believes she has taken too much laxative. No abdominal pain.     Atrial fibrillation, unspecified type (H)  History of CVA (cerebrovascular accident)  Chronic anticoagulation  On Eliquis.   Lab Results   Component Value Date    HGB 13.4 01/27/2019    HGB 13.9 01/26/2019       Hypertension, unspecified type  Diastolic congestive heart failure, unspecified HF chronicity (H)  Since admission -155. HR . 97% on room air and no edema. Patient takes Diltiazem  mg daily, Lasix 20 mg daily, losartan 50 mg daily, metoprolol xl 50 mg daily.   BP: 117-155/67-85 mmHg, HR: bpm, Wt: 150lbs 1/29  Results for DWAINE CASTRO (MRN [id number]) as of 1/29/2019 11:06   Ref. Range 1/27/2019 06:20   Sodium Latest Ref Range: 133 - 144 mmol/L 141   Potassium Latest Ref Range: 3.4 - 5.3  mmol/L 3.4   Chloride Latest Ref Range: 94 - 109 mmol/L 105   Carbon Dioxide Latest Ref Range: 20 - 32 mmol/L 29   Urea Nitrogen Latest Ref Range: 7 - 30 mg/dL 17   Creatinine Latest Ref Range: 0.52 - 1.04 mg/dL 0.92         Rheumatoid arthritis, involving unspecified site, unspecified rheumatoid factor presence (H)  Back on methotrexate. No complaints.     Hypothyroidism, unspecified type  On synthroid.   Lab Results   Component Value Date    TSH 9.77 01/04/2019    TSH 0.54 11/02/2016       Advanced directives, counseling/discussion  Reviewed and completed POLST - DNR/DNI.        CODE STATUS/ADVANCE DIRECTIVES DISCUSSION:   DNR / DNI  Patient's living condition: lives with spouse    ALLERGIES:Amitriptyline; Clonazepam; and Latex  PAST MEDICAL HISTORY:  has a past medical history of A-fib -- Chronic, Aortic regurgitation, Arthritis, CHF (congestive heart failure) (H), CVA (cerebral vascular accident) (H), Glaucoma, Hypertension, Mitral regurgitation, PUD (peptic ulcer disease), Pulmonic valve regurgitation, RA (rheumatoid arthritis) (H), Spinal stenosis, Thyroid disease, and Tricuspid regurgitation.  PAST SURGICAL HISTORY:  has a past surgical history that includes orthopedic surgery.  FAMILY HISTORY: family history includes Cancer in her father; Cerebrovascular Disease in her mother.  SOCIAL HISTORY:  reports that  has never smoked. she has never used smokeless tobacco. She reports that she drinks alcohol. She reports that she does not use drugs.    Post Discharge Medication Reconciliation Status: discharge medications reconciled, continue medications without change.  Current Outpatient Medications   Medication Sig Dispense Refill     acetaminophen (TYLENOL) 500 MG tablet Take 2 tablets (1,000 mg) by mouth every 8 hours 60 tablet 0     apixaban ANTICOAGULANT (ELIQUIS) 5 MG tablet Take 1 tablet (5 mg) by mouth 2 times daily 60 tablet 0     calcium carb 1250 mg, 500 mg [place],/vitamin D 200 units (OSCAL WITH D) 500-200  "MG-UNIT per tablet Take 1 tablet by mouth 2 times daily (with meals)       carboxymethylcellulose (REFRESH PLUS) 0.5 % SOLN Place 1 drop into both eyes 2 times daily        diltiazem ER (DILT-XR) 180 MG 24 hr capsule Take 180 mg by mouth daily       FOLIC ACID PO Take 1 mg by mouth daily       furosemide (LASIX) 20 MG tablet Take 1 tablet (20 mg) by mouth every morning 30 tablet 0     latanoprost (XALATAN) 0.005 % ophthalmic solution Place 1 drop into the right eye At Bedtime       levothyroxine (SYNTHROID/LEVOTHROID) 50 MCG tablet Take 50 mcg by mouth daily       LOSARTAN POTASSIUM PO Take 50 mg by mouth daily        methotrexate 2.5 MG tablet Take 15 mg by mouth every 7 days Wednesday       metoprolol succinate ER (TOPROL-XL) 50 MG 24 hr tablet Take 50 mg by mouth daily       polyethylene glycol (MIRALAX/GLYCOLAX) packet Take 17 g by mouth daily as needed for constipation       sennosides (SENOKOT) 8.6 MG tablet Take 2 tablets by mouth 2 times daily as needed for constipation       timolol (TIMOPTIC) 0.5 % ophthalmic solution Place 1 drop into the right eye 2 times daily       traMADol (ULTRAM) 50 MG tablet Take 1 tablet (50 mg) by mouth every 6 hours as needed for moderate pain 24 tablet 0       ROS:  10 point ROS of systems including Constitutional, Eyes, Respiratory, Cardiovascular, Gastroenterology, Genitourinary, Integumentary, Musculoskeletal, Psychiatric were all negative except for pertinent positives noted in my HPI.    Exam:  /85   Pulse 80   Temp 97.6  F (36.4  C)   Resp 20   Ht 1.6 m (5' 3\")   Wt 68 kg (150 lb)   SpO2 97%   BMI 26.57 kg/m     GENERAL APPEARANCE:  Alert, in no distress, pleasant, cooperative, oriented x 4  EYES:  EOM, lids, pupils and irises normal, sclera clear and conjunctiva normal, no discharge or mattering on lids or lashes noted  ENT:  Mouth normal, moist mucous membranes, nose normal without drainage or crusting, external ears without lesions, hearing acuity " intact  RESP:  respiratory effort and palpation of chest normal, no chest wall tenderness, no respiratory distress, Lung sounds clear, patient is on room air  CV:  Palpation and auscultation of heart done, rate and rhythm controlled and regular, no murmur, no rub or gallop. Edema none bilateral lower extremities.   ABDOMEN:  normal bowel sounds, soft, nontender.  M/S:   Gait and station walks with assist , no tenderness or swelling of the joints; able to move all extremities   SKIN:  Inspection and palpation of skin and subcutaneous tissue: has a scattered raised red rash on upper back   NEURO: cranial nerves 2-12 grossly intact, no facial asymmetry, no speech deficits and able to follow directions, moves all extremities symmetrically  PSYCH:  insight and judgement intact, memory intact, affect and mood normal     Lab/Diagnostic data:  CBC RESULTS:   Recent Labs   Lab Test 01/27/19  0620 01/26/19  1330   WBC 7.6 7.3   RBC 4.00 4.16   HGB 13.4 13.9   HCT 37.8 39.8   MCV 95 96   MCH 33.5* 33.4*   MCHC 35.4 34.9   RDW 13.6 13.8    248       Last Basic Metabolic Panel:  Recent Labs   Lab Test 01/27/19  0620 01/26/19  1330    144   POTASSIUM 3.4 3.4   CHLORIDE 105 109   LONNIE 8.8 8.8   CO2 29 25   BUN 17 17   CR 0.92 0.91   GLC 83 79       Liver Function Studies -   Recent Labs   Lab Test 01/04/19  1330 12/26/18  1445   PROTTOTAL 7.9 7.7   ALBUMIN 4.0 3.7   BILITOTAL 0.7 0.4   ALKPHOS 81 84   AST 39 47*   ALT 47 37       TSH   Date Value Ref Range Status   01/04/2019 9.77 (H) 0.40 - 4.00 mU/L Final   11/02/2016 0.54 0.40 - 4.00 mU/L Final     Lab Results   Component Value Date    A1C 5.6 04/28/2015       ASSESSMENT/PLAN:  Closed fracture of ramus of left pubis with routine healing, subsequent encounter  Physical deconditioning  Acute onset - at TCU, continue Tylenol and PRN ultram for pain management. Continue therapies - f/u with progress next week. Ortho f/u PRN.     Constipation, unspecified constipation  type  Resolved, now c/o diarrhea. Patient has PRN laxatives - hold until no stool x 2 days. Stool sample for c diff and norovirus today.     Atrial fibrillation, unspecified type (H)  History of CVA (cerebrovascular accident)  Chronic anticoagulation  Chronic issues, on Eliquis. Repeat CBC on 1/31, meds as above.     Hypertension, unspecified type  Diastolic congestive heart failure, unspecified HF chronicity (H)  Chronic, stable. Meds, vs, wt as ordered. BMP on 1/31.     Rheumatoid arthritis, involving unspecified site, unspecified rheumatoid factor presence (H)  Chronic, meds as PTA.     Hypothyroidism, unspecified type  Chronic, meds as PTA.     Advanced directives, counseling/discussion  Completed POLST - DNR/DNI.      Orders:  DNR/DNI  Stool for c diff and norovirus  CBC, BMP on 1/31 diagnosis HTN  HC 1% cream to rash on back TID x 7 days    Total time spent with patient visit at the skilled nursing facility was 35 min including patient visit and review of past records. Greater than 50% of total time spent with counseling and coordinating care due to review of history, current issues, status and concerns and POC to address them as above    Electronically signed by:  HUEY Koenig CNP                    Sincerely,        HUEY Koenig CNP

## 2019-01-31 ENCOUNTER — HOSPITAL LABORATORY (OUTPATIENT)
Dept: OTHER | Facility: CLINIC | Age: 84
End: 2019-01-31

## 2019-01-31 VITALS
TEMPERATURE: 97.3 F | RESPIRATION RATE: 16 BRPM | WEIGHT: 149.6 LBS | SYSTOLIC BLOOD PRESSURE: 133 MMHG | HEIGHT: 63 IN | DIASTOLIC BLOOD PRESSURE: 87 MMHG | BODY MASS INDEX: 26.51 KG/M2 | OXYGEN SATURATION: 98 % | HEART RATE: 82 BPM

## 2019-01-31 LAB
ANION GAP SERPL CALCULATED.3IONS-SCNC: 8 MMOL/L (ref 3–14)
BUN SERPL-MCNC: 18 MG/DL (ref 7–30)
CALCIUM SERPL-MCNC: 9.3 MG/DL (ref 8.5–10.1)
CHLORIDE SERPL-SCNC: 107 MMOL/L (ref 94–109)
CO2 SERPL-SCNC: 26 MMOL/L (ref 20–32)
CREAT SERPL-MCNC: 0.9 MG/DL (ref 0.52–1.04)
ERYTHROCYTE [DISTWIDTH] IN BLOOD BY AUTOMATED COUNT: 14.3 % (ref 10–15)
GFR SERPL CREATININE-BSD FRML MDRD: 58 ML/MIN/{1.73_M2}
GLUCOSE SERPL-MCNC: 87 MG/DL (ref 70–99)
HCT VFR BLD AUTO: 42.4 % (ref 35–47)
HGB BLD-MCNC: 14.7 G/DL (ref 11.7–15.7)
MCH RBC QN AUTO: 33.4 PG (ref 26.5–33)
MCHC RBC AUTO-ENTMCNC: 34.7 G/DL (ref 31.5–36.5)
MCV RBC AUTO: 96 FL (ref 78–100)
PLATELET # BLD AUTO: 250 10E9/L (ref 150–450)
POTASSIUM SERPL-SCNC: 3.6 MMOL/L (ref 3.4–5.3)
RBC # BLD AUTO: 4.4 10E12/L (ref 3.8–5.2)
SODIUM SERPL-SCNC: 141 MMOL/L (ref 133–144)
WBC # BLD AUTO: 8.4 10E9/L (ref 4–11)

## 2019-01-31 RX ORDER — BENZOCAINE/MENTHOL 6 MG-10 MG
LOZENGE MUCOUS MEMBRANE 3 TIMES DAILY
COMMUNITY
End: 2019-08-05

## 2019-01-31 ASSESSMENT — MIFFLIN-ST. JEOR: SCORE: 1087.71

## 2019-02-01 ENCOUNTER — NURSING HOME VISIT (OUTPATIENT)
Dept: GERIATRICS | Facility: CLINIC | Age: 84
End: 2019-02-01
Payer: COMMERCIAL

## 2019-02-01 DIAGNOSIS — R53.81 PHYSICAL DECONDITIONING: Primary | ICD-10-CM

## 2019-02-01 DIAGNOSIS — I50.32 CHRONIC DIASTOLIC CONGESTIVE HEART FAILURE (H): ICD-10-CM

## 2019-02-01 DIAGNOSIS — Z86.73 HISTORY OF CVA (CEREBROVASCULAR ACCIDENT): ICD-10-CM

## 2019-02-01 DIAGNOSIS — I48.0 PAROXYSMAL ATRIAL FIBRILLATION (H): ICD-10-CM

## 2019-02-01 DIAGNOSIS — S32.592D PUBIC RAMUS FRACTURE, LEFT, WITH ROUTINE HEALING, SUBSEQUENT ENCOUNTER: ICD-10-CM

## 2019-02-01 PROCEDURE — 99304 1ST NF CARE SF/LOW MDM 25: CPT | Performed by: INTERNAL MEDICINE

## 2019-02-01 NOTE — LETTER
2/1/2019        RE: Marley Stewart  [address]  [place] [address]        PRIMARY CARE PROVIDER AND CLINIC RESPONSIBLE:  Chandra Ortiz [facility] [address] / [facility] [id number]        ADMISSION HISTORY AND PHYSICAL EXAMINATION     Chief Complaint   Patient presents with     Hospital F/U     Fatigue     Pelvic Pain         HISTORY OF PRESENT ILLNESS:  86 year old female, (7/17/1932), admitted [facility] for continuation of medical care and rehab.  HPI information obtained from: facility chart records, facility staff, patient report and [facility] chart review.    Marley Stewart is a 86 year old female with history of Atrial fibrillation/flutter s/p cardioversion (2017), Chronic anticoagulation use, Hypertension, Diastolic congestive heart failure, Rheumatoid arthritis, Hypothyroidism and Hx CVA who was admitted at [facility] from 1/26 to 1/28/19 due to mechanical; fall and sustained pelvic bone injury. CT pelvis with nondisplaced fractures of the central and medial aspects of the left superior pubic ramus. Orthopedic surgery recommended PT/OT and weight bearing as tolerated. She has pain at pelvic pain with movement. She feels tired but no weak. Slept poor due to frequent incontinent urine x2 last night, appetite okay and had BM yesterday. Patient denies chest pain, dyspnea, abdominal pain, n&v, fever, chills, dysuria, leg pain or swelling. The rest of ROS is unremarkable. Patient is seen and examined by DEYSI Lester NP. Please see DEYSI Lester's admit noted dated 1/29/196 for details of admission, past medical history, family history, allergies, medication list, social history and other details pertinent with this admission. Hospital admission and dc summary reviewed.      Past Medical History:   Diagnosis Date     A-fib -- Chronic      Aortic regurgitation     1-2+ per 4/2015 Echo     Arthritis      CHF (congestive heart failure) (H)     EF 65-70% on 4/2015 Echo     CVA  (cerebral vascular accident) (H)      Glaucoma      Hypertension      Mitral regurgitation     2+ per 4/2015 Echo     PUD (peptic ulcer disease)      Pulmonic valve regurgitation     1+ per 4/2015 Echo     RA (rheumatoid arthritis) (H)      Spinal stenosis      Thyroid disease      Tricuspid regurgitation     2+ per 4/2015 Echo       Past Surgical History:   Procedure Laterality Date     ORTHOPEDIC SURGERY         Current Outpatient Medications   Medication Sig     acetaminophen (TYLENOL) 500 MG tablet Take 2 tablets (1,000 mg) by mouth every 8 hours     apixaban ANTICOAGULANT (ELIQUIS) 5 MG tablet Take 1 tablet (5 mg) by mouth 2 times daily     calcium carb 1250 mg, 500 mg [place],/vitamin D 200 units (OSCAL WITH D) 500-200 MG-UNIT per tablet Take 1 tablet by mouth 2 times daily (with meals)     carboxymethylcellulose (REFRESH PLUS) 0.5 % SOLN Place 1 drop into both eyes 2 times daily      diltiazem ER (DILT-XR) 180 MG 24 hr capsule Take 180 mg by mouth daily     FOLIC ACID PO Take 1 mg by mouth daily     furosemide (LASIX) 20 MG tablet Take 1 tablet (20 mg) by mouth every morning     hydrocortisone (CORTAID) 1 % external cream Apply topically 3 times daily     latanoprost (XALATAN) 0.005 % ophthalmic solution Place 1 drop into the right eye At Bedtime     levothyroxine (SYNTHROID/LEVOTHROID) 50 MCG tablet Take 50 mcg by mouth daily     LOSARTAN POTASSIUM PO Take 50 mg by mouth daily      methotrexate 2.5 MG tablet Take 15 mg by mouth every 7 days Wednesday     metoprolol succinate ER (TOPROL-XL) 50 MG 24 hr tablet Take 50 mg by mouth daily     polyethylene glycol (MIRALAX/GLYCOLAX) packet Take 17 g by mouth daily as needed for constipation     sennosides (SENOKOT) 8.6 MG tablet Take 2 tablets by mouth 2 times daily as needed for constipation     timolol (TIMOPTIC) 0.5 % ophthalmic solution Place 1 drop into the right eye 2 times daily     No current facility-administered medications for this visit.        Allergies  "  Allergen Reactions     Amitriptyline      Clonazepam Other (See Comments)     Somnolence at 0.5 mg dose     Latex Rash       Social History     Socioeconomic History     Marital status:      Spouse name: Not on file     Number of children: 3     Years of education: Not on file     Highest education level: Not on file   Social Needs     Financial resource strain: Not on file     Food insecurity - worry: Not on file     Food insecurity - inability: Not on file     Transportation needs - medical: Not on file     Transportation needs - non-medical: Not on file   Occupational History     Occupation: Retired    Tobacco Use     Smoking status: Never Smoker     Smokeless tobacco: Never Used   Substance and Sexual Activity     Alcohol use: Yes     Comment: < 1 drink a month     Drug use: No     Sexual activity: Not on file   Other Topics Concern     Parent/sibling w/ CABG, MI or angioplasty before 65F 55M? Not Asked      Service Not Asked     Blood Transfusions Not Asked     Caffeine Concern Not Asked     Occupational Exposure Not Asked     Hobby Hazards Not Asked     Sleep Concern Not Asked     Stress Concern Not Asked     Weight Concern Not Asked     Special Diet Not Asked     Back Care Not Asked     Exercise Yes     Comment: stretches, golf      Bike Helmet Not Asked     Seat Belt Not Asked     Self-Exams Not Asked   Social History Narrative    , 3 children, she lives in a \"double home\" with her  who is slightly forgetful.  She desires DNR/DNI status.  Daughter-in-law, Ashley, is a nurse and checks on them. (last updated 1/4/2019)           Information reviewed:  Medications, vital signs, orders, nursing notes, problem list, hospital information.     ROS: All 10 point review of system completed, those pertinent positive, please see H&P, the remaining ROS is negative.    /87   Pulse 82   Temp 97.3  F (36.3  C)   Resp 16   Ht 1.6 m (5' 3\")   Wt 67.9 kg (149 lb 9.6 oz)   SpO2 " 98%   BMI 26.50 kg/m       PHYSICAL EXAMINATION:   GENERAL:  No acute distress.  SKIN:  Dry and warm.  There is no rash at area of skin examined.  HEENT:  Head without trauma.  Pupils round, reactive. Exam of conjunctiva and lids are normal. Sclera without icterus. There is no oral thrush.  NECK:  Supple. No jugular venous distension.  CHEST: No reproducible chest tenderness.   LUNGS:  Normal respiratory effort. Lungs reveal decreased breath sounds at bases. No rales or wheezes.  HEART:  Irregular rate and rhythm.  No murmur, gallops or rubs auscultated.  ABDOMEN:  Soft, bowel sounds positive.  There is no tenderness or guarding.   EXTREMITIES: No edema. Pelvic bone pain at left  NEUROLOGIC:  Alert and oriented x3.  Moving all ext. Gait not tested.  PSYCH: Recent and remote memory is mildly impaired. Mood and affect are normal.    Lab/Diagnostic data:  Reviewed    CBC RESULTS:   Recent Labs   Lab Test 01/31/19  0815   WBC 8.4   RBC 4.40   HGB 14.7   HCT 42.4   MCV 96   MCH 33.4*   MCHC 34.7   RDW 14.3          Recent Labs   Lab Test 01/31/19  0815 01/27/19  0620    141   POTASSIUM 3.6 3.4   CHLORIDE 107 105   CO2 26 29   ANIONGAP 8 7   GLC 87 83   BUN 18 17   CR 0.90 0.92   LONNIE 9.3 8.8       Liver Function Studies -   Recent Labs   Lab Test 01/04/19  1330   PROTTOTAL 7.9   ALBUMIN 4.0   BILITOTAL 0.7   ALKPHOS 81   AST 39   ALT 47         Results for orders placed or performed during the hospital encounter of 01/26/19   XR Pelvis and Hip Left 2 Views    Narrative    PELVIS AND LEFT HIP TWO VIEWS January 26, 2019 1:53 PM    HISTORY: Pain after falling.    COMPARISON: A CT of the abdomen and pelvis on 12/26/2018.    FINDINGS: There is cortical irregularity of the left superior pubic  ramus suspicious for a nondisplaced fracture. This was not present on  a CT of the abdomen and pelvis on 12/26/2018 and is therefore a recent  fracture. No other definite fracture is seen. There are mild to  moderate  degenerative changes in both hips. No other abnormality is  seen.    POOL ALBERTO MD   CT Pelvis Bone wo Contrast    Narrative    CT PELVIS AND LEFT HIP WITHOUT CONTRAST  January 26, 2019 2:53 PM    HISTORY: Left hip pain after falling.    COMPARISON: Radiographs earlier today and a CT on 12/26/2018.    TECHNIQUE: CT imaging is performed through the pelvis and both hips  without contrast. Multiplanar reformatting was performed with  attention given to the left hip. Radiation dose for this scan was  reduced using automated exposure control, adjustment of the mA and/or  kV according to patient size, or iterative reconstruction technique.     FINDINGS: There are nondisplaced fractures of the central and medial  aspects of the left superior pubic ramus. No other fracture or osseous  lesion is demonstrated. There are moderate degenerative changes of the  hips and degenerative changes of the visualized lower lumbar spine.  There is calcification in the vascular structures. There are  diverticula of the sigmoid colon with no evidence of diverticulitis.  No other soft tissue abnormality is seen.      Impression    IMPRESSION: Nondisplaced fractures of the left superior pubic ramus.     POOL ALBERTO MD       ASSESSMENT / PLAN:     Physical deconditioning  Plan: PT/OT with increase independence, self-care, strength and endurance and other cares that help return to home/facility of origin, fall precautions. Care conference with patient and family for the progress of rehab and disposition issues will be discussed as planned. Rehab evaluation and other evaluations including CPT are at rehab logs, to be reviewed separately.  Fall risk assessment as well as cognitive evaluation will be formed during rehab stay if indicated.    Pubic ramus fracture, left, with routine healing, subsequent encounter  Plan: PT/OT, pain medication, DVT prophylaxis with Elquis. Follow up orthopedic clinic as scheduled.     Chronic  diastolic congestive heart failure (H)  Plan: continue current medications lasix and metoprolol, monitor I&O and daily weighs and labs if indicated. Follow up cardiologist and PCP as scheduled.    Paroxysmal atrial fibrillation (H)  Plan: continue metoprolol and Elquis.    History of CVA (cerebrovascular accident)  Plan: continue metoprolol and Elquis.    Other problems with same care. Primary care doctor and other specialists to address those chronic problems in next clinic appointment to be scheduled upon discharge from the TCU.      Total time spent with patient visit was 34 min including patient visit, review of past records, patients counseling and coordinating care.      Electronically signed by:  Olvin Avila MD              Sincerely,        Olvin Avila MD

## 2019-02-01 NOTE — PROGRESS NOTES
PRIMARY CARE PROVIDER AND CLINIC RESPONSIBLE:  Chandra Ortiz, [facility] [address] / [facility] [id number]        ADMISSION HISTORY AND PHYSICAL EXAMINATION     Chief Complaint   Patient presents with     Hospital F/U     Fatigue     Pelvic Pain         HISTORY OF PRESENT ILLNESS:  86 year old female, (7/17/1932), admitted to the [facility] for continuation of medical care and rehab.  HPI information obtained from: facility chart records, facility staff, patient report and [facility] chart review.    Marley Stewrat is a 86 year old female with history of Atrial fibrillation/flutter s/p cardioversion (2017), Chronic anticoagulation use, Hypertension, Diastolic congestive heart failure, Rheumatoid arthritis, Hypothyroidism and Hx CVA who was admitted at [facility] from 1/26 to 1/28/19 due to mechanical; fall and sustained pelvic bone injury. CT pelvis with nondisplaced fractures of the central and medial aspects of the left superior pubic ramus. Orthopedic surgery recommended PT/OT and weight bearing as tolerated. She has pain at pelvic pain with movement. She feels tired but no weak. Slept poor due to frequent incontinent urine x2 last night, appetite okay and had BM yesterday. Patient denies chest pain, dyspnea, abdominal pain, n&v, fever, chills, dysuria, leg pain or swelling. The rest of ROS is unremarkable. Patient is seen and examined by DEYSI Lester NP. Please see DEYSI Lester's admit noted dated 1/29/196 for details of admission, past medical history, family history, allergies, medication list, social history and other details pertinent with this admission. Hospital admission and dc summary reviewed.      Past Medical History:   Diagnosis Date     A-fib -- Chronic      Aortic regurgitation     1-2+ per 4/2015 Echo     Arthritis      CHF (congestive heart failure) (H)     EF 65-70% on 4/2015 Echo     CVA (cerebral vascular accident) (H)      Glaucoma      Hypertension      Mitral  regurgitation     2+ per 4/2015 Echo     PUD (peptic ulcer disease)      Pulmonic valve regurgitation     1+ per 4/2015 Echo     RA (rheumatoid arthritis) (H)      Spinal stenosis      Thyroid disease      Tricuspid regurgitation     2+ per 4/2015 Echo       Past Surgical History:   Procedure Laterality Date     ORTHOPEDIC SURGERY         Current Outpatient Medications   Medication Sig     acetaminophen (TYLENOL) 500 MG tablet Take 2 tablets (1,000 mg) by mouth every 8 hours     apixaban ANTICOAGULANT (ELIQUIS) 5 MG tablet Take 1 tablet (5 mg) by mouth 2 times daily     calcium carb 1250 mg, 500 mg [place],/vitamin D 200 units (OSCAL WITH D) 500-200 MG-UNIT per tablet Take 1 tablet by mouth 2 times daily (with meals)     carboxymethylcellulose (REFRESH PLUS) 0.5 % SOLN Place 1 drop into both eyes 2 times daily      diltiazem ER (DILT-XR) 180 MG 24 hr capsule Take 180 mg by mouth daily     FOLIC ACID PO Take 1 mg by mouth daily     furosemide (LASIX) 20 MG tablet Take 1 tablet (20 mg) by mouth every morning     hydrocortisone (CORTAID) 1 % external cream Apply topically 3 times daily     latanoprost (XALATAN) 0.005 % ophthalmic solution Place 1 drop into the right eye At Bedtime     levothyroxine (SYNTHROID/LEVOTHROID) 50 MCG tablet Take 50 mcg by mouth daily     LOSARTAN POTASSIUM PO Take 50 mg by mouth daily      methotrexate 2.5 MG tablet Take 15 mg by mouth every 7 days Wednesday     metoprolol succinate ER (TOPROL-XL) 50 MG 24 hr tablet Take 50 mg by mouth daily     polyethylene glycol (MIRALAX/GLYCOLAX) packet Take 17 g by mouth daily as needed for constipation     sennosides (SENOKOT) 8.6 MG tablet Take 2 tablets by mouth 2 times daily as needed for constipation     timolol (TIMOPTIC) 0.5 % ophthalmic solution Place 1 drop into the right eye 2 times daily     No current facility-administered medications for this visit.        Allergies   Allergen Reactions     Amitriptyline      Clonazepam Other (See Comments)     " Somnolence at 0.5 mg dose     Latex Rash       Social History     Socioeconomic History     Marital status:      Spouse name: Not on file     Number of children: 3     Years of education: Not on file     Highest education level: Not on file   Social Needs     Financial resource strain: Not on file     Food insecurity - worry: Not on file     Food insecurity - inability: Not on file     Transportation needs - medical: Not on file     Transportation needs - non-medical: Not on file   Occupational History     Occupation: Retired    Tobacco Use     Smoking status: Never Smoker     Smokeless tobacco: Never Used   Substance and Sexual Activity     Alcohol use: Yes     Comment: < 1 drink a month     Drug use: No     Sexual activity: Not on file   Other Topics Concern     Parent/sibling w/ CABG, MI or angioplasty before 65F 55M? Not Asked      Service Not Asked     Blood Transfusions Not Asked     Caffeine Concern Not Asked     Occupational Exposure Not Asked     Hobby Hazards Not Asked     Sleep Concern Not Asked     Stress Concern Not Asked     Weight Concern Not Asked     Special Diet Not Asked     Back Care Not Asked     Exercise Yes     Comment: stretches, golf      Bike Helmet Not Asked     Seat Belt Not Asked     Self-Exams Not Asked   Social History Narrative    , 3 children, she lives in a \"double home\" with her  who is slightly forgetful.  She desires DNR/DNI status.  Daughter-in-law, Ashley, is a nurse and checks on them. (last updated 1/4/2019)           Information reviewed:  Medications, vital signs, orders, nursing notes, problem list, hospital information.     ROS: All 10 point review of system completed, those pertinent positive, please see H&P, the remaining ROS is negative.    /87   Pulse 82   Temp 97.3  F (36.3  C)   Resp 16   Ht 1.6 m (5' 3\")   Wt 67.9 kg (149 lb 9.6 oz)   SpO2 98%   BMI 26.50 kg/m      PHYSICAL EXAMINATION:   GENERAL:  No acute " distress.  SKIN:  Dry and warm.  There is no rash at area of skin examined.  HEENT:  Head without trauma.  Pupils round, reactive. Exam of conjunctiva and lids are normal. Sclera without icterus. There is no oral thrush.  NECK:  Supple. No jugular venous distension.  CHEST: No reproducible chest tenderness.   LUNGS:  Normal respiratory effort. Lungs reveal decreased breath sounds at bases. No rales or wheezes.  HEART:  Irregular rate and rhythm.  No murmur, gallops or rubs auscultated.  ABDOMEN:  Soft, bowel sounds positive.  There is no tenderness or guarding.   EXTREMITIES: No edema. Pelvic bone pain at left  NEUROLOGIC:  Alert and oriented x3.  Moving all ext. Gait not tested.  PSYCH: Recent and remote memory is mildly impaired. Mood and affect are normal.    Lab/Diagnostic data:  Reviewed    CBC RESULTS:   Recent Labs   Lab Test 01/31/19  0815   WBC 8.4   RBC 4.40   HGB 14.7   HCT 42.4   MCV 96   MCH 33.4*   MCHC 34.7   RDW 14.3          Recent Labs   Lab Test 01/31/19  0815 01/27/19  0620    141   POTASSIUM 3.6 3.4   CHLORIDE 107 105   CO2 26 29   ANIONGAP 8 7   GLC 87 83   BUN 18 17   CR 0.90 0.92   LONNIE 9.3 8.8       Liver Function Studies -   Recent Labs   Lab Test 01/04/19  1330   PROTTOTAL 7.9   ALBUMIN 4.0   BILITOTAL 0.7   ALKPHOS 81   AST 39   ALT 47         Results for orders placed or performed during the hospital encounter of 01/26/19   XR Pelvis and Hip Left 2 Views    Narrative    PELVIS AND LEFT HIP TWO VIEWS January 26, 2019 1:53 PM    HISTORY: Pain after falling.    COMPARISON: A CT of the abdomen and pelvis on 12/26/2018.    FINDINGS: There is cortical irregularity of the left superior pubic  ramus suspicious for a nondisplaced fracture. This was not present on  a CT of the abdomen and pelvis on 12/26/2018 and is therefore a recent  fracture. No other definite fracture is seen. There are mild to  moderate degenerative changes in both hips. No other abnormality is  seen.    POOL  MD REMY   CT Pelvis Bone wo Contrast    Narrative    CT PELVIS AND LEFT HIP WITHOUT CONTRAST  January 26, 2019 2:53 PM    HISTORY: Left hip pain after falling.    COMPARISON: Radiographs earlier today and a CT on 12/26/2018.    TECHNIQUE: CT imaging is performed through the pelvis and both hips  without contrast. Multiplanar reformatting was performed with  attention given to the left hip. Radiation dose for this scan was  reduced using automated exposure control, adjustment of the mA and/or  kV according to patient size, or iterative reconstruction technique.     FINDINGS: There are nondisplaced fractures of the central and medial  aspects of the left superior pubic ramus. No other fracture or osseous  lesion is demonstrated. There are moderate degenerative changes of the  hips and degenerative changes of the visualized lower lumbar spine.  There is calcification in the vascular structures. There are  diverticula of the sigmoid colon with no evidence of diverticulitis.  No other soft tissue abnormality is seen.      Impression    IMPRESSION: Nondisplaced fractures of the left superior pubic ramus.     POOL ALBERTO MD       ASSESSMENT / PLAN:     Physical deconditioning  Plan: PT/OT with increase independence, self-care, strength and endurance and other cares that help return to home/facility of origin, fall precautions. Care conference with patient and family for the progress of rehab and disposition issues will be discussed as planned. Rehab evaluation and other evaluations including CPT are at rehab logs, to be reviewed separately.  Fall risk assessment as well as cognitive evaluation will be formed during rehab stay if indicated.    Pubic ramus fracture, left, with routine healing, subsequent encounter  Plan: PT/OT, pain medication, DVT prophylaxis with Elquis. Follow up orthopedic clinic as scheduled.     Chronic diastolic congestive heart failure (H)  Plan: continue current medications lasix and  metoprolol, monitor I&O and daily weighs and labs if indicated. Follow up cardiologist and PCP as scheduled.    Paroxysmal atrial fibrillation (H)  Plan: continue metoprolol and Elquis.    History of CVA (cerebrovascular accident)  Plan: continue metoprolol and Elquis.    Other problems with same care. Primary care doctor and other specialists to address those chronic problems in next clinic appointment to be scheduled upon discharge from the TCU.      Total time spent with patient visit was 34 min including patient visit, review of past records, patients counseling and coordinating care.      Electronically signed by:  Olvin Avila MD

## 2019-02-02 ENCOUNTER — DOCUMENTATION ONLY (OUTPATIENT)
Dept: OTHER | Facility: CLINIC | Age: 84
End: 2019-02-02

## 2019-02-07 VITALS
HEART RATE: 57 BPM | BODY MASS INDEX: 25.69 KG/M2 | RESPIRATION RATE: 16 BRPM | WEIGHT: 145 LBS | HEIGHT: 63 IN | SYSTOLIC BLOOD PRESSURE: 134 MMHG | DIASTOLIC BLOOD PRESSURE: 65 MMHG | OXYGEN SATURATION: 99 % | TEMPERATURE: 98 F

## 2019-02-07 RX ORDER — TRAMADOL HYDROCHLORIDE 50 MG/1
50 TABLET ORAL EVERY 6 HOURS PRN
COMMUNITY
End: 2019-08-05

## 2019-02-07 RX ORDER — ONDANSETRON 4 MG/1
4 TABLET, ORALLY DISINTEGRATING ORAL EVERY 6 HOURS PRN
COMMUNITY
End: 2019-08-05

## 2019-02-07 ASSESSMENT — MIFFLIN-ST. JEOR: SCORE: 1066.85

## 2019-02-07 NOTE — PROGRESS NOTES
[facility]    Chief Complaint   Patient presents with     GETACHEW       [facility] Medical Record Number:  [id number]  Place of Service where encounter took place:  [facility] [[id number]]    HPI:    Marley Stewart is a 86 year old  (7/17/1932), who is being seen today for an episodic care visit.  HPI information obtained from: facility chart records, facility staff, patient report and [facility] chart review.  She has a medical history significant for afib, s/p cardioversion, HTN, diastolic CHF, RA, history of CVA 2015 and came to this facility 1/28/2019 for short term rehab and medical management following hospitalization after a mechanical fall. CT showed a left pubic ramus fracture. Ortho consulted and recommended WBAT.     Today's concern is:     Pubic ramus fracture, left, with routine healing, subsequent encounter  Chronic diastolic congestive heart failure (H)-reports pain is controlled. Hasn't been using tramadol.   Chronic atrial fibrillation (H)-HR: 51-95, there are 2 readings in the 40s. Denies feeling dizzy or lightheaded.   Rheumatoid arthritis, involving unspecified site, unspecified rheumatoid factor presence (H)-denies flare of symptoms   Essential hypertension-BPs:117/64, 134/65, 136/73  History of CVA (cerebrovascular accident)  Mild cognitive impairment-vague historian. SLUMS 24/30, CPT 5.1/5.6   Lives at home with her   Slow transit constipation-feels constipated today. Had BM yesterday. She had GI symptoms last week with nausea, vomiting and diarrhea. Multiple patients and staff also had symptoms and there was confirmed Norovirus at the facility.   Physical deconditioning-ambulating 25 ft with walker and contact guard assist.  Requires assist of 1 with transfers and cares.     Wt Readings from Last 5 Encounters:   02/07/19 65.8 kg (145 lb)   01/31/19 67.9 kg (149 lb 9.6 oz)   01/29/19 68 kg (150 lb)   01/28/19 66 kg (145 lb 6.4 oz)   01/04/19 68 kg  (150 lb)       ALLERGIES: Amitriptyline; Clonazepam; and Latex  Past Medical, Surgical, Family and Social History reviewed and updated in [facility].    Current Outpatient Medications   Medication Sig Dispense Refill     acetaminophen (TYLENOL) 500 MG tablet Take 2 tablets (1,000 mg) by mouth every 8 hours 60 tablet 0     apixaban ANTICOAGULANT (ELIQUIS) 5 MG tablet Take 1 tablet (5 mg) by mouth 2 times daily 60 tablet 0     calcium carb 1250 mg, 500 mg [place],/vitamin D 200 units (OSCAL WITH D) 500-200 MG-UNIT per tablet Take 1 tablet by mouth 2 times daily (with meals)       carboxymethylcellulose (REFRESH PLUS) 0.5 % SOLN Place 1 drop into both eyes 2 times daily        diltiazem ER (DILT-XR) 180 MG 24 hr capsule Take 180 mg by mouth daily       FOLIC ACID PO Take 1 mg by mouth daily       furosemide (LASIX) 20 MG tablet Take 1 tablet (20 mg) by mouth every morning 30 tablet 0     hydrocortisone (CORTAID) 1 % external cream Apply topically 3 times daily       latanoprost (XALATAN) 0.005 % ophthalmic solution Place 1 drop into the right eye At Bedtime       levothyroxine (SYNTHROID/LEVOTHROID) 50 MCG tablet Take 50 mcg by mouth daily       LOSARTAN POTASSIUM PO Take 50 mg by mouth daily        methotrexate 2.5 MG tablet Take 15 mg by mouth every 7 days Wednesday       metoprolol succinate ER (TOPROL-XL) 50 MG 24 hr tablet Take 50 mg by mouth daily       ondansetron (ZOFRAN-ODT) 4 MG ODT tab Take 4 mg by mouth every 6 hours as needed for nausea       polyethylene glycol (MIRALAX/GLYCOLAX) packet Take 17 g by mouth daily as needed for constipation       sennosides (SENOKOT) 8.6 MG tablet Take 2 tablets by mouth 2 times daily as needed for constipation       timolol (TIMOPTIC) 0.5 % ophthalmic solution Place 1 drop into the right eye 2 times daily       traMADol (ULTRAM) 50 MG tablet Take 50 mg by mouth every 6 hours as needed for severe pain       REVIEW OF SYSTEMS:  10 point ROS of systems including Constitutional, Eyes,  "Respiratory, Cardiovascular, Gastroenterology, Genitourinary, Integumentary, Musculoskeletal, Psychiatric were all negative except for pertinent positives noted in my HPI.    Physical Exam:  /65   Pulse 57   Temp 98  F (36.7  C)   Resp 16   Ht 1.6 m (5' 3\")   Wt 65.8 kg (145 lb)   SpO2 99%   BMI 25.69 kg/m    GENERAL APPEARANCE:  Alert, in no distress  ENT:  Mouth and posterior oropharynx normal, moist mucous membranes, [place]  EYES:  EOM normal, conjunctiva and lids normal, PERRL  NECK:  No adenopathy,masses or thyromegaly  RESP:  respiratory effort and palpation of chest normal, lungs clear to auscultation , no respiratory distress  CV:  Palpation and auscultation of heart done , regular rate and rhythm, no murmur, rub, or gallop, no edema, +2 pedal pulses  ABDOMEN:  normal bowel sounds, soft, nontender, no hepatosplenomegaly or other masses  M/S:   in bed. CAN with good strength. No joint inflammation  SKIN:  no rashes or open areas  PSYCH:  oriented X 3, memory impaired , affect and mood normal    Recent Labs:   Lab Results   Component Value Date    WBC 8.4 01/31/2019     Lab Results   Component Value Date    RBC 4.40 01/31/2019     Lab Results   Component Value Date    HGB 14.7 01/31/2019     Lab Results   Component Value Date    HCT 42.4 01/31/2019     Lab Results   Component Value Date    MCV 96 01/31/2019     Lab Results   Component Value Date    MCH 33.4 01/31/2019     Lab Results   Component Value Date    MCHC 34.7 01/31/2019     Lab Results   Component Value Date    RDW 14.3 01/31/2019     Lab Results   Component Value Date     01/31/2019     Last Comprehensive Metabolic Panel:  Sodium   Date Value Ref Range Status   01/31/2019 141 133 - 144 mmol/L Final     Potassium   Date Value Ref Range Status   01/31/2019 3.6 3.4 - 5.3 mmol/L Final     Chloride   Date Value Ref Range Status   01/31/2019 107 94 - 109 mmol/L Final     Carbon Dioxide   Date Value Ref Range Status   01/31/2019 26 20 - 32 " mmol/L Final     Anion Gap   Date Value Ref Range Status   01/31/2019 8 3 - 14 mmol/L Final     Glucose   Date Value Ref Range Status   01/31/2019 87 70 - 99 mg/dL Final     Urea Nitrogen   Date Value Ref Range Status   01/31/2019 18 7 - 30 mg/dL Final     Creatinine   Date Value Ref Range Status   01/31/2019 0.90 0.52 - 1.04 mg/dL Final     GFR Estimate   Date Value Ref Range Status   01/31/2019 58 (L) >60 mL/min/[1.73_m2] Final     Comment:     Non  GFR Calc  Starting 12/18/2018, serum creatinine based estimated GFR (eGFR) will be   calculated using the Chronic Kidney Disease Epidemiology Collaboration   (CKD-EPI) equation.       Calcium   Date Value Ref Range Status   01/31/2019 9.3 8.5 - 10.1 mg/dL Final     ASSESSMENT / PLAN:  (S32.592D) Pubic ramus fracture, left, with routine healing, subsequent encounter  (primary encounter diagnosis)  Comment: pain and mobility improving.   Plan: continue tylenol, prn tramadol. Continue calcium/vitamin D. Ortho follow up prn.     (I50.32) Chronic diastolic congestive heart failure (H)  Comment: compensated  Plan: continue lasix, metoprolol, diltiazem, losartan. Daily weight. BMP. Cardiology follow up per usual routine.     (I48.2) Chronic atrial fibrillation (H)  Comment: rate controlled with 2 outliers of HR in the 40s.   Plan: continue Eliquis. Continue metoprolol and diltiazem, with parameters. Monitor VS    (M06.9) Rheumatoid arthritis, involving unspecified site, unspecified rheumatoid factor presence (H)  Comment: managed  Plan: continue methotrexate and folic acid. Prn pain meds.     (I10) Essential hypertension  Comment: controlled  Plan: continue metoprolol, losartan, lasix, diltiazem. Monitor VS. Follow BMP    (Z86.73) History of CVA (cerebrovascular accident)  Comment:no acute issues  Plan: anticoagulation as above.     (G31.84) Mild cognitive impairment  Comment: cognition appears to be at baseline  Plan: supportive care    (K59.01) Slow transit  constipation  Comment: managed  Plan: continue bowel regimen. Offer prn med today if no BM    (R53.81) Physical deconditioning  Comment: progressing in therapies  Plan: continue PHYSICAL THERAPY/OT. Goal is to return home with services. Care conference is scheduled for later today        Electronically signed by  HUEY Mayo CNP

## 2019-02-08 ENCOUNTER — NURSING HOME VISIT (OUTPATIENT)
Dept: GERIATRICS | Facility: CLINIC | Age: 84
End: 2019-02-08
Payer: COMMERCIAL

## 2019-02-08 DIAGNOSIS — I50.32 CHRONIC DIASTOLIC CONGESTIVE HEART FAILURE (H): ICD-10-CM

## 2019-02-08 DIAGNOSIS — S32.592D PUBIC RAMUS FRACTURE, LEFT, WITH ROUTINE HEALING, SUBSEQUENT ENCOUNTER: Primary | ICD-10-CM

## 2019-02-08 DIAGNOSIS — M06.9 RHEUMATOID ARTHRITIS, INVOLVING UNSPECIFIED SITE, UNSPECIFIED RHEUMATOID FACTOR PRESENCE: ICD-10-CM

## 2019-02-08 DIAGNOSIS — R53.81 PHYSICAL DECONDITIONING: ICD-10-CM

## 2019-02-08 DIAGNOSIS — K59.01 SLOW TRANSIT CONSTIPATION: ICD-10-CM

## 2019-02-08 DIAGNOSIS — G31.84 MILD COGNITIVE IMPAIRMENT: ICD-10-CM

## 2019-02-08 DIAGNOSIS — I48.20 CHRONIC ATRIAL FIBRILLATION (H): ICD-10-CM

## 2019-02-08 DIAGNOSIS — I10 ESSENTIAL HYPERTENSION: ICD-10-CM

## 2019-02-08 DIAGNOSIS — Z86.73 HISTORY OF CVA (CEREBROVASCULAR ACCIDENT): ICD-10-CM

## 2019-02-08 PROCEDURE — 99310 SBSQ NF CARE HIGH MDM 45: CPT | Performed by: NURSE PRACTITIONER

## 2019-02-08 NOTE — LETTER
2/8/2019        RE: Marley Stewart  [address]  [place] [address]              [facility]    Chief Complaint   Patient presents with     GETACHEW       [facility] Medical Record Number:  [id number]  [facility] where encounter took place[facility] [[id number]]    HPI:    Marley Stewart is a 86 year old  (7/17/1932), who is being seen today for an episodic care visit.  HPI information obtained from: facility chart records, facility staff, patient report and [facility] chart review.  She has a medical history significant for afib, s/p cardioversion, HTN, diastolic CHF, RA, history of CVA 2015 and came to this facility 1/28/2019 for short term rehab and medical management following hospitalization after a mechanical fall. CT showed a left pubic ramus fracture. Ortho consulted and recommended WBAT.     Today's concern is:     Pubic ramus fracture, left, with routine healing, subsequent encounter  Chronic diastolic congestive heart failure (H)-reports pain is controlled. Hasn't been using tramadol.   Chronic atrial fibrillation (H)-HR: 51-95, there are 2 readings in the 40s. Denies feeling dizzy or lightheaded.   Rheumatoid arthritis, involving unspecified site, unspecified rheumatoid factor presence (H)-denies flare of symptoms   Essential hypertension-BPs:117/64, 134/65, 136/73  History of CVA (cerebrovascular accident)  Mild cognitive impairment-vague historian. SLUMS 24/30, CPT 5.1/5.6   Lives at home with her   Slow transit constipation-feels constipated today. Had BM yesterday. She had GI symptoms last week with nausea, vomiting and diarrhea. Multiple patients and staff also had symptoms and there was confirmed Norovirus at the facility.   Physical deconditioning-ambulating 25 ft with walker and contact guard assist.  Requires assist of 1 with transfers and cares.     Wt Readings from Last 5 Encounters:   02/07/19 65.8 kg (145 lb)   01/31/19 67.9 kg  (149 lb 9.6 oz)   01/29/19 68 kg (150 lb)   01/28/19 66 kg (145 lb 6.4 oz)   01/04/19 68 kg (150 lb)       ALLERGIES: Amitriptyline; Clonazepam; and Latex  Past Medical, Surgical, Family and Social History reviewed and updated in [facility].    Current Outpatient Medications   Medication Sig Dispense Refill     acetaminophen (TYLENOL) 500 MG tablet Take 2 tablets (1,000 mg) by mouth every 8 hours 60 tablet 0     apixaban ANTICOAGULANT (ELIQUIS) 5 MG tablet Take 1 tablet (5 mg) by mouth 2 times daily 60 tablet 0     calcium carb 1250 mg, 500 mg [place],/vitamin D 200 units (OSCAL WITH D) 500-200 MG-UNIT per tablet Take 1 tablet by mouth 2 times daily (with meals)       carboxymethylcellulose (REFRESH PLUS) 0.5 % SOLN Place 1 drop into both eyes 2 times daily        diltiazem ER (DILT-XR) 180 MG 24 hr capsule Take 180 mg by mouth daily       FOLIC ACID PO Take 1 mg by mouth daily       furosemide (LASIX) 20 MG tablet Take 1 tablet (20 mg) by mouth every morning 30 tablet 0     hydrocortisone (CORTAID) 1 % external cream Apply topically 3 times daily       latanoprost (XALATAN) 0.005 % ophthalmic solution Place 1 drop into the right eye At Bedtime       levothyroxine (SYNTHROID/LEVOTHROID) 50 MCG tablet Take 50 mcg by mouth daily       LOSARTAN POTASSIUM PO Take 50 mg by mouth daily        methotrexate 2.5 MG tablet Take 15 mg by mouth every 7 days Wednesday       metoprolol succinate ER (TOPROL-XL) 50 MG 24 hr tablet Take 50 mg by mouth daily       ondansetron (ZOFRAN-ODT) 4 MG ODT tab Take 4 mg by mouth every 6 hours as needed for nausea       polyethylene glycol (MIRALAX/GLYCOLAX) packet Take 17 g by mouth daily as needed for constipation       sennosides (SENOKOT) 8.6 MG tablet Take 2 tablets by mouth 2 times daily as needed for constipation       timolol (TIMOPTIC) 0.5 % ophthalmic solution Place 1 drop into the right eye 2 times daily       traMADol (ULTRAM) 50 MG tablet Take 50 mg by mouth every 6 hours as needed for  "severe pain       REVIEW OF SYSTEMS:  10 point ROS of systems including Constitutional, Eyes, Respiratory, Cardiovascular, Gastroenterology, Genitourinary, Integumentary, Musculoskeletal, Psychiatric were all negative except for pertinent positives noted in my HPI.    Physical Exam:  /65   Pulse 57   Temp 98  F (36.7  C)   Resp 16   Ht 1.6 m (5' 3\")   Wt 65.8 kg (145 lb)   SpO2 99%   BMI 25.69 kg/m     GENERAL APPEARANCE:  Alert, in no distress  ENT:  Mouth and posterior oropharynx normal, moist mucous membranes, [place]  EYES:  EOM normal, conjunctiva and lids normal, PERRL  NECK:  No adenopathy,masses or thyromegaly  RESP:  respiratory effort and palpation of chest normal, lungs clear to auscultation , no respiratory distress  CV:  Palpation and auscultation of heart done , regular rate and rhythm, no murmur, rub, or gallop, no edema, +2 pedal pulses  ABDOMEN:  normal bowel sounds, soft, nontender, no hepatosplenomegaly or other masses  M/S:   in bed. CAN with good strength. No joint inflammation  SKIN:  no rashes or open areas  PSYCH:  oriented X 3, memory impaired , affect and mood normal    Recent Labs:   Lab Results   Component Value Date    WBC 8.4 01/31/2019     Lab Results   Component Value Date    RBC 4.40 01/31/2019     Lab Results   Component Value Date    HGB 14.7 01/31/2019     Lab Results   Component Value Date    HCT 42.4 01/31/2019     Lab Results   Component Value Date    MCV 96 01/31/2019     Lab Results   Component Value Date    MCH 33.4 01/31/2019     Lab Results   Component Value Date    MCHC 34.7 01/31/2019     Lab Results   Component Value Date    RDW 14.3 01/31/2019     Lab Results   Component Value Date     01/31/2019     Last Comprehensive Metabolic Panel:  Sodium   Date Value Ref Range Status   01/31/2019 141 133 - 144 mmol/L Final     Potassium   Date Value Ref Range Status   01/31/2019 3.6 3.4 - 5.3 mmol/L Final     Chloride   Date Value Ref Range Status   01/31/2019 107 " 94 - 109 mmol/L Final     Carbon Dioxide   Date Value Ref Range Status   01/31/2019 26 20 - 32 mmol/L Final     Anion Gap   Date Value Ref Range Status   01/31/2019 8 3 - 14 mmol/L Final     Glucose   Date Value Ref Range Status   01/31/2019 87 70 - 99 mg/dL Final     Urea Nitrogen   Date Value Ref Range Status   01/31/2019 18 7 - 30 mg/dL Final     Creatinine   Date Value Ref Range Status   01/31/2019 0.90 0.52 - 1.04 mg/dL Final     GFR Estimate   Date Value Ref Range Status   01/31/2019 58 (L) >60 mL/min/[1.73_m2] Final     Comment:     Non  GFR Calc  Starting 12/18/2018, serum creatinine based estimated GFR (eGFR) will be   calculated using the Chronic Kidney Disease Epidemiology Collaboration   (CKD-EPI) equation.       Calcium   Date Value Ref Range Status   01/31/2019 9.3 8.5 - 10.1 mg/dL Final     ASSESSMENT / PLAN:  (S32.592D) Pubic ramus fracture, left, with routine healing, subsequent encounter  (primary encounter diagnosis)  Comment: pain and mobility improving.   Plan: continue tylenol, prn tramadol. Continue calcium/vitamin D. Ortho follow up prn.     (I50.32) Chronic diastolic congestive heart failure (H)  Comment: compensated  Plan: continue lasix, metoprolol, diltiazem, losartan. Daily weight. BMP. Cardiology follow up per usual routine.     (I48.2) Chronic atrial fibrillation (H)  Comment: rate controlled with 2 outliers of HR in the 40s.   Plan: continue Eliquis. Continue metoprolol and diltiazem, with parameters. Monitor VS    (M06.9) Rheumatoid arthritis, involving unspecified site, unspecified rheumatoid factor presence (H)  Comment: managed  Plan: continue methotrexate and folic acid. Prn pain meds.     (I10) Essential hypertension  Comment: controlled  Plan: continue metoprolol, losartan, lasix, diltiazem. Monitor VS. Follow BMP    (Z86.73) History of CVA (cerebrovascular accident)  Comment:no acute issues  Plan: anticoagulation as above.     (G31.84) Mild cognitive  impairment  Comment: cognition appears to be at baseline  Plan: supportive care    (K59.01) Slow transit constipation  Comment: managed  Plan: continue bowel regimen. Offer prn med today if no BM    (R53.81) Physical deconditioning  Comment: progressing in therapies  Plan: continue PHYSICAL THERAPY/OT. Goal is to return home with services. Care conference is scheduled for later today        Electronically signed by  HUEY Mayo CNP, Jamison Robin Barber, APRN CNP

## 2019-02-11 ENCOUNTER — HOSPITAL LABORATORY (OUTPATIENT)
Dept: OTHER | Facility: CLINIC | Age: 84
End: 2019-02-11

## 2019-02-11 LAB
ANION GAP SERPL CALCULATED.3IONS-SCNC: 2 MMOL/L (ref 3–14)
BUN SERPL-MCNC: 17 MG/DL (ref 7–30)
CALCIUM SERPL-MCNC: 9.3 MG/DL (ref 8.5–10.1)
CHLORIDE SERPL-SCNC: 108 MMOL/L (ref 94–109)
CO2 SERPL-SCNC: 31 MMOL/L (ref 20–32)
CREAT SERPL-MCNC: 0.91 MG/DL (ref 0.52–1.04)
GFR SERPL CREATININE-BSD FRML MDRD: 57 ML/MIN/{1.73_M2}
GLUCOSE SERPL-MCNC: 103 MG/DL (ref 70–99)
POTASSIUM SERPL-SCNC: 4.1 MMOL/L (ref 3.4–5.3)
SODIUM SERPL-SCNC: 141 MMOL/L (ref 133–144)

## 2019-02-13 ENCOUNTER — NURSING HOME VISIT (OUTPATIENT)
Dept: GERIATRICS | Facility: CLINIC | Age: 84
End: 2019-02-13
Payer: COMMERCIAL

## 2019-02-13 VITALS
OXYGEN SATURATION: 95 % | RESPIRATION RATE: 20 BRPM | SYSTOLIC BLOOD PRESSURE: 135 MMHG | BODY MASS INDEX: 26.25 KG/M2 | DIASTOLIC BLOOD PRESSURE: 77 MMHG | HEART RATE: 65 BPM | TEMPERATURE: 98.5 F | WEIGHT: 148.2 LBS

## 2019-02-13 DIAGNOSIS — R53.81 PHYSICAL DECONDITIONING: ICD-10-CM

## 2019-02-13 DIAGNOSIS — K59.01 SLOW TRANSIT CONSTIPATION: ICD-10-CM

## 2019-02-13 DIAGNOSIS — S32.592D PUBIC RAMUS FRACTURE, LEFT, WITH ROUTINE HEALING, SUBSEQUENT ENCOUNTER: Primary | ICD-10-CM

## 2019-02-13 DIAGNOSIS — M06.9 RHEUMATOID ARTHRITIS, INVOLVING UNSPECIFIED SITE, UNSPECIFIED RHEUMATOID FACTOR PRESENCE: ICD-10-CM

## 2019-02-13 DIAGNOSIS — I10 ESSENTIAL HYPERTENSION: ICD-10-CM

## 2019-02-13 DIAGNOSIS — G31.84 MILD COGNITIVE IMPAIRMENT: ICD-10-CM

## 2019-02-13 DIAGNOSIS — Z86.73 HISTORY OF CVA (CEREBROVASCULAR ACCIDENT): ICD-10-CM

## 2019-02-13 DIAGNOSIS — I48.20 CHRONIC ATRIAL FIBRILLATION (H): ICD-10-CM

## 2019-02-13 DIAGNOSIS — I50.32 CHRONIC DIASTOLIC CONGESTIVE HEART FAILURE (H): ICD-10-CM

## 2019-02-13 PROCEDURE — 99316 NF DSCHRG MGMT 30 MIN+: CPT | Performed by: NURSE PRACTITIONER

## 2019-02-13 NOTE — PROGRESS NOTES
[place] [facility] DISCHARGE SUMMARY    PATIENT'S NAME: Marley Stewart  YOB: 1932  MEDICAL RECORD NUMBER:  [id number]  [facility] [[id number]]    PRIMARY CARE PROVIDER AND CLINIC RESPONSIBLE AFTER TRANSFER: Chandra Ortiz [facility] [address] / [facility] [address]     TRANSFERRING PROVIDERS: HUEY Mayo CNP, Olvin Avila MD  DATE OF SNF ADMISSION:  January / 28 / 2019  DATE OF SNF (anticipated) DISCHARGE: February / 14 / 2019  DISCHARGE DISPOSITION: Non-FMG Provider   RECENT HOSPITALIZATION/ED:  [facility] Hospital stay 1/26/19 to 1/28/19.     CODE STATUS/ADVANCE DIRECTIVES DISCUSSION:   DNR / DNI     Allergies   Allergen Reactions     Amitriptyline      Clonazepam Other (See Comments)     Somnolence at 0.5 mg dose     Latex Rash     Condition on Discharge:  Improving.  Cognitive Scores: SLUMS 24/30 and CPT 5.1/5.6    Equipment: walker    DISCHARGE DIAGNOSIS:   1. Pubic ramus fracture, left, with routine healing, subsequent encounter    2. Chronic diastolic congestive heart failure (H)    3. Chronic atrial fibrillation (H)    4. Rheumatoid arthritis, involving unspecified site, unspecified rheumatoid factor presence (H)    5. Essential hypertension    6. History of CVA (cerebrovascular accident)    7. Slow transit constipation    8. Mild cognitive impairment    9. Physical deconditioning        [facility] Course:   HPI information obtained from: facility chart records, facility staff, patient report and [facility] chart review.  She has a medical history significant for afib, s/p cardioversion, HTN, diastolic CHF, RA, history of CVA 2015 and came to this facility for short term rehab and medical management following hospitalization after a mechanical fall. CT showed a left pubic ramus fracture. Ortho consulted and recommended WBAT.      She has worked with  PHYSICAL THERAPY and OT with good progress. Ambulating >1000 ft with walker and supervision. Able to do 20 stairs with hand rail and supervision. TUG 26 seconds, indicating moderate fall risk. Requires supervision to stand by assist with cares.   ASSESSMENT / PLAN:  (S31.706O) Pubic ramus fracture, left, with routine healing, subsequent encounter  (primary encounter diagnosis)  Comment: pain and mobility improved. Rarely using tramadol. She is feeling well and ready to return home.  will assist with cares.   Plan: discharge home 2/14/2019. Continue tylenol, prn tramadol. Continue calcium/vitamin D. Home services and follow up as below.     (I50.32) Chronic diastolic congestive heart failure (H)  Comment: compensated. Renal function at baseline  Plan: continue lasix, metoprolol, diltiazem, losartan. Daily weight. Follow up labs per PCP.  Cardiology follow up per usual routine.     (I48.2) Chronic atrial fibrillation (H)  Comment: rate controlled: 54-72  Plan: continue Eliquis. Continue metoprolol and diltiazem, with parameters.     (M06.9) Rheumatoid arthritis, involving unspecified site, unspecified rheumatoid factor presence (H)  Comment: no s/s of acute flare  Plan: continue methotrexate and folic acid. Prn pain meds.     (I10) Essential hypertension  Comment: controlled with BPs: 139/63, 155/80, 139/56     Plan: continue metoprolol, losartan, lasix, diltiazem.    (Z86.73) History of CVA (cerebrovascular accident)  Comment:no acute issues  Plan: anticoagulation as above.     (K59.01) Slow transit constipation  Comment: improved, but feels constipated today  Plan: schedule senna 2 at .     (G31.84) Mild cognitive impairment  Comment: mild deficits on cognitive testing  Plan: supportive care    (R53.81) Physical deconditioning  Comment: progress in therapies as above  Plan: home therapies for ongoing gait training, strengthening and ADL safety        PAST MEDICAL HISTORY:  has a past medical history of  A-fib -- Chronic, Aortic regurgitation, Arthritis, CHF (congestive heart failure) (H), CVA (cerebral vascular accident) (H), Glaucoma, Hypertension, Mitral regurgitation, PUD (peptic ulcer disease), Pulmonic valve regurgitation, RA (rheumatoid arthritis) (H), Spinal stenosis, Thyroid disease, and Tricuspid regurgitation.    DISCHARGE MEDICATIONS:  Current Outpatient Medications   Medication Sig Dispense Refill     acetaminophen (TYLENOL) 500 MG tablet Take 2 tablets (1,000 mg) by mouth every 8 hours 60 tablet 0     apixaban ANTICOAGULANT (ELIQUIS) 5 MG tablet Take 1 tablet (5 mg) by mouth 2 times daily 60 tablet 0     calcium carb 1250 mg, 500 mg [place],/vitamin D 200 units (OSCAL WITH D) 500-200 MG-UNIT per tablet Take 1 tablet by mouth 2 times daily (with meals)       carboxymethylcellulose (REFRESH PLUS) 0.5 % SOLN Place 1 drop into both eyes 2 times daily        diltiazem ER (DILT-XR) 180 MG 24 hr capsule Take 180 mg by mouth daily       FOLIC ACID PO Take 1 mg by mouth daily       furosemide (LASIX) 20 MG tablet Take 1 tablet (20 mg) by mouth every morning 30 tablet 0     hydrocortisone (CORTAID) 1 % external cream Apply topically 3 times daily       latanoprost (XALATAN) 0.005 % ophthalmic solution Place 1 drop into the right eye At Bedtime       levothyroxine (SYNTHROID/LEVOTHROID) 50 MCG tablet Take 50 mcg by mouth daily       LOSARTAN POTASSIUM PO Take 50 mg by mouth daily        methotrexate 2.5 MG tablet Take 15 mg by mouth every 7 days Wednesday       metoprolol succinate ER (TOPROL-XL) 50 MG 24 hr tablet Take 50 mg by mouth daily       ondansetron (ZOFRAN-ODT) 4 MG ODT tab Take 4 mg by mouth every 6 hours as needed for nausea       polyethylene glycol (MIRALAX/GLYCOLAX) packet Take 17 g by mouth daily as needed for constipation       sennosides (SENOKOT) 8.6 MG tablet Take 2 tablets by mouth At Bedtime        timolol (TIMOPTIC) 0.5 % ophthalmic solution Place 1 drop into the right eye 2 times daily        traMADol (ULTRAM) 50 MG tablet Take 50 mg by mouth every 6 hours as needed for severe pain         MEDICATION CHANGES/RATIONALE:   Bowel regimen  Controlled medications sent with patient:   Medication: tramadol , 30 tabs given to patient at the time of discharge to take home     ROS:    4 point ROS including Respiratory, CV, GI and , other than that noted in the HPI,  is negative    Physical Exam:   Vitals: /77   Pulse 65   Temp 98.5  F (36.9  C)   Resp 20   Wt 67.2 kg (148 lb 3.2 oz)   SpO2 95%   BMI 26.25 kg/m    BMI= Body mass index is 26.25 kg/m .    GENERAL APPEARANCE:  Alert, in no distress  ENT:  Mouth and posterior oropharynx normal, moist mucous membranes, [place]  EYES:  EOM normal, conjunctiva and lids normal  NECK:  No adenopathy,masses or thyromegaly  RESP:  respiratory effort and palpation of chest normal, lungs clear to auscultation , no respiratory distress  CV:  Palpation and auscultation of heart done , regular rate and rhythm, no murmur,  no edema, +2 pedal pulses  ABDOMEN:  soft, nontender, no hepatosplenomegaly or other masses  M/S:   Gait steady,  CAN with good strength. No joint inflammation  SKIN:  no rashes or open areas  PSYCH:  oriented X 3, memory impaired , affect and mood normal    DISCHARGE PLAN:  Occupational Therapy, Physical Therapy, Registered Nurse, Home Health Aide and From:  [facility]  Patient instructed to follow-up with:  PCP in 7 days    Ortho follow up prn    Current [place] scheduled appointments:  No future appointments.    MTM referral needed and placed by this provider: No    Pending labs: None  SNF labs   Last Comprehensive Metabolic Panel:  Sodium   Date Value Ref Range Status   02/11/2019 141 133 - 144 mmol/L Final     Potassium   Date Value Ref Range Status   02/11/2019 4.1 3.4 - 5.3 mmol/L Final     Chloride   Date Value Ref Range Status   02/11/2019 108 94 - 109 mmol/L Final     Carbon Dioxide   Date Value Ref Range Status   02/11/2019 31 20 - 32  mmol/L Final     Anion Gap   Date Value Ref Range Status   02/11/2019 2 (L) 3 - 14 mmol/L Final     Glucose   Date Value Ref Range Status   02/11/2019 103 (H) 70 - 99 mg/dL Final     Urea Nitrogen   Date Value Ref Range Status   02/11/2019 17 7 - 30 mg/dL Final     Creatinine   Date Value Ref Range Status   02/11/2019 0.91 0.52 - 1.04 mg/dL Final     GFR Estimate   Date Value Ref Range Status   02/11/2019 57 (L) >60 mL/min/[1.73_m2] Final     Comment:     Non  GFR Calc  Starting 12/18/2018, serum creatinine based estimated GFR (eGFR) will be   calculated using the Chronic Kidney Disease Epidemiology Collaboration   (CKD-EPI) equation.       Calcium   Date Value Ref Range Status   02/11/2019 9.3 8.5 - 10.1 mg/dL Final     Lab Results   Component Value Date    WBC 8.4 01/31/2019     Lab Results   Component Value Date    RBC 4.40 01/31/2019     Lab Results   Component Value Date    HGB 14.7 01/31/2019     Lab Results   Component Value Date    HCT 42.4 01/31/2019     Lab Results   Component Value Date    MCV 96 01/31/2019     Lab Results   Component Value Date    MCH 33.4 01/31/2019     Lab Results   Component Value Date    MCHC 34.7 01/31/2019     Lab Results   Component Value Date    RDW 14.3 01/31/2019     Lab Results   Component Value Date     01/31/2019     Discharge Treatments: Daily weight    TOTAL DISCHARGE TIME:   Greater than 30 minutes  Electronically signed by:  HUEY Mayo CNP

## 2019-02-13 NOTE — LETTER
2/13/2019        RE: Marley Stewart  [address]  [place] [address]          [place] [facility] DISCHARGE SUMMARY    PATIENT'S NAME: Marley Stewart  YOB: 1932  MEDICAL RECORD NUMBER:  [id number]  [facility] where encounter took [facility] [[id number]]    PRIMARY CARE PROVIDER AND CLINIC RESPONSIBLE AFTER TRANSFER: [facility] [address] / [facility] [address]     TRANSFERRING PROVIDERS: HUEY Mayo CNP, Olvin Avila MD  DATE OF SNF ADMISSION:  January / 28 / 2019  DATE OF SNF (anticipated) DISCHARGE: February / 14 / 2019  DISCHARGE DISPOSITION: Non-FMG Provider   RECENT HOSPITALIZATION/ED:  [facility] stay 1/26/19 to 1/28/19.     CODE STATUS/ADVANCE DIRECTIVES DISCUSSION:   DNR / DNI     Allergies   Allergen Reactions     Amitriptyline      Clonazepam Other (See Comments)     Somnolence at 0.5 mg dose     Latex Rash     Condition on Discharge:  Improving.  Cognitive Scores: SLUMS 24/30 and CPT 5.1/5.6    Equipment: walker    DISCHARGE DIAGNOSIS:   1. Pubic ramus fracture, left, with routine healing, subsequent encounter    2. Chronic diastolic congestive heart failure (H)    3. Chronic atrial fibrillation (H)    4. Rheumatoid arthritis, involving unspecified site, unspecified rheumatoid factor presence (H)    5. Essential hypertension    6. History of CVA (cerebrovascular accident)    7. Slow transit constipation    8. Mild cognitive impairment    9. Physical deconditioning        [facility] Course:   HPI information obtained from: facility chart records, facility staff, patient report and [facility] chart review.  She has a medical history significant for afib, s/p cardioversion, HTN, diastolic CHF, RA, history of CVA 2015 and came to this facility for short term rehab and medical management following hospitalization after a mechanical fall. CT showed a left pubic  ramus fracture. Ortho consulted and recommended WBAT.      She has worked with PHYSICAL THERAPY and OT with good progress. Ambulating >1000 ft with walker and supervision. Able to do 20 stairs with hand rail and supervision. TUG 26 seconds, indicating moderate fall risk. Requires supervision to stand by assist with cares.   ASSESSMENT / PLAN:  (S32.899X) Pubic ramus fracture, left, with routine healing, subsequent encounter  (primary encounter diagnosis)  Comment: pain and mobility improved. Rarely using tramadol. She is feeling well and ready to return home.  will assist with cares.   Plan: discharge home 2/14/2019. Continue tylenol, prn tramadol. Continue calcium/vitamin D. Home services and follow up as below.     (I50.32) Chronic diastolic congestive heart failure (H)  Comment: compensated . Renal function at baseline  Plan: continue lasix, metoprolol, diltiazem, losartan. Daily weight. Follow up labs per PCP.  Cardiology follow up per usual routine.     (I48.2) Chronic atrial fibrillation (H)  Comment: rate controlled: 54-72  Plan: continue Eliquis. Continue metoprolol and diltiazem, with parameters.     (M06.9) Rheumatoid arthritis, involving unspecified site, unspecified rheumatoid factor presence (H)  Comment: no s/s of acute flare  Plan: continue methotrexate and folic acid. Prn pain meds.     (I10) Essential hypertension  Comment: controlled with BPs: 139/63, 155/80, 139/56     Plan: continue metoprolol, losartan, lasix, diltiazem.    (Z86.73) History of CVA (cerebrovascular accident)  Comment:no acute issues  Plan: anticoagulation as above.     (K59.01) Slow transit constipation  Comment: improved, but feels constipated today  Plan: schedule senna 2 at HS.     (G31.84) Mild cognitive impairment  Comment: mild deficits on cognitive testing  Plan: supportive care    (R53.81) Physical deconditioning  Comment: progress in therapies as above  Plan: home therapies for ongoing gait training, strengthening  and ADL safety        PAST MEDICAL HISTORY:  has a past medical history of A-fib -- Chronic, Aortic regurgitation, Arthritis, CHF (congestive heart failure) (H), CVA (cerebral vascular accident) (H), Glaucoma, Hypertension, Mitral regurgitation, PUD (peptic ulcer disease), Pulmonic valve regurgitation, RA (rheumatoid arthritis) (H), Spinal stenosis, Thyroid disease, and Tricuspid regurgitation.    DISCHARGE MEDICATIONS:  Current Outpatient Medications   Medication Sig Dispense Refill     acetaminophen (TYLENOL) 500 MG tablet Take 2 tablets (1,000 mg) by mouth every 8 hours 60 tablet 0     apixaban ANTICOAGULANT (ELIQUIS) 5 MG tablet Take 1 tablet (5 mg) by mouth 2 times daily 60 tablet 0     calcium carb 1250 mg, 500 mg [place],/vitamin D 200 units (OSCAL WITH D) 500-200 MG-UNIT per tablet Take 1 tablet by mouth 2 times daily (with meals)       carboxymethylcellulose (REFRESH PLUS) 0.5 % SOLN Place 1 drop into both eyes 2 times daily        diltiazem ER (DILT-XR) 180 MG 24 hr capsule Take 180 mg by mouth daily       FOLIC ACID PO Take 1 mg by mouth daily       furosemide (LASIX) 20 MG tablet Take 1 tablet (20 mg) by mouth every morning 30 tablet 0     hydrocortisone (CORTAID) 1 % external cream Apply topically 3 times daily       latanoprost (XALATAN) 0.005 % ophthalmic solution Place 1 drop into the right eye At Bedtime       levothyroxine (SYNTHROID/LEVOTHROID) 50 MCG tablet Take 50 mcg by mouth daily       LOSARTAN POTASSIUM PO Take 50 mg by mouth daily        methotrexate 2.5 MG tablet Take 15 mg by mouth every 7 days Wednesday       metoprolol succinate ER (TOPROL-XL) 50 MG 24 hr tablet Take 50 mg by mouth daily       ondansetron (ZOFRAN-ODT) 4 MG ODT tab Take 4 mg by mouth every 6 hours as needed for nausea       polyethylene glycol (MIRALAX/GLYCOLAX) packet Take 17 g by mouth daily as needed for constipation       sennosides (SENOKOT) 8.6 MG tablet Take 2 tablets by mouth At Bedtime        timolol (TIMOPTIC) 0.5  % ophthalmic solution Place 1 drop into the right eye 2 times daily       traMADol (ULTRAM) 50 MG tablet Take 50 mg by mouth every 6 hours as needed for severe pain         MEDICATION CHANGES/RATIONALE:   Bowel regimen  Controlled medications sent with patient:   Medication: tramadol , 30 tabs given to patient at the time of discharge to take home     ROS:    4 point ROS including Respiratory, CV, GI and , other than that noted in the HPI,  is negative    Physical Exam:   Vitals: /77   Pulse 65   Temp 98.5  F (36.9  C)   Resp 20   Wt 67.2 kg (148 lb 3.2 oz)   SpO2 95%   BMI 26.25 kg/m     BMI= Body mass index is 26.25 kg/m .    GENERAL APPEARANCE:  Alert, in no distress  ENT:  Mouth and posterior oropharynx normal, moist mucous membranes, [place]  EYES:  EOM normal, conjunctiva and lids normal  NECK:  No adenopathy,masses or thyromegaly  RESP:  respiratory effort and palpation of chest normal, lungs clear to auscultation , no respiratory distress  CV:  Palpation and auscultation of heart done , regular rate and rhythm, no murmur,  no edema, +2 pedal pulses  ABDOMEN:  soft, nontender, no hepatosplenomegaly or other masses  M/S:   Gait steady,  CAN with good strength. No joint inflammation  SKIN:  no rashes or open areas  PSYCH:  oriented X 3, memory impaired , affect and mood normal    DISCHARGE PLAN:  Occupational Therapy, Physical Therapy, Registered Nurse, Home Health Aide and From:  [facility]  Patient instructed to follow-up with:  PCP in 7 days    Ortho follow up prn    Current [place] scheduled appointments:  No future appointments.    MTM referral needed and placed by this provider: No    Pending labs: None  SNF labs   Last Comprehensive Metabolic Panel:  Sodium   Date Value Ref Range Status   02/11/2019 141 133 - 144 mmol/L Final     Potassium   Date Value Ref Range Status   02/11/2019 4.1 3.4 - 5.3 mmol/L Final     Chloride   Date Value Ref Range Status   02/11/2019 108 94 - 109 mmol/L  Final     Carbon Dioxide   Date Value Ref Range Status   02/11/2019 31 20 - 32 mmol/L Final     Anion Gap   Date Value Ref Range Status   02/11/2019 2 (L) 3 - 14 mmol/L Final     Glucose   Date Value Ref Range Status   02/11/2019 103 (H) 70 - 99 mg/dL Final     Urea Nitrogen   Date Value Ref Range Status   02/11/2019 17 7 - 30 mg/dL Final     Creatinine   Date Value Ref Range Status   02/11/2019 0.91 0.52 - 1.04 mg/dL Final     GFR Estimate   Date Value Ref Range Status   02/11/2019 57 (L) >60 mL/min/[1.73_m2] Final     Comment:     Non  GFR Calc  Starting 12/18/2018, serum creatinine based estimated GFR (eGFR) will be   calculated using the Chronic Kidney Disease Epidemiology Collaboration   (CKD-EPI) equation.       Calcium   Date Value Ref Range Status   02/11/2019 9.3 8.5 - 10.1 mg/dL Final     Lab Results   Component Value Date    WBC 8.4 01/31/2019     Lab Results   Component Value Date    RBC 4.40 01/31/2019     Lab Results   Component Value Date    HGB 14.7 01/31/2019     Lab Results   Component Value Date    HCT 42.4 01/31/2019     Lab Results   Component Value Date    MCV 96 01/31/2019     Lab Results   Component Value Date    MCH 33.4 01/31/2019     Lab Results   Component Value Date    MCHC 34.7 01/31/2019     Lab Results   Component Value Date    RDW 14.3 01/31/2019     Lab Results   Component Value Date     01/31/2019     Discharge Treatments: Daily weight    TOTAL DISCHARGE TIME:   Greater than 30 minutes  Electronically signed by:  HUEY Mayo CNP        Documentation of Face-to-Face and Certification for Home Health Services     Patient: Marley Stewart   YOB: 1932  MR Number: [id number]  Today's Date: 2/14/2019    I certify that patient: Marley Stewart is under my care and that I, or a nurse practitioner or physician's assistant working with me, had a face-to-face encounter that meets the physician face-to-face encounter requirements with this  patient on: 2/14/2019.    This encounter with the patient was in whole, or in part, for the following medical condition, which is the primary reason for home health care: left pubic ramus fracture.    I certify that, based on my findings, the following services are medically necessary home health services: Nursing, Occupational Therapy and Physical Therapy.    My clinical findings support the need for the above services because: Nurse is needed: To assess VS, pain control, weight after changes in medications or other medical regimen., To provide assessment and oversight required in the home to assure adherence to the medical plan due to: complex medication regimen, at risk for rehospitalization. and To provide caregiver training to assist with: med management.., Occupational Therapy Services are needed to assess and treat cognitive ability and address ADL safety due to impairment in functional status, mild cognitive impairment. and Physical Therapy Services are needed to assess and treat the following functional impairments: gait instability, fall risk.    Further, I certify that my clinical findings support that this patient is homebound (i.e. absences from home require considerable and taxing effort and are for medical reasons or [employer] services or infrequently or of short duration when for other reasons) because: Requires assistance of another person or specialized equipment to access medical services because patient: Has prohibitive pain during ambulation., Is unable to exit home safely on own due to: gait instability, fall risk., Range of motion limitations prevents ability to exit home safely. and Requires supervision of another for safe transfer...    Based on the above findings. I certify that this patient is confined to the home and needs intermittent skilled nursing care, physical therapy and/or speech therapy.  The patient is under my care, and I have initiated the establishment of the plan of care.   This patient will be followed by a physician who will periodically review the plan of care.  Physician/Provider to provide follow up care: Chandra Ortiz    Responsible Medicare certified PEC[employer] Physician: Electronically signed by Dr. Olvin Avila MD, and only signing for initial order. Please send all follow up questions and concerns or needed follow up signatures to the PCP Chandra Ortiz.    Physician Signature: See electronic signature associated with these discharge orders.  Date: 2/14/2019        Sincerely,        HUEY Mayo CNP

## 2019-02-14 NOTE — PROGRESS NOTES
Documentation of Face-to-Face and Certification for Home Health Services     Patient: Marley Stewart   YOB: 1932  MR Number: [id number]  Today's Date: 2/14/2019    I certify that patient: Marley Stewart is under my care and that I, or a nurse practitioner or physician's assistant working with me, had a face-to-face encounter that meets the physician face-to-face encounter requirements with this patient on: 2/14/2019.    This encounter with the patient was in whole, or in part, for the following medical condition, which is the primary reason for home health care: left pubic ramus fracture.    I certify that, based on my findings, the following services are medically necessary home health services: Nursing, Occupational Therapy and Physical Therapy.    My clinical findings support the need for the above services because: Nurse is needed: To assess VS, pain control, weight after changes in medications or other medical regimen., To provide assessment and oversight required in the home to assure adherence to the medical plan due to: complex medication regimen, at risk for rehospitalization. and To provide caregiver training to assist with: med management.., Occupational Therapy Services are needed to assess and treat cognitive ability and address ADL safety due to impairment in functional status, mild cognitive impairment. and Physical Therapy Services are needed to assess and treat the following functional impairments: gait instability, fall risk.    Further, I certify that my clinical findings support that this patient is homebound (i.e. absences from home require considerable and taxing effort and are for medical reasons or [facility] services or infrequently or of short duration when for other reasons) because: Requires assistance of another person or specialized equipment to access medical services because patient: Has prohibitive pain during ambulation., Is unable to exit home safely on own due to:  gait instability, fall risk., Range of motion limitations prevents ability to exit home safely. and Requires supervision of another for safe transfer...    Based on the above findings. I certify that this patient is confined to the home and needs intermittent skilled nursing care, physical therapy and/or speech therapy.  The patient is under my care, and I have initiated the establishment of the plan of care.  This patient will be followed by a physician who will periodically review the plan of care.  Physician/Provider to provide follow up care: Chandra Ortiz    Responsible Medicare certified PECOS Physician: Electronically signed by Dr. Olvin Avila MD, and only signing for initial order. Please send all follow up questions and concerns or needed follow up signatures to the PCP Chandra Ortiz.    Physician Signature: See electronic signature associated with these discharge orders.  Date: 2/14/2019

## 2019-08-05 ENCOUNTER — APPOINTMENT (OUTPATIENT)
Dept: CT IMAGING | Facility: CLINIC | Age: 84
End: 2019-08-05
Attending: EMERGENCY MEDICINE
Payer: COMMERCIAL

## 2019-08-05 ENCOUNTER — HOSPITAL ENCOUNTER (OUTPATIENT)
Facility: CLINIC | Age: 84
Setting detail: OBSERVATION
Discharge: HOME | End: 2019-08-06
Attending: EMERGENCY MEDICINE | Admitting: INTERNAL MEDICINE
Payer: COMMERCIAL
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
## 2019-08-05 DIAGNOSIS — S32.048A OTHER CLOSED FRACTURE OF FOURTH LUMBAR VERTEBRA, INITIAL ENCOUNTER (H): ICD-10-CM

## 2019-08-05 DIAGNOSIS — S32.049A CLOSED FRACTURE OF FOURTH LUMBAR VERTEBRA, UNSPECIFIED FRACTURE MORPHOLOGY, INITIAL ENCOUNTER (H): Primary | ICD-10-CM

## 2019-08-05 LAB
ALBUMIN UR-MCNC: NEGATIVE MG/DL
AMORPH CRY #/AREA URNS HPF: ABNORMAL /HPF
ANION GAP SERPL CALCULATED.3IONS-SCNC: 6 MMOL/L (ref 3–14)
APPEARANCE UR: ABNORMAL
APTT PPP: 30 SEC (ref 22–37)
BASOPHILS # BLD AUTO: 0 10E9/L (ref 0–0.2)
BASOPHILS NFR BLD AUTO: 0.2 %
BILIRUB UR QL STRIP: NEGATIVE
BUN SERPL-MCNC: 22 MG/DL (ref 7–30)
CALCIUM SERPL-MCNC: 10.2 MG/DL (ref 8.5–10.1)
CHLORIDE SERPL-SCNC: 106 MMOL/L (ref 94–109)
CO2 SERPL-SCNC: 29 MMOL/L (ref 20–32)
COLOR UR AUTO: YELLOW
CREAT SERPL-MCNC: 0.93 MG/DL (ref 0.52–1.04)
DIFFERENTIAL METHOD BLD: NORMAL
EOSINOPHIL # BLD AUTO: 0.2 10E9/L (ref 0–0.7)
EOSINOPHIL NFR BLD AUTO: 2.1 %
ERYTHROCYTE [DISTWIDTH] IN BLOOD BY AUTOMATED COUNT: 14.5 % (ref 10–15)
GFR SERPL CREATININE-BSD FRML MDRD: 55 ML/MIN/{1.73_M2}
GLUCOSE SERPL-MCNC: 84 MG/DL (ref 70–99)
GLUCOSE UR STRIP-MCNC: NEGATIVE MG/DL
HCT VFR BLD AUTO: 41.1 % (ref 35–47)
HGB BLD-MCNC: 14.5 G/DL (ref 11.7–15.7)
HGB UR QL STRIP: ABNORMAL
IMM GRANULOCYTES # BLD: 0.1 10E9/L (ref 0–0.4)
IMM GRANULOCYTES NFR BLD: 0.8 %
INR PPP: 1.42 (ref 0.86–1.14)
KETONES UR STRIP-MCNC: 5 MG/DL
LEUKOCYTE ESTERASE UR QL STRIP: NEGATIVE
LYMPHOCYTES # BLD AUTO: 2 10E9/L (ref 0.8–5.3)
LYMPHOCYTES NFR BLD AUTO: 20.5 %
MCH RBC QN AUTO: 32.7 PG (ref 26.5–33)
MCHC RBC AUTO-ENTMCNC: 35.3 G/DL (ref 31.5–36.5)
MCV RBC AUTO: 93 FL (ref 78–100)
MONOCYTES # BLD AUTO: 0.8 10E9/L (ref 0–1.3)
MONOCYTES NFR BLD AUTO: 8.3 %
NEUTROPHILS # BLD AUTO: 6.6 10E9/L (ref 1.6–8.3)
NEUTROPHILS NFR BLD AUTO: 68.1 %
NITRATE UR QL: NEGATIVE
PH UR STRIP: 7.5 PH (ref 5–7)
PLATELET # BLD AUTO: 264 10E9/L (ref 150–450)
POTASSIUM SERPL-SCNC: 3.6 MMOL/L (ref 3.4–5.3)
RBC # BLD AUTO: 4.44 10E12/L (ref 3.8–5.2)
RBC #/AREA URNS AUTO: 11 /HPF (ref 0–2)
SODIUM SERPL-SCNC: 141 MMOL/L (ref 133–144)
SOURCE: ABNORMAL
SP GR UR STRIP: 1.02 (ref 1–1.03)
UROBILINOGEN UR STRIP-MCNC: NORMAL MG/DL (ref 0–2)
WBC # BLD AUTO: 9.7 10E9/L (ref 4–11)
WBC #/AREA URNS AUTO: 0 /HPF (ref 0–5)

## 2019-08-05 PROCEDURE — 85730 THROMBOPLASTIN TIME PARTIAL: CPT | Performed by: EMERGENCY MEDICINE

## 2019-08-05 PROCEDURE — 25000128 H RX IP 250 OP 636: Performed by: EMERGENCY MEDICINE

## 2019-08-05 PROCEDURE — 25000132 ZZH RX MED GY IP 250 OP 250 PS 637: Performed by: INTERNAL MEDICINE

## 2019-08-05 PROCEDURE — 25800030 ZZH RX IP 258 OP 636: Performed by: INTERNAL MEDICINE

## 2019-08-05 PROCEDURE — 80048 BASIC METABOLIC PNL TOTAL CA: CPT | Performed by: EMERGENCY MEDICINE

## 2019-08-05 PROCEDURE — 99285 EMERGENCY DEPT VISIT HI MDM: CPT | Mod: 25

## 2019-08-05 PROCEDURE — 72192 CT PELVIS W/O DYE: CPT

## 2019-08-05 PROCEDURE — 25000125 ZZHC RX 250: Performed by: INTERNAL MEDICINE

## 2019-08-05 PROCEDURE — 96376 TX/PRO/DX INJ SAME DRUG ADON: CPT

## 2019-08-05 PROCEDURE — 72131 CT LUMBAR SPINE W/O DYE: CPT

## 2019-08-05 PROCEDURE — 81001 URINALYSIS AUTO W/SCOPE: CPT | Performed by: INTERNAL MEDICINE

## 2019-08-05 PROCEDURE — 85025 COMPLETE CBC W/AUTO DIFF WBC: CPT | Performed by: EMERGENCY MEDICINE

## 2019-08-05 PROCEDURE — 99220 ZZC INITIAL OBSERVATION CARE,LEVL III: CPT | Performed by: INTERNAL MEDICINE

## 2019-08-05 PROCEDURE — 96374 THER/PROPH/DIAG INJ IV PUSH: CPT

## 2019-08-05 PROCEDURE — 85610 PROTHROMBIN TIME: CPT | Performed by: EMERGENCY MEDICINE

## 2019-08-05 PROCEDURE — G0378 HOSPITAL OBSERVATION PER HR: HCPCS

## 2019-08-05 RX ORDER — NALOXONE HYDROCHLORIDE 0.4 MG/ML
.1-.4 INJECTION, SOLUTION INTRAMUSCULAR; INTRAVENOUS; SUBCUTANEOUS
Status: DISCONTINUED | OUTPATIENT
Start: 2019-08-05 | End: 2019-08-06 | Stop reason: HOSPADM

## 2019-08-05 RX ORDER — FLUOCINONIDE TOPICAL SOLUTION USP, 0.05% 0.5 MG/ML
SOLUTION TOPICAL 2 TIMES DAILY PRN
COMMUNITY
End: 2022-06-14

## 2019-08-05 RX ORDER — HYDROMORPHONE HYDROCHLORIDE 1 MG/ML
0.5 INJECTION, SOLUTION INTRAMUSCULAR; INTRAVENOUS; SUBCUTANEOUS
Status: COMPLETED | OUTPATIENT
Start: 2019-08-05 | End: 2019-08-05

## 2019-08-05 RX ORDER — FUROSEMIDE 20 MG
20 TABLET ORAL EVERY MORNING
Status: DISCONTINUED | OUTPATIENT
Start: 2019-08-06 | End: 2019-08-06 | Stop reason: HOSPADM

## 2019-08-05 RX ORDER — LATANOPROST 50 UG/ML
1 SOLUTION/ DROPS OPHTHALMIC AT BEDTIME
Status: DISCONTINUED | OUTPATIENT
Start: 2019-08-05 | End: 2019-08-06 | Stop reason: HOSPADM

## 2019-08-05 RX ORDER — DILTIAZEM HYDROCHLORIDE 120 MG/1
120 CAPSULE, EXTENDED RELEASE ORAL EVERY MORNING
Status: DISCONTINUED | OUTPATIENT
Start: 2019-08-06 | End: 2019-08-06 | Stop reason: HOSPADM

## 2019-08-05 RX ORDER — SENNOSIDES 8.6 MG
2 TABLET ORAL
Status: DISCONTINUED | OUTPATIENT
Start: 2019-08-05 | End: 2019-08-06 | Stop reason: HOSPADM

## 2019-08-05 RX ORDER — ACETAMINOPHEN 650 MG/1
650 SUPPOSITORY RECTAL EVERY 4 HOURS PRN
Status: DISCONTINUED | OUTPATIENT
Start: 2019-08-05 | End: 2019-08-06 | Stop reason: HOSPADM

## 2019-08-05 RX ORDER — METOPROLOL SUCCINATE 25 MG/1
25 TABLET, EXTENDED RELEASE ORAL EVERY MORNING
Status: DISCONTINUED | OUTPATIENT
Start: 2019-08-06 | End: 2019-08-06 | Stop reason: HOSPADM

## 2019-08-05 RX ORDER — ONDANSETRON 4 MG/1
4 TABLET, ORALLY DISINTEGRATING ORAL EVERY 6 HOURS PRN
Status: DISCONTINUED | OUTPATIENT
Start: 2019-08-05 | End: 2019-08-06 | Stop reason: HOSPADM

## 2019-08-05 RX ORDER — SODIUM CHLORIDE 9 MG/ML
INJECTION, SOLUTION INTRAVENOUS CONTINUOUS
Status: ACTIVE | OUTPATIENT
Start: 2019-08-05 | End: 2019-08-06

## 2019-08-05 RX ORDER — HYDRALAZINE HYDROCHLORIDE 20 MG/ML
10 INJECTION INTRAMUSCULAR; INTRAVENOUS EVERY 4 HOURS PRN
Status: DISCONTINUED | OUTPATIENT
Start: 2019-08-05 | End: 2019-08-06 | Stop reason: HOSPADM

## 2019-08-05 RX ORDER — LEVOTHYROXINE SODIUM 50 UG/1
50 TABLET ORAL DAILY
Status: DISCONTINUED | OUTPATIENT
Start: 2019-08-06 | End: 2019-08-06 | Stop reason: HOSPADM

## 2019-08-05 RX ORDER — AMOXICILLIN 250 MG
2 CAPSULE ORAL 2 TIMES DAILY PRN
Status: DISCONTINUED | OUTPATIENT
Start: 2019-08-05 | End: 2019-08-06 | Stop reason: HOSPADM

## 2019-08-05 RX ORDER — AMOXICILLIN 250 MG
1 CAPSULE ORAL 2 TIMES DAILY PRN
Status: DISCONTINUED | OUTPATIENT
Start: 2019-08-05 | End: 2019-08-06 | Stop reason: HOSPADM

## 2019-08-05 RX ORDER — OXYCODONE HYDROCHLORIDE 5 MG/1
5-10 TABLET ORAL
Status: DISCONTINUED | OUTPATIENT
Start: 2019-08-05 | End: 2019-08-06 | Stop reason: HOSPADM

## 2019-08-05 RX ORDER — ACETAMINOPHEN 325 MG/1
650 TABLET ORAL EVERY 4 HOURS PRN
Status: DISCONTINUED | OUTPATIENT
Start: 2019-08-05 | End: 2019-08-06 | Stop reason: HOSPADM

## 2019-08-05 RX ORDER — TIMOLOL MALEATE 5 MG/ML
1 SOLUTION/ DROPS OPHTHALMIC 2 TIMES DAILY
Status: DISCONTINUED | OUTPATIENT
Start: 2019-08-05 | End: 2019-08-06 | Stop reason: HOSPADM

## 2019-08-05 RX ORDER — DILTIAZEM HYDROCHLORIDE 120 MG/1
120 CAPSULE, EXTENDED RELEASE ORAL EVERY MORNING
COMMUNITY
End: 2022-06-14

## 2019-08-05 RX ORDER — SENNOSIDES 8.6 MG
2 TABLET ORAL AT BEDTIME
Status: DISCONTINUED | OUTPATIENT
Start: 2019-08-05 | End: 2019-08-05

## 2019-08-05 RX ORDER — ONDANSETRON 2 MG/ML
4 INJECTION INTRAMUSCULAR; INTRAVENOUS EVERY 6 HOURS PRN
Status: DISCONTINUED | OUTPATIENT
Start: 2019-08-05 | End: 2019-08-06 | Stop reason: HOSPADM

## 2019-08-05 RX ORDER — METOPROLOL SUCCINATE 25 MG/1
25 TABLET, EXTENDED RELEASE ORAL EVERY MORNING
COMMUNITY

## 2019-08-05 RX ORDER — MORPHINE SULFATE 2 MG/ML
2 INJECTION, SOLUTION INTRAMUSCULAR; INTRAVENOUS
Status: DISCONTINUED | OUTPATIENT
Start: 2019-08-05 | End: 2019-08-06 | Stop reason: HOSPADM

## 2019-08-05 RX ADMIN — TIMOLOL MALEATE 1 DROP: 5 SOLUTION OPHTHALMIC at 21:26

## 2019-08-05 RX ADMIN — ACETAMINOPHEN 650 MG: 325 TABLET, FILM COATED ORAL at 23:24

## 2019-08-05 RX ADMIN — LATANOPROST 1 DROP: 50 SOLUTION/ DROPS OPHTHALMIC at 21:26

## 2019-08-05 RX ADMIN — APIXABAN 5 MG: 5 TABLET, FILM COATED ORAL at 19:36

## 2019-08-05 RX ADMIN — HYDROMORPHONE HYDROCHLORIDE 0.5 MG: 1 INJECTION, SOLUTION INTRAMUSCULAR; INTRAVENOUS; SUBCUTANEOUS at 16:55

## 2019-08-05 RX ADMIN — HYDROMORPHONE HYDROCHLORIDE 0.5 MG: 1 INJECTION, SOLUTION INTRAMUSCULAR; INTRAVENOUS; SUBCUTANEOUS at 13:17

## 2019-08-05 RX ADMIN — HYDROMORPHONE HYDROCHLORIDE 0.5 MG: 1 INJECTION, SOLUTION INTRAMUSCULAR; INTRAVENOUS; SUBCUTANEOUS at 16:19

## 2019-08-05 RX ADMIN — SODIUM CHLORIDE: 9 INJECTION, SOLUTION INTRAVENOUS at 18:32

## 2019-08-05 RX ADMIN — ACETAMINOPHEN 650 MG: 325 TABLET, FILM COATED ORAL at 19:36

## 2019-08-05 ASSESSMENT — ACTIVITIES OF DAILY LIVING (ADL)
BATHING: 0-->INDEPENDENT
AMBULATION: 1-->ASSISTIVE EQUIPMENT
WHICH_OF_THE_ABOVE_FUNCTIONAL_RISKS_HAD_A_RECENT_ONSET_OR_CHANGE?: AMBULATION
SWALLOWING: 0-->SWALLOWS FOODS/LIQUIDS WITHOUT DIFFICULTY
DRESS: 0-->INDEPENDENT
RETIRED_COMMUNICATION: 0-->UNDERSTANDS/COMMUNICATES WITHOUT DIFFICULTY
TRANSFERRING: 1-->ASSISTIVE EQUIPMENT
COGNITION: 0 - NO COGNITION ISSUES REPORTED
NUMBER_OF_TIMES_PATIENT_HAS_FALLEN_WITHIN_LAST_SIX_MONTHS: 1
TOILETING: 0-->INDEPENDENT
RETIRED_EATING: 0-->INDEPENDENT
FALL_HISTORY_WITHIN_LAST_SIX_MONTHS: YES

## 2019-08-05 ASSESSMENT — MIFFLIN-ST. JEOR
SCORE: 1084.53
SCORE: 1084.53

## 2019-08-05 NOTE — PLAN OF CARE
PRIOR TO DISCHARGE     Comments: -diagnostic tests and consults completed and resulted-Yes  -vital signs normal or at patient baseline-No  -adequate pain control on oral analgesics-No  -returns to baseline functional status-No  -safe disposition plan has been identified-No    Pt rates low back pain at 6/10, declines analgesic at this time, CMS intact. Orthopedic consultation pending.

## 2019-08-05 NOTE — PHARMACY-ADMISSION MEDICATION HISTORY
"Admission medication history interview status for the 8/5/2019  admission is complete. See EPIC admission navigator for prior to admission medications     Medication history source reliability:Moderate    Actions taken by pharmacist (provider contacted, etc):Called Freddie and      Additional medication history information not noted on PTA med list :None    Medication reconciliation/reorder completed by provider prior to medication history? No    Time spent in this activity: 20\"      Prior to Admission medications    Medication Sig Last Dose Taking? Auth Provider   apixaban ANTICOAGULANT (ELIQUIS) 5 MG tablet Take 1 tablet (5 mg) by mouth 2 times daily 8/5/2019 at am Yes Marino Sandoval MD   calcium carb 1250 mg, 500 mg [place],/vitamin D 200 units (OSCAL WITH D) 500-200 MG-UNIT per tablet Take 1 tablet by mouth 2 times daily (with meals) 8/5/2019 at am Yes Unknown, Entered By History   carboxymethylcellulose (REFRESH PLUS) 0.5 % SOLN Place 1 drop into both eyes 2 times daily  8/5/2019 at am Yes Unknown, Entered By History   diltiazem ER (DILT-XR) 120 MG 24 hr capsule Take 120 mg by mouth every morning 8/5/2019 at am Yes Unknown, Entered By History   fluocinonide (LIDEX) 0.05 % external solution Apply topically 2 times daily To scalp 8/5/2019 at am Yes Unknown, Entered By History   FOLIC ACID PO Take 1-3 mg by mouth daily  8/5/2019 at am Yes Reported, Patient   furosemide (LASIX) 20 MG tablet Take 1 tablet (20 mg) by mouth every morning 8/5/2019 at am Yes Marino Sandoval MD   latanoprost (XALATAN) 0.005 % ophthalmic solution Place 1 drop into the right eye At Bedtime 8/4/2019 at hs Yes Unknown, Entered By History   levothyroxine (SYNTHROID/LEVOTHROID) 50 MCG tablet Take 50 mcg by mouth daily 8/5/2019 at am Yes Unknown, Entered By History   metoprolol succinate ER (TOPROL-XL) 25 MG 24 hr tablet Take 25 mg by mouth every morning 8/5/2019 at am Yes Unknown, Entered By History   sennosides (SENOKOT) 8.6 MG tablet " Take 2 tablets by mouth At Bedtime  8/4/2019 at hs Yes Unknown, Entered By History   timolol (TIMOPTIC) 0.5 % ophthalmic solution Place 1 drop into the right eye 2 times daily 8/5/2019 at am Yes Unknown, Entered By History   methotrexate 2.5 MG tablet Take 15 mg by mouth every 7 days Wednesday last Wed  Unknown, Entered By History

## 2019-08-05 NOTE — ED PROVIDER NOTES
"  History     Chief Complaint:  Fall    HPI   Marley Stewart is a 87 year old female with a history of atrial fibrillation, on Eliquis, hypertension, congestive heart failure, and rheumatoid arthritis, on methotrexate, who presents via EMS for evaluation of lower back pain after a fall. This morning, she was walking with her cane to a restaurant when suddenly her friend lost her balance and fell into the patient. This caused the patient to lose balance and fall backwards. She denies losing consciousness and only reports injury to her lower back. However, she was unable to stand up until the paramedics arrived. Here, she reports lower back pain. She denies any neck pain, headache, extremity pain, groin numbness, or chest pain. She reports she is \"somewhat incontinent\" at baseline, and also ambulates with a cane. She also notes a history of pelvic fracture in January after a fall.     Allergies:  Amitriptyline  Clonazepam  Latex      Medications:    Eliquis  Diltiazem   Lasix  Levothyroxine  Losartan  Metoprolol  Methotrexate  Zofran  Tramadol     Past Medical History:    Atrial fibrillation  Aortic regurgitation  Rheumatoid arthritis  Congestive heart failure  Cerebral vascular accident  Hypertension  Mitral regurgitation  Peptic ulcer disease  Spinal stenosis  Thyroid disease   Chronic renal failure    Past Surgical History:    Orthopedic surgery x2  Bunion and hammertoe surgery    x2  Dental extraction  Bilateral cataracts removal  Trabeculectomy  Yag capsulotomy  D&C  Bilateral knee replacement    Family History:    Stroke - Mother  Bone cancer - Father    Social History:  Marital Status:   [2]  Presents via EMS  Negative for tobacco use.  Positive for alcohol use.   Negative for drug use.      Review of Systems   All other systems reviewed and are negative.    Physical Exam     Patient Vitals for the past 24 hrs:   BP Temp Temp src Pulse Resp SpO2 Height Weight   19 1511 -- -- -- -- -- 96 % " "-- --   08/05/19 1510 (!) 170/80 -- -- 74 -- -- -- --   08/05/19 1228 (!) 185/79 97.9  F (36.6  C) Oral 70 20 98 % 1.6 m (5' 3\") 68 kg (150 lb)      Physical Exam  General: Resting on the bed.  Head: No obvious trauma to head.  Ears, Nose, Throat:  External ears normal.  Nose normal.  Clear TMs  Eyes:  Conjunctivae clear.  Pupils are equal, round, and reactive.   Neck: Normal range of motion.  Neck supple. non tender c spine.    CV: Regular rate and rhythm.  No murmurs.      Respiratory: Effort normal and breath sounds normal.  No wheezing or crackles.   Gastrointestinal: Soft.  No distension. There is no tenderness.  Musculoskeletal: Nontender thoracic spine without step-off deformity.  Tenderness along the lower lumbar spine without appreciable step-off.  Tenderness along the sacrum as well.  Neuro: Alert. Moving all extremities appropriately.  GCS 15.  CN II-XII grossly intact, no pronator drift.  Gross muscle strength intact of the proximal and distal bilateral lower extremities.  Sensation intact to light touch in all 4 extremities.    Skin: Skin is warm and dry.  No rash noted.     Emergency Department Course   Imaging:  Radiographic findings were communicated with the patient who voiced understanding of the findings.  CT Lumbar Spine without contrast:   Acute mildly displaced fracture involving the anterior  superior aspect of the L4 vertebral body, as per radiology.    CT Pelvis Bone without contrast:   Interval healing of previous left pubic rami fractures. No  recent-appearing fracture is demonstrated, as per radiology.    Laboratory:  CBC: WBC: 9.7, HGB: 14.5, PLT: 264  BMP: GFR: 55 (L), Ca: 10.2 (H), o/w WNL (Creatinine: 0.93)  INR: 1.42 (H)  PTT: 30    Interventions:  1317 Dilaudid, 0.5 mg, IV injection   1619 Dilaudid, 0.5 mg, IV injection     Emergency Department Course:  Nursing notes and vitals reviewed.   1239: I performed an exam of the patient as documented above.      IV was inserted and blood " was drawn for laboratory testing, results above.    Medicine administered as documented above.     The patient was sent for pelvic bone and lumbar spine CTs while in the emergency department, results above.     1557: Dr. Maldonado called me with a verbal preliminary read of the lumbar CT scan as the final read is stuck in transcription. He states the fracture is stable.    1612: I rechecked the patient and discussed the results of her workup thus far. She is amenable to admission for pain control.     1615: I consulted with Dr. Ventura of the hospitalist services. He is in agreement to accept the patient for admission.    1625: I consulted with Татьяна Park PA-C from [facility], regarding the patient's history and presentation here in the emergency department. She agrees to consult on the case.     Findings and plan explained to the Patient who consents to admission. Discussed the patient with Dr. Ventura, who will admit the patient to an observation bed for further monitoring, evaluation, and treatment.    I personally reviewed the laboratory and imaging results with the Patient and answered all related questions prior to admission.    Impression & Plan      Medical Decision Makin-year-old female with a recent pubic rami fracture presents with a mechanical fall and low back pain.  Vital signs show hypertensive but otherwise unremarkable.  Broad differential was pursued including not limited to fracture dislocation, epidural abscess, epidural hematoma, unstable fracture, cauda equina, neurovascular injury, etc.  CBC shows no leukocytosis or anemia.  BMP shows no acute electrolyte metabolic renal dysfunction.  INR is mildly prolonged consistent with Eliquis in addition to PTT being normal.  Patient denies any other trauma on head to toe exam besides some low lumbar pain and pain along the sacrum.  She denies hitting her head.  Patient reports this is a mechanical fall.  There is no chest pain or shortness of  breath or syncope preceding the fall.  CT pelvis shows healing of the prior left pubic rami fractures.  No other acute fractures noted.  CT lumbar shows acute mildly displaced fracture involving the anterior superior aspect of L4 vertebral body.  In discussion with the radiologist did not appear to be any acute unstable elements to the fracture.  Patient had sensation intact bilaterally.  Plantar and dorsiflexion was intact bilaterally.  2+ DP pulses bilaterally.  No saddle anesthesia or new incontinence.  Patient has baseline incontinence that is not acutely different.  Discussed with orthopedic surgery who will see patient during her stay for likely brace placement.  Plan to admit for pain control.  Admitted to hospitalist who kindly accepted.    Diagnosis:    ICD-10-CM    1. Other closed fracture of fourth lumbar vertebra, initial encounter (H) S32.048A    2. Fall, initial encounter W19.XXXA        Disposition:  Admitted to obs under the care of Dr. Lorenzo Gardner Disclosure:  I, [place] Dixon, am serving as a scribe on 8/5/2019 at 12:39 PM to personally document services performed by Tressa Olvera MD based on my observations and the provider's statements to me.     Florida Dixon  8/5/2019    EMERGENCY DEPARTMENT       Tressa Olvera MD  08/05/19 9471

## 2019-08-05 NOTE — ED NOTES
Bed: ED26  Expected date:   Expected time:   Means of arrival:   Comments:  E2  87 F fall/back pain  1218

## 2019-08-05 NOTE — ED TRIAGE NOTES
[place] at mall - walks with cane - friend fell into pt knocking her over  Pt complaining of mid lower back - denies hitting head denies neck pain

## 2019-08-05 NOTE — PLAN OF CARE
Pt arrived from ED at 1725, rates low back pain 7/10, pt settled in bed, awaiting orders from provider.

## 2019-08-06 ENCOUNTER — APPOINTMENT (OUTPATIENT)
Dept: PHYSICAL THERAPY | Facility: CLINIC | Age: 84
End: 2019-08-06
Attending: INTERNAL MEDICINE
Payer: COMMERCIAL

## 2019-08-06 VITALS
HEART RATE: 63 BPM | TEMPERATURE: 97.4 F | WEIGHT: 150 LBS | RESPIRATION RATE: 18 BRPM | SYSTOLIC BLOOD PRESSURE: 120 MMHG | OXYGEN SATURATION: 96 % | DIASTOLIC BLOOD PRESSURE: 44 MMHG | BODY MASS INDEX: 26.58 KG/M2 | HEIGHT: 63 IN

## 2019-08-06 PROCEDURE — 97161 PT EVAL LOW COMPLEX 20 MIN: CPT | Mod: GP

## 2019-08-06 PROCEDURE — 97530 THERAPEUTIC ACTIVITIES: CPT | Mod: GP

## 2019-08-06 PROCEDURE — G0378 HOSPITAL OBSERVATION PER HR: HCPCS

## 2019-08-06 PROCEDURE — 99217 ZZC OBSERVATION CARE DISCHARGE: CPT | Performed by: PHYSICIAN ASSISTANT

## 2019-08-06 PROCEDURE — 25000132 ZZH RX MED GY IP 250 OP 250 PS 637: Performed by: INTERNAL MEDICINE

## 2019-08-06 PROCEDURE — 25000132 ZZH RX MED GY IP 250 OP 250 PS 637: Performed by: PHYSICIAN ASSISTANT

## 2019-08-06 PROCEDURE — L0625 LO FLEX L1-BELOW L5 PRE OTS: HCPCS

## 2019-08-06 RX ORDER — AMOXICILLIN 250 MG
1 CAPSULE ORAL 2 TIMES DAILY
Status: DISCONTINUED | OUTPATIENT
Start: 2019-08-06 | End: 2019-08-06 | Stop reason: HOSPADM

## 2019-08-06 RX ORDER — LIDOCAINE 4 G/G
1-2 PATCH TOPICAL
Status: DISCONTINUED | OUTPATIENT
Start: 2019-08-06 | End: 2019-08-06 | Stop reason: HOSPADM

## 2019-08-06 RX ORDER — CYCLOBENZAPRINE HCL 5 MG
2.5 TABLET ORAL 3 TIMES DAILY
Qty: 30 TABLET | Refills: 0 | Status: SHIPPED | OUTPATIENT
Start: 2019-08-06 | End: 2022-06-14

## 2019-08-06 RX ORDER — ACETAMINOPHEN 500 MG
1000 TABLET ORAL 3 TIMES DAILY
Status: ON HOLD | COMMUNITY
Start: 2019-08-06 | End: 2022-06-21

## 2019-08-06 RX ORDER — ACETAMINOPHEN 500 MG
1000 TABLET ORAL 3 TIMES DAILY
Status: DISCONTINUED | OUTPATIENT
Start: 2019-08-06 | End: 2019-08-06 | Stop reason: HOSPADM

## 2019-08-06 RX ORDER — CYCLOBENZAPRINE HYDROCHLORIDE 5 MG/1
2.5 TABLET, FILM COATED ORAL 3 TIMES DAILY
Status: DISCONTINUED | OUTPATIENT
Start: 2019-08-06 | End: 2019-08-06 | Stop reason: HOSPADM

## 2019-08-06 RX ORDER — LIDOCAINE 4 G/G
2 PATCH TOPICAL EVERY 24 HOURS
Qty: 20 PATCH | Refills: 0 | Status: SHIPPED | OUTPATIENT
Start: 2019-08-06 | End: 2022-06-14

## 2019-08-06 RX ORDER — POLYETHYLENE GLYCOL 3350 17 G/17G
17 POWDER, FOR SOLUTION ORAL DAILY
Status: DISCONTINUED | OUTPATIENT
Start: 2019-08-06 | End: 2019-08-06 | Stop reason: HOSPADM

## 2019-08-06 RX ADMIN — ACETAMINOPHEN 1000 MG: 500 TABLET, FILM COATED ORAL at 10:45

## 2019-08-06 RX ADMIN — CYCLOBENZAPRINE HYDROCHLORIDE 2.5 MG: 5 TABLET, FILM COATED ORAL at 12:09

## 2019-08-06 RX ADMIN — POLYETHYLENE GLYCOL 3350 17 G: 17 POWDER, FOR SOLUTION ORAL at 10:45

## 2019-08-06 RX ADMIN — ACETAMINOPHEN 650 MG: 325 TABLET, FILM COATED ORAL at 04:01

## 2019-08-06 RX ADMIN — TIMOLOL MALEATE 1 DROP: 5 SOLUTION OPHTHALMIC at 08:03

## 2019-08-06 RX ADMIN — APIXABAN 5 MG: 5 TABLET, FILM COATED ORAL at 08:02

## 2019-08-06 RX ADMIN — DILTIAZEM HYDROCHLORIDE 120 MG: 120 CAPSULE, EXTENDED RELEASE ORAL at 08:02

## 2019-08-06 RX ADMIN — FUROSEMIDE 20 MG: 20 TABLET ORAL at 08:02

## 2019-08-06 RX ADMIN — SENNOSIDES AND DOCUSATE SODIUM 1 TABLET: 8.6; 5 TABLET ORAL at 10:45

## 2019-08-06 RX ADMIN — LEVOTHYROXINE SODIUM 50 MCG: 50 TABLET ORAL at 08:02

## 2019-08-06 RX ADMIN — METOPROLOL SUCCINATE 25 MG: 25 TABLET, EXTENDED RELEASE ORAL at 08:02

## 2019-08-06 RX ADMIN — LIDOCAINE 2 PATCH: 560 PATCH PERCUTANEOUS; TOPICAL; TRANSDERMAL at 08:02

## 2019-08-06 NOTE — PLAN OF CARE
PT:  Discharge Planner PT   Patient plan for discharge: Unsure if she can go home today vs tomorrow  Current status: Orders received, eval completed, treatment initiated. Pt admitted under observation status with a fall after her friend fell into her knocking her down. Found to have an L4 fracture. Prior to admit pt was living with her spouse in a split level side by side home, uses a SEC and is independent with mobility. Currently requires SBA sit to/from stand from chair, mod A to change into depends, CGA for gait of 30 ft with SEC with mild dec in balance and slow pace. Rated pain 6/10 in the chair and 3/10 with gait. Pt demonstrates pain, dec balance and activity tolerance and difficulty ambulating and transferring and would benefit from skilled PT services in order to improve this.  Barriers to return to prior living situation: Pain limiting her mobility, falls risk, needs assist or supervision for mobility  Recommendations for discharge: Home with spouse assist and home PT  Rationale for recommendations: Anticipate pt will be able to return home at discharge pending pain control. Spouse is available to assist as needed. Pt would benefit from continued PT to improve strength, balance, mobility to increase independence, reduce falls risk and increase safety.       Entered by: Kiki Joe 08/06/2019 11:43 AM

## 2019-08-06 NOTE — PROGRESS NOTES
"S: Pt was seen today at [facility] for eval/fitting for an LSO corset as ordered.  DX:  L - fx  O:  I entered the patients room and she is getting ready to discharge.    A: At this time I have fit pt with a size 36-48\" panel LSO corset with Velcro closures as she stood by the edge of the bed. The patient was instructed on donning/doffing; care and cleaning of the LSO was explained. Care was taken in selection of the proper size LSO to insure an intimate fit.  Upon completion of the initial fitting the pt had no complaints, and the fit of the orthosis appeared to be satisfactory at this time.   P: The patient was instructed to call if any problems or questions arise.   G:  Reduce pain and motion of the lumbar spine  Miguel REBOLLEDO        "

## 2019-08-06 NOTE — PLAN OF CARE
PRIOR TO DISCHARGE     Comments: -diagnostic tests and consults completed and resulted-Yes    -vital signs normal or at patient baseline- yes    -adequate pain control on oral analgesics-  Partially met    -returns to baseline functional status-No    -safe disposition plan has been identified-No

## 2019-08-06 NOTE — DISCHARGE SUMMARY
Admit Date:     08/05/2019   Discharge Date:      8/6/2019     PRIMARY CARE PHYSICIAN:  Chandra Ortiz MD      DISCHARGE DIAGNOSES:   1.  Mechanical fall resulting in L4 vertebral fracture.   2.  Dizziness with noted history of atrial fibrillation/flutter, status post permanent pacemaker placement.   3.  Hypertension.   4.  Reported history of diastolic heart failure.   5.  Rheumatoid arthritis.   6.  Hypothyroidism.   7.  Glaucoma.      DISCHARGE MEDICATIONS:       Review of your medicines      START taking      Dose / Directions   acetaminophen 500 MG tablet  Commonly known as:  TYLENOL  Used for:  Closed fracture of fourth lumbar vertebra, unspecified fracture morphology, initial encounter (H)      Dose:  1000 mg  Take 2 tablets (1,000 mg) by mouth 3 times daily  Refills:  0     cyclobenzaprine 5 MG tablet  Commonly known as:  FLEXERIL  Used for:  Closed fracture of fourth lumbar vertebra, unspecified fracture morphology, initial encounter (H)      Dose:  2.5 mg  Take 0.5 tablets (2.5 mg) by mouth 3 times daily  Quantity:  30 tablet  Refills:  0     Lidocaine 4 % Patch  Commonly known as:  LIDOCARE  Used for:  Closed fracture of fourth lumbar vertebra, unspecified fracture morphology, initial encounter (H)      Dose:  2 patch  Place 2 patches onto the skin every 24 hours To prevent lidocaine toxicity, patient should be patch free for 12 hrs daily.  Quantity:  20 patch  Refills:  0        CONTINUE these medicines which have NOT CHANGED      Dose / Directions   apixaban ANTICOAGULANT 5 MG tablet  Commonly known as:  ELIQUIS  Used for:  Atrial fibrillation with RVR      Dose:  5 mg  Take 1 tablet (5 mg) by mouth 2 times daily  Quantity:  60 tablet  Refills:  0     calcium carbonate-vitamin D 500-200 MG-UNIT tablet  Commonly known as:  OSCAL w/D      Dose:  1 tablet  Take 1 tablet by mouth 2 times daily (with meals)  Refills:  0     carboxymethylcellulose 0.5 % Soln ophthalmic solution  Commonly known as:  REFRESH  PLUS      Dose:  1 drop  Place 1 drop into both eyes 2 times daily  Refills:  0     diltiazem  MG 24 hr capsule  Commonly known as:  DILT-XR      Dose:  120 mg  Take 120 mg by mouth every morning  Refills:  0     fluocinonide 0.05 % external solution  Commonly known as:  LIDEX      Apply topically 2 times daily To scalp  Refills:  0     FOLIC ACID PO      Dose:  1-3 mg  Take 1-3 mg by mouth daily  Refills:  0     furosemide 20 MG tablet  Commonly known as:  LASIX  Used for:  Acute on chronic diastolic congestive heart failure (H)      Dose:  20 mg  Take 1 tablet (20 mg) by mouth every morning  Quantity:  30 tablet  Refills:  0     latanoprost 0.005 % ophthalmic solution  Commonly known as:  XALATAN      Dose:  1 drop  Place 1 drop into the right eye At Bedtime  Refills:  0     levothyroxine 50 MCG tablet  Commonly known as:  SYNTHROID/LEVOTHROID      Dose:  50 mcg  Take 50 mcg by mouth daily  Refills:  0     methotrexate 2.5 MG tablet  Indication:  Rheumatoid Arthritis      Dose:  15 mg  Take 15 mg by mouth every 7 days Wednesday  (6 x 2.5 mg)  Refills:  0     metoprolol succinate ER 25 MG 24 hr tablet  Commonly known as:  TOPROL-XL      Dose:  25 mg  Take 25 mg by mouth every morning  Refills:  0     sennosides 8.6 MG tablet  Commonly known as:  SENOKOT      Dose:  2 tablet  Take 2 tablets by mouth nightly as needed for constipation  Refills:  0     timolol maleate 0.5 % ophthalmic solution  Commonly known as:  TIMOPTIC      Dose:  1 drop  Place 1 drop into the right eye 2 times daily  Refills:  0           Where to get your medicines      These medications were sent to [facility] - [place] - [address] [address]  [address] [address] [facility] [address]    Phone:  [phone]     cyclobenzaprine 5 MG tablet    Lidocaine 4 % Patch     Some of these will need a paper prescription and others can be bought over the counter. Ask your nurse if you have questions.    You don't need a  prescription for these medications    acetaminophen 500 MG tablet         ALLERGIES:    Allergies   Allergen Reactions     Amitriptyline      Clonazepam Other (See Comments)     Somnolence at 0.5 mg dose     Latex Rash       DISPOSITION:  Home.      FOLLOWUP WITH RECOMMENDATIONS:  The patient will follow up with primary care provider within 1 week for medication evaluation and hospital followup.      ACTIVITY:  As tolerated.      DIET:  Regular diet, though recommend a low-salt diet.      ADDITIONAL SERVICES:  The patient will have a referral for outpatient physical therapy.      CONSULTS:  Orthopedic Surgical Service.      LABORATORY, IMAGING AND PROCEDURES:   1.  Routine laboratory studies that included a CBC with platelet differential, basic metabolic panel, INR, PTT and urinalysis.   2.  Lumbar spine CT without contrast.   3.  Pelvic bone CT without contrast.      PENDING RESULTS:  None.      PHYSICAL EXAMINATION ON DAY OF DISCHARGE:  Please review progress note as written earlier today.      BRIEF HISTORY OF PRESENT ILLNESS:  Marley Setwart is an 87-year-old female with a past medical history significant for atrial fibrillation/flutter, status post permanent pacemaker placement for tachybrady syndrome, hypertension, rheumatoid arthritis, hypothyroidism, glaucoma and reported history of diastolic heart failure who was registered to observation following a mechanical fall resulting in L4 vertebral fracture.      HOSPITAL COURSE:   1.  L4 vertebral fracture following a mechanical fall:  This was confirmed on lumbar spine CT with anterior superior aspect and mildly displaced fracture.  The patient was evaluated by Orthopedic Surgery, who recommended nonsurgical management with continued analgesic management and a TLSO brace.  The patient's pain was managed with scheduled Tylenol 1000 mg 2 times daily, Flexeril 2.5 mg 3 times daily and lidocaine patches, which will all be ordered at time of discharge.  The patient was  also evaluated by PT, who recommended continued work with Physical Therapy and outpatient referral has been made.   2.  Dizziness:  The patient has been experiencing intermittent dizziness.  Orthostatic blood pressures were checked and noted to be positive.  I believe patient's oral Lasix could be contributing to this and recommended encouragement of oral hydration.  When orthostatics were repeated later on day of discharge, they were negative and patient no longer had any dizziness.   3.  History of atrial fibrillation/flutter, status post permanent pacemaker implantation in the setting of tachybrady syndrome:  This remains stable.  The patient's prior to admit diltiazem, metoprolol and apixaban were all continued and will be resumed at time of discharge.   4.  Essential hypertension:  The patient's prior to admit diltiazem, metoprolol and furosemide were all continued during this stay and will be resumed at time of discharge.   5.  Reported chronic diastolic heart failure:  The patient appeared euvolemic throughout this time, had no episodes or complaints of shortness of breath.  Prior to admit Lasix was continued and will be resumed at time of discharge.   6.  Rheumatoid arthritis:  The patient's prior to admit, methotrexate and folic acid were held during this stay, but will be resumed at time of discharge.   7.  Hypothyroidism:  The patient's prior to admit levothyroxine was continued during this stay and will be resumed at time of discharge.   8.  Glaucoma:  The patient's prior to admit eyedrops were continued and will be resumed at time of discharge.      CODE STATUS:  DNR/DNI.      The patient was discussed with Dr. Andres Loredo, who agrees with discharge at this time.  Dr. Loredo will evaluate the patient independently.      TOTAL DISCHARGE TIME:  Less than 30 minutes.         ANDRES LOREDO MD       As dictated by GILMER MACE PA-C            D: 08/06/2019   T: 08/06/2019   MT: BILL      Name:      GLORIA DWAINE   MRN:      [id number]        Account:        [id number]   :      1932           Admit Date:     2019                                  Discharge Date:       Document: [id number]       cc: Chandra Ortiz MD

## 2019-08-06 NOTE — H&P
[facility]    History and Physical  Hospitalist       Date of Admission:  8/5/2019  Date of Service (when I saw the patient): 08/05/19    Assessment & Plan   Marley Stewart is a 87 year old female who presents after a fall with pain    L4 vertebral fracture  Mechanical fall  Presented after having a fall.  It appears this was mechanical as her friend fell into her.  Patient does not think she had loss of consciousness.  She states she has been having a lot of dizziness for the last 6 months (see below).  With pain upon falling.  CT in the emergency department showed an acute mildly displaced fracture involving the anterior superior aspect of the L4 vertebral body.  Pain is under reasonable control.  She has chronic urinary incontinence but denies any fecal incontinence or saddle anesthesia.  -Orthopedic surgery consult  -Physical therapy when cleared by Ortho  -PRN's acetaminophen, oxycodone  -PRN morphine IV for breakthrough pain    Dizziness  Atrial fibrillation/ flutter  Patient is slightly poor historian, likely secondary to narcotics in the emergency department.  She has a long-standing history of atrial fibrillation/flutter.  For many years it appears this was cardioverted as well as some attempts to maintain normal sinus rhythm with amiodarone.  This did not work, and it appears attempts were made at rate control.  However, she developed bradycardia/tachybradycardia syndrome.  Pacemaker was placed in May of this year. Echo 9/2018 with EF 70-75%, pronounced septal and apical hypertrophy without outflow gradient, moderately increase pulm presures at 48+RA. Pt c/o dizziness when getting up  - orthostatic blood pressures  - check UA  - consider echo  - gentle IV fluids overnight  - Continue prior to admission apixaban  - Continue prior to admission diltiazem and metoprolol for now    HTN  Reported h/o diastolic HF  - continue meds as above  - hold pta furosemide 20 mg daily    RA  Hold  methotrexate while on obs    Hypothyroidism  Continue pta levothyroxine    Glaucoma  Continue pta eye gtts    DVT Prophylaxis: on eliquis  Code Status: DNR / DNI    Disposition: Expected discharge in 1-2 days    Shawn Ventura MD  [phone] (P)  Text Page     Primary Care Physician   Dr. Chandra Ortiz    Chief Complaint   Pain after fall    History is obtained from the patient and medical records    History of Present Illness   Marley Stewart is a 87 year old female who presents with pain after fall.  She has a medical history remarkable for atrial fibrillation/flutter, pacemaker placement for tachybradycardia syndrome, hypertension, RA, hypothyroidism.  She was in her usual state of health today and she went to lunch with a friend at the [employer].  While there her friend fell and hit her.  The patient then fell as well.  She is unable to determine how she fell but immediately after hitting the ground she felt pain in her back.  She subsequently came to the emergency department.  In the ER she denies fevers chills.  She denied chest pain or shortness of breath.  Denies any abdominal pain.  Occasional constipation and some mild nausea.  Pain is generally in her back area and wraps around to the front.  She denies any fecal incontinence but does have chronic urinary incontinence.  She also is complaining of dizziness.  She states over the last several months she has had problems with dizziness when she goes from sitting to standing.  She reports having to take some time before she stands up otherwise she is afraid she will pass out.  Of note, she had a pacemaker placed in April 2019 through [employer].    The emergency department blood pressures in the 150s to 180 systolic.  Heart rates in the 50s to 70s range.  O2 sats are fine.  She is afebrile.  Labs were unremarkable.  INR was 1.42.  CT scan showed an mildly displaced fracture involving her L4 vertebra.    Past Medical History    I have reviewed  this patient's medical history and updated it with pertinent information if needed.   Past Medical History:   Diagnosis Date     A-fib -- Chronic      Aortic regurgitation     1-2+ per 4/2015 Echo     Arthritis      CHF (congestive heart failure) (H)     EF 65-70% on 4/2015 Echo     CVA (cerebral vascular accident) (H)      Glaucoma      Hypertension      Mitral regurgitation     2+ per 4/2015 Echo     PUD (peptic ulcer disease)      Pulmonic valve regurgitation     1+ per 4/2015 Echo     RA (rheumatoid arthritis) (H)      Spinal stenosis      Thyroid disease      Tricuspid regurgitation     2+ per 4/2015 Echo       Past Surgical History   I have reviewed this patient's surgical history and updated it with pertinent information if needed.  Past Surgical History:   Procedure Laterality Date     ORTHOPEDIC SURGERY         Prior to Admission Medications   Prior to Admission Medications   Prescriptions Last Dose Informant Patient Reported? Taking?   FOLIC ACID PO 8/5/2019 at am Pharmacy Yes Yes   Sig: Take 1-3 mg by mouth daily    apixaban ANTICOAGULANT (ELIQUIS) 5 MG tablet 8/5/2019 at am Pharmacy No Yes   Sig: Take 1 tablet (5 mg) by mouth 2 times daily   calcium carb 1250 mg, 500 mg Nikolski,/vitamin D 200 units (OSCAL WITH D) 500-200 MG-UNIT per tablet 8/5/2019 at am Pharmacy Yes Yes   Sig: Take 1 tablet by mouth 2 times daily (with meals)   carboxymethylcellulose (REFRESH PLUS) 0.5 % SOLN 8/5/2019 at am Pharmacy Yes Yes   Sig: Place 1 drop into both eyes 2 times daily    diltiazem ER (DILT-XR) 120 MG 24 hr capsule 8/5/2019 at am Pharmacy Yes Yes   Sig: Take 120 mg by mouth every morning   fluocinonide (LIDEX) 0.05 % external solution 8/5/2019 at am  Yes Yes   Sig: Apply topically 2 times daily To scalp   furosemide (LASIX) 20 MG tablet 8/5/2019 at am Pharmacy No Yes   Sig: Take 1 tablet (20 mg) by mouth every morning   latanoprost (XALATAN) 0.005 % ophthalmic solution 8/4/2019 at  Pharmacy Yes Yes   Sig: Place 1 drop  "into the right eye At Bedtime   levothyroxine (SYNTHROID/LEVOTHROID) 50 MCG tablet 8/5/2019 at am Pharmacy Yes Yes   Sig: Take 50 mcg by mouth daily   methotrexate 2.5 MG tablet last Wed Pharmacy Yes No   Sig: Take 15 mg by mouth every 7 days Wednesday  (6 x 2.5 mg)   metoprolol succinate ER (TOPROL-XL) 25 MG 24 hr tablet 8/5/2019 at am Pharmacy Yes Yes   Sig: Take 25 mg by mouth every morning   sennosides (SENOKOT) 8.6 MG tablet prn Pharmacy Yes Yes   Sig: Take 2 tablets by mouth nightly as needed for constipation    timolol (TIMOPTIC) 0.5 % ophthalmic solution 8/5/2019 at am Pharmacy Yes Yes   Sig: Place 1 drop into the right eye 2 times daily      Facility-Administered Medications: None     Allergies   Allergies   Allergen Reactions     Amitriptyline      Clonazepam Other (See Comments)     Somnolence at 0.5 mg dose     Latex Rash       Social History   I have reviewed this patient's social history and updated it with pertinent information if needed. Marley Stewart  reports that she has never smoked. She has never used smokeless tobacco. She reports that she drinks alcohol. She reports that she does not use drugs.    Family History   I have reviewed this patient's family history and updated it with pertinent information if needed.   Family History   Problem Relation Age of Onset     Cerebrovascular Disease Mother      Cancer Father         \"Bone Cancer\"       Review of Systems   The 10 point Review of Systems is negative other than noted in the HPI or here.     Physical Exam   Temp: 96.1  F (35.6  C) Temp src: Oral BP: (!) 155/56 Pulse: 59   Resp: 20 SpO2: 95 % O2 Device: None (Room air)    Vital Signs with Ranges  150 lbs 0 oz    Constitutional: alert, oriented and in no acute distress  Eyes: EOMI, PERRL  HEENT: OP clear  Respiratory: CTA B without w/c  Cardiovascular: RRR without m/r/g  GI: soft, nontender, nondistended, no HSM  Lymph/Hematologic: no cervical LAD  Genitourinary: deferred  Skin: no rashes or " lesions grossly  Musculoskeletal: no deformities or arthritis  Neurologic: CN II-XII, CAN, sensation grossly intact  Psychiatric: mood and affect wnl    Data   Data reviewed today:  I personally reviewed the spine CT image(s) showing L4 vertebral fracture.  Recent Labs   Lab 08/05/19  1317   WBC 9.7   HGB 14.5   MCV 93      INR 1.42*      POTASSIUM 3.6   CHLORIDE 106   CO2 29   BUN 22   CR 0.93   ANIONGAP 6   LONNIE 10.2*   GLC 84       Recent Results (from the past 24 hour(s))   Lumbar spine CT w/o contrast    Narrative    CT LUMBAR SPINE WITHOUT CONTRAST  8/5/2019 2:15 PM     HISTORY: Back pain, risk factors (osteoporosis or chronic steroid use  or elderly).     TECHNIQUE: Axial images of the lumbar spine were obtained without  intravenous contrast. Multiplanar reformations were performed.   Radiation dose for this scan was reduced using automated exposure  control, adjustment of the mA and/or kV according to patient size, or  iterative reconstruction technique.     COMPARISON: Lumbar spine CT 7/23/2018.    FINDINGS:  There is an acute mildly displaced fracture predominantly  involving the superior endplate of L4, with additional fracture  component/buckling involving the anterior vertebral cortex (series 7  image 36). There is minimal associated vertebral height loss. No other  acute fractures are identified. Alignment appears unchanged, with mild  retrolisthesis of L2 on L3, and mild anterolisthesis of L3 on L4 on a  degenerative basis. There is a background of multilevel degenerative  disc disease and facet arthropathy of the lumbar spine, with most  pronounced findings at the L4-L5 level where there is a disc bulge,  moderate bilateral facet arthropathy, and focal ligamentum flavum  thickening that results in at least moderate spinal stenosis. There  are varying degrees of multilevel neural foraminal stenosis, most  pronounced on the right at the L4-L5 and to lesser degree the L3-L4  and L5-S1  levels. There is also prominent neural foraminal stenosis on  the left at L5-S1. There is mild atherosclerotic calcification  involving the abdominal aorta and iliac arteries.      Impression    IMPRESSION: Acute mildly displaced fracture involving the anterior  superior aspect of the L4 vertebral body.    BETTY BRAND MD   CT Pelvis Bone wo Contrast    Narrative    CT PELVIS WITHOUT CONTRAST   8/5/2019 2:15 PM    HISTORY: Pelvic pain after falling. Evaluate for fracture.    COMPARISON: A CT on 1/26/2019.    TECHNIQUE: CT imaging was performed through the pelvis without  contrast. Coronal reformatting was performed. Radiation dose for this  scan was reduced using automated exposure control, adjustment of the  mA and/or kV according to patient size, or iterative reconstruction  technique.     FINDINGS: There has been interval healing of the previously seen  nondisplaced fractures of the left superior pubic ramus, now with  sclerosis and callus formation adjacent to the healed fractures. There  is also now sclerosis in the central aspect of the left inferior pubic  ramus which was not seen previously consistent with a healed fracture.  No recent-appearing fracture is seen. Again seen are moderate  degenerative changes in the hips and degenerative changes in the  visualized lower lumbar spine. Vascular calcifications are again  noted. There are numerous diverticula of the distal colon with no  evidence of diverticulitis. No other soft tissue abnormality is seen.      Impression    IMPRESSION: Interval healing of previous left pubic rami fractures. No  recent-appearing fracture is demonstrated.    POOL ALBERTO MD

## 2019-08-06 NOTE — PROGRESS NOTES
[facility]    Hospitalist Progress Note    Assessment & Plan   Marley Stewart is a 87 year old female who was admitted on 8/5/2019.     Past medical history significant for atrial fibrillation/flutter, pacemaker placement for tachybradycardia syndrome, hypertension, RA, hypothyroidism, glaucoma, reported history of diastolic heart failure who was registered to observation following a mechanical fall resulting in L4 fracture.      L4 vertebral fracture  Mechanical fall  Presented after having a fall.  It appears this was mechanical as her friend fell into her.  Patient does not think she had loss of consciousness.  She states she has been having a lot of dizziness for the last 6 months (see below).  With pain upon falling.  CT in the emergency department showed an acute mildly displaced fracture involving the anterior superior aspect of the L4 vertebral body.  Pain is under reasonable control.  She has chronic urinary incontinence but denies any fecal incontinence or saddle anesthesia.  - Orthopedic surgery consult.  - Physical therapy when cleared by Ortho.  - PRN's oxycodone.  - PRN morphine IV for breakthrough pain.  - Lidoderm patch.    - Schedule APAP 1000 mg TID.    - Consider Orthotic brace TLSO but await Ortho recommendations.    - Miralax daily and Senna BID.       Dizziness  Atrial fibrillation/ flutter  Patient is slightly poor historian, likely secondary to narcotics in the emergency department.  She has a long-standing history of atrial fibrillation/flutter.  For many years it appears this was cardioverted as well as some attempts to maintain normal sinus rhythm with amiodarone.  This did not work, and it appears attempts were made at rate control.  However, she developed bradycardia/tachybradycardia syndrome.  Pacemaker was placed in May of this year. Echo 9/2018 with EF 70-75%, pronounced septal and apical hypertrophy without outflow gradient, moderately increase pulm presures at 48+RA.  Pt c/o dizziness when getting up.  UA negative.    - Orthostatic blood pressures.   --Postive this morning sittin/64 standin/78  - Consider echo.   - Gentle IV fluids overnight.  - Continue prior to admission apixaban.  - Continue prior to admission diltiazem and metoprolol.     HTN  Reported h/o diastolic HF  - Continue meds as above.  - PTA furosemide 20 mg daily.   --Consider decreasing dose to 10 mg daily.       RA  - Hold methotrexate while on obs.  - Hold Folic acid and resume at discharge.       Hypothyroidism  - Continue PTA levothyroxine.     Glaucoma  - Continue PTA eye gtts.    Diet: Regular Diet Adult     DVT Prophylaxis: Eliquis   Armstrong Catheter: not present  Code Status: DNR/DNI     Disposition Plan    Expected discharge: Today versus tomorrow, recommended to awaiting PT and Ortho consults once awaiting Ortho/PT consults and once pain is adequately managed .   Entered: Reginald So PA-C 2019, 7:23 AM          The patient has been discussed with Dr. Finch, who agrees with the assessment and plan at this time.  Dr. Finch will evaluate the patient independently.      Ean So PA-C   [phone]    Interval History   Patient was resting in bed upon arrival.  She denied fever, chills, chest pain, shortness of breath, abdominal pain.      She continues to have back pain (low) that runs across her back but not down her legs or into her groin.  Discussed pain management with the patient as well as Ortho consult and PT assessment.      Patient is concerned about urinary incontinence.  Discussed patient's attempt to stand/walk earlier with noted pain and change in blood pressure.  Reviewed potential for TCU stay.    -Data reviewed today: I reviewed all new labs and imaging results over the last 24 hours. I personally reviewed no images or EKG's today.    Physical Exam   Temp: 96.3  F (35.7  C) Temp src: Oral BP: 138/59 Pulse: 60   Resp: 18 SpO2: 94 % O2 Device: None (Room air)     Vitals:    08/05/19 1228 08/05/19 1728   Weight: 68 kg (150 lb) 68 kg (150 lb)     Vital Signs with Ranges  Temp:  [96  F (35.6  C)-97.9  F (36.6  C)] 96.3  F (35.7  C)  Pulse:  [59-74] 60  Resp:  [18-22] 18  BP: (124-185)/(53-80) 138/59  SpO2:  [94 %-98 %] 94 %  I/O last 3 completed shifts:  In: 1000 [I.V.:1000]  Out: 1000 [Urine:1000]      Constitutional: Awake, alert, cooperative, no apparent distress.    ENT: Normocephalic, without obvious abnormality, atraumatic, oral pharynx with moist mucus membranes, tonsils without erythema or exudates.  Neck: Supple, symmetrical, trachea midline, no adenopathy.  Pulmonary: No increased work of breathing, good air exchange, clear to auscultation bilaterally, no crackles or wheezing.  Cardiovascular: Regular rate and rhythm, normal S1 and S2, no S3 or S4, and no murmur noted.  GI: Normal bowel sounds, soft, non-distended, non-tender.  Skin/Integumen: Clear.  Neuro: CN II-XII grossly intact.  Psych:  Alert and oriented x 3. Normal affect.  Extremities: No lower extremity edema noted, and non-TTP bilaterally.       Medications       acetaminophen  1,000 mg Oral TID     apixaban ANTICOAGULANT  5 mg Oral BID     cyclobenzaprine  2.5 mg Oral TID     diltiazem ER  120 mg Oral QAM     furosemide  20 mg Oral QAM     latanoprost  1 drop Right Eye At Bedtime     levothyroxine  50 mcg Oral Daily     lidocaine  1-2 patch Transdermal Q24H     lidocaine   Transdermal Q8H     lidocaine   Transdermal Q24h     metoprolol succinate ER  25 mg Oral QAM     polyethylene glycol  17 g Oral Daily     senna-docusate  1 tablet Oral BID     timolol maleate  1 drop Right Eye BID       Data   Recent Labs   Lab 08/05/19  1317   WBC 9.7   HGB 14.5   MCV 93      INR 1.42*      POTASSIUM 3.6   CHLORIDE 106   CO2 29   BUN 22   CR 0.93   ANIONGAP 6   LONNIE 10.2*   GLC 84       Recent Results (from the past 24 hour(s))   Lumbar spine CT w/o contrast    Narrative    CT LUMBAR SPINE WITHOUT CONTRAST   8/5/2019 2:15 PM     HISTORY: Back pain, risk factors (osteoporosis or chronic steroid use  or elderly).     TECHNIQUE: Axial images of the lumbar spine were obtained without  intravenous contrast. Multiplanar reformations were performed.   Radiation dose for this scan was reduced using automated exposure  control, adjustment of the mA and/or kV according to patient size, or  iterative reconstruction technique.     COMPARISON: Lumbar spine CT 7/23/2018.    FINDINGS:  There is an acute mildly displaced fracture predominantly  involving the superior endplate of L4, with additional fracture  component/buckling involving the anterior vertebral cortex (series 7  image 36). There is minimal associated vertebral height loss. No other  acute fractures are identified. Alignment appears unchanged, with mild  retrolisthesis of L2 on L3, and mild anterolisthesis of L3 on L4 on a  degenerative basis. There is a background of multilevel degenerative  disc disease and facet arthropathy of the lumbar spine, with most  pronounced findings at the L4-L5 level where there is a disc bulge,  moderate bilateral facet arthropathy, and focal ligamentum flavum  thickening that results in at least moderate spinal stenosis. There  are varying degrees of multilevel neural foraminal stenosis, most  pronounced on the right at the L4-L5 and to lesser degree the L3-L4  and L5-S1 levels. There is also prominent neural foraminal stenosis on  the left at L5-S1. There is mild atherosclerotic calcification  involving the abdominal aorta and iliac arteries.      Impression    IMPRESSION: Acute mildly displaced fracture involving the anterior  superior aspect of the L4 vertebral body.    BETTY BRAND MD   CT Pelvis Bone wo Contrast    Narrative    CT PELVIS WITHOUT CONTRAST   8/5/2019 2:15 PM    HISTORY: Pelvic pain after falling. Evaluate for fracture.    COMPARISON: A CT on 1/26/2019.    TECHNIQUE: CT imaging was performed through the pelvis  without  contrast. Coronal reformatting was performed. Radiation dose for this  scan was reduced using automated exposure control, adjustment of the  mA and/or kV according to patient size, or iterative reconstruction  technique.     FINDINGS: There has been interval healing of the previously seen  nondisplaced fractures of the left superior pubic ramus, now with  sclerosis and callus formation adjacent to the healed fractures. There  is also now sclerosis in the central aspect of the left inferior pubic  ramus which was not seen previously consistent with a healed fracture.  No recent-appearing fracture is seen. Again seen are moderate  degenerative changes in the hips and degenerative changes in the  visualized lower lumbar spine. Vascular calcifications are again  noted. There are numerous diverticula of the distal colon with no  evidence of diverticulitis. No other soft tissue abnormality is seen.      Impression    IMPRESSION: Interval healing of previous left pubic rami fractures. No  recent-appearing fracture is demonstrated.    POOL ALBERTO MD

## 2019-08-06 NOTE — PLAN OF CARE
PRIOR TO DISCHARGE     Comments: -diagnostic tests and consults completed and resulted-Yes  -vital signs normal or at patient baseline-No  -adequate pain control on oral analgesics-yes  -returns to baseline functional status-No  -safe disposition plan has been identified-No    Pt rates low back pain at 6/10, decreased with PRN tylenol, CMS intact. Orthopedic consultation pending.

## 2019-08-06 NOTE — PLAN OF CARE
Pt a/o. VSS. Pain controlled. Lumbar brace done. Tolerating reg diet. Voiding. Ambulating. Victorino paper work reviewed with pt. Verbalizes understanding. PIV removed. Take home meds given. Verified 5 rights of med. All questions answered. Follow up aware. Dc home with family
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
## 2019-08-06 NOTE — PLAN OF CARE
PRIOR TO DISCHARGE     Comments: -diagnostic tests and consults completed and resulted-Yes    -vital signs normal or at patient baseline- No    -adequate pain control on oral analgesics- not met    -returns to baseline functional status-No    -safe disposition plan has been identified-No

## 2019-08-06 NOTE — PLAN OF CARE
PRIOR TO DISCHARGE     Comments: -diagnostic tests and consults completed and resulted-Yes  -vital signs normal or at patient baseline-No  -adequate pain control on oral analgesics-yes  -returns to baseline functional status-No  -safe disposition plan has been identified-No     [id number]: Vital signs stable on RA ex some hypertension. A&Ox 4. Lung sounds diminished. Tolerating regular diet. Complains of inermittent nausea, denies antiemetic. Adequate urinary output via purewick, pt states some baseline urinary incontinence. CMS intact. Has yet to get out of bed, been refusing when offered. Low back pain controlled with PRN tylenol. Slept well throughout the night. Orthopedic consult pending. Will continue to monitor.

## 2019-08-07 NOTE — PLAN OF CARE
PT:  Physical Therapy Discharge Summary    Reason for therapy discharge:    Discharged to home with outpatient therapy.    Progress towards therapy goal(s). See goals on Care Plan in [facility] electronic health record for goal details.  Goals not met.  Barriers to achieving goals:   discharge from facility and discharge on same date as initial evaluation.    Therapy recommendation(s):    Continued therapy is recommended.  Rationale/Recommendations:  OPPT for back pain.

## 2019-08-08 ENCOUNTER — HOSPITAL ENCOUNTER (EMERGENCY)
Facility: CLINIC | Age: 84
Discharge: HOME | End: 2019-08-08
Attending: EMERGENCY MEDICINE | Admitting: EMERGENCY MEDICINE
Payer: COMMERCIAL

## 2019-08-08 VITALS
WEIGHT: 150 LBS | RESPIRATION RATE: 14 BRPM | TEMPERATURE: 97.5 F | DIASTOLIC BLOOD PRESSURE: 64 MMHG | OXYGEN SATURATION: 98 % | HEIGHT: 64 IN | SYSTOLIC BLOOD PRESSURE: 162 MMHG | BODY MASS INDEX: 25.61 KG/M2

## 2019-08-08 DIAGNOSIS — S32.040S COMPRESSION FRACTURE OF L4 LUMBAR VERTEBRA, SEQUELA: ICD-10-CM

## 2019-08-08 PROCEDURE — 99283 EMERGENCY DEPT VISIT LOW MDM: CPT

## 2019-08-08 PROCEDURE — 25000132 ZZH RX MED GY IP 250 OP 250 PS 637: Performed by: EMERGENCY MEDICINE

## 2019-08-08 PROCEDURE — 25000131 ZZH RX MED GY IP 250 OP 636 PS 637: Performed by: EMERGENCY MEDICINE

## 2019-08-08 RX ORDER — ONDANSETRON 4 MG/1
4 TABLET, ORALLY DISINTEGRATING ORAL ONCE
Status: COMPLETED | OUTPATIENT
Start: 2019-08-08 | End: 2019-08-08

## 2019-08-08 RX ORDER — OXYCODONE AND ACETAMINOPHEN 5; 325 MG/1; MG/1
1 TABLET ORAL ONCE
Status: COMPLETED | OUTPATIENT
Start: 2019-08-08 | End: 2019-08-08

## 2019-08-08 RX ORDER — OXYCODONE HYDROCHLORIDE 5 MG/1
5 TABLET ORAL EVERY 6 HOURS PRN
Qty: 12 TABLET | Refills: 0 | Status: SHIPPED | OUTPATIENT
Start: 2019-08-08 | End: 2019-08-11 | Stop reason: DRUGHIGH

## 2019-08-08 RX ORDER — ONDANSETRON 4 MG/1
4 TABLET, ORALLY DISINTEGRATING ORAL EVERY 8 HOURS PRN
Qty: 10 TABLET | Refills: 0 | Status: SHIPPED | OUTPATIENT
Start: 2019-08-08 | End: 2019-08-27

## 2019-08-08 RX ADMIN — ONDANSETRON 4 MG: 4 TABLET, ORALLY DISINTEGRATING ORAL at 13:32

## 2019-08-08 RX ADMIN — OXYCODONE HYDROCHLORIDE AND ACETAMINOPHEN 1 TABLET: 5; 325 TABLET ORAL at 13:32

## 2019-08-08 ASSESSMENT — MIFFLIN-ST. JEOR: SCORE: 1092.46

## 2019-08-08 ASSESSMENT — ENCOUNTER SYMPTOMS
CHILLS: 0
FEVER: 0
BACK PAIN: 1
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
## 2019-08-08 NOTE — ED TRIAGE NOTES
Pt fell Monday, has compression fx L 4. Pt family unable to care for her at home. Pt wants to be admitted to [facility]

## 2019-08-09 ENCOUNTER — NURSING HOME VISIT (OUTPATIENT)
Dept: GERIATRICS | Facility: CLINIC | Age: 84
End: 2019-08-09
Payer: COMMERCIAL

## 2019-08-09 VITALS
BODY MASS INDEX: 27.71 KG/M2 | SYSTOLIC BLOOD PRESSURE: 128 MMHG | DIASTOLIC BLOOD PRESSURE: 70 MMHG | OXYGEN SATURATION: 96 % | WEIGHT: 150.6 LBS | TEMPERATURE: 98.5 F | HEIGHT: 62 IN | RESPIRATION RATE: 16 BRPM | HEART RATE: 67 BPM

## 2019-08-09 DIAGNOSIS — S32.049D CLOSED FRACTURE OF FOURTH LUMBAR VERTEBRA WITH ROUTINE HEALING, UNSPECIFIED FRACTURE MORPHOLOGY, SUBSEQUENT ENCOUNTER: Primary | ICD-10-CM

## 2019-08-09 DIAGNOSIS — G31.84 MILD COGNITIVE IMPAIRMENT: ICD-10-CM

## 2019-08-09 DIAGNOSIS — I50.32 CHRONIC DIASTOLIC CONGESTIVE HEART FAILURE (H): ICD-10-CM

## 2019-08-09 DIAGNOSIS — I48.20 CHRONIC ATRIAL FIBRILLATION (H): ICD-10-CM

## 2019-08-09 DIAGNOSIS — K59.01 SLOW TRANSIT CONSTIPATION: ICD-10-CM

## 2019-08-09 DIAGNOSIS — Z71.89 ADVANCED DIRECTIVES, COUNSELING/DISCUSSION: ICD-10-CM

## 2019-08-09 DIAGNOSIS — R53.81 PHYSICAL DECONDITIONING: ICD-10-CM

## 2019-08-09 DIAGNOSIS — M06.9 RHEUMATOID ARTHRITIS, INVOLVING UNSPECIFIED SITE, UNSPECIFIED RHEUMATOID FACTOR PRESENCE: ICD-10-CM

## 2019-08-09 DIAGNOSIS — Z86.73 HISTORY OF CVA (CEREBROVASCULAR ACCIDENT): ICD-10-CM

## 2019-08-09 DIAGNOSIS — I10 ESSENTIAL HYPERTENSION: ICD-10-CM

## 2019-08-09 PROCEDURE — 99310 SBSQ NF CARE HIGH MDM 45: CPT | Performed by: NURSE PRACTITIONER

## 2019-08-09 ASSESSMENT — MIFFLIN-ST. JEOR: SCORE: 1071.37

## 2019-08-09 NOTE — PROGRESS NOTES
[facility]  PRIMARY CARE PROVIDER AND CLINIC:  Chandra Ortiz MD, [facility] [address] / [facility] [address]  Chief Complaint   Patient presents with     Hospital F/U     [facility] Record Number:  [id number]  Place of Service where encounter took place:  [facility] [[id number]]    Marley Stewart  is a 87 year old  (7/17/1932), admitted to the above facility from  [facility]. Hospital stay 8/8/2019 through 8/8/2019. Admitted to this facility for  rehab, medical management and nursing care.    HPI:    HPI information obtained from: facility chart records, facility staff, patient report, [facility] chart review and family/first contact son Lukas Stewart  report.   Brief Summary of Hospital Course:   She has a medical history significant for afib with pacemaker, diastolic heart failure,  RA, history of CVA and was hospitalized 8/5-8/6/2019 after a mechanical fall resulting in a mildly displaced L4 vertebral  fracture. Ortho consulted and she was fitted for TLSO. Flexeril and lidoderm patches were started for pain and she discharged home with her . She returned to the ED 8/8/2019 with severe pain and inability to manage at home. Oxycodone was started and she discharged  to this facility for therapies and ongoing care.     Updates on Status Since Skilled nursing Admission:      Closed fracture of fourth lumbar vertebra with routine healing, unspecified fracture morphology, subsequent encounter-reports pain is severe and not relieved with tylenol. Has been reluctant to use oxycodone. No numbness or weakness of her legs.   Chronic atrial fibrillation (H)-HR: 67-71  Chronic diastolic congestive heart failure (H)-denies cough, shortness of breath or chest pain   Rheumatoid arthritis, involving unspecified site, unspecified rheumatoid factor presence (H)-denies pain other than her back   Essential hypertension-BPs: 128/70, 175/82, 136/67,  "158/71  Slow transit constipation-reports no BM for \"several days.\" Uncomfortable, but no nausea or vomiting.   History of CVA (cerebrovascular accident)  Mild cognitive impairment-fairly good historian. Conversation appropriate.   Physical deconditioning-ambulating short distances with walker and assist. Requires assist of 1 with cares.   Poor appetite and feels that food is sticking in her throat. Denies chronic troubles with chewing or swallowing.       CODE STATUS/ADVANCE DIRECTIVES DISCUSSION:   DNR / DNI  Patient's living condition: lives with spouse  ALLERGIES: Amitriptyline; Clonazepam; and Latex  PAST MEDICAL HISTORY:  has a past medical history of A-fib -- Chronic, Aortic regurgitation, Arthritis, CHF (congestive heart failure) (H), CVA (cerebral vascular accident) (H), Glaucoma, Hypertension, Mitral regurgitation, PUD (peptic ulcer disease), Pulmonic valve regurgitation, RA (rheumatoid arthritis) (H), Spinal stenosis, Thyroid disease, and Tricuspid regurgitation.  PAST SURGICAL HISTORY:   has a past surgical history that includes orthopedic surgery.  FAMILY HISTORY: family history includes Cancer in her father; Cerebrovascular Disease in her mother.  SOCIAL HISTORY:   reports that she has never smoked. She has never used smokeless tobacco. She reports that she drinks alcohol. She reports that she does not use drugs.    Post Discharge Medication Reconciliation Status: discharge medications reconciled and changed, per note/orders (see AVS)    Current Outpatient Medications   Medication Sig Dispense Refill     acetaminophen (TYLENOL) 500 MG tablet Take 2 tablets (1,000 mg) by mouth 3 times daily       apixaban ANTICOAGULANT (ELIQUIS) 5 MG tablet Take 1 tablet (5 mg) by mouth 2 times daily 60 tablet 0     bisacodyl (DULCOLAX) 10 MG suppository Place 10 mg rectally daily as needed for constipation       calcium carb 1250 mg, 500 mg [place],/vitamin D 200 units (OSCAL WITH D) 500-200 MG-UNIT per tablet Take 1 " tablet by mouth 2 times daily (with meals)       carboxymethylcellulose (REFRESH PLUS) 0.5 % SOLN Place 1 drop into both eyes 2 times daily        cyclobenzaprine (FLEXERIL) 5 MG tablet Take 0.5 tablets (2.5 mg) by mouth 3 times daily 30 tablet 0     diltiazem ER (DILT-XR) 120 MG 24 hr capsule Take 120 mg by mouth every morning       fluocinonide (LIDEX) 0.05 % external solution Apply topically 2 times daily as needed To scalp        FOLIC ACID PO Take 1 mg by mouth daily        furosemide (LASIX) 20 MG tablet Take 1 tablet (20 mg) by mouth every morning 30 tablet 0     latanoprost (XALATAN) 0.005 % ophthalmic solution Place 1 drop into the right eye At Bedtime       levothyroxine (SYNTHROID/LEVOTHROID) 50 MCG tablet Take 50 mcg by mouth daily       Lidocaine (LIDOCARE) 4 % Patch Place 2 patches onto the skin every 24 hours To prevent lidocaine toxicity, patient should be patch free for 12 hrs daily. 20 patch 0     methotrexate 2.5 MG tablet Take 15 mg by mouth every 7 days Wednesday  (6 x 2.5 mg)       metoprolol succinate ER (TOPROL-XL) 25 MG 24 hr tablet Take 25 mg by mouth every morning       ondansetron (ZOFRAN ODT) 4 MG ODT tab Take 1 tablet (4 mg) by mouth every 8 hours as needed for nausea 10 tablet 0     oxyCODONE (ROXICODONE) 5 MG tablet Take 5 mg by mouth every 6 hours as needed for severe pain Plus daily dose with breakfast       polyethylene glycol (MIRALAX/GLYCOLAX) packet Take 17 g by mouth daily       senna-docusate (SENOKOT-S/PERICOLACE) 8.6-50 MG tablet Take 1 tablet by mouth 2 times daily       timolol (TIMOPTIC) 0.5 % ophthalmic solution Place 1 drop into the right eye 2 times daily         ROS:  10 point ROS of systems including Constitutional, Eyes, Respiratory, Cardiovascular, Gastroenterology, Genitourinary, Integumentary, Musculoskeletal, Psychiatric were all negative except for pertinent positives noted in my HPI.    Vitals:  /70   Pulse 67   Temp 98.5  F (36.9  C)   Resp 16   Ht  "1.575 m (5' 2\")   Wt 68.3 kg (150 lb 9.6 oz)   SpO2 96%   BMI 27.55 kg/m    Exam:  GENERAL APPEARANCE:  Alert, in pain   ENT:  Mouth and posterior oropharynx normal, moist mucous membranes, [place]  EYES:  EOM normal, conjunctiva and lids normal, PERRL  NECK:  No adenopathy,masses or thyromegaly  RESP:  respiratory effort and palpation of chest normal, lungs clear to auscultation , no respiratory distress  CV:  Palpation and auscultation of heart done , regular rate and rhythm, no murmur, rub, or gallop, no edema, +2 pedal pulses  ABDOMEN:  normal bowel sounds, soft, nontender, no hepatosplenomegaly or other masses  M/S:   in bed. CAN with good strength. No joint inflammation   SKIN:  no rashes or open areas  PSYCH:  oriented X 3, memory impaired , affect and mood normal    Lab/Diagnostic data:  Lab Results   Component Value Date    WBC 9.7 08/05/2019     Lab Results   Component Value Date    RBC 4.44 08/05/2019     Lab Results   Component Value Date    HGB 14.5 08/05/2019     Lab Results   Component Value Date    HCT 41.1 08/05/2019     Lab Results   Component Value Date    MCV 93 08/05/2019     Lab Results   Component Value Date    MCH 32.7 08/05/2019     Lab Results   Component Value Date    MCHC 35.3 08/05/2019     Lab Results   Component Value Date    RDW 14.5 08/05/2019     Lab Results   Component Value Date     08/05/2019     Last Comprehensive Metabolic Panel:  Sodium   Date Value Ref Range Status   08/05/2019 141 133 - 144 mmol/L Final     Potassium   Date Value Ref Range Status   08/05/2019 3.6 3.4 - 5.3 mmol/L Final     Chloride   Date Value Ref Range Status   08/05/2019 106 94 - 109 mmol/L Final     Carbon Dioxide   Date Value Ref Range Status   08/05/2019 29 20 - 32 mmol/L Final     Anion Gap   Date Value Ref Range Status   08/05/2019 6 3 - 14 mmol/L Final     Glucose   Date Value Ref Range Status   08/05/2019 84 70 - 99 mg/dL Final     Urea Nitrogen   Date Value Ref Range Status   08/05/2019 22 7 " - 30 mg/dL Final     Creatinine   Date Value Ref Range Status   08/05/2019 0.93 0.52 - 1.04 mg/dL Final     GFR Estimate   Date Value Ref Range Status   08/05/2019 55 (L) >60 mL/min/[1.73_m2] Final     Comment:     Non  GFR Calc  Starting 12/18/2018, serum creatinine based estimated GFR (eGFR) will be   calculated using the Chronic Kidney Disease Epidemiology Collaboration   (CKD-EPI) equation.       Calcium   Date Value Ref Range Status   08/05/2019 10.2 (H) 8.5 - 10.1 mg/dL Final     ASSESSMENT / PLAN:  (S32.049D) Closed fracture of fourth lumbar vertebra with routine healing, unspecified fracture morphology, subsequent encounter  (primary encounter diagnosis)  Comment: pain not optimally controlled. With encouragement from her son, she agrees to a scheduled daily dose of oxycodone to help get through therapies.   Plan: oxycodone 5 mg with breakfast and prn. Continue tylenol, lidoderm patches, flexeril. Closely monitor for side effects from flexeril and oxycodone. Bowel regimen as below.     (I48.2) Chronic atrial fibrillation (H)  Comment: rate controlled  Plan: continue diltiazem, metoprolol, Eliquis.     (I50.32) Chronic diastolic congestive heart failure (H)  Comment: appears compensated   Plan: continue lasix, metoprolol, diltiazem. Daily weight. Follow BMP .    (M06.9) Rheumatoid arthritis, involving unspecified site, unspecified rheumatoid factor presence (H)  Comment: no signs of acute flare  Plan: continue methotrexate and folic acid. Rheumatology follow up per usual schedule.     (I10) Essential hypertension  Comment: controlled. Pain likely contributing to occasional elevated reading.   Plan: continue diltiazem, lasix, metoprolol. Monitor VS.     (K59.01) Slow transit constipation  Comment: acute on chronic. No s/s of obstruction   Plan: schedule senna bid, miralax daily. Suppository today and prn. Fleet enema if no results with supp.   Discussed with nurse.     (Z86.73) History of CVA  (cerebrovascular accident)  (G31.84) Mild cognitive impairment  Comment: appears to have mild deficits with conversation   Plan: cognitive testing per therapies.     (R53.81) Physical deconditioning  Comment: due to acute injury and multiple comorbidities   Plan: PHYSICAL THERAPY/OT. Goal is to return home with her  and home care services. SPEECH THERAPY to eval for possible swallow issues.     (Z71.89) Advanced directives, counseling/discussion  Comment: she has a healthcare  directive and confirms DNR/DNI. Son is in agreement.   Plan: POLST completed and orders updated.               Total time spent with patient visit at the skilled nursing facility was 38 mins including patient visit, review of past records and discussion with son . Greater than 50% of total time spent with counseling and coordinating care due to coordinating care with facility staff  on admission orders, coordinating care with follow up labs and appointments and counseling patient/resident and family for 20 minutes on: the reason for  hospitalization and the treatment,  the plan of care and projected length of SNF stay, current medications (treatments) reconciled from the hospital, recent lab and imaging results and subsequent treatment plan and current pain control plan and controlled substances ordered and taper plan of care. Counseling patient and son re: advanced directives and completing POLST.     Electronically signed by:  HUEY Mayo CNP

## 2019-08-09 NOTE — LETTER
8/9/2019        RE: Marley Stewart  [address]  [place] [address]        [facility]  PRIMARY CARE PROVIDER AND CLINIC:  Chandra Ortiz MD, [facility] [address] / [facility] [address]  Chief Complaint   Patient presents with     Hospital F/U     Normal Medical Record Number:  [id number]  Place of Service where encounter took place:  [facility] [[id number]]    Marley Stewart  is a 87 year old  (7/17/1932), admitted to the above facility from  [facility]. Hospital stay 8/8/2019 through 8/8/2019. Admitted to this facility for  rehab, medical management and nursing care.    HPI:    HPI information obtained from: facility chart records, facility staff, patient report, [facility] chart review and family/first contact son Lukas Stewart  report.   Brief Summary of Hospital Course:   She has a medical history significant for afib with pacemaker, diastolic heart failure,  RA, history of CVA and was hospitalized 8/5-8/6/2019 after a mechanical fall resulting in a mildly displaced L4 vertebral  fracture. Ortho consulted and she was fitted for TLSO. Flexeril and lidoderm patches were started for pain and she discharged home with her . She returned to the ED 8/8/2019 with severe pain and inability to manage at home. Oxycodone was started and she discharged  to this facility for therapies and ongoing care.     Updates on Status Since Skilled nursing Admission:      Closed fracture of fourth lumbar vertebra with routine healing, unspecified fracture morphology, subsequent encounter-reports pain is severe and not relieved with tylenol. Has been reluctant to use oxycodone. No numbness or weakness of her legs.   Chronic atrial fibrillation (H)-HR: 67-71  Chronic diastolic congestive heart failure (H)-denies cough, shortness of breath or chest pain   Rheumatoid arthritis, involving unspecified site, unspecified rheumatoid factor presence (H)-denies  "pain other than her back   Essential hypertension-BPs: 128/70, 175/82, 136/67, 158/71  Slow transit constipation-reports no BM for \"several days.\" Uncomfortable, but no nausea or vomiting.   History of CVA (cerebrovascular accident)  Mild cognitive impairment-fairly good historian. Conversation appropriate.   Physical deconditioning-ambulating short distances with walker and assist. Requires assist of 1 with cares.   Poor appetite and feels that food is sticking in her throat. Denies chronic troubles with chewing or swallowing.       CODE STATUS/ADVANCE DIRECTIVES DISCUSSION:   DNR / DNI  Patient's living condition: lives with spouse  ALLERGIES: Amitriptyline; Clonazepam; and Latex  PAST MEDICAL HISTORY:  has a past medical history of A-fib -- Chronic, Aortic regurgitation, Arthritis, CHF (congestive heart failure) (H), CVA (cerebral vascular accident) (H), Glaucoma, Hypertension, Mitral regurgitation, PUD (peptic ulcer disease), Pulmonic valve regurgitation, RA (rheumatoid arthritis) (H), Spinal stenosis, Thyroid disease, and Tricuspid regurgitation.  PAST SURGICAL HISTORY:   has a past surgical history that includes orthopedic surgery.  FAMILY HISTORY: family history includes Cancer in her father; Cerebrovascular Disease in her mother.  SOCIAL HISTORY:   reports that she has never smoked. She has never used smokeless tobacco. She reports that she drinks alcohol. She reports that she does not use drugs.    Post Discharge Medication Reconciliation Status: discharge medications reconciled and changed, per note/orders (see AVS)    Current Outpatient Medications   Medication Sig Dispense Refill     acetaminophen (TYLENOL) 500 MG tablet Take 2 tablets (1,000 mg) by mouth 3 times daily       apixaban ANTICOAGULANT (ELIQUIS) 5 MG tablet Take 1 tablet (5 mg) by mouth 2 times daily 60 tablet 0     bisacodyl (DULCOLAX) 10 MG suppository Place 10 mg rectally daily as needed for constipation       calcium carb 1250 mg, 500 mg " [place],/vitamin D 200 units (OSCAL WITH D) 500-200 MG-UNIT per tablet Take 1 tablet by mouth 2 times daily (with meals)       carboxymethylcellulose (REFRESH PLUS) 0.5 % SOLN Place 1 drop into both eyes 2 times daily        cyclobenzaprine (FLEXERIL) 5 MG tablet Take 0.5 tablets (2.5 mg) by mouth 3 times daily 30 tablet 0     diltiazem ER (DILT-XR) 120 MG 24 hr capsule Take 120 mg by mouth every morning       fluocinonide (LIDEX) 0.05 % external solution Apply topically 2 times daily as needed To scalp        FOLIC ACID PO Take 1 mg by mouth daily        furosemide (LASIX) 20 MG tablet Take 1 tablet (20 mg) by mouth every morning 30 tablet 0     latanoprost (XALATAN) 0.005 % ophthalmic solution Place 1 drop into the right eye At Bedtime       levothyroxine (SYNTHROID/LEVOTHROID) 50 MCG tablet Take 50 mcg by mouth daily       Lidocaine (LIDOCARE) 4 % Patch Place 2 patches onto the skin every 24 hours To prevent lidocaine toxicity, patient should be patch free for 12 hrs daily. 20 patch 0     methotrexate 2.5 MG tablet Take 15 mg by mouth every 7 days Wednesday  (6 x 2.5 mg)       metoprolol succinate ER (TOPROL-XL) 25 MG 24 hr tablet Take 25 mg by mouth every morning       ondansetron (ZOFRAN ODT) 4 MG ODT tab Take 1 tablet (4 mg) by mouth every 8 hours as needed for nausea 10 tablet 0     oxyCODONE (ROXICODONE) 5 MG tablet Take 5 mg by mouth every 6 hours as needed for severe pain Plus daily dose with breakfast       polyethylene glycol (MIRALAX/GLYCOLAX) packet Take 17 g by mouth daily       senna-docusate (SENOKOT-S/PERICOLACE) 8.6-50 MG tablet Take 1 tablet by mouth 2 times daily       timolol (TIMOPTIC) 0.5 % ophthalmic solution Place 1 drop into the right eye 2 times daily         ROS:  10 point ROS of systems including Constitutional, Eyes, Respiratory, Cardiovascular, Gastroenterology, Genitourinary, Integumentary, Musculoskeletal, Psychiatric were all negative except for pertinent positives noted in my  "HPI.    Vitals:  /70   Pulse 67   Temp 98.5  F (36.9  C)   Resp 16   Ht 1.575 m (5' 2\")   Wt 68.3 kg (150 lb 9.6 oz)   SpO2 96%   BMI 27.55 kg/m     Exam:  GENERAL APPEARANCE:  Alert, in pain   ENT:  Mouth and posterior oropharynx normal, moist mucous membranes, [place]  EYES:  EOM normal, conjunctiva and lids normal, PERRL  NECK:  No adenopathy,masses or thyromegaly  RESP:  respiratory effort and palpation of chest normal, lungs clear to auscultation , no respiratory distress  CV:  Palpation and auscultation of heart done , regular rate and rhythm, no murmur, rub, or gallop, no edema, +2 pedal pulses  ABDOMEN:  normal bowel sounds, soft, nontender, no hepatosplenomegaly or other masses  M/S:   in bed. CAN with good strength. No joint inflammation   SKIN:  no rashes or open areas  PSYCH:  oriented X 3, memory impaired , affect and mood normal    Lab/Diagnostic data:  Lab Results   Component Value Date    WBC 9.7 08/05/2019     Lab Results   Component Value Date    RBC 4.44 08/05/2019     Lab Results   Component Value Date    HGB 14.5 08/05/2019     Lab Results   Component Value Date    HCT 41.1 08/05/2019     Lab Results   Component Value Date    MCV 93 08/05/2019     Lab Results   Component Value Date    MCH 32.7 08/05/2019     Lab Results   Component Value Date    MCHC 35.3 08/05/2019     Lab Results   Component Value Date    RDW 14.5 08/05/2019     Lab Results   Component Value Date     08/05/2019     Last Comprehensive Metabolic Panel:  Sodium   Date Value Ref Range Status   08/05/2019 141 133 - 144 mmol/L Final     Potassium   Date Value Ref Range Status   08/05/2019 3.6 3.4 - 5.3 mmol/L Final     Chloride   Date Value Ref Range Status   08/05/2019 106 94 - 109 mmol/L Final     Carbon Dioxide   Date Value Ref Range Status   08/05/2019 29 20 - 32 mmol/L Final     Anion Gap   Date Value Ref Range Status   08/05/2019 6 3 - 14 mmol/L Final     Glucose   Date Value Ref Range Status   08/05/2019 84 70 " - 99 mg/dL Final     Urea Nitrogen   Date Value Ref Range Status   08/05/2019 22 7 - 30 mg/dL Final     Creatinine   Date Value Ref Range Status   08/05/2019 0.93 0.52 - 1.04 mg/dL Final     GFR Estimate   Date Value Ref Range Status   08/05/2019 55 (L) >60 mL/min/[1.73_m2] Final     Comment:     Non  GFR Calc  Starting 12/18/2018, serum creatinine based estimated GFR (eGFR) will be   calculated using the Chronic Kidney Disease Epidemiology Collaboration   (CKD-EPI) equation.       Calcium   Date Value Ref Range Status   08/05/2019 10.2 (H) 8.5 - 10.1 mg/dL Final     ASSESSMENT / PLAN:  (S32.049D) Closed fracture of fourth lumbar vertebra with routine healing, unspecified fracture morphology, subsequent encounter  (primary encounter diagnosis)  Comment: pain not optimally controlled. With encouragement from her son, she agrees to a scheduled daily dose of oxycodone to help get through therapies.   Plan: oxycodone 5 mg with breakfast and prn. Continue tylenol, lidoderm patches, flexeril. Closely monitor for side effects from flexeril and oxycodone. Bowel regimen as below.     (I48.2) Chronic atrial fibrillation (H)  Comment: rate controlled  Plan: continue diltiazem, metoprolol, Eliquis.     (I50.32) Chronic diastolic congestive heart failure (H)  Comment: appears compensated   Plan: continue lasix, metoprolol, diltiazem. Daily weight. Follow BMP .    (M06.9) Rheumatoid arthritis, involving unspecified site, unspecified rheumatoid factor presence (H)  Comment: no signs of acute flare  Plan: continue methotrexate and folic acid. Rheumatology follow up per usual schedule.     (I10) Essential hypertension  Comment: controlled. Pain likely contributing to occasional elevated reading.   Plan: continue diltiazem, lasix, metoprolol. Monitor VS.     (K59.01) Slow transit constipation  Comment: acute on chronic. No s/s of obstruction   Plan: schedule senna bid, miralax daily. Suppository today and prn. Fleet  enema if no results with supp.   Discussed with nurse.     (Z86.73) History of CVA (cerebrovascular accident)  (G31.84) Mild cognitive impairment  Comment: appears to have mild deficits with conversation   Plan: cognitive testing per therapies.     (R53.81) Physical deconditioning  Comment: due to acute injury and multiple comorbidities   Plan: PHYSICAL THERAPY/OT. Goal is to return home with her  and home care services. SPEECH THERAPY to eval for possible swallow issues.     (Z71.89) Advanced directives, counseling/discussion  Comment: she has a healthcare  directive and confirms DNR/DNI. Son is in agreement.   Plan: POLST completed and orders updated.               Total time spent with patient visit at the skilled nursing facility was 38 mins including patient visit, review of past records and discussion with son . Greater than 50% of total time spent with counseling and coordinating care due to coordinating care with facility staff  on admission orders, coordinating care with follow up labs and appointments and counseling patient/resident and family for 20 minutes on: the reason for  hospitalization and the treatment,  the plan of care and projected length of SNF stay, current medications (treatments) reconciled from the hospital, recent lab and imaging results and subsequent treatment plan and current pain control plan and controlled substances ordered and taper plan of care. Counseling patient and son re: advanced directives and completing POLST.     Electronically signed by:  HUEY Mayo CNP                         Sincerely,        HUEY Mayo CNP

## 2019-08-11 RX ORDER — OXYCODONE HYDROCHLORIDE 5 MG/1
5 TABLET ORAL EVERY 6 HOURS PRN
COMMUNITY
End: 2022-06-14

## 2019-08-11 RX ORDER — BISACODYL 10 MG
10 SUPPOSITORY, RECTAL RECTAL DAILY PRN
COMMUNITY
End: 2019-08-27

## 2019-08-11 RX ORDER — POLYETHYLENE GLYCOL 3350 17 G/17G
17 POWDER, FOR SOLUTION ORAL DAILY
COMMUNITY
End: 2022-06-14

## 2019-08-11 RX ORDER — AMOXICILLIN 250 MG
1 CAPSULE ORAL 2 TIMES DAILY
COMMUNITY
End: 2022-06-14

## 2019-08-12 ENCOUNTER — NURSING HOME VISIT (OUTPATIENT)
Dept: GERIATRICS | Facility: CLINIC | Age: 84
End: 2019-08-12
Payer: COMMERCIAL

## 2019-08-12 VITALS
BODY MASS INDEX: 27.79 KG/M2 | OXYGEN SATURATION: 97 % | HEIGHT: 62 IN | RESPIRATION RATE: 18 BRPM | TEMPERATURE: 97.6 F | SYSTOLIC BLOOD PRESSURE: 138 MMHG | HEART RATE: 63 BPM | DIASTOLIC BLOOD PRESSURE: 71 MMHG | WEIGHT: 151 LBS

## 2019-08-12 DIAGNOSIS — E03.9 HYPOTHYROIDISM, UNSPECIFIED TYPE: ICD-10-CM

## 2019-08-12 DIAGNOSIS — Z79.01 CHRONIC ANTICOAGULATION: ICD-10-CM

## 2019-08-12 DIAGNOSIS — I48.0 PAROXYSMAL ATRIAL FIBRILLATION (H): ICD-10-CM

## 2019-08-12 DIAGNOSIS — I49.5 TACHY-BRADY SYNDROME (H): ICD-10-CM

## 2019-08-12 DIAGNOSIS — M06.9 RHEUMATOID ARTHRITIS, INVOLVING UNSPECIFIED SITE, UNSPECIFIED RHEUMATOID FACTOR PRESENCE: ICD-10-CM

## 2019-08-12 DIAGNOSIS — S32.049D CLOSED FRACTURE OF FOURTH LUMBAR VERTEBRA WITH ROUTINE HEALING, UNSPECIFIED FRACTURE MORPHOLOGY, SUBSEQUENT ENCOUNTER: Primary | ICD-10-CM

## 2019-08-12 DIAGNOSIS — I10 ESSENTIAL HYPERTENSION: ICD-10-CM

## 2019-08-12 DIAGNOSIS — Z86.73 HISTORY OF CVA (CEREBROVASCULAR ACCIDENT): ICD-10-CM

## 2019-08-12 DIAGNOSIS — G31.84 MILD COGNITIVE IMPAIRMENT: ICD-10-CM

## 2019-08-12 DIAGNOSIS — Z95.0 CARDIAC PACEMAKER IN SITU: ICD-10-CM

## 2019-08-12 DIAGNOSIS — R53.81 PHYSICAL DECONDITIONING: ICD-10-CM

## 2019-08-12 DIAGNOSIS — I50.32 CHRONIC DIASTOLIC HEART FAILURE (H): ICD-10-CM

## 2019-08-12 PROCEDURE — 99306 1ST NF CARE HIGH MDM 50: CPT | Performed by: INTERNAL MEDICINE

## 2019-08-12 ASSESSMENT — MIFFLIN-ST. JEOR: SCORE: 1073.18

## 2019-08-12 NOTE — LETTER
2019        RE: Marley Stewart  [address]  [place] [address]        [facility]  INITIAL VISIT NOTE  2019    PRIMARY CARE PROVIDER AND CLINIC:  Chandra Ortiz [facility] [address] / [facility] [address]    Chief Complaint   Patient presents with     Hospital F/U       HPI:    Marley Stewart is a 87 year old  (1932) female who was seen at [facility] on 2019 for an initial visit. Medical history is notable for mild memory impairment, atrial fibrillation/flutter,  tachybradycardia syndrome, status post permanent pacemaker, chronic diastolic heart failure, CVA, rheumatoid arthritis, hypertension, dyslipidemia, glaucoma,hypothyroidism, spinal stenosis, osteoarthritis, and history of Lyme disease.    The patient was hospitalized at [facility] from  through 2019 for mechanical fall resulting in L4 vertebral fracture.    She is admitted to this facility for medical management and rehab.    She continues to have low back pain but she is more ambulatory with PT/OT.  She reports a small bowel movement.  She denies fever, chills, chest pain, dyspnea, nausea, vomiting, abdominal pain or other complaints.    CODE STATUS:   DNR / DNI    PAST MEDICAL HISTORY:   Past Medical History:   Diagnosis Date    Memory impairment, mild      A-fib -- Chronic      Aortic regurgitation     1-2+ per 2015 Echo     Arthritis      CHF (congestive heart failure) (H)     EF 65-70% on 2015 Echo     CVA (cerebral vascular accident) (H)      Glaucoma      Hypertension      Mitral regurgitation     2+ per 2015 Echo     PUD (peptic ulcer disease)      Pulmonic valve regurgitation     1+ per 2015 Echo     RA (rheumatoid arthritis) (H)      Spinal stenosis      Thyroid disease      Tricuspid regurgitation     2+ per 2015 Echo       PAST SURGICAL HISTORY:   Left femur fracture ORIF   x2  [place] teeth extraction  Cataract  "removal  D&C  Bilateral knee replacement  Bunion and hammertoe repair on the left  Surgery for tibia-fibula fractures  Trabeculectomy on the right in 05/2012.   YAG capsulotomy in 05/2013 on the right.     FAMILY HISTORY:   Family History   Problem Relation Age of Onset     Cerebrovascular Disease Mother      Cancer Father         \"Bone Cancer\"       SOCIAL HISTORY:   The patient is  and lives with her .  She does not smoke or drink.  She does use a cane or a walker for ambulation.    MEDICATIONS:  Current Outpatient Medications   Medication Sig Dispense Refill     acetaminophen (TYLENOL) 500 MG tablet Take 2 tablets (1,000 mg) by mouth 3 times daily       apixaban ANTICOAGULANT (ELIQUIS) 5 MG tablet Take 1 tablet (5 mg) by mouth 2 times daily 60 tablet 0     bisacodyl (DULCOLAX) 10 MG suppository Place 10 mg rectally daily as needed for constipation       calcium carb 1250 mg, 500 mg [place],/vitamin D 200 units (OSCAL WITH D) 500-200 MG-UNIT per tablet Take 1 tablet by mouth 2 times daily (with meals)       carboxymethylcellulose (REFRESH PLUS) 0.5 % SOLN Place 1 drop into both eyes 2 times daily        cyclobenzaprine (FLEXERIL) 5 MG tablet Take 0.5 tablets (2.5 mg) by mouth 3 times daily 30 tablet 0     diltiazem ER (DILT-XR) 120 MG 24 hr capsule Take 120 mg by mouth every morning       fluocinonide (LIDEX) 0.05 % external solution Apply topically 2 times daily as needed To scalp        FOLIC ACID PO Take 1 mg by mouth daily        furosemide (LASIX) 20 MG tablet Take 1 tablet (20 mg) by mouth every morning 30 tablet 0     latanoprost (XALATAN) 0.005 % ophthalmic solution Place 1 drop into the right eye At Bedtime       levothyroxine (SYNTHROID/LEVOTHROID) 50 MCG tablet Take 50 mcg by mouth daily       Lidocaine (LIDOCARE) 4 % Patch Place 2 patches onto the skin every 24 hours To prevent lidocaine toxicity, patient should be patch free for 12 hrs daily. 20 patch 0     methotrexate 2.5 MG tablet Take " "15 mg by mouth every 7 days Wednesday  (6 x 2.5 mg)       metoprolol succinate ER (TOPROL-XL) 25 MG 24 hr tablet Take 25 mg by mouth every morning       ondansetron (ZOFRAN ODT) 4 MG ODT tab Take 1 tablet (4 mg) by mouth every 8 hours as needed for nausea 10 tablet 0     oxyCODONE (ROXICODONE) 5 MG tablet Take 5 mg by mouth every 6 hours as needed for severe pain Plus daily dose with breakfast       polyethylene glycol (MIRALAX/GLYCOLAX) packet Take 17 g by mouth daily       senna-docusate (SENOKOT-S/PERICOLACE) 8.6-50 MG tablet Take 1 tablet by mouth 2 times daily       timolol (TIMOPTIC) 0.5 % ophthalmic solution Place 1 drop into the right eye 2 times daily         ALLERGIES:  Allergies   Allergen Reactions     Amitriptyline      Clonazepam Other (See Comments)     Somnolence at 0.5 mg dose     Latex Rash       ROS:  10 point ROS neg other than the symptoms noted above in the HPI.    PHYSICAL EXAM:  /71   Pulse 63   Temp 97.6  F (36.4  C)   Resp 18   Ht 1.575 m (5' 2\")   Wt 68.5 kg (151 lb)   SpO2 97%   BMI 27.62 kg/m     Gen: Sitting in bed, alert, cooperative and in no acute distress  HEENT: Normocephalic; oropharynx clear  Card: RRR, S1, S2, no murmurs  Resp: Lungs clear to auscultation bilaterally  GI: Abdomen soft, not-tender, non-distended, +BS  MSK: Normal muscle tone, no LE edema  Neuro: CX II-XII grossly in tact; ROM in all four extremities grossly in tact  Psych: Alert and oriented x2-3; normal affect    LABORATORY/IMAGING DATA:  Lab Results   Component Value Date    WBC 9.7 08/05/2019     Lab Results   Component Value Date    RBC 4.44 08/05/2019     Lab Results   Component Value Date    HGB 14.5 08/05/2019     Lab Results   Component Value Date    HCT 41.1 08/05/2019     Lab Results   Component Value Date    MCV 93 08/05/2019     Lab Results   Component Value Date    MCH 32.7 08/05/2019     Lab Results   Component Value Date    MCHC 35.3 08/05/2019     Lab Results   Component Value Date    " RDW 14.5 08/05/2019     Lab Results   Component Value Date     08/05/2019     Last Comprehensive Metabolic Panel:  Sodium   Date Value Ref Range Status   08/05/2019 141 133 - 144 mmol/L Final     Potassium   Date Value Ref Range Status   08/05/2019 3.6 3.4 - 5.3 mmol/L Final     Chloride   Date Value Ref Range Status   08/05/2019 106 94 - 109 mmol/L Final     Carbon Dioxide   Date Value Ref Range Status   08/05/2019 29 20 - 32 mmol/L Final     Anion Gap   Date Value Ref Range Status   08/05/2019 6 3 - 14 mmol/L Final     Glucose   Date Value Ref Range Status   08/05/2019 84 70 - 99 mg/dL Final     Urea Nitrogen   Date Value Ref Range Status   08/05/2019 22 7 - 30 mg/dL Final     Creatinine   Date Value Ref Range Status   08/05/2019 0.93 0.52 - 1.04 mg/dL Final     GFR Estimate   Date Value Ref Range Status   08/05/2019 55 (L) >60 mL/min/[1.73_m2] Final     Comment:     Non  GFR Calc  Starting 12/18/2018, serum creatinine based estimated GFR (eGFR) will be   calculated using the Chronic Kidney Disease Epidemiology Collaboration   (CKD-EPI) equation.       Calcium   Date Value Ref Range Status   08/05/2019 10.2 (H) 8.5 - 10.1 mg/dL Final       ASSESSMENT/PLAN:  Mechanical fall with acute mildly displaced L4 vertebral body fracture,  Physical deconditioning.  Continue with scheduled acetaminophen 1000 mg p.o. 3 times daily as well as Flexeril 2.5 mg p.o. 3 times daily and as needed oxycodone.  Continue with PT/OT.    Mild cognitive impairment (chronic).  Patient appears to have mild memory impairment at baseline, which according to family it is chronic.  Continue cognitive testing per therapies.     History of CVA (cerebrovascular accident).  Continue prior to admission anticoagulation therapy with apixaban.    Tachybradycardia syndrome status post permanent pacemaker in May 2019,  History of atrial fibrillation/flutter,  Chronic diastolic heart failure, (LV ejection fraction 70 to 75% in  September 2018),  Essential hypertension,  Patient is stable from cardiac standpoint and blood pressure is well controlled.  She will continue with prior to admission diltiazem  mg p.o. daily, furosemide 20 mg p.o. daily, metoprolol XL 25 mg p.o. daily, and chronic anticoagulation therapy with apixaban.    Hypothyroidism.  Chronic.  She will continue with prior to admission levothyroxine.    Glaucoma.  She will continue with prior to admission ophthalmic drops.    Rheumatoid arthritis.  Chronic and is stable.  She will continue with prior to admission weekly methotrexate and daily folate.      Electronically signed by:  Ron Stafford MD                      Sincerely,        Ron Stafford MD

## 2019-08-12 NOTE — PROGRESS NOTES
[facility]  INITIAL VISIT NOTE  2019    PRIMARY CARE PROVIDER AND CLINIC:  Chandra Ortiz [facility] [address] / [facility] [address]    Chief Complaint   Patient presents with     Hospital F/U       HPI:    Marley Stewart is a 87 year old  (1932) female who was seen at [facility] on 2019 for an initial visit. Medical history is notable for mild memory impairment, atrial fibrillation/flutter,  tachybradycardia syndrome, status post permanent pacemaker, chronic diastolic heart failure, CVA, rheumatoid arthritis, hypertension, dyslipidemia, glaucoma,hypothyroidism, spinal stenosis, osteoarthritis, and history of Lyme disease.    The patient was hospitalized at [facility] from  through 2019 for mechanical fall resulting in L4 vertebral fracture.    She is admitted to this facility for medical management and rehab.    She continues to have low back pain but she is more ambulatory with PT/OT.  She reports a small bowel movement.  She denies fever, chills, chest pain, dyspnea, nausea, vomiting, abdominal pain or other complaints.    CODE STATUS:   DNR / DNI    PAST MEDICAL HISTORY:   Past Medical History:   Diagnosis Date    Memory impairment, mild      A-fib -- Chronic      Aortic regurgitation     1-2+ per 2015 Echo     Arthritis      CHF (congestive heart failure) (H)     EF 65-70% on 2015 Echo     CVA (cerebral vascular accident) (H)      Glaucoma      Hypertension      Mitral regurgitation     2+ per 2015 Echo     PUD (peptic ulcer disease)      Pulmonic valve regurgitation     1+ per 2015 Echo     RA (rheumatoid arthritis) (H)      Spinal stenosis      Thyroid disease      Tricuspid regurgitation     2+ per 2015 Echo       PAST SURGICAL HISTORY:   Left femur fracture ORIF   x2  [place] teeth extraction  Cataract removal  D&C  Bilateral knee replacement  Bunion and hammertoe repair on the left  Surgery for  "tibia-fibula fractures  Trabeculectomy on the right in 05/2012.   YAG capsulotomy in 05/2013 on the right.     FAMILY HISTORY:   Family History   Problem Relation Age of Onset     Cerebrovascular Disease Mother      Cancer Father         \"Bone Cancer\"       SOCIAL HISTORY:   The patient is  and lives with her .  She does not smoke or drink.  She does use a cane or a walker for ambulation.    MEDICATIONS:  Current Outpatient Medications   Medication Sig Dispense Refill     acetaminophen (TYLENOL) 500 MG tablet Take 2 tablets (1,000 mg) by mouth 3 times daily       apixaban ANTICOAGULANT (ELIQUIS) 5 MG tablet Take 1 tablet (5 mg) by mouth 2 times daily 60 tablet 0     bisacodyl (DULCOLAX) 10 MG suppository Place 10 mg rectally daily as needed for constipation       calcium carb 1250 mg, 500 mg [place],/vitamin D 200 units (OSCAL WITH D) 500-200 MG-UNIT per tablet Take 1 tablet by mouth 2 times daily (with meals)       carboxymethylcellulose (REFRESH PLUS) 0.5 % SOLN Place 1 drop into both eyes 2 times daily        cyclobenzaprine (FLEXERIL) 5 MG tablet Take 0.5 tablets (2.5 mg) by mouth 3 times daily 30 tablet 0     diltiazem ER (DILT-XR) 120 MG 24 hr capsule Take 120 mg by mouth every morning       fluocinonide (LIDEX) 0.05 % external solution Apply topically 2 times daily as needed To scalp        FOLIC ACID PO Take 1 mg by mouth daily        furosemide (LASIX) 20 MG tablet Take 1 tablet (20 mg) by mouth every morning 30 tablet 0     latanoprost (XALATAN) 0.005 % ophthalmic solution Place 1 drop into the right eye At Bedtime       levothyroxine (SYNTHROID/LEVOTHROID) 50 MCG tablet Take 50 mcg by mouth daily       Lidocaine (LIDOCARE) 4 % Patch Place 2 patches onto the skin every 24 hours To prevent lidocaine toxicity, patient should be patch free for 12 hrs daily. 20 patch 0     methotrexate 2.5 MG tablet Take 15 mg by mouth every 7 days Wednesday  (6 x 2.5 mg)       metoprolol succinate ER (TOPROL-XL) 25 " "MG 24 hr tablet Take 25 mg by mouth every morning       ondansetron (ZOFRAN ODT) 4 MG ODT tab Take 1 tablet (4 mg) by mouth every 8 hours as needed for nausea 10 tablet 0     oxyCODONE (ROXICODONE) 5 MG tablet Take 5 mg by mouth every 6 hours as needed for severe pain Plus daily dose with breakfast       polyethylene glycol (MIRALAX/GLYCOLAX) packet Take 17 g by mouth daily       senna-docusate (SENOKOT-S/PERICOLACE) 8.6-50 MG tablet Take 1 tablet by mouth 2 times daily       timolol (TIMOPTIC) 0.5 % ophthalmic solution Place 1 drop into the right eye 2 times daily         ALLERGIES:  Allergies   Allergen Reactions     Amitriptyline      Clonazepam Other (See Comments)     Somnolence at 0.5 mg dose     Latex Rash       ROS:  10 point ROS neg other than the symptoms noted above in the HPI.    PHYSICAL EXAM:  /71   Pulse 63   Temp 97.6  F (36.4  C)   Resp 18   Ht 1.575 m (5' 2\")   Wt 68.5 kg (151 lb)   SpO2 97%   BMI 27.62 kg/m    Gen: Sitting in bed, alert, cooperative and in no acute distress  HEENT: Normocephalic; oropharynx clear  Card: RRR, S1, S2, no murmurs  Resp: Lungs clear to auscultation bilaterally  GI: Abdomen soft, not-tender, non-distended, +BS  MSK: Normal muscle tone, no LE edema  Neuro: CX II-XII grossly in tact; ROM in all four extremities grossly in tact  Psych: Alert and oriented x2-3; normal affect    LABORATORY/IMAGING DATA:  Lab Results   Component Value Date    WBC 9.7 08/05/2019     Lab Results   Component Value Date    RBC 4.44 08/05/2019     Lab Results   Component Value Date    HGB 14.5 08/05/2019     Lab Results   Component Value Date    HCT 41.1 08/05/2019     Lab Results   Component Value Date    MCV 93 08/05/2019     Lab Results   Component Value Date    MCH 32.7 08/05/2019     Lab Results   Component Value Date    MCHC 35.3 08/05/2019     Lab Results   Component Value Date    RDW 14.5 08/05/2019     Lab Results   Component Value Date     08/05/2019     Last " Comprehensive Metabolic Panel:  Sodium   Date Value Ref Range Status   08/05/2019 141 133 - 144 mmol/L Final     Potassium   Date Value Ref Range Status   08/05/2019 3.6 3.4 - 5.3 mmol/L Final     Chloride   Date Value Ref Range Status   08/05/2019 106 94 - 109 mmol/L Final     Carbon Dioxide   Date Value Ref Range Status   08/05/2019 29 20 - 32 mmol/L Final     Anion Gap   Date Value Ref Range Status   08/05/2019 6 3 - 14 mmol/L Final     Glucose   Date Value Ref Range Status   08/05/2019 84 70 - 99 mg/dL Final     Urea Nitrogen   Date Value Ref Range Status   08/05/2019 22 7 - 30 mg/dL Final     Creatinine   Date Value Ref Range Status   08/05/2019 0.93 0.52 - 1.04 mg/dL Final     GFR Estimate   Date Value Ref Range Status   08/05/2019 55 (L) >60 mL/min/[1.73_m2] Final     Comment:     Non  GFR Calc  Starting 12/18/2018, serum creatinine based estimated GFR (eGFR) will be   calculated using the Chronic Kidney Disease Epidemiology Collaboration   (CKD-EPI) equation.       Calcium   Date Value Ref Range Status   08/05/2019 10.2 (H) 8.5 - 10.1 mg/dL Final       ASSESSMENT/PLAN:  Mechanical fall with acute mildly displaced L4 vertebral body fracture,  Physical deconditioning.  Continue with scheduled acetaminophen 1000 mg p.o. 3 times daily as well as Flexeril 2.5 mg p.o. 3 times daily and as needed oxycodone.  Continue with PT/OT.    Mild cognitive impairment (chronic).  Patient appears to have mild memory impairment at baseline, which according to family it is chronic.  Continue cognitive testing per therapies.     History of CVA (cerebrovascular accident).  Continue prior to admission anticoagulation therapy with apixaban.    Tachybradycardia syndrome status post permanent pacemaker in May 2019,  History of atrial fibrillation/flutter,  Chronic diastolic heart failure, (LV ejection fraction 70 to 75% in September 2018),  Essential hypertension,  Patient is stable from cardiac standpoint and blood  pressure is well controlled.  She will continue with prior to admission diltiazem  mg p.o. daily, furosemide 20 mg p.o. daily, metoprolol XL 25 mg p.o. daily, and chronic anticoagulation therapy with apixaban.    Hypothyroidism.  Chronic.  She will continue with prior to admission levothyroxine.    Glaucoma.  She will continue with prior to admission ophthalmic drops.    Rheumatoid arthritis.  Chronic and is stable.  She will continue with prior to admission weekly methotrexate and daily folate.      Electronically signed by:  Ron Stafford MD

## 2019-08-15 ENCOUNTER — NURSING HOME VISIT (OUTPATIENT)
Dept: GERIATRICS | Facility: CLINIC | Age: 84
End: 2019-08-15
Payer: COMMERCIAL

## 2019-08-15 VITALS
OXYGEN SATURATION: 96 % | WEIGHT: 151.5 LBS | DIASTOLIC BLOOD PRESSURE: 90 MMHG | HEART RATE: 70 BPM | HEIGHT: 62 IN | SYSTOLIC BLOOD PRESSURE: 169 MMHG | TEMPERATURE: 98.6 F | BODY MASS INDEX: 27.88 KG/M2 | RESPIRATION RATE: 22 BRPM

## 2019-08-15 DIAGNOSIS — M06.9 RHEUMATOID ARTHRITIS, INVOLVING UNSPECIFIED SITE, UNSPECIFIED RHEUMATOID FACTOR PRESENCE: ICD-10-CM

## 2019-08-15 DIAGNOSIS — S32.049D CLOSED FRACTURE OF FOURTH LUMBAR VERTEBRA WITH ROUTINE HEALING, UNSPECIFIED FRACTURE MORPHOLOGY, SUBSEQUENT ENCOUNTER: Primary | ICD-10-CM

## 2019-08-15 DIAGNOSIS — I10 ESSENTIAL HYPERTENSION: ICD-10-CM

## 2019-08-15 DIAGNOSIS — K59.01 SLOW TRANSIT CONSTIPATION: ICD-10-CM

## 2019-08-15 DIAGNOSIS — I50.32 CHRONIC DIASTOLIC CONGESTIVE HEART FAILURE (H): ICD-10-CM

## 2019-08-15 DIAGNOSIS — I48.20 CHRONIC ATRIAL FIBRILLATION (H): ICD-10-CM

## 2019-08-15 DIAGNOSIS — Z86.73 HISTORY OF CVA (CEREBROVASCULAR ACCIDENT): ICD-10-CM

## 2019-08-15 DIAGNOSIS — R53.81 PHYSICAL DECONDITIONING: ICD-10-CM

## 2019-08-15 DIAGNOSIS — G31.84 MILD COGNITIVE IMPAIRMENT: ICD-10-CM

## 2019-08-15 PROCEDURE — 99309 SBSQ NF CARE MODERATE MDM 30: CPT | Performed by: NURSE PRACTITIONER

## 2019-08-15 ASSESSMENT — MIFFLIN-ST. JEOR: SCORE: 1075.45

## 2019-08-15 NOTE — PROGRESS NOTES
"[facility]  Lead Medical Record Number:  [id number]  Place of Service where encounter took place:  [facility] [[id number]]  Chief Complaint   Patient presents with     RECHECK       HPI:    Marley Stewart  is a 87 year old (7/17/1932), who is being seen today for an episodic care visit.  HPI information obtained from: facility chart records, facility staff, patient report and [facility] chart review.   She came to this facility from the [facility] ED 8/8/2019 for short term rehab and medical management following hospitalization 8/5-8/6/2019 after a mechanical fall resulting in a mildly displaced L4 vertebral  fracture. Ortho consulted and she was fitted for TLSO. Flexeril and lidoderm patches were started for pain and she discharged home with her . She returned to the ED 8/8/2019 with severe pain and inability to manage at home. Oxycodone was started and she discharged  to this facility for therapies and ongoing care.       Today's concerns are:      Closed fracture of fourth lumbar vertebra with routine healing, unspecified fracture morphology, subsequent encounter-reports pain is slightly less, but still severe with activity. She's up in the recliner now and fairly comfortable. Flexeril was changed to prn due to grogginess. Fatigued.   Chronic atrial fibrillation (H)-HR: 62-70  Chronic diastolic congestive heart failure (H)-weight is stable at 151 lbs. Denies cough, shortness of breath or chest pain.   Rheumatoid arthritis, involving unspecified site, unspecified rheumatoid factor presence (H)  Essential hypertension-BPs: 169/90, 149/71, 133/78, 138/71    Slow transit constipation-reports no BM for a \"few\"  days and feels uncomfortable. No nausea, vomiting or abdominal pain. Appetite fair to poor.   History of CVA (cerebrovascular accident)  Mild cognitive impairment-conversation appropriate and fairly good historian. SLUMS 26/30   Physical deconditioning-ambulating " 215 ft with walker and stand by assist. Requires assist of 1 with all cares.     Past Medical and Surgical History reviewed in Epic today.    MEDICATIONS:  Current Outpatient Medications   Medication Sig Dispense Refill     acetaminophen (TYLENOL) 500 MG tablet Take 2 tablets (1,000 mg) by mouth 3 times daily       apixaban ANTICOAGULANT (ELIQUIS) 5 MG tablet Take 1 tablet (5 mg) by mouth 2 times daily 60 tablet 0     bisacodyl (DULCOLAX) 10 MG suppository Place 10 mg rectally daily as needed for constipation       calcium carb 1250 mg, 500 mg [place],/vitamin D 200 units (OSCAL WITH D) 500-200 MG-UNIT per tablet Take 1 tablet by mouth 2 times daily (with meals)       carboxymethylcellulose (REFRESH PLUS) 0.5 % SOLN Place 1 drop into both eyes 2 times daily        cyclobenzaprine (FLEXERIL) 5 MG tablet Take 0.5 tablets (2.5 mg) by mouth 3 times daily (Patient taking differently: Take 2.5 mg by mouth 3 times daily as needed ) 30 tablet 0     diltiazem ER (DILT-XR) 120 MG 24 hr capsule Take 120 mg by mouth every morning       fluocinonide (LIDEX) 0.05 % external solution Apply topically 2 times daily as needed To scalp        FOLIC ACID PO Take 1 mg by mouth daily        furosemide (LASIX) 20 MG tablet Take 1 tablet (20 mg) by mouth every morning 30 tablet 0     latanoprost (XALATAN) 0.005 % ophthalmic solution Place 1 drop into the right eye At Bedtime       levothyroxine (SYNTHROID/LEVOTHROID) 50 MCG tablet Take 50 mcg by mouth daily       Lidocaine (LIDOCARE) 4 % Patch Place 2 patches onto the skin every 24 hours To prevent lidocaine toxicity, patient should be patch free for 12 hrs daily. 20 patch 0     methotrexate 2.5 MG tablet Take 15 mg by mouth every 7 days Wednesday  (6 x 2.5 mg)       metoprolol succinate ER (TOPROL-XL) 25 MG 24 hr tablet Take 25 mg by mouth every morning       ondansetron (ZOFRAN ODT) 4 MG ODT tab Take 1 tablet (4 mg) by mouth every 8 hours as needed for nausea 10 tablet 0     oxyCODONE  "(ROXICODONE) 5 MG tablet Take 5 mg by mouth every 6 hours as needed for severe pain Plus daily dose with breakfast       polyethylene glycol (MIRALAX/GLYCOLAX) packet Take 17 g by mouth daily       senna-docusate (SENOKOT-S/PERICOLACE) 8.6-50 MG tablet Take 1 tablet by mouth 2 times daily       timolol (TIMOPTIC) 0.5 % ophthalmic solution Place 1 drop into the right eye 2 times daily         REVIEW OF SYSTEMS:  4 point ROS including Respiratory, CV, GI and , other than that noted in the HPI,  is negative    Objective:  BP (!) 169/90   Pulse 70   Temp 98.6  F (37  C)   Resp 22   Ht 1.575 m (5' 2\")   Wt 68.7 kg (151 lb 8 oz)   SpO2 96%   BMI 27.71 kg/m    Exam:  GENERAL APPEARANCE:  Alert, in no distress   ENT:  Mouth and posterior oropharynx normal, moist mucous membranes, [place]  EYES:  EOM normal, conjunctiva and lids normal  NECK:  No adenopathy,masses or thyromegaly  RESP:  respiratory effort and palpation of chest normal, lungs clear to auscultation , no respiratory distress  CV:  Palpation and auscultation of heart done , regular rate and rhythm, no murmur,  no edema, +2 pedal pulses  ABDOMEN:  normal bowel sounds, soft, nontender, no  masses  M/S:   in recliner.  CAN with good strength. No joint inflammation   SKIN:  no rashes or open areas  PSYCH:  oriented X 3, memory impaired , affect and mood normal         Labs:   Recent labs in [facility] reviewed by me today.     ASSESSMENT / PLAN:  (S32.294D) Closed fracture of fourth lumbar vertebra with routine healing, unspecified fracture morphology, subsequent encounter  (primary encounter diagnosis)  Comment: some improvement in pain and mobility   Plan: continue tylenol, lidoderm patches, prn flexeril,  oxycodone. TLSO when out of bed.      (I48.2) Chronic atrial fibrillation (H)  Comment: rate controlled  Plan: continue diltiazem, metoprolol, Eliquis.      (I50.32) Chronic diastolic congestive heart failure (H)  Comment: compensated   Plan: continue lasix, " metoprolol, diltiazem. Daily weight. BMP      (M06.9) Rheumatoid arthritis, involving unspecified site, unspecified rheumatoid factor presence (H)  Comment: no signs of acute flare  Plan: continue methotrexate and folic acid. Rheumatology follow up per usual schedule.      (I10) Essential hypertension  Comment: controlled. Pain likely contributing to occasional elevated reading.   Plan: continue diltiazem, lasix, metoprolol. Monitor VS. BMP.      (K59.01) Slow transit constipation  Comment: acute on chronic. Constipated today, last BM 2 days ago   Plan: supp today and prn. Continue senna and miralax.      (Z86.73) History of CVA (cerebrovascular accident)  (G31.84) Mild cognitive impairment  Comment: cognition appears to be at baseline   Plan: supportive care      (R53.81) Physical deconditioning  Comment: slowly progressing in therapies   Plan: continue PHYSICAL THERAPY/OT. Goal is to return home with her  and home care services    Electronically signed by:  HUEY Mayo CNP

## 2019-08-15 NOTE — LETTER
"    8/15/2019        RE: Marley Stewart  [address]  [place] [address]        [facility]  [facility] [place] Medical Record Number:  [id number]  [facility] where encounter took place[facility] [[id number]]  Chief Complaint   Patient presents with     RECHECK       HPI:    Marley Stewart  is a 87 year old (7/17/1932), who is being seen today for an episodic care visit.  HPI information obtained from: facility chart records, facility staff, patient report and [facility] chart review.   She came to this facility from the [facility] ED 8/8/2019 for short term rehab and medical management following hospitalization 8/5-8/6/2019 after a mechanical fall resulting in a mildly displaced L4 vertebral  fracture. Ortho consulted and she was fitted for TLSO. Flexeril and lidoderm patches were started for pain and she discharged home with her . She returned to the ED 8/8/2019 with severe pain and inability to manage at home. Oxycodone was started and she discharged  to this facility for therapies and ongoing care.       Today's concerns are:      Closed fracture of fourth lumbar vertebra with routine healing, unspecified fracture morphology, subsequent encounter-reports pain is slightly less, but still severe with activity. She's up in the recliner now and fairly comfortable. Flexeril was changed to prn due to grogginess. Fatigued.   Chronic atrial fibrillation (H)-HR: 62-70  Chronic diastolic congestive heart failure (H)-weight is stable at 151 lbs. Denies cough, shortness of breath or chest pain.   Rheumatoid arthritis, involving unspecified site, unspecified rheumatoid factor presence (H)  Essential hypertension-BPs: 169/90, 149/71, 133/78, 138/71    Slow transit constipation-reports no BM for a \"few\"  days and feels uncomfortable. No nausea, vomiting or abdominal pain. Appetite fair to poor.   History of CVA (cerebrovascular accident)  Mild cognitive impairment-conversation " appropriate and fairly good historian. UMS 26/30   Physical deconditioning-ambulating 215 ft with walker and stand by assist. Requires assist of 1 with all cares.     Past Medical and Surgical History reviewed in Epic today.    MEDICATIONS:  Current Outpatient Medications   Medication Sig Dispense Refill     acetaminophen (TYLENOL) 500 MG tablet Take 2 tablets (1,000 mg) by mouth 3 times daily       apixaban ANTICOAGULANT (ELIQUIS) 5 MG tablet Take 1 tablet (5 mg) by mouth 2 times daily 60 tablet 0     bisacodyl (DULCOLAX) 10 MG suppository Place 10 mg rectally daily as needed for constipation       calcium carb 1250 mg, 500 mg [place],/vitamin D 200 units (OSCAL WITH D) 500-200 MG-UNIT per tablet Take 1 tablet by mouth 2 times daily (with meals)       carboxymethylcellulose (REFRESH PLUS) 0.5 % SOLN Place 1 drop into both eyes 2 times daily        cyclobenzaprine (FLEXERIL) 5 MG tablet Take 0.5 tablets (2.5 mg) by mouth 3 times daily (Patient taking differently: Take 2.5 mg by mouth 3 times daily as needed ) 30 tablet 0     diltiazem ER (DILT-XR) 120 MG 24 hr capsule Take 120 mg by mouth every morning       fluocinonide (LIDEX) 0.05 % external solution Apply topically 2 times daily as needed To scalp        FOLIC ACID PO Take 1 mg by mouth daily        furosemide (LASIX) 20 MG tablet Take 1 tablet (20 mg) by mouth every morning 30 tablet 0     latanoprost (XALATAN) 0.005 % ophthalmic solution Place 1 drop into the right eye At Bedtime       levothyroxine (SYNTHROID/LEVOTHROID) 50 MCG tablet Take 50 mcg by mouth daily       Lidocaine (LIDOCARE) 4 % Patch Place 2 patches onto the skin every 24 hours To prevent lidocaine toxicity, patient should be patch free for 12 hrs daily. 20 patch 0     methotrexate 2.5 MG tablet Take 15 mg by mouth every 7 days Wednesday  (6 x 2.5 mg)       metoprolol succinate ER (TOPROL-XL) 25 MG 24 hr tablet Take 25 mg by mouth every morning       ondansetron (ZOFRAN ODT) 4 MG ODT tab Take 1  "tablet (4 mg) by mouth every 8 hours as needed for nausea 10 tablet 0     oxyCODONE (ROXICODONE) 5 MG tablet Take 5 mg by mouth every 6 hours as needed for severe pain Plus daily dose with breakfast       polyethylene glycol (MIRALAX/GLYCOLAX) packet Take 17 g by mouth daily       senna-docusate (SENOKOT-S/PERICOLACE) 8.6-50 MG tablet Take 1 tablet by mouth 2 times daily       timolol (TIMOPTIC) 0.5 % ophthalmic solution Place 1 drop into the right eye 2 times daily         REVIEW OF SYSTEMS:  4 point ROS including Respiratory, CV, GI and , other than that noted in the HPI,  is negative    Objective:  BP (!) 169/90   Pulse 70   Temp 98.6  F (37  C)   Resp 22   Ht 1.575 m (5' 2\")   Wt 68.7 kg (151 lb 8 oz)   SpO2 96%   BMI 27.71 kg/m     Exam:  GENERAL APPEARANCE:  Alert, in  no distress   ENT:  Mouth and posterior oropharynx normal, moist mucous membranes, [place]  EYES:  EOM normal, conjunctiva and lids normal  NECK:  No adenopathy,masses or thyromegaly  RESP:  respiratory effort and palpation of chest normal, lungs clear to auscultation , no respiratory distress  CV:  Palpation and auscultation of heart done , regular rate and rhythm, no murmur,  no edema, +2 pedal pulses  ABDOMEN:  normal bowel sounds, soft, nontender, no  masses  M/S:   in  recliner.  CAN with good strength. No joint inflammation   SKIN:  no rashes or open areas  PSYCH:  oriented X 3, memory impaired , affect and mood normal         Labs:   Recent labs in [facility] reviewed by me today.     ASSESSMENT / PLAN:  (S32.194M) Closed fracture of fourth lumbar vertebra with routine healing, unspecified fracture morphology, subsequent encounter  (primary encounter diagnosis)  Comment:  some improvement in pain and mobility   Plan:  continue tylenol, lidoderm patches, prn flexeril,  oxycodone. TLSO when out of bed.      (I48.2) Chronic atrial fibrillation (H)  Comment: rate controlled  Plan: continue diltiazem, metoprolol, Eliquis.      (I50.32) Chronic " diastolic congestive heart failure (H)  Comment: compensated   Plan: continue lasix, metoprolol, diltiazem. Daily weight. BMP      (M06.9) Rheumatoid arthritis, involving unspecified site, unspecified rheumatoid factor presence (H)  Comment: no signs of acute flare  Plan: continue methotrexate and folic acid. Rheumatology follow up per usual schedule.      (I10) Essential hypertension  Comment: controlled. Pain likely contributing to occasional elevated reading.   Plan: continue diltiazem, lasix, metoprolol. Monitor VS.  BMP.      (K59.01) Slow transit constipation  Comment: acute on chronic. Constipated today, last BM 2 days ago   Plan:  supp today and prn. Continue senna and miralax.      (Z86.73) History of CVA (cerebrovascular accident)  (G31.84) Mild cognitive impairment  Comment:  cognition appears to be at baseline   Plan:  supportive care      (R53.81) Physical deconditioning  Comment:  slowly progressing in therapies   Plan:  continue PHYSICAL THERAPY/OT. Goal is to return home with her  and home care services    Electronically signed by:  HUEY Mayo CNP               Sincerely,        HUEY Mayo CNP

## 2019-08-19 ENCOUNTER — HOSPITAL LABORATORY (OUTPATIENT)
Dept: OTHER | Facility: CLINIC | Age: 84
End: 2019-08-19

## 2019-08-19 LAB
ANION GAP SERPL CALCULATED.3IONS-SCNC: 7 MMOL/L (ref 3–14)
BUN SERPL-MCNC: 22 MG/DL (ref 7–30)
CALCIUM SERPL-MCNC: 9.3 MG/DL (ref 8.5–10.1)
CHLORIDE SERPL-SCNC: 106 MMOL/L (ref 94–109)
CO2 SERPL-SCNC: 25 MMOL/L (ref 20–32)
CREAT SERPL-MCNC: 1.04 MG/DL (ref 0.52–1.04)
GFR SERPL CREATININE-BSD FRML MDRD: 48 ML/MIN/{1.73_M2}
GLUCOSE SERPL-MCNC: 93 MG/DL (ref 70–99)
POTASSIUM SERPL-SCNC: 4.1 MMOL/L (ref 3.4–5.3)
SODIUM SERPL-SCNC: 138 MMOL/L (ref 133–144)

## 2019-08-20 ENCOUNTER — NURSING HOME VISIT (OUTPATIENT)
Dept: GERIATRICS | Facility: CLINIC | Age: 84
End: 2019-08-20
Payer: COMMERCIAL

## 2019-08-20 VITALS
TEMPERATURE: 97.5 F | WEIGHT: 151.5 LBS | RESPIRATION RATE: 22 BRPM | SYSTOLIC BLOOD PRESSURE: 166 MMHG | BODY MASS INDEX: 27.88 KG/M2 | DIASTOLIC BLOOD PRESSURE: 82 MMHG | OXYGEN SATURATION: 97 % | HEART RATE: 74 BPM | HEIGHT: 62 IN

## 2019-08-20 DIAGNOSIS — I48.20 CHRONIC ATRIAL FIBRILLATION (H): ICD-10-CM

## 2019-08-20 DIAGNOSIS — M06.9 RHEUMATOID ARTHRITIS, INVOLVING UNSPECIFIED SITE, UNSPECIFIED RHEUMATOID FACTOR PRESENCE: ICD-10-CM

## 2019-08-20 DIAGNOSIS — G31.84 MILD COGNITIVE IMPAIRMENT: ICD-10-CM

## 2019-08-20 DIAGNOSIS — S32.049D CLOSED FRACTURE OF FOURTH LUMBAR VERTEBRA WITH ROUTINE HEALING, UNSPECIFIED FRACTURE MORPHOLOGY, SUBSEQUENT ENCOUNTER: Primary | ICD-10-CM

## 2019-08-20 DIAGNOSIS — I50.32 CHRONIC DIASTOLIC CONGESTIVE HEART FAILURE (H): ICD-10-CM

## 2019-08-20 DIAGNOSIS — K59.01 SLOW TRANSIT CONSTIPATION: ICD-10-CM

## 2019-08-20 DIAGNOSIS — Z86.73 HISTORY OF CVA (CEREBROVASCULAR ACCIDENT): ICD-10-CM

## 2019-08-20 DIAGNOSIS — R53.81 PHYSICAL DECONDITIONING: ICD-10-CM

## 2019-08-20 DIAGNOSIS — I10 ESSENTIAL HYPERTENSION: ICD-10-CM

## 2019-08-20 PROCEDURE — 99309 SBSQ NF CARE MODERATE MDM 30: CPT | Performed by: NURSE PRACTITIONER

## 2019-08-20 ASSESSMENT — MIFFLIN-ST. JEOR: SCORE: 1075.45

## 2019-08-20 NOTE — PROGRESS NOTES
"[facility]  [place] [facility] Record Number:  [id number]  [facility] of [facility] where encounter took place:  [facility] [[id number]]  Chief Complaint   Patient presents with     RECHECK       HPI:    Marley Stewart  is a 87 year old (7/17/1932), who is being seen today for an episodic care visit.  HPI information obtained from: facility chart records, facility staff, patient report and [facility] chart review.  She came to this facility from the [facility] ED 8/8/2019 for short term rehab and medical management following hospitalization 8/5-8/6/2019 after a mechanical fall resulting in a mildly displaced L4 vertebral  fracture. Ortho consulted and she was fitted for TLSO. Flexeril and lidoderm patches were started for pain and she discharged home with her . She returned to the ED 8/8/2019 with severe pain and inability to manage at home. Oxycodone was started and she discharged  to this facility for therapies and ongoing care.        Today's concerns are:      Closed fracture of fourth lumbar vertebra with routine healing, unspecified fracture morphology, subsequent encounter-reports pain is \" a little better.\" Has been sitting up in a chair for longer periods.  is here and feels that she's making progress.   Chronic atrial fibrillation (H)-HR: 69-74  Chronic diastolic congestive heart failure (H)-denies cough, shortness of breath or chest pain.  Weight is stable at 151 lbs.   Rheumatoid arthritis, involving unspecified site, unspecified rheumatoid factor presence (H)-denies acute joint pain   Essential hypertension-BPs: 166/82, 167/78, 148/73, 139/75  Slow transit constipation-reports this has improved. Fair appetite.   History of CVA (cerebrovascular accident)  Mild cognitive impairment-conversation appropriate. SLUMS 26/30, CPT 4.8/5.6   Physical deconditioning-ambulating 240 ft with walker and supervision to stand by assist. Requires stand by assist with transfers and " cares.     Past Medical and Surgical History reviewed in Epic today.    MEDICATIONS:  Current Outpatient Medications   Medication Sig Dispense Refill     acetaminophen (TYLENOL) 500 MG tablet Take 2 tablets (1,000 mg) by mouth 3 times daily       apixaban ANTICOAGULANT (ELIQUIS) 5 MG tablet Take 1 tablet (5 mg) by mouth 2 times daily 60 tablet 0     bisacodyl (DULCOLAX) 10 MG suppository Place 10 mg rectally daily as needed for constipation       calcium carb 1250 mg, 500 mg [place],/vitamin D 200 units (OSCAL WITH D) 500-200 MG-UNIT per tablet Take 1 tablet by mouth 2 times daily (with meals)       carboxymethylcellulose (REFRESH PLUS) 0.5 % SOLN Place 1 drop into both eyes 2 times daily        cyclobenzaprine (FLEXERIL) 5 MG tablet Take 0.5 tablets (2.5 mg) by mouth 3 times daily (Patient taking differently: Take 2.5 mg by mouth 3 times daily as needed ) 30 tablet 0     diltiazem ER (DILT-XR) 120 MG 24 hr capsule Take 120 mg by mouth every morning       fluocinonide (LIDEX) 0.05 % external solution Apply topically 2 times daily as needed To scalp        FOLIC ACID PO Take 1 mg by mouth daily        furosemide (LASIX) 20 MG tablet Take 1 tablet (20 mg) by mouth every morning 30 tablet 0     latanoprost (XALATAN) 0.005 % ophthalmic solution Place 1 drop into the right eye At Bedtime       levothyroxine (SYNTHROID/LEVOTHROID) 50 MCG tablet Take 50 mcg by mouth daily       Lidocaine (LIDOCARE) 4 % Patch Place 2 patches onto the skin every 24 hours To prevent lidocaine toxicity, patient should be patch free for 12 hrs daily. 20 patch 0     methotrexate 2.5 MG tablet Take 15 mg by mouth every 7 days Wednesday  (6 x 2.5 mg)       metoprolol succinate ER (TOPROL-XL) 25 MG 24 hr tablet Take 25 mg by mouth every morning       ondansetron (ZOFRAN ODT) 4 MG ODT tab Take 1 tablet (4 mg) by mouth every 8 hours as needed for nausea 10 tablet 0     oxyCODONE (ROXICODONE) 5 MG tablet Take 5 mg by mouth every 6 hours as needed for  "severe pain Plus daily dose with breakfast       polyethylene glycol (MIRALAX/GLYCOLAX) packet Take 17 g by mouth daily       senna-docusate (SENOKOT-S/PERICOLACE) 8.6-50 MG tablet Take 1 tablet by mouth 2 times daily       timolol (TIMOPTIC) 0.5 % ophthalmic solution Place 1 drop into the right eye 2 times daily         REVIEW OF SYSTEMS:  4 point ROS including Respiratory, CV, GI and , other than that noted in the HPI,  is negative    Objective:  BP (!) 166/82   Pulse 74   Temp 97.5  F (36.4  C)   Resp 22   Ht 1.575 m (5' 2\")   Wt 68.7 kg (151 lb 8 oz)   SpO2 97%   BMI 27.71 kg/m    Exam:  GENERAL APPEARANCE:  Alert, in no distress   ENT:  Mouth and posterior oropharynx normal, moist mucous membranes, [place]  EYES:  EOM normal, conjunctiva and lids normal  NECK:  No adenopathy,masses or thyromegaly  RESP:  respiratory effort and palpation of chest normal, lungs clear to auscultation , no respiratory distress  CV:  Palpation and auscultation of heart done , regular rate and rhythm, no murmur,  no edema, +2 pedal pulses  ABDOMEN:  soft, nontender, no  masses  M/S:   in chair. CAN with good strength. No joint inflammation   SKIN:  no rashes or open areas  PSYCH:  oriented X 3, memory impaired , affect and mood normal    Labs:   Labs done in SNF are in [place] EPIC. Please refer to them using EPIC/Care Everywhere.    ASSESSMENT / PLAN:  (S32.462D) Closed fracture of fourth lumbar vertebra with routine healing, unspecified fracture morphology, subsequent encounter  (primary encounter diagnosis)  Comment: pain and mobility slowly improving   Plan: continue tylenol, lidoderm patches, prn flexeril,  oxycodone. TLSO when out of bed.      (I48.2) Chronic atrial fibrillation (H)  Comment: rate controlled  Plan: continue diltiazem, metoprolol, Eliquis.      (I50.32) Chronic diastolic congestive heart failure (H)  Comment: compensated   Plan: continue lasix, metoprolol, diltiazem. Daily weight. BMP      (M06.9) " Rheumatoid arthritis, involving unspecified site, unspecified rheumatoid factor presence (H)  Comment: no signs of acute flare  Plan: continue methotrexate and folic acid. Rheumatology follow up per usual schedule.      (I10) Essential hypertension  Comment: controlled. Pain likely contributing to occasional elevated reading. Renal function at baseline.   Plan: continue diltiazem, lasix, metoprolol. Monitor VS. Follow BMP.      (K59.01) Slow transit constipation  Comment: acute on chronic, improved   Plan: continue senna and miralax. Supp prn.      (Z86.73) History of CVA (cerebrovascular accident)  (G31.84) Mild cognitive impairment  Comment: cognition appears to be at baseline   Plan: supportive care      (R53.81) Physical deconditioning  Comment: improved progress  in therapies   Plan: continue PHYSICAL THERAPY/OT. Goal is to return home with her  and home care services      Electronically signed by:  HUEY Mayo CNP

## 2019-08-20 NOTE — LETTER
"    8/20/2019        RE: Marley Stewart  [address]  [place] [address]        [facility]  [place] [facility] Record Number:  [id number]  [facility] of Service where encounter took place[facility] [[id number]]  Chief Complaint   Patient presents with     RECHECK       HPI:    Marley Stewart  is a 87 year old (7/17/1932), who is being seen today for an episodic care visit.  HPI information obtained from: facility chart records, facility staff, patient report and [facility] chart review.  She came to this facility from the [facility] ED 8/8/2019 for short term rehab and medical management following hospitalization 8/5-8/6/2019 after a mechanical fall resulting in a mildly displaced L4 vertebral  fracture. Ortho consulted and she was fitted for TLSO. Flexeril and lidoderm patches were started for pain and she discharged home with her . She returned to the ED 8/8/2019 with severe pain and inability to manage at home. Oxycodone was started and she discharged  to this facility for therapies and ongoing care.        Today's concerns are:      Closed fracture of fourth lumbar vertebra with routine healing, unspecified fracture morphology, subsequent encounter-reports pain is \" a little better.\" Has been sitting up in a chair for longer periods.  is here and feels that she's making progress.   Chronic atrial fibrillation (H)-HR: 69-74  Chronic diastolic congestive heart failure (H)-denies cough, shortness of breath or chest pain.  Weight is stable at 151 lbs.   Rheumatoid arthritis, involving unspecified site, unspecified rheumatoid factor presence (H)-denies acute joint pain   Essential hypertension-BPs: 166/82, 167/78, 148/73, 139/75  Slow transit constipation-reports this has improved. Fair appetite.   History of CVA (cerebrovascular accident)  Mild cognitive impairment-conversation appropriate. SLUMS 26/30, CPT 4.8/5.6   Physical deconditioning-ambulating 240 ft with " walker and supervision to stand by assist. Requires stand by assist with transfers and cares.     Past Medical and Surgical History reviewed in Epic today.    MEDICATIONS:  Current Outpatient Medications   Medication Sig Dispense Refill     acetaminophen (TYLENOL) 500 MG tablet Take 2 tablets (1,000 mg) by mouth 3 times daily       apixaban ANTICOAGULANT (ELIQUIS) 5 MG tablet Take 1 tablet (5 mg) by mouth 2 times daily 60 tablet 0     bisacodyl (DULCOLAX) 10 MG suppository Place 10 mg rectally daily as needed for constipation       calcium carb 1250 mg, 500 mg [place],/vitamin D 200 units (OSCAL WITH D) 500-200 MG-UNIT per tablet Take 1 tablet by mouth 2 times daily (with meals)       carboxymethylcellulose (REFRESH PLUS) 0.5 % SOLN Place 1 drop into both eyes 2 times daily        cyclobenzaprine (FLEXERIL) 5 MG tablet Take 0.5 tablets (2.5 mg) by mouth 3 times daily (Patient taking differently: Take 2.5 mg by mouth 3 times daily as needed ) 30 tablet 0     diltiazem ER (DILT-XR) 120 MG 24 hr capsule Take 120 mg by mouth every morning       fluocinonide (LIDEX) 0.05 % external solution Apply topically 2 times daily as needed To scalp        FOLIC ACID PO Take 1 mg by mouth daily        furosemide (LASIX) 20 MG tablet Take 1 tablet (20 mg) by mouth every morning 30 tablet 0     latanoprost (XALATAN) 0.005 % ophthalmic solution Place 1 drop into the right eye At Bedtime       levothyroxine (SYNTHROID/LEVOTHROID) 50 MCG tablet Take 50 mcg by mouth daily       Lidocaine (LIDOCARE) 4 % Patch Place 2 patches onto the skin every 24 hours To prevent lidocaine toxicity, patient should be patch free for 12 hrs daily. 20 patch 0     methotrexate 2.5 MG tablet Take 15 mg by mouth every 7 days Wednesday  (6 x 2.5 mg)       metoprolol succinate ER (TOPROL-XL) 25 MG 24 hr tablet Take 25 mg by mouth every morning       ondansetron (ZOFRAN ODT) 4 MG ODT tab Take 1 tablet (4 mg) by mouth every 8 hours as needed for nausea 10 tablet 0  "    oxyCODONE (ROXICODONE) 5 MG tablet Take 5 mg by mouth every 6 hours as needed for severe pain Plus daily dose with breakfast       polyethylene glycol (MIRALAX/GLYCOLAX) packet Take 17 g by mouth daily       senna-docusate (SENOKOT-S/PERICOLACE) 8.6-50 MG tablet Take 1 tablet by mouth 2 times daily       timolol (TIMOPTIC) 0.5 % ophthalmic solution Place 1 drop into the right eye 2 times daily         REVIEW OF SYSTEMS:  4 point ROS including Respiratory, CV, GI and , other than that noted in the HPI,  is negative    Objective:  BP (!) 166/82   Pulse 74   Temp 97.5  F (36.4  C)   Resp 22   Ht 1.575 m (5' 2\")   Wt 68.7 kg (151 lb 8 oz)   SpO2 97%   BMI 27.71 kg/m     Exam:  GENERAL APPEARANCE:  Alert, in no distress   ENT:  Mouth and posterior oropharynx normal, moist mucous membranes, [place]  EYES:  EOM normal, conjunctiva and lids normal  NECK:  No adenopathy,masses or thyromegaly  RESP:  respiratory effort and palpation of chest normal, lungs clear to auscultation , no respiratory distress  CV:  Palpation and auscultation of heart done , regular rate and rhythm, no murmur,  no edema, +2 pedal pulses  ABDOMEN:  soft, nontender, no  masses  M/S:   in  chair. CAN with good strength. No joint inflammation   SKIN:  no rashes or open areas  PSYCH:  oriented X 3, memory impaired , affect and mood normal    Labs:   Labs done in SNF are in [place] EPIC. Please refer to them using [facility]/Care Everywhere.    ASSESSMENT / PLAN:  (S32.915D) Closed fracture of fourth lumbar vertebra with routine healing, unspecified fracture morphology, subsequent encounter  (primary encounter diagnosis)  Comment: pain and mobility slowly improving   Plan: continue tylenol, lidoderm patches, prn flexeril,  oxycodone. TLSO when out of bed.      (I48.2) Chronic atrial fibrillation (H)  Comment: rate controlled  Plan: continue diltiazem, metoprolol, Eliquis.      (I50.32) Chronic diastolic congestive heart failure (H)  Comment: " compensated   Plan: continue lasix, metoprolol, diltiazem. Daily weight. BMP      (M06.9) Rheumatoid arthritis, involving unspecified site, unspecified rheumatoid factor presence (H)  Comment: no signs of acute flare  Plan: continue methotrexate and folic acid. Rheumatology follow up per usual schedule.      (I10) Essential hypertension  Comment: controlled. Pain likely contributing to occasional elevated reading. Renal function at baseline.   Plan: continue diltiazem, lasix, metoprolol. Monitor VS. Follow BMP.      (K59.01) Slow transit constipation  Comment: acute on chronic, improved   Plan: continue senna and miralax.  Supp prn.      (Z86.73) History of CVA (cerebrovascular accident)  (G31.84) Mild cognitive impairment  Comment: cognition appears to be at baseline   Plan: supportive care      (R53.81) Physical deconditioning  Comment: improved progress  in therapies   Plan: continue PHYSICAL THERAPY/OT. Goal is to return home with her  and home care services      Electronically signed by:  HUEY Mayo CNP               Sincerely,        HUEY Mayo CNP

## 2019-08-27 ENCOUNTER — DISCHARGE SUMMARY NURSING HOME (OUTPATIENT)
Dept: GERIATRICS | Facility: CLINIC | Age: 84
End: 2019-08-27
Payer: COMMERCIAL

## 2019-08-27 VITALS
WEIGHT: 149 LBS | HEIGHT: 62 IN | DIASTOLIC BLOOD PRESSURE: 82 MMHG | RESPIRATION RATE: 18 BRPM | OXYGEN SATURATION: 98 % | HEART RATE: 69 BPM | BODY MASS INDEX: 27.42 KG/M2 | SYSTOLIC BLOOD PRESSURE: 150 MMHG | TEMPERATURE: 97.6 F

## 2019-08-27 DIAGNOSIS — I48.20 CHRONIC ATRIAL FIBRILLATION (H): ICD-10-CM

## 2019-08-27 DIAGNOSIS — Z86.73 HISTORY OF CVA (CEREBROVASCULAR ACCIDENT): ICD-10-CM

## 2019-08-27 DIAGNOSIS — R53.81 PHYSICAL DECONDITIONING: ICD-10-CM

## 2019-08-27 DIAGNOSIS — K59.01 SLOW TRANSIT CONSTIPATION: ICD-10-CM

## 2019-08-27 DIAGNOSIS — S32.049D CLOSED FRACTURE OF FOURTH LUMBAR VERTEBRA WITH ROUTINE HEALING, UNSPECIFIED FRACTURE MORPHOLOGY, SUBSEQUENT ENCOUNTER: Primary | ICD-10-CM

## 2019-08-27 DIAGNOSIS — I50.32 CHRONIC DIASTOLIC CONGESTIVE HEART FAILURE (H): ICD-10-CM

## 2019-08-27 DIAGNOSIS — M06.9 RHEUMATOID ARTHRITIS, INVOLVING UNSPECIFIED SITE, UNSPECIFIED RHEUMATOID FACTOR PRESENCE: ICD-10-CM

## 2019-08-27 DIAGNOSIS — G31.84 MILD COGNITIVE IMPAIRMENT: ICD-10-CM

## 2019-08-27 DIAGNOSIS — I10 ESSENTIAL HYPERTENSION: ICD-10-CM

## 2019-08-27 PROCEDURE — 99316 NF DSCHRG MGMT 30 MIN+: CPT | Performed by: NURSE PRACTITIONER

## 2019-08-27 ASSESSMENT — MIFFLIN-ST. JEOR: SCORE: 1064.11

## 2019-08-27 NOTE — LETTER
8/27/2019        RE: Marley Stewart  [address]  [place] [address]        [facility] DISCHARGE SUMMARY  PATIENT'S NAME: Marley Stewart  YOB: 1932  MEDICAL RECORD NUMBER:  [id number]  Place of Service where encounter took place:  [facility] [[id number]]    PRIMARY CARE PROVIDER AND CLINIC RESPONSIBLE AFTER TRANSFER:   Chandra Ortiz MD, [facility] [address] / [facility] [address]    Non-[facility] Provider     Transferring providers: HUEY Mayo CNP, Ron Stafford MD  Recent Hospitalization/ED:  Emergency Room  [facility] stay 8/8/2019 to 8/8/2019.  Date of SNF Admission: August / 08 / 2019  Date of SNF (anticipated) Discharge: August / 30 / 2019  Discharged to: home with    Cognitive Scores: SLUMS: 26/30 and CPT: 4.3/5.6  DME: Hospital Bed and Walker    CODE STATUS/ADVANCE DIRECTIVES DISCUSSION:  DNR / DNI   ALLERGIES: Amitriptyline; Clonazepam; and Latex    DISCHARGE DIAGNOSIS/NURSING FACILITY COURSE:   She came to this facility from the [facility] ED 8/8/2019 for short term rehab and medical management following hospitalization 8/5-8/6/2019 after a mechanical fall resulting in a mildly displaced L4 vertebral  fracture. Ortho consulted and she was fitted for TLSO. Flexeril and lidoderm patches were started for pain and she discharged home with her . She returned to the ED 8/8/2019 with severe pain and inability to manage at home. Oxycodone was started and she discharged  to this facility for therapies and ongoing care.     She has worked with Pt and OT with slow, but ultimately good progress. Ambulating 600 ft with walker and supervision. Able to do 6 stairs with hand rails and stand by assist. TUG 42 seconds, indicating high fall risk. Requires stand by to contact guard assist with bathing and toileting.   ASSESSMENT / PLAN:  (S32.146D) Closed fracture of fourth lumbar vertebra with routine healing,  unspecified fracture morphology, subsequent encounter  (primary encounter diagnosis)  Comment: pain and mobility slowly improving. Feeling much better in the past few days and ready to return home.  is able to  assist with cares and they have made arrangements for private duty care.   Plan: discharge home 8/29/2019. Hospital bed has been ordered. Continue tylenol, lidoderm patches, prn flexeril,  oxycodone. TLSO when out of bed. Home services and follow up as below.     (I48.2) Chronic atrial fibrillation (H)  Comment: rate controlled: 64-84  Plan: continue metoprolol, diltiazem, Eliquis.     (I50.32) Chronic diastolic congestive heart failure (H)  Comment: compensated with weight stable at 151 lbs.   Plan: continue lasix, metoprolol, diltiazem. Daily weight.     (M06.9) Rheumatoid arthritis, involving unspecified site, unspecified rheumatoid factor presence (H)  Comment: no signs of acute flare  Plan: continue methotrexate and folic acid. Rheumatology follow up per usual schedule.     (I10) Essential hypertension  Comment: acceptable control with recent BPs: 150/82, 157/77, 154/81    Plan: continue diltiazem, lasix, metoprolol. Avoid hypotension due to advanced age and high fall risk.     (K59.01) Slow transit constipation  Comment: improved with current meds  Plan: continue bowel regimen     (Z86.73) History of CVA (cerebrovascular accident)  (G31.84) Mild cognitive impairment  Comment: cognition is at baseline   Plan: supportive care     (R53.81) Physical deconditioning  Comment: progress in therapies as above  Plan: home therapies for ongoing gait training, strengthening and ADL safety.         Past Medical History:  has a past medical history of A-fib -- Chronic, Aortic regurgitation, Arthritis, CHF (congestive heart failure) (H), CVA (cerebral vascular accident) (H), Glaucoma, Hypertension, Mitral regurgitation, PUD (peptic ulcer disease), Pulmonic valve regurgitation, RA (rheumatoid arthritis) (H),  Spinal stenosis, Thyroid disease, and Tricuspid regurgitation.    Discharge Medications:  Current Outpatient Medications   Medication Sig Dispense Refill     acetaminophen (TYLENOL) 500 MG tablet Take 2 tablets (1,000 mg) by mouth 3 times daily       apixaban ANTICOAGULANT (ELIQUIS) 5 MG tablet Take 1 tablet (5 mg) by mouth 2 times daily 60 tablet 0     calcium carb 1250 mg, 500 mg [place],/vitamin D 200 units (OSCAL WITH D) 500-200 MG-UNIT per tablet Take 1 tablet by mouth 2 times daily (with meals)       carboxymethylcellulose (REFRESH PLUS) 0.5 % SOLN Place 1 drop into both eyes 2 times daily        cyclobenzaprine (FLEXERIL) 5 MG tablet Take 0.5 tablets (2.5 mg) by mouth 3 times daily (Patient taking differently: Take 2.5 mg by mouth 3 times daily as needed ) 30 tablet 0     diltiazem ER (DILT-XR) 120 MG 24 hr capsule Take 120 mg by mouth every morning       fluocinonide (LIDEX) 0.05 % external solution Apply topically 2 times daily as needed To scalp        FOLIC ACID PO Take 1 mg by mouth daily        furosemide (LASIX) 20 MG tablet Take 1 tablet (20 mg) by mouth every morning 30 tablet 0     latanoprost (XALATAN) 0.005 % ophthalmic solution Place 1 drop into the right eye At Bedtime       levothyroxine (SYNTHROID/LEVOTHROID) 50 MCG tablet Take 50 mcg by mouth daily       Lidocaine (LIDOCARE) 4 % Patch Place 2 patches onto the skin every 24 hours To prevent lidocaine toxicity, patient should be patch free for 12 hrs daily. 20 patch 0     methotrexate 2.5 MG tablet Take 15 mg by mouth every 7 days Wednesday  (6 x 2.5 mg)       metoprolol succinate ER (TOPROL-XL) 25 MG 24 hr tablet Take 25 mg by mouth every morning       oxyCODONE (ROXICODONE) 5 MG tablet Take 5 mg by mouth every 6 hours as needed for severe pain Plus daily dose with breakfast       polyethylene glycol (MIRALAX/GLYCOLAX) packet Take 17 g by mouth daily       senna-docusate (SENOKOT-S/PERICOLACE) 8.6-50 MG tablet Take 1 tablet by mouth 2 times daily  "      timolol (TIMOPTIC) 0.5 % ophthalmic solution Place 1 drop into the right eye 2 times daily       Medication Changes/Rationale:     Flexeril changed to prn due to sleepiness and mild confusion    Bowel meds adjusted    Controlled medications sent with patient:   Medication: oxycodone  , 30 tabs given to patient at the time of discharge to take home     ROS:   4 point ROS including Respiratory, CV, GI and , other than that noted in the HPI,  is negative    Physical Exam:   Vitals: BP (!) 150/82   Pulse 69   Temp 97.6  F (36.4  C)   Resp 18   Ht 1.575 m (5' 2\")   Wt 67.6 kg (149 lb)   SpO2 98%   BMI 27.25 kg/m     BMI= Body mass index is 27.25 kg/m .  GENERAL APPEARANCE:  Alert, in no distress   ENT:  Mouth and posterior oropharynx normal, moist mucous membranes, [place]  EYES:  EOM normal, conjunctiva and lids normal  NECK:  No adenopathy,masses or thyromegaly  RESP:  respiratory effort and palpation of chest normal, lungs clear to auscultation , no respiratory distress  CV:  Palpation and auscultation of heart done , regular rate and rhythm, no murmur,  no edema, +2 pedal pulses  ABDOMEN:  soft, nontender, no  masses  M/S:   in chair. CAN with good strength. No joint inflammation   SKIN:  no rashes or open areas  PSYCH:  oriented X 3, memory impaired , affect and mood normal    SNF labs:  Last Comprehensive Metabolic Panel:  Sodium   Date Value Ref Range Status   08/19/2019 138 133 - 144 mmol/L Final     Potassium   Date Value Ref Range Status   08/19/2019 4.1 3.4 - 5.3 mmol/L Final     Chloride   Date Value Ref Range Status   08/19/2019 106 94 - 109 mmol/L Final     Carbon Dioxide   Date Value Ref Range Status   08/19/2019 25 20 - 32 mmol/L Final     Anion Gap   Date Value Ref Range Status   08/19/2019 7 3 - 14 mmol/L Final     Glucose   Date Value Ref Range Status   08/19/2019 93 70 - 99 mg/dL Final     Urea Nitrogen   Date Value Ref Range Status   08/19/2019 22 7 - 30 mg/dL Final     Creatinine   Date " Value Ref Range Status   2019 1.04 0.52 - 1.04 mg/dL Final     GFR Estimate   Date Value Ref Range Status   2019 48 (L) >60 mL/min/[1.73_m2] Final     Comment:     Non  GFR Calc  Starting 2018, serum creatinine based estimated GFR (eGFR) will be   calculated using the Chronic Kidney Disease Epidemiology Collaboration   (CKD-EPI) equation.       Calcium   Date Value Ref Range Status   2019 9.3 8.5 - 10.1 mg/dL Final     Lab Results   Component Value Date    WBC 9.7 2019     Lab Results   Component Value Date    RBC 4.44 2019     Lab Results   Component Value Date    HGB 14.5 2019     Lab Results   Component Value Date    HCT 41.1 2019     Lab Results   Component Value Date    MCV 93 2019     Lab Results   Component Value Date    MCH 32.7 2019     Lab Results   Component Value Date    MCHC 35.3 2019     Lab Results   Component Value Date    RDW 14.5 2019     Lab Results   Component Value Date     2019         DISCHARGE PLAN:    Follow up labs: per PCP    Medical Follow Up:      Follow up with primary care provider in 1-2 weeks   Follow up with Ortho prn     MTM referral needed and placed by this provider: No    Discharge Services: Home Care:  Occupational Therapy, Physical Therapy, Registered Nurse, Home Health Aide and From:  [facility] (+ private pay services)    Discharge Instructions Verbalized to Patient at Discharge:     TLSO when out of bed      TOTAL DISCHARGE TIME:   Greater than 30 minutes  Electronically signed by:  HUEY Mayo CNP                     Face to Face and Medical Necessity Statement for DME Provider visit    Demographic Information on Marley Stewart:  Gender: female  : 1932  [address] [place] [address]  [phone] (home) none (work)    Medical Record: [id number]  Social Security Number: [id number]  Primary Care Provider: Chandra Ortiz  Insurance: Payor: [facility]  "/ Plan: BCBS OUT OF STATE MEDICARE REPLACEMENT / Product Type: Medicare /     HPI:   Marley Stewart is a 87 year old  (7/17/1932), who is being seen today for a face to face provider visit at [facility] ; medical necessity statement for DME included. This patient requires the following:  DME Ordered and Medical Necessity Statement   Hospital bed: semi electric  with half side rails    Patient requires frequent body positioning not feasible in an ordinary bed to alleviate pain and requires head of bed to be elevated more than 30 degrees due to compression fracture and CHF, as well as to facilitate transfers from bed to chair.     Pt needing above DME with expected length of need of 99 due to medical necessity associated with following diagnosis:     Closed fracture of fourth lumbar vertebra with routine healing, unspecified fracture morphology, subsequent encounter  Chronic atrial fibrillation (H)  Chronic diastolic congestive heart failure (H)  Rheumatoid arthritis, involving unspecified site, unspecified rheumatoid factor presence (H)  Essential hypertension  Slow transit constipation  History of CVA (cerebrovascular accident)  Mild cognitive impairment  Physical deconditioning      PMH   has a past medical history of A-fib -- Chronic, Aortic regurgitation, Arthritis, CHF (congestive heart failure) (H), CVA (cerebral vascular accident) (H), Glaucoma, Hypertension, Mitral regurgitation, PUD (peptic ulcer disease), Pulmonic valve regurgitation, RA (rheumatoid arthritis) (H), Spinal stenosis, Thyroid disease, and Tricuspid regurgitation.    ROS:4 point ROS including Respiratory, CV, GI and , other than that noted in the HPI,  is negative    EXAM  Vitals: BP (!) 150/82   Pulse 69   Temp 97.6  F (36.4  C)   Resp 18   Ht 1.575 m (5' 2\")   Wt 67.6 kg (149 lb)   SpO2 98%   BMI 27.25 kg/m   ;BMI= Body mass index is 27.25 kg/m .  GENERAL APPEARANCE:  Alert, in no distress   ENT:  Mouth and posterior " oropharynx normal, moist mucous membranes, [place]  EYES:  EOM normal, conjunctiva and lids normal  NECK:  No adenopathy,masses or thyromegaly  RESP:  respiratory effort and palpation of chest normal, lungs clear to auscultation , no respiratory distress  CV:  Palpation and auscultation of heart done , regular rate and rhythm, no murmur,  no edema, +2 pedal pulses  ABDOMEN:  soft, nontender, no  masses  M/S:   in chair. CAN with good strength. No joint inflammation   SKIN:  no rashes or open areas  PSYCH:  oriented X 3, memory impaired , affect and mood normal    ASSESSMENT/PLAN:  1. Closed fracture of fourth lumbar vertebra with routine healing, unspecified fracture morphology, subsequent encounter    2. Chronic atrial fibrillation (H)    3. Chronic diastolic congestive heart failure (H)    4. Rheumatoid arthritis, involving unspecified site, unspecified rheumatoid factor presence (H)    5. Essential hypertension    6. Slow transit constipation    7. History of CVA (cerebrovascular accident)    8. Mild cognitive impairment    9. Physical deconditioning        Orders:  1. Facility staff/TC to contact DME company to get their order form for provider to fill out    ELECTRONICALLY SIGNED BY AMBER CERTIFIED PROVIDER:  HUEY Mayo CNP   NPI: [id number]  [facility]  [address] [place] [address], [address]  [place] [address]            Documentation of Face-to-Face and Certification for Home Health Services     Patient: Marley Stewart   YOB: 1932  MR Number: [id number]  Today's Date: 8/27/2019    I certify that patient: Marley Stewart is under my care and that I, or a nurse practitioner or physician's assistant working with me, had a face-to-face encounter that meets the physician face-to-face encounter requirements with this patient on: 8/27/2019.    This encounter with the patient was in whole, or in part, for the following medical condition, which is the primary reason for home health  care: lumbar vertebral fracture    I certify that, based on my findings, the following services are medically necessary home health services: Nursing, Occupational Therapy, Physical Therapy and HHA.    My clinical findings support the need for the above services because: Nurse is needed: To assess VS, pain control, weight after changes in medications or other medical regimen., To provide assessment and oversight required in the home to assure adherence to the medical plan due to: complex medication regimen, at risk for rehospitalization. and To provide caregiver training to assist with: med management.., Occupational Therapy Services are needed to assess and treat cognitive ability and address ADL safety due to impairment in functional status and cognition . and Physical Therapy Services are needed to assess and treat the following functional impairments: gait instability, fall risk.    Further, I certify that my clinical findings support that this patient is homebound (i.e. absences from home require considerable and taxing effort and are for medical reasons or Orthodoxy services or infrequently or of short duration when for other reasons) because: Requires assistance of another person or specialized equipment to access medical services because patient: Has prohibitive pain during ambulation., Is unable to exit home safely on own due to: gait instability, high fall risk, cognitive impairment., Range of motion limitations prevents ability to exit home safely. and Requires supervision of another for safe transfer...    Based on the above findings. I certify that this patient is confined to the home and needs intermittent skilled nursing care, physical therapy and/or speech therapy.  The patient is under my care, and I have initiated the establishment of the plan of care.  This patient will be followed by a physician who will periodically review the plan of care.  Physician/Provider to provide follow up care: Angel  Chandra Allen    Responsible Medicare certified PEC[employer] Physician: Dr.Mohammad Rivera MD, signing F2F and only signing for initial order. Please send all follow up questions and concerns or needed follow up signatures to the PCP, who [facility] has on file as:  Chandra Ortiz.    Physician Signature: See electronic signature associated with these discharge orders.  Date: 8/27/2019        Sincerely,        HUEY Mayo CNP

## 2019-08-27 NOTE — PROGRESS NOTES
[facility] DISCHARGE SUMMARY  PATIENT'S NAME: Marley Stewart  YOB: 1932  MEDICAL RECORD NUMBER:  [id number]  Place of Service where encounter took place:  [facility] [[id number]]    PRIMARY CARE PROVIDER AND CLINIC RESPONSIBLE AFTER TRANSFER:   Chandra Ortiz MD, [facility] [address] / [facility] [address]    Non-[facility] Provider     Transferring providers: HUEY Mayo CNP, Ron Stafford MD  Recent Hospitalization/ED:  Emergency Room  [facility] stay 8/8/2019 to 8/8/2019.  Date of SNF Admission: August / 08 / 2019  Date of SNF (anticipated) Discharge: August / 30 / 2019  Discharged to: home with    Cognitive Scores: SLUMS: 26/30 and CPT: 4.3/5.6  DME: Hospital Bed and Walker    CODE STATUS/ADVANCE DIRECTIVES DISCUSSION:  DNR / DNI   ALLERGIES: Amitriptyline; Clonazepam; and Latex    DISCHARGE DIAGNOSIS/NURSING FACILITY COURSE:   She came to this facility from the [facility] ED 8/8/2019 for short term rehab and medical management following hospitalization 8/5-8/6/2019 after a mechanical fall resulting in a mildly displaced L4 vertebral  fracture. Ortho consulted and she was fitted for TLSO. Flexeril and lidoderm patches were started for pain and she discharged home with her . She returned to the ED 8/8/2019 with severe pain and inability to manage at home. Oxycodone was started and she discharged  to this facility for therapies and ongoing care.     She has worked with Pt and OT with slow, but ultimately good progress. Ambulating 600 ft with walker and supervision. Able to do 6 stairs with hand rails and stand by assist. TUG 42 seconds, indicating high fall risk. Requires stand by to contact guard assist with bathing and toileting.   ASSESSMENT / PLAN:  (S32.854D) Closed fracture of fourth lumbar vertebra with routine healing, unspecified fracture morphology, subsequent encounter  (primary encounter  diagnosis)  Comment: pain and mobility slowly improving. Feeling much better in the past few days and ready to return home.  is able to  assist with cares and they have made arrangements for private duty care.   Plan: discharge home 8/29/2019. Hospital bed has been ordered. Continue tylenol, lidoderm patches, prn flexeril,  oxycodone. TLSO when out of bed. Home services and follow up as below.     (I48.2) Chronic atrial fibrillation (H)  Comment: rate controlled: 64-84  Plan: continue metoprolol, diltiazem, Eliquis.     (I50.32) Chronic diastolic congestive heart failure (H)  Comment: compensated with weight stable at 151 lbs.   Plan: continue lasix, metoprolol, diltiazem. Daily weight.     (M06.9) Rheumatoid arthritis, involving unspecified site, unspecified rheumatoid factor presence (H)  Comment: no signs of acute flare  Plan: continue methotrexate and folic acid. Rheumatology follow up per usual schedule.     (I10) Essential hypertension  Comment: acceptable control with recent BPs: 150/82, 157/77, 154/81    Plan: continue diltiazem, lasix, metoprolol. Avoid hypotension due to advanced age and high fall risk.     (K59.01) Slow transit constipation  Comment: improved with current meds  Plan: continue bowel regimen     (Z86.73) History of CVA (cerebrovascular accident)  (G31.84) Mild cognitive impairment  Comment: cognition is at baseline   Plan: supportive care     (R53.81) Physical deconditioning  Comment: progress in therapies as above  Plan: home therapies for ongoing gait training, strengthening and ADL safety.         Past Medical History:  has a past medical history of A-fib -- Chronic, Aortic regurgitation, Arthritis, CHF (congestive heart failure) (H), CVA (cerebral vascular accident) (H), Glaucoma, Hypertension, Mitral regurgitation, PUD (peptic ulcer disease), Pulmonic valve regurgitation, RA (rheumatoid arthritis) (H), Spinal stenosis, Thyroid disease, and Tricuspid regurgitation.    Discharge  Medications:  Current Outpatient Medications   Medication Sig Dispense Refill     acetaminophen (TYLENOL) 500 MG tablet Take 2 tablets (1,000 mg) by mouth 3 times daily       apixaban ANTICOAGULANT (ELIQUIS) 5 MG tablet Take 1 tablet (5 mg) by mouth 2 times daily 60 tablet 0     calcium carb 1250 mg, 500 mg [place],/vitamin D 200 units (OSCAL WITH D) 500-200 MG-UNIT per tablet Take 1 tablet by mouth 2 times daily (with meals)       carboxymethylcellulose (REFRESH PLUS) 0.5 % SOLN Place 1 drop into both eyes 2 times daily        cyclobenzaprine (FLEXERIL) 5 MG tablet Take 0.5 tablets (2.5 mg) by mouth 3 times daily (Patient taking differently: Take 2.5 mg by mouth 3 times daily as needed ) 30 tablet 0     diltiazem ER (DILT-XR) 120 MG 24 hr capsule Take 120 mg by mouth every morning       fluocinonide (LIDEX) 0.05 % external solution Apply topically 2 times daily as needed To scalp        FOLIC ACID PO Take 1 mg by mouth daily        furosemide (LASIX) 20 MG tablet Take 1 tablet (20 mg) by mouth every morning 30 tablet 0     latanoprost (XALATAN) 0.005 % ophthalmic solution Place 1 drop into the right eye At Bedtime       levothyroxine (SYNTHROID/LEVOTHROID) 50 MCG tablet Take 50 mcg by mouth daily       Lidocaine (LIDOCARE) 4 % Patch Place 2 patches onto the skin every 24 hours To prevent lidocaine toxicity, patient should be patch free for 12 hrs daily. 20 patch 0     methotrexate 2.5 MG tablet Take 15 mg by mouth every 7 days Wednesday  (6 x 2.5 mg)       metoprolol succinate ER (TOPROL-XL) 25 MG 24 hr tablet Take 25 mg by mouth every morning       oxyCODONE (ROXICODONE) 5 MG tablet Take 5 mg by mouth every 6 hours as needed for severe pain Plus daily dose with breakfast       polyethylene glycol (MIRALAX/GLYCOLAX) packet Take 17 g by mouth daily       senna-docusate (SENOKOT-S/PERICOLACE) 8.6-50 MG tablet Take 1 tablet by mouth 2 times daily       timolol (TIMOPTIC) 0.5 % ophthalmic solution Place 1 drop into the  "right eye 2 times daily       Medication Changes/Rationale:     Flexeril changed to prn due to sleepiness and mild confusion    Bowel meds adjusted    Controlled medications sent with patient:   Medication: oxycodone  , 30 tabs given to patient at the time of discharge to take home     ROS:   4 point ROS including Respiratory, CV, GI and , other than that noted in the HPI,  is negative    Physical Exam:   Vitals: BP (!) 150/82   Pulse 69   Temp 97.6  F (36.4  C)   Resp 18   Ht 1.575 m (5' 2\")   Wt 67.6 kg (149 lb)   SpO2 98%   BMI 27.25 kg/m    BMI= Body mass index is 27.25 kg/m .  GENERAL APPEARANCE:  Alert, in no distress   ENT:  Mouth and posterior oropharynx normal, moist mucous membranes, [place]  EYES:  EOM normal, conjunctiva and lids normal  NECK:  No adenopathy,masses or thyromegaly  RESP:  respiratory effort and palpation of chest normal, lungs clear to auscultation , no respiratory distress  CV:  Palpation and auscultation of heart done , regular rate and rhythm, no murmur,  no edema, +2 pedal pulses  ABDOMEN:  soft, nontender, no  masses  M/S:   in chair. CAN with good strength. No joint inflammation   SKIN:  no rashes or open areas  PSYCH:  oriented X 3, memory impaired , affect and mood normal    SNF labs:  Last Comprehensive Metabolic Panel:  Sodium   Date Value Ref Range Status   08/19/2019 138 133 - 144 mmol/L Final     Potassium   Date Value Ref Range Status   08/19/2019 4.1 3.4 - 5.3 mmol/L Final     Chloride   Date Value Ref Range Status   08/19/2019 106 94 - 109 mmol/L Final     Carbon Dioxide   Date Value Ref Range Status   08/19/2019 25 20 - 32 mmol/L Final     Anion Gap   Date Value Ref Range Status   08/19/2019 7 3 - 14 mmol/L Final     Glucose   Date Value Ref Range Status   08/19/2019 93 70 - 99 mg/dL Final     Urea Nitrogen   Date Value Ref Range Status   08/19/2019 22 7 - 30 mg/dL Final     Creatinine   Date Value Ref Range Status   08/19/2019 1.04 0.52 - 1.04 mg/dL Final     GFR " Estimate   Date Value Ref Range Status   08/19/2019 48 (L) >60 mL/min/[1.73_m2] Final     Comment:     Non  GFR Calc  Starting 12/18/2018, serum creatinine based estimated GFR (eGFR) will be   calculated using the Chronic Kidney Disease Epidemiology Collaboration   (CKD-EPI) equation.       Calcium   Date Value Ref Range Status   08/19/2019 9.3 8.5 - 10.1 mg/dL Final     Lab Results   Component Value Date    WBC 9.7 08/05/2019     Lab Results   Component Value Date    RBC 4.44 08/05/2019     Lab Results   Component Value Date    HGB 14.5 08/05/2019     Lab Results   Component Value Date    HCT 41.1 08/05/2019     Lab Results   Component Value Date    MCV 93 08/05/2019     Lab Results   Component Value Date    MCH 32.7 08/05/2019     Lab Results   Component Value Date    MCHC 35.3 08/05/2019     Lab Results   Component Value Date    RDW 14.5 08/05/2019     Lab Results   Component Value Date     08/05/2019         DISCHARGE PLAN:    Follow up labs: per PCP    Medical Follow Up:      Follow up with primary care provider in 1-2 weeks   Follow up with Ortho prn     MTM referral needed and placed by this provider: No    Discharge Services: Home Care:  Occupational Therapy, Physical Therapy, Registered Nurse, Home Health Aide and From:  [facility] (+ private pay services)    Discharge Instructions Verbalized to Patient at Discharge:     TLSO when out of bed      TOTAL DISCHARGE TIME:   Greater than 30 minutes  Electronically signed by:  HUEY Mayo CNP

## 2019-08-27 NOTE — PROGRESS NOTES
Documentation of Face-to-Face and Certification for Home Health Services     Patient: Marley Stewart   YOB: 1932  MR Number: [id number]  Today's Date: 8/27/2019    I certify that patient: Marley Stewart is under my care and that I, or a nurse practitioner or physician's assistant working with me, had a face-to-face encounter that meets the physician face-to-face encounter requirements with this patient on: 8/27/2019.    This encounter with the patient was in whole, or in part, for the following medical condition, which is the primary reason for home health care: lumbar vertebral fracture    I certify that, based on my findings, the following services are medically necessary home health services: Nursing, Occupational Therapy, Physical Therapy and HHA.    My clinical findings support the need for the above services because: Nurse is needed: To assess VS, pain control, weight after changes in medications or other medical regimen., To provide assessment and oversight required in the home to assure adherence to the medical plan due to: complex medication regimen, at risk for rehospitalization. and To provide caregiver training to assist with: med management.., Occupational Therapy Services are needed to assess and treat cognitive ability and address ADL safety due to impairment in functional status and cognition . and Physical Therapy Services are needed to assess and treat the following functional impairments: gait instability, fall risk.    Further, I certify that my clinical findings support that this patient is homebound (i.e. absences from home require considerable and taxing effort and are for medical reasons or Worship services or infrequently or of short duration when for other reasons) because: Requires assistance of another person or specialized equipment to access medical services because patient: Has prohibitive pain during ambulation., Is unable to exit home safely on own due to: gait  instability, high fall risk, cognitive impairment., Range of motion limitations prevents ability to exit home safely. and Requires supervision of another for safe transfer...    Based on the above findings. I certify that this patient is confined to the home and needs intermittent skilled nursing care, physical therapy and/or speech therapy.  The patient is under my care, and I have initiated the establishment of the plan of care.  This patient will be followed by a physician who will periodically review the plan of care.  Physician/Provider to provide follow up care: Chandra Ortiz    Responsible Medicare certified PECOS Physician: Dr.Mohammad Rivera MD, signing F2F and only signing for initial order. Please send all follow up questions and concerns or needed follow up signatures to the PCP, who [facility] has on file as:  Chandra Ortiz.    Physician Signature: See electronic signature associated with these discharge orders.  Date: 8/27/2019

## 2019-08-27 NOTE — PROGRESS NOTES
Face to Face and Medical Necessity Statement for DME Provider visit    Demographic Information on Marley Stewart:  Gender: female  : 1932  [address] [address]  [phone] (home) none (work)    Medical Record: [id number]  Social Security Number: [id number]  Primary Care Provider: Chandra Ortiz  Insurance: Payor: BCBS / Plan: BCBS OUT OF STATE MEDICARE REPLACEMENT / Product Type: Medicare /     HPI:   Marley Stewart is a 87 year old  (1932), who is being seen today for a face to face provider visit at [facility] ; medical necessity statement for DME included. This patient requires the following:  DME Ordered and Medical Necessity Statement   Hospital bed: semi electric  with half side rails    Patient requires frequent body positioning not feasible in an ordinary bed to alleviate pain and requires head of bed to be elevated more than 30 degrees due to compression fracture and CHF, as well as to facilitate transfers from bed to chair.     Pt needing above DME with expected length of need of 99 due to medical necessity associated with following diagnosis:     Closed fracture of fourth lumbar vertebra with routine healing, unspecified fracture morphology, subsequent encounter  Chronic atrial fibrillation (H)  Chronic diastolic congestive heart failure (H)  Rheumatoid arthritis, involving unspecified site, unspecified rheumatoid factor presence (H)  Essential hypertension  Slow transit constipation  History of CVA (cerebrovascular accident)  Mild cognitive impairment  Physical deconditioning      PMH   has a past medical history of A-fib -- Chronic, Aortic regurgitation, Arthritis, CHF (congestive heart failure) (H), CVA (cerebral vascular accident) (H), Glaucoma, Hypertension, Mitral regurgitation, PUD (peptic ulcer disease), Pulmonic valve regurgitation, RA (rheumatoid arthritis) (H), Spinal stenosis, Thyroid disease, and Tricuspid regurgitation.    ROS:4 point ROS  "including Respiratory, CV, GI and , other than that noted in the HPI,  is negative    EXAM  Vitals: BP (!) 150/82   Pulse 69   Temp 97.6  F (36.4  C)   Resp 18   Ht 1.575 m (5' 2\")   Wt 67.6 kg (149 lb)   SpO2 98%   BMI 27.25 kg/m  ;BMI= Body mass index is 27.25 kg/m .  GENERAL APPEARANCE:  Alert, in no distress   ENT:  Mouth and posterior oropharynx normal, moist mucous membranes, [place]  EYES:  EOM normal, conjunctiva and lids normal  NECK:  No adenopathy,masses or thyromegaly  RESP:  respiratory effort and palpation of chest normal, lungs clear to auscultation , no respiratory distress  CV:  Palpation and auscultation of heart done , regular rate and rhythm, no murmur,  no edema, +2 pedal pulses  ABDOMEN:  soft, nontender, no  masses  M/S:   in chair. CAN with good strength. No joint inflammation   SKIN:  no rashes or open areas  PSYCH:  oriented X 3, memory impaired , affect and mood normal    ASSESSMENT/PLAN:  1. Closed fracture of fourth lumbar vertebra with routine healing, unspecified fracture morphology, subsequent encounter    2. Chronic atrial fibrillation (H)    3. Chronic diastolic congestive heart failure (H)    4. Rheumatoid arthritis, involving unspecified site, unspecified rheumatoid factor presence (H)    5. Essential hypertension    6. Slow transit constipation    7. History of CVA (cerebrovascular accident)    8. Mild cognitive impairment    9. Physical deconditioning        Orders:  1. Facility staff/TC to contact DME company to get their order form for provider to fill out    ELECTRONICALLY SIGNED BY AMBER CERTIFIED PROVIDER:  HUEY Mayo CNP   NPI: [id number]  [facility]  [address] [place] [address], [address]  [place] [address]      "

## 2019-10-02 ENCOUNTER — HEALTH MAINTENANCE LETTER (OUTPATIENT)
Age: 84
End: 2019-10-02

## 2019-12-16 ENCOUNTER — HEALTH MAINTENANCE LETTER (OUTPATIENT)
Age: 84
End: 2019-12-16

## 2021-01-15 ENCOUNTER — HEALTH MAINTENANCE LETTER (OUTPATIENT)
Age: 86
End: 2021-01-15

## 2021-02-15 ENCOUNTER — IMMUNIZATION (OUTPATIENT)
Dept: NURSING | Facility: CLINIC | Age: 86
End: 2021-02-15
Payer: COMMERCIAL

## 2021-02-15 PROCEDURE — 0001A PR COVID VAC PFIZER DIL RECON 30 MCG/0.3 ML IM: CPT

## 2021-02-15 PROCEDURE — 91300 PR COVID VAC PFIZER DIL RECON 30 MCG/0.3 ML IM: CPT

## 2021-03-08 ENCOUNTER — IMMUNIZATION (OUTPATIENT)
Dept: NURSING | Facility: CLINIC | Age: 86
End: 2021-03-08
Attending: INTERNAL MEDICINE
Payer: COMMERCIAL

## 2021-03-08 PROCEDURE — 0002A PR COVID VAC PFIZER DIL RECON 30 MCG/0.3 ML IM: CPT

## 2021-03-08 PROCEDURE — 91300 PR COVID VAC PFIZER DIL RECON 30 MCG/0.3 ML IM: CPT

## 2021-09-05 ENCOUNTER — HEALTH MAINTENANCE LETTER (OUTPATIENT)
Age: 86
End: 2021-09-05

## 2022-02-19 ENCOUNTER — HEALTH MAINTENANCE LETTER (OUTPATIENT)
Age: 87
End: 2022-02-19

## 2022-06-14 ENCOUNTER — HOSPITAL ENCOUNTER (INPATIENT)
Facility: CLINIC | Age: 87
LOS: 10 days | Discharge: NURSING, REHAB OR LONG-TERM CARE | DRG: 871 | End: 2022-06-24
Attending: EMERGENCY MEDICINE | Admitting: INTERNAL MEDICINE
Payer: COMMERCIAL

## 2022-06-14 DIAGNOSIS — N30.00 ACUTE CYSTITIS WITHOUT HEMATURIA: ICD-10-CM

## 2022-06-14 DIAGNOSIS — S32.049A CLOSED FRACTURE OF FOURTH LUMBAR VERTEBRA, UNSPECIFIED FRACTURE MORPHOLOGY, INITIAL ENCOUNTER (H): ICD-10-CM

## 2022-06-14 DIAGNOSIS — R41.82 ALTERED MENTAL STATUS, UNSPECIFIED ALTERED MENTAL STATUS TYPE: ICD-10-CM

## 2022-06-14 DIAGNOSIS — M62.81 GENERALIZED MUSCLE WEAKNESS: ICD-10-CM

## 2022-06-14 DIAGNOSIS — R21 RASH AND NONSPECIFIC SKIN ERUPTION: Primary | ICD-10-CM

## 2022-06-14 LAB
ALBUMIN SERPL-MCNC: 3.7 G/DL (ref 3.4–5)
ALBUMIN UR-MCNC: 30 MG/DL
ALP SERPL-CCNC: 77 U/L (ref 40–150)
ALT SERPL W P-5'-P-CCNC: 18 U/L (ref 0–50)
ANION GAP SERPL CALCULATED.3IONS-SCNC: 7 MMOL/L (ref 3–14)
APPEARANCE UR: ABNORMAL
AST SERPL W P-5'-P-CCNC: 18 U/L (ref 0–45)
BASOPHILS # BLD AUTO: 0 10E3/UL (ref 0–0.2)
BASOPHILS NFR BLD AUTO: 0 %
BILIRUB SERPL-MCNC: 1 MG/DL (ref 0.2–1.3)
BILIRUB UR QL STRIP: NEGATIVE
BUN SERPL-MCNC: 18 MG/DL (ref 7–30)
CALCIUM SERPL-MCNC: 9.5 MG/DL (ref 8.5–10.1)
CHLORIDE BLD-SCNC: 107 MMOL/L (ref 94–109)
CO2 SERPL-SCNC: 26 MMOL/L (ref 20–32)
COLOR UR AUTO: ABNORMAL
CREAT SERPL-MCNC: 0.92 MG/DL (ref 0.52–1.04)
EOSINOPHIL # BLD AUTO: 0 10E3/UL (ref 0–0.7)
EOSINOPHIL NFR BLD AUTO: 0 %
ERYTHROCYTE [DISTWIDTH] IN BLOOD BY AUTOMATED COUNT: 13.9 % (ref 10–15)
GFR SERPL CREATININE-BSD FRML MDRD: 59 ML/MIN/1.73M2
GLUCOSE BLD-MCNC: 115 MG/DL (ref 70–99)
GLUCOSE UR STRIP-MCNC: NEGATIVE MG/DL
HCT VFR BLD AUTO: 43.3 % (ref 35–47)
HGB BLD-MCNC: 14.7 G/DL (ref 11.7–15.7)
HGB UR QL STRIP: ABNORMAL
HOLD SPECIMEN: NORMAL
IMM GRANULOCYTES # BLD: 0.1 10E3/UL
IMM GRANULOCYTES NFR BLD: 1 %
KETONES UR STRIP-MCNC: ABNORMAL MG/DL
LEUKOCYTE ESTERASE UR QL STRIP: ABNORMAL
LYMPHOCYTES # BLD AUTO: 1.7 10E3/UL (ref 0.8–5.3)
LYMPHOCYTES NFR BLD AUTO: 14 %
MCH RBC QN AUTO: 32.7 PG (ref 26.5–33)
MCHC RBC AUTO-ENTMCNC: 33.9 G/DL (ref 31.5–36.5)
MCV RBC AUTO: 96 FL (ref 78–100)
MONOCYTES # BLD AUTO: 1.2 10E3/UL (ref 0–1.3)
MONOCYTES NFR BLD AUTO: 9 %
MUCOUS THREADS #/AREA URNS LPF: PRESENT /LPF
NEUTROPHILS # BLD AUTO: 9.5 10E3/UL (ref 1.6–8.3)
NEUTROPHILS NFR BLD AUTO: 76 %
NITRATE UR QL: NEGATIVE
NRBC # BLD AUTO: 0 10E3/UL
NRBC BLD AUTO-RTO: 0 /100
PH UR STRIP: 7.5 [PH] (ref 5–7)
PLATELET # BLD AUTO: 243 10E3/UL (ref 150–450)
POTASSIUM BLD-SCNC: 4 MMOL/L (ref 3.4–5.3)
PROT SERPL-MCNC: 7.6 G/DL (ref 6.8–8.8)
RBC # BLD AUTO: 4.5 10E6/UL (ref 3.8–5.2)
RBC URINE: 47 /HPF
SARS-COV-2 RNA RESP QL NAA+PROBE: NEGATIVE
SODIUM SERPL-SCNC: 140 MMOL/L (ref 133–144)
SP GR UR STRIP: 1.02 (ref 1–1.03)
SQUAMOUS EPITHELIAL: 8 /HPF
TROPONIN I SERPL HS-MCNC: 41 NG/L
TSH SERPL DL<=0.005 MIU/L-ACNC: 0.44 MU/L (ref 0.4–4)
UROBILINOGEN UR STRIP-MCNC: NORMAL MG/DL
WBC # BLD AUTO: 12.5 10E3/UL (ref 4–11)
WBC CLUMPS #/AREA URNS HPF: PRESENT /HPF
WBC URINE: >182 /HPF

## 2022-06-14 PROCEDURE — 96365 THER/PROPH/DIAG IV INF INIT: CPT

## 2022-06-14 PROCEDURE — 85014 HEMATOCRIT: CPT | Performed by: EMERGENCY MEDICINE

## 2022-06-14 PROCEDURE — 84443 ASSAY THYROID STIM HORMONE: CPT | Performed by: EMERGENCY MEDICINE

## 2022-06-14 PROCEDURE — C9803 HOPD COVID-19 SPEC COLLECT: HCPCS

## 2022-06-14 PROCEDURE — 36415 COLL VENOUS BLD VENIPUNCTURE: CPT | Performed by: EMERGENCY MEDICINE

## 2022-06-14 PROCEDURE — 250N000011 HC RX IP 250 OP 636: Performed by: EMERGENCY MEDICINE

## 2022-06-14 PROCEDURE — 80053 COMPREHEN METABOLIC PANEL: CPT | Performed by: EMERGENCY MEDICINE

## 2022-06-14 PROCEDURE — 81001 URINALYSIS AUTO W/SCOPE: CPT | Performed by: EMERGENCY MEDICINE

## 2022-06-14 PROCEDURE — 120N000001 HC R&B MED SURG/OB

## 2022-06-14 PROCEDURE — 96366 THER/PROPH/DIAG IV INF ADDON: CPT

## 2022-06-14 PROCEDURE — 87086 URINE CULTURE/COLONY COUNT: CPT | Performed by: EMERGENCY MEDICINE

## 2022-06-14 PROCEDURE — 85048 AUTOMATED LEUKOCYTE COUNT: CPT | Performed by: EMERGENCY MEDICINE

## 2022-06-14 PROCEDURE — 99285 EMERGENCY DEPT VISIT HI MDM: CPT | Mod: 25

## 2022-06-14 PROCEDURE — U0005 INFEC AGEN DETEC AMPLI PROBE: HCPCS | Performed by: EMERGENCY MEDICINE

## 2022-06-14 PROCEDURE — 82040 ASSAY OF SERUM ALBUMIN: CPT | Performed by: EMERGENCY MEDICINE

## 2022-06-14 PROCEDURE — 258N000003 HC RX IP 258 OP 636: Performed by: EMERGENCY MEDICINE

## 2022-06-14 PROCEDURE — 84484 ASSAY OF TROPONIN QUANT: CPT | Performed by: EMERGENCY MEDICINE

## 2022-06-14 PROCEDURE — 99223 1ST HOSP IP/OBS HIGH 75: CPT | Mod: AI | Performed by: INTERNAL MEDICINE

## 2022-06-14 RX ORDER — DORZOLAMIDE HYDROCHLORIDE AND TIMOLOL MALEATE 20; 5 MG/ML; MG/ML
1 SOLUTION/ DROPS OPHTHALMIC 2 TIMES DAILY
COMMUNITY

## 2022-06-14 RX ORDER — CEFTRIAXONE 1 G/1
1 INJECTION, POWDER, FOR SOLUTION INTRAMUSCULAR; INTRAVENOUS ONCE
Status: DISCONTINUED | OUTPATIENT
Start: 2022-06-14 | End: 2022-06-14

## 2022-06-14 RX ORDER — AMOXICILLIN 500 MG/1
2000 CAPSULE ORAL SEE ADMIN INSTRUCTIONS
Status: ON HOLD | COMMUNITY
End: 2022-07-02

## 2022-06-14 RX ORDER — CEFTRIAXONE 1 G/1
1 INJECTION, POWDER, FOR SOLUTION INTRAMUSCULAR; INTRAVENOUS ONCE
Status: COMPLETED | OUTPATIENT
Start: 2022-06-14 | End: 2022-06-15

## 2022-06-14 RX ORDER — PREDNISOLONE ACETATE 10 MG/ML
1 SUSPENSION/ DROPS OPHTHALMIC 2 TIMES DAILY
COMMUNITY

## 2022-06-14 RX ORDER — CEPHALEXIN 500 MG/1
500 CAPSULE ORAL 3 TIMES DAILY
Qty: 21 CAPSULE | Refills: 0 | Status: SHIPPED | OUTPATIENT
Start: 2022-06-14 | End: 2022-06-24

## 2022-06-14 RX ORDER — CEPHALEXIN 500 MG/1
500 CAPSULE ORAL ONCE
Status: DISCONTINUED | OUTPATIENT
Start: 2022-06-14 | End: 2022-06-14

## 2022-06-14 RX ADMIN — SODIUM CHLORIDE 1000 ML: 9 INJECTION, SOLUTION INTRAVENOUS at 21:04

## 2022-06-14 RX ADMIN — CEFTRIAXONE SODIUM 1 G: 1 INJECTION, POWDER, FOR SOLUTION INTRAMUSCULAR; INTRAVENOUS at 21:04

## 2022-06-14 ASSESSMENT — ENCOUNTER SYMPTOMS
SHORTNESS OF BREATH: 0
ABDOMINAL PAIN: 0
CONFUSION: 1
APPETITE CHANGE: 1

## 2022-06-14 ASSESSMENT — ACTIVITIES OF DAILY LIVING (ADL)
ADLS_ACUITY_SCORE: 37
ADLS_ACUITY_SCORE: 37

## 2022-06-14 NOTE — ED TRIAGE NOTES
Pt's  noted to EMS pt wasn't acting normal. Pt has hx of UTI.      Triage Assessment     Row Name 06/14/22 [id number]       Triage Assessment (Adult)    Airway WDL WDL       Respiratory WDL    Respiratory WDL WDL       Skin Circulation/Temperature WDL    Skin Circulation/Temperature WDL WDL       Cardiac WDL    Cardiac WDL WDL       Peripheral/Neurovascular WDL    Peripheral Neurovascular WDL WDL       Cognitive/Neuro/Behavioral WDL    Cognitive/Neuro/Behavioral WDL WDL

## 2022-06-14 NOTE — ED PROVIDER NOTES
"  History   Chief Complaint:  Altered Mental Status       The history is provided by the patient and the spouse.      Marley Stewart is a 89 year old female with history of a-fib, respiratory failure, UTIs and CHF who presents with altered mental status. Patient's  notes she is near her usual baseline for mental status and would not have brought her in, as her caregiver is the one who sent her in. Patient denies pain, breathing issues, eating or drinking changes aside from a slightly decreased appetite as well as any abdomen pain. Patient spent most of the day in bed today.     Suspected UTI due to chronic incontinence.    Review of Systems   Constitutional: Positive for appetite change.   Respiratory: Negative for shortness of breath.    Gastrointestinal: Negative for abdominal pain.   Genitourinary:        Chronic incontinence.   Psychiatric/Behavioral: Positive for confusion (baseline).   All other systems reviewed and are negative.    Allergies:  Amitriptyline  Clonazepam  Latex    Medications:  Prolia   Cosopt   Synthroid   Pred Forte   Eliquis   Lasix   Methotrexate  Toprol XL     Past Medical History:     A-fib   CHF  Stroke  Respiratory failure   UTI  CVA  Aortic regurgitation   Tricuspid   Hypertension   Vertigo   Acute respiratory failure   Pneumonia   CRF  Diverticulitis of colon   Hypothyroidism      Past Surgical History:    Ortho surgery   Femur plate   Eyelid surgery  C section  [place] teeth   Trabeculectomy   Knee replacement   Bunion and hammer toe and foot surgery   Dilation and curettage     Family History:    Mother - Cerebrovascular disease  Father - bone cancer     Social History:  The patient presents to the ED with .       Physical Exam     Patient Vitals for the past 24 hrs:   BP Temp Temp src Pulse Resp SpO2 Height Weight   06/14/22 1802 (!) 188/93 99.5  F (37.5  C) Oral 89 20 96 % 1.651 m (5' 5\") 63.5 kg (140 lb)       Physical Exam  Eye:  Pupils are equal, round, and " reactive.  Extraocular movements intact.    ENT:  No rhinorrhea.  Moist mucus membranes.  Normal tongue and tonsil. There is a vertical defect to the right lateral upper lip, possibly due to abrasion or laceration without gaping or active bleeding.     Cardiac:  Regular rate and rhythm.  No murmurs, gallops, or rubs.    Pulmonary:  Clear to auscultation bilaterally.  No wheezes, rales, or rhonchi.    Abdomen:  Positive bowel sounds.  Abdomen is soft and non-distended, without focal tenderness.    Musculoskeletal:  Normal movement of all extremities without evidence for deficit.    Skin:  Warm and dry without rashes.    Neurologic:  Cranial nerves 2-12 intact. Non-focal exam without asymmetric weakness or numbness.     Psychiatric:  Normal affect with appropriate interaction with examiner. Patient is pleasantly confused, at baseline per .     Emergency Department Course     Laboratory:  Labs Ordered and Resulted from Time of ED Arrival to Time of ED Departure   COMPREHENSIVE METABOLIC PANEL - Abnormal       Result Value    Sodium 140      Potassium 4.0      Chloride 107      Carbon Dioxide (CO2) 26      Anion Gap 7      Urea Nitrogen 18      Creatinine 0.92      Calcium 9.5      Glucose 115 (*)     Alkaline Phosphatase 77      AST 18      ALT 18      Protein Total 7.6      Albumin 3.7      Bilirubin Total 1.0      GFR Estimate 59 (*)    ROUTINE UA WITH MICROSCOPIC REFLEX TO CULTURE - Abnormal    Color Urine Orange (*)     Appearance Urine Slightly Cloudy (*)     Glucose Urine Negative      Bilirubin Urine Negative      Ketones Urine Trace (*)     Specific Gravity Urine 1.019      Blood Urine Trace (*)     pH Urine 7.5 (*)     Protein Albumin Urine 30  (*)     Urobilinogen Urine Normal      Nitrite Urine Negative      Leukocyte Esterase Urine Large (*)     WBC Clumps Urine Present (*)     Mucus Urine Present (*)     RBC Urine 47 (*)     WBC Urine >182 (*)     Squamous Epithelials Urine 8 (*)    CBC WITH  PLATELETS AND DIFFERENTIAL - Abnormal    WBC Count 12.5 (*)     RBC Count 4.50      Hemoglobin 14.7      Hematocrit 43.3      MCV 96      MCH 32.7      MCHC 33.9      RDW 13.9      Platelet Count 243      % Neutrophils 76      % Lymphocytes 14      % Monocytes 9      % Eosinophils 0      % Basophils 0      % Immature Granulocytes 1      NRBCs per 100 WBC 0      Absolute Neutrophils 9.5 (*)     Absolute Lymphocytes 1.7      Absolute Monocytes 1.2      Absolute Eosinophils 0.0      Absolute Basophils 0.0      Absolute Immature Granulocytes 0.1      Absolute NRBCs 0.0     TROPONIN I - Normal    Troponin I High Sensitivity 41     TSH WITH FREE T4 REFLEX - Normal    TSH 0.44     COVID-19 VIRUS (CORONAVIRUS) BY PCR   URINE CULTURE      Emergency Department Course:         Reviewed:  I reviewed nursing notes, vitals, past medical history and Care Everywhere    Assessments:  1807 I obtained history and examined the patient as noted above.   1924 I rechecked the patient and explained findings.   1937 I rechecked the patient and requested the daughter's phone number.   1950 I rechecked the patient with UTI diagnoses. Prepared for discharge.   2014 I rechecked the patient and informed the  that the patient will be admitted after failing to stand or walk.     Interventions:  2104 Rocephin 1 g, IV  2104 NS 1000 mL, IV    Consults:   2022 I spoke to Dr. Finch, hospitalist who agrees to admit the patient.     Disposition:  The patient was admitted to the hospital under the care of Dr. Finch.      Impression & Plan     Medical Decision Making:  This unfortunate 89-year-old woman presents to us with generalized weakness and change in mental state.  This was a challenging encounter as the patient was unable to provide me with any significant information.  Her  was rather confrontational, frustrated that the patient was brought here.  From what I can understand, the patient has a caregiver and her daughter-in-law who  "speaks with the patient by phone, and they were the ones who initiated EMS transport to the hospital.  The  states that \"I never would have brought her here\" but stating that she may have a bladder infection due to chronic incontinence.  Unfortunately, her  is not have phone numbers for the daughter-in-law or the caregiver and his phone number is the only one that is in her chart.    On my assessment, the patient is resting comfortably in the bed.  She feels warm to the touch and is somewhat confused, though her  states that she is at her baseline.  She has no complaints for me.  Head to toe exam is otherwise reassuring she does appear very weak.  Laboratory investigation was pursued which is generally unremarkable.  She does have clear and obvious signs of a significant bladder infection and this very likely is causing most of her symptoms.    At this point in the evaluation, I was confronted with a very angry .  He was beside himself with frustration that she had been in the emergency department for 1 hour and 15 minutes and that it took this long to get her history, physical, IV, and laboratory results back, feeling that this should have been done much faster.  He was very difficult to redirect and was adamant that he was going to take his wife home.  He stated that he had 2 family members in the waiting room, and hoping to get further information about this patient, I walked with him out to the waiting room to speak to them.  Unfortunately, they had left at this point.  We came back to the patient room and again he was adamant that he wanted to take her home.  She was in support of this as well.  However, we attempted to address the patient and get her up, she was unable to stand, requiring a two-person assist, far off of her baseline.  With this, we again discussed admission, with shared decision for admission considering her weakness and need for fluids, IV antibiotics, and a " tincture of time.  The  seem to be in support of this as was the patient and I spoke with Dr. Finch to have the hospitalist service who agreed to admit the patient under inpatient status.  I did explain to the  that beds are very tight and that there may not be a bed available, that she could stay in the emergency department overnight, though she would be treated by the hospitalist service and that we would watch her closely.  He seemed to understand this.    Unfortunately, approximately 3 hours later, I was again accosted by her .  He was irate, swearing at me and calling me a number of derogatory and inappropriate names.  He could not understand why she was still in the emergency department and was again demanding to take her home.  I reiterated the fact that her strength was not to a point where she could be discharged and that she would continue with treatment here in the emergency department under the hospitalist service.  We asked his wife what she wanted to do, and she was in support of staying in the hospital as she admits that this weakness is more than she can bear.  In the end, the  agreed to go home and get some rest while we continue to treat the patient through the emergency department until a bed can be procured.  Otherwise, the patient was stable throughout her time under my care and she was admitted to the hospitalist service and signed out to the oncoming emergency physician until a bed is available.      Covid-19  Marley Stewart was evaluated during a global COVID-19 pandemic, which necessitated consideration that the patient might be at risk for infection with the SARS-CoV-2 virus that causes COVID-19.   Applicable protocols for evaluation were followed during the patient's care.   COVID-19 was considered as part of the patient's evaluation. The plan for testing is:  a test was obtained during this visit.    Diagnosis:    ICD-10-CM    1. Altered mental status,  unspecified altered mental status type  R41.82    2. Acute cystitis without hematuria  N30.00    3. Generalized muscle weakness  M62.81        Discharge Medications:  New Prescriptions    CEPHALEXIN (KEFLEX) 500 MG CAPSULE    Take 1 capsule (500 mg) by mouth 3 times daily for 7 days       Scribe Disclosure:  I, TAI KIRK, am serving as a scribe at 6:07 PM on 6/14/2022 to document services personally performed by Trierweiler, Chad A, MD based on my observations and the provider's statements to me.          Trierweiler, Chad A, MD  06/15/22 2773

## 2022-06-15 LAB
ANION GAP SERPL CALCULATED.3IONS-SCNC: 9 MMOL/L (ref 3–14)
BUN SERPL-MCNC: 16 MG/DL (ref 7–30)
CALCIUM SERPL-MCNC: 8.7 MG/DL (ref 8.5–10.1)
CHLORIDE BLD-SCNC: 108 MMOL/L (ref 94–109)
CO2 SERPL-SCNC: 21 MMOL/L (ref 20–32)
CREAT SERPL-MCNC: 0.81 MG/DL (ref 0.52–1.04)
ERYTHROCYTE [DISTWIDTH] IN BLOOD BY AUTOMATED COUNT: 13.8 % (ref 10–15)
GFR SERPL CREATININE-BSD FRML MDRD: 69 ML/MIN/1.73M2
GLUCOSE BLD-MCNC: 122 MG/DL (ref 70–99)
HCT VFR BLD AUTO: 39.3 % (ref 35–47)
HGB BLD-MCNC: 13.4 G/DL (ref 11.7–15.7)
HOLD SPECIMEN: NORMAL
LACTATE SERPL-SCNC: 0.9 MMOL/L (ref 0.7–2)
MCH RBC QN AUTO: 32.6 PG (ref 26.5–33)
MCHC RBC AUTO-ENTMCNC: 34.1 G/DL (ref 31.5–36.5)
MCV RBC AUTO: 96 FL (ref 78–100)
PLATELET # BLD AUTO: 219 10E3/UL (ref 150–450)
POTASSIUM BLD-SCNC: 3.7 MMOL/L (ref 3.4–5.3)
PROCALCITONIN SERPL-MCNC: <0.05 NG/ML
RBC # BLD AUTO: 4.11 10E6/UL (ref 3.8–5.2)
SODIUM SERPL-SCNC: 138 MMOL/L (ref 133–144)
WBC # BLD AUTO: 12 10E3/UL (ref 4–11)

## 2022-06-15 PROCEDURE — 83605 ASSAY OF LACTIC ACID: CPT | Performed by: HOSPITALIST

## 2022-06-15 PROCEDURE — 258N000003 HC RX IP 258 OP 636: Performed by: INTERNAL MEDICINE

## 2022-06-15 PROCEDURE — 80048 BASIC METABOLIC PNL TOTAL CA: CPT | Performed by: INTERNAL MEDICINE

## 2022-06-15 PROCEDURE — 120N000001 HC R&B MED SURG/OB

## 2022-06-15 PROCEDURE — 250N000011 HC RX IP 250 OP 636: Performed by: INTERNAL MEDICINE

## 2022-06-15 PROCEDURE — 250N000013 HC RX MED GY IP 250 OP 250 PS 637: Performed by: INTERNAL MEDICINE

## 2022-06-15 PROCEDURE — 87040 BLOOD CULTURE FOR BACTERIA: CPT | Performed by: INTERNAL MEDICINE

## 2022-06-15 PROCEDURE — 36415 COLL VENOUS BLD VENIPUNCTURE: CPT | Performed by: INTERNAL MEDICINE

## 2022-06-15 PROCEDURE — 250N000009 HC RX 250: Performed by: INTERNAL MEDICINE

## 2022-06-15 PROCEDURE — 99232 SBSQ HOSP IP/OBS MODERATE 35: CPT | Performed by: PHYSICIAN ASSISTANT

## 2022-06-15 PROCEDURE — 36415 COLL VENOUS BLD VENIPUNCTURE: CPT | Performed by: HOSPITALIST

## 2022-06-15 PROCEDURE — 84145 PROCALCITONIN (PCT): CPT | Performed by: INTERNAL MEDICINE

## 2022-06-15 PROCEDURE — 85014 HEMATOCRIT: CPT | Performed by: INTERNAL MEDICINE

## 2022-06-15 RX ORDER — LEVOTHYROXINE SODIUM 50 UG/1
50 TABLET ORAL DAILY
Status: DISCONTINUED | OUTPATIENT
Start: 2022-06-15 | End: 2022-06-24 | Stop reason: HOSPADM

## 2022-06-15 RX ORDER — ONDANSETRON 4 MG/1
4 TABLET, ORALLY DISINTEGRATING ORAL EVERY 6 HOURS PRN
Status: DISCONTINUED | OUTPATIENT
Start: 2022-06-15 | End: 2022-06-24 | Stop reason: HOSPADM

## 2022-06-15 RX ORDER — METOPROLOL SUCCINATE 25 MG/1
25 TABLET, EXTENDED RELEASE ORAL EVERY MORNING
Status: DISCONTINUED | OUTPATIENT
Start: 2022-06-15 | End: 2022-06-24 | Stop reason: HOSPADM

## 2022-06-15 RX ORDER — SODIUM CHLORIDE 9 MG/ML
INJECTION, SOLUTION INTRAVENOUS CONTINUOUS
Status: DISCONTINUED | OUTPATIENT
Start: 2022-06-15 | End: 2022-06-16

## 2022-06-15 RX ORDER — PREDNISOLONE ACETATE 10 MG/ML
1 SUSPENSION/ DROPS OPHTHALMIC 2 TIMES DAILY
Status: DISCONTINUED | OUTPATIENT
Start: 2022-06-15 | End: 2022-06-24 | Stop reason: HOSPADM

## 2022-06-15 RX ORDER — LIDOCAINE 40 MG/G
CREAM TOPICAL
Status: DISCONTINUED | OUTPATIENT
Start: 2022-06-15 | End: 2022-06-24 | Stop reason: HOSPADM

## 2022-06-15 RX ORDER — AMOXICILLIN 250 MG
2 CAPSULE ORAL 2 TIMES DAILY PRN
Status: DISCONTINUED | OUTPATIENT
Start: 2022-06-15 | End: 2022-06-24 | Stop reason: HOSPADM

## 2022-06-15 RX ORDER — ACETAMINOPHEN 650 MG/1
650 SUPPOSITORY RECTAL EVERY 6 HOURS PRN
Status: DISCONTINUED | OUTPATIENT
Start: 2022-06-15 | End: 2022-06-24 | Stop reason: HOSPADM

## 2022-06-15 RX ORDER — AMOXICILLIN 250 MG
1 CAPSULE ORAL 2 TIMES DAILY PRN
Status: DISCONTINUED | OUTPATIENT
Start: 2022-06-15 | End: 2022-06-24 | Stop reason: HOSPADM

## 2022-06-15 RX ORDER — CLONIDINE HYDROCHLORIDE 0.1 MG/1
0.1 TABLET ORAL EVERY 6 HOURS PRN
Status: DISCONTINUED | OUTPATIENT
Start: 2022-06-15 | End: 2022-06-24 | Stop reason: HOSPADM

## 2022-06-15 RX ORDER — DORZOLAMIDE HYDROCHLORIDE AND TIMOLOL MALEATE 20; 5 MG/ML; MG/ML
1 SOLUTION/ DROPS OPHTHALMIC 2 TIMES DAILY
Status: DISCONTINUED | OUTPATIENT
Start: 2022-06-15 | End: 2022-06-24 | Stop reason: HOSPADM

## 2022-06-15 RX ORDER — LATANOPROST 50 UG/ML
1 SOLUTION/ DROPS OPHTHALMIC AT BEDTIME
Status: DISCONTINUED | OUTPATIENT
Start: 2022-06-15 | End: 2022-06-24 | Stop reason: HOSPADM

## 2022-06-15 RX ORDER — ONDANSETRON 2 MG/ML
4 INJECTION INTRAMUSCULAR; INTRAVENOUS EVERY 6 HOURS PRN
Status: DISCONTINUED | OUTPATIENT
Start: 2022-06-15 | End: 2022-06-24 | Stop reason: HOSPADM

## 2022-06-15 RX ORDER — CEFTRIAXONE 1 G/1
1 INJECTION, POWDER, FOR SOLUTION INTRAMUSCULAR; INTRAVENOUS EVERY 24 HOURS
Status: DISCONTINUED | OUTPATIENT
Start: 2022-06-15 | End: 2022-06-21

## 2022-06-15 RX ORDER — ACETAMINOPHEN 325 MG/1
650 TABLET ORAL EVERY 6 HOURS PRN
Status: DISCONTINUED | OUTPATIENT
Start: 2022-06-15 | End: 2022-06-24 | Stop reason: HOSPADM

## 2022-06-15 RX ADMIN — LATANOPROST 1 DROP: 50 SOLUTION/ DROPS OPHTHALMIC at 06:00

## 2022-06-15 RX ADMIN — LEVOTHYROXINE SODIUM 50 MCG: 50 TABLET ORAL at 09:52

## 2022-06-15 RX ADMIN — APIXABAN 5 MG: 5 TABLET, FILM COATED ORAL at 20:21

## 2022-06-15 RX ADMIN — CEFTRIAXONE SODIUM 1 G: 1 INJECTION, POWDER, FOR SOLUTION INTRAMUSCULAR; INTRAVENOUS at 20:22

## 2022-06-15 RX ADMIN — ACETAMINOPHEN 650 MG: 325 TABLET ORAL at 09:50

## 2022-06-15 RX ADMIN — METOPROLOL SUCCINATE 25 MG: 25 TABLET, EXTENDED RELEASE ORAL at 09:50

## 2022-06-15 RX ADMIN — PREDNISOLONE ACETATE 1 DROP: 10 SUSPENSION/ DROPS OPHTHALMIC at 22:37

## 2022-06-15 RX ADMIN — ACETAMINOPHEN 650 MG: 325 TABLET ORAL at 20:21

## 2022-06-15 RX ADMIN — APIXABAN 5 MG: 5 TABLET, FILM COATED ORAL at 09:49

## 2022-06-15 RX ADMIN — SODIUM CHLORIDE, PRESERVATIVE FREE: 5 INJECTION INTRAVENOUS at 05:58

## 2022-06-15 RX ADMIN — DORZOLAMIDE HYDROCHLORIDE AND TIMOLOL MALEATE 1 DROP: 20; 5 SOLUTION/ DROPS OPHTHALMIC at 21:53

## 2022-06-15 ASSESSMENT — ACTIVITIES OF DAILY LIVING (ADL)
ADLS_ACUITY_SCORE: 43
ADLS_ACUITY_SCORE: 37
ADLS_ACUITY_SCORE: 43
ADLS_ACUITY_SCORE: 37
ADLS_ACUITY_SCORE: 43
ADLS_ACUITY_SCORE: 37
ADLS_ACUITY_SCORE: 37

## 2022-06-15 NOTE — PHARMACY-ADMISSION MEDICATION HISTORY
Pharmacy Medication History  Admission medication history interview status for the 6/14/2022  admission is complete. See EPIC admission navigator for prior to admission medications     Location of Interview: Patient room  Medication history sources: Patient    Additional medication history information:   Pt was not a great historian;  was no help    Medication reconciliation completed by provider prior to medication history? No    Time spent in this activity: 15 minutes    Prior to Admission medications    Medication Sig Last Dose Taking? Auth Provider Long Term End Date   acetaminophen (TYLENOL) 500 MG tablet Take 2 tablets (1,000 mg) by mouth 3 times daily  Patient taking differently: Take 1,000 mg by mouth every 6 hours as needed prn med Yes Reginald So PA-C     amoxicillin (AMOXIL) 500 MG capsule Take 2,000 mg by mouth See Admin Instructions Prior to dentist  Yes Unknown, Entered By History     apixaban ANTICOAGULANT (ELIQUIS) 5 MG tablet Take 1 tablet (5 mg) by mouth 2 times daily 6/14/2022 at am Yes Marino Sandoval MD     calcium carb 1250 mg, 500 mg [place],/vitamin D 200 units (OSCAL WITH D) 500-200 MG-UNIT per tablet Take 1 tablet by mouth 2 times daily (with meals) 6/14/2022 at am Yes Unknown, Entered By History     cephALEXin (KEFLEX) 500 MG capsule Take 1 capsule (500 mg) by mouth 3 times daily for 7 days  Yes Trierweiler, Chad A, MD No 6/21/22   dorzolamide-timolol (COSOPT) 2-0.5 % ophthalmic solution Place 1 drop into the right eye 2 times daily 6/14/2022 at am Yes Unknown, Entered By History     FOLIC ACID PO Take 1 mg by mouth daily  6/14/2022 at Unknown time Yes Reported, Patient     furosemide (LASIX) 20 MG tablet Take 1 tablet (20 mg) by mouth every morning 6/14/2022 at Unknown time Yes Marino Sandoval MD Yes    latanoprost (XALATAN) 0.005 % ophthalmic solution Place 1 drop into the right eye At Bedtime 6/13/2022 at Unknown time Yes Unknown, Entered By History Yes     levothyroxine (SYNTHROID/LEVOTHROID) 50 MCG tablet Take 50 mcg by mouth daily 6/14/2022 at Unknown time Yes Unknown, Entered By History No    metoprolol succinate ER (TOPROL-XL) 25 MG 24 hr tablet Take 25 mg by mouth every morning 6/14/2022 at Unknown time Yes Unknown, Entered By History No    prednisoLONE acetate (PRED FORTE) 1 % ophthalmic suspension Place 1 drop into the right eye 2 times daily 6/14/2022 at am Yes Unknown, Entered By History     methotrexate 2.5 MG tablet Take 15 mg by mouth every 7 days Wednesday  (6 x 2.5 mg) 6/8/2022  Unknown, Entered By History No        The information provided in this note is only as accurate as the sources available at the time of update(s)   Laure LemusD

## 2022-06-15 NOTE — H&P
Admitted: 06/14/2022    CHIEF COMPLAINT:  Generalized weakness and confusion.    HISTORY OF PRESENT ILLNESS:  Marley Stewart is a very pleasant 89-year-old female with past medical history significant for chronic atrial fibrillation on anticoagulation with Eliquis, heart failure with preserved ejection fraction, hypertension, history of stroke, rheumatoid arthritis and hypothyroidism, among other medical problems, who presents to the emergency room today for evaluation of generalized weakness and worsening confusion.  Limited history is available to me this evening, given the patient's present confusion and lack of family at bedside.    The patient reportedly lives at home with her .  She has a caregiver and either a daughter or daughter-in-law who is an RN.  Earlier today, she was observed to become increasingly confused, more so than her baseline, and generally weak.  EMS was called.  The patient's  arrived home while EMS was there and confirmed that she was somewhat confused.  She was brought to the emergency room for further evaluation.  There was no reported history of recent illness, fevers, chills, complaints of chest pain, shortness of breath, abdominal pain, nausea, vomiting or other changes in her bowel or bladder habits.    In the emergency room, she was seen and evaluated by Dr. Trierweiler.  She was afebrile and in rate-controlled atrial fibrillation.  On physical exam, she appeared mildly unkempt.  Labs were generally unremarkable.  Her electrolytes and renal function were stable.  She did have a mild leukocytosis with a white count of 12.5.  Her urinalysis ultimately returned grossly abnormal with greater than 182 WBCs, large leukocyte esterase.  She was given a dose of IV ceftriaxone.  Her  initially stated that her condition was stable and he wanted to take her home; however, patient remained weak during her time in the emergency room and was needing assistance of 2 to get out of  bed.  At baseline, she typically ambulates without assistance.   was ultimately agreeable to her remaining hospitalized for ongoing evaluation and care.      When I saw the patient in the emergency room this evening, her  had already left.  She herself was resting comfortably.  She was alert and oriented to self.  With some prompting, she was able to tell me she was in the hospital.  She did not know the year.  She could not provide any further details as to why she was brought to the hospital today.    PAST MEDICAL HISTORY:    1.  Chronic atrial fibrillation, reportedly on chronic anticoagulation with Eliquis.  2.  Hypertension.  3.  History of a stroke.  4.  Rheumatoid arthritis.  5.  History of heart failure with preserved ejection fraction per last echocardiogram in 2016.  6.  Peptic ulcer disease.  7.  Spinal stenosis.  8.  Glaucoma.  9.  Hypothyroidism.  10.  Rheumatoid arthritis, stable on methotrexate.    PAST SURGICAL HISTORY:  Previous orthopedic surgery not further specified.    FAMILY HISTORY:  Mother had a history of cerebrovascular disease.  Father had a history of bone cancer.    SOCIAL HISTORY:  She is a lifelong nonsmoker.  She does not consume alcohol.  She lives at home with her .  It sounds as though she has some sort of caregiver with her during the day.  She typically ambulates without assistance.    HOME MEDICATIONS:    Prior to Admission medications    Medication Sig Last Dose Taking? Auth Provider Long Term End Date   acetaminophen (TYLENOL) 500 MG tablet Take 2 tablets (1,000 mg) by mouth 3 times daily  Patient taking differently: Take 1,000 mg by mouth every 6 hours as needed prn med Yes Reginald So PA-C       amoxicillin (AMOXIL) 500 MG capsule Take 2,000 mg by mouth See Admin Instructions Prior to dentist   Yes Unknown, Entered By History       apixaban ANTICOAGULANT (ELIQUIS) 5 MG tablet Take 1 tablet (5 mg) by mouth 2 times daily 6/14/2022 at am Yes  Marino Sandoval MD       calcium carb 1250 mg, 500 mg [place],/vitamin D 200 units (OSCAL WITH D) 500-200 MG-UNIT per tablet Take 1 tablet by mouth 2 times daily (with meals) 6/14/2022 at am Yes Unknown, Entered By History       cephALEXin (KEFLEX) 500 MG capsule Take 1 capsule (500 mg) by mouth 3 times daily for 7 days   Yes Trierweiler, Chad A, MD No 6/21/22   dorzolamide-timolol (COSOPT) 2-0.5 % ophthalmic solution Place 1 drop into the right eye 2 times daily 6/14/2022 at am Yes Unknown, Entered By History       FOLIC ACID PO Take 1 mg by mouth daily  6/14/2022 at Unknown time Yes Reported, Patient       furosemide (LASIX) 20 MG tablet Take 1 tablet (20 mg) by mouth every morning 6/14/2022 at Unknown time Yes Marino Sandoval MD Yes     latanoprost (XALATAN) 0.005 % ophthalmic solution Place 1 drop into the right eye At Bedtime 6/13/2022 at Unknown time Yes Unknown, Entered By History Yes     levothyroxine (SYNTHROID/LEVOTHROID) 50 MCG tablet Take 50 mcg by mouth daily 6/14/2022 at Unknown time Yes Unknown, Entered By History No     metoprolol succinate ER (TOPROL-XL) 25 MG 24 hr tablet Take 25 mg by mouth every morning 6/14/2022 at Unknown time Yes Unknown, Entered By History No     prednisoLONE acetate (PRED FORTE) 1 % ophthalmic suspension Place 1 drop into the right eye 2 times daily 6/14/2022 at am Yes Unknown, Entered By History       methotrexate 2.5 MG tablet Take 15 mg by mouth every 7 days Wednesday  (6 x 2.5 mg) 6/8/2022   Unknown, Entered By History No        ALLERGIES:    1.  AMITRIPTYLINE.  2.  CLONAZEPAM.  3.  LATEX.    REVIEW OF SYSTEMS:  Unable to complete a full 10-point review of systems at present given patient's confusion and no other family available at bedside.    PHYSICAL EXAMINATION:    VITAL SIGNS:  Temperature 99.5, pulse of 99, respirations 20, blood pressure 170/102, O2 saturation 95% on room air.   GENERAL:  The patient is a well-nourished, well-developed female, appears stated age.   She is alert and oriented to self and location with some prompting.  She appears mildly unkempt, in no acute distress.  HEENT:  Pupils equal, round, and reactive to light and accommodation.  Extraocular muscle movements intact.  Mucous membranes are moist.  CARDIOVASCULAR:  Heart rate is irregularly irregular without murmurs, gallops, or rubs.  Pulses are + and symmetric in bilateral upper extremities.  There is no lower extremity edema.  LUNGS:  Clear to auscultation bilaterally.  No wheezes, rales or rhonchi, no increased work of breathing or accessory muscle use.  ABDOMEN:  Soft, nontender, nondistended.  Positive bowel sounds throughout.  INTEGUMENTARY:  Skin is warm, dry, no rash, lesions, jaundice or ecchymosis.  NEUROLOGIC:  Cranial nerves II-XII are grossly intact.  No focal motor or sensory deficits.  Gait was not assessed.    LAB DATA AND IMAGING:  BMP shows sodium 140, potassium 4.0, creatinine 0.92.  LFTs are within normal limits.  TSH was normal.  Troponin was negative.  Glucose 115.  CBC showed a white count 12.5, hemoglobin of 14.7 and a platelet count of 243.  Urinalysis was markedly abnormal with greater than 182 WBCs, large leukocyte esterase, trace blood, with 47 RBCs, some mucus, WBC clumps and squamous cells.  Urine culture is pending at this time.    ASSESSMENT AND PLAN:  Marley Stewart is a very pleasant 89-year-old female with past medical history of hypertension, heart failure with preserved ejection fraction, atrial fibrillation on chronic anticoagulation with Eliquis, history of stroke, rheumatoid arthritis, hypothyroidism and peptic ulcer disease, among other medical problems, who presents to the emergency room tonight for evaluation of increased confusion and generalized weakness.  She was found to have a urinary tract infection.  She will be admitted to the hospital [facility] for ongoing evaluation and care.  1.  Urinary tract infection with suspected metabolic encephalopathy and generalized  weakness secondary to urinary tract infection:  Her family briefly mentioned a history of urinary tract infections.  She typically ambulates independently per reports.  She has become more weak and increasingly confused in the preceding day or two.  Her baseline level of mentation is not clear to me this evening.  In the emergency room, she is afebrile and hemodynamically stable.  She has mild leukocytosis.  UA is abnormal.  She is started on ceftriaxone.  Will continue ceftriaxone while awaiting urine culture data, then can tailor antibiotics further as needed.  Will continue IV fluids overnight.  PT, OT consults have been placed.  2.  Atrial fibrillation:  On chronic anticoagulation with Eliquis and rate controlled with metoprolol. These medications will be continued.   3.  Hypertension, heart failure with preserved ejection fraction, history of cerebrovascular accident:  Chronic and stable on metoprolol and Eliquis.  Hold Lasix while getting IV fluids.   4.  Rheumatoid arthritis:  Takes methotrexate on .  Hold methotrexate given concerns for active infection.  5.  Hypothyroidism:  Chronic and stable on levothyroxine.    DEEP VENOUS THROMBOSIS PROPHYLAXIS:  PCDs.    CODE STATUS:  There is no family at bedside this evening, but I do find a POLST that was completed in 2019 that confirmed DNR/DNI.  This does corroborate with recent hospitalizations.    Lizabeth Finch DO        D: 2022   T: 2022   MT: OBED    Name:     DWAINE CASTRO  MRN:      [id number]        Account:     [id number]   :      1932           Admitted:    2022       Document: [id number]    cc:  Chandra Ortiz MD

## 2022-06-15 NOTE — UTILIZATION REVIEW
Admission Status; Secondary Review Determination     Admission Date: 6/14/2022  5:59 PM       Under the authority of the Utilization Management Committee, the utilization review process indicated a secondary review on the above patient.  The review outcome is based on review of the medical records, discussions with staff, and applying clinical experience noted on the date of the review.        (x)      Inpatient Status Appropriate - This patient's medical care is consistent with medical management for inpatient care and reasonable inpatient medical practice.       RATIONALE FOR DETERMINATION      Brief clinical presentation, information copied from the chart, abbreviated and edited for relevant content:     Marley Stewart is a 89-year-old female with past medical history of hypertension, heart failure with preserved ejection fraction, atrial fibrillation on chronic anticoagulation with Eliquis, history of stroke, rheumatoid arthritis, hypothyroidism and peptic ulcer disease, among other medical problems, who presented with  increased confusion and generalized weakness.  She was found to have a urinary tract infection with suspected metabolic encephalopathy and generalized weakness secondary to urinary tract infection:  She has become more weak and increasingly confused in the preceding day or two. Labs with mild leukocytosis.  UA is abnormal. started on IV ceftriaxone while awaiting urine culture data, then can tailor antibiotics. IV fluids overnight.  PT, OT consults have been placed.         At the time of admission with the information available to the attending physician, more than 2 nights hospital complex care was anticipated. Also, there was a risk of adverse outcome if patient was treated outpatient or observation. High intensity of services anticipated. Inpatient admission appropriate based on Medicare guidelines.       The information on this document is developed by the utilization review team in order for  the business office to ensure compliance.  This only denotes the appropriateness of proper admission status and does not reflect the quality of care rendered.         The definitions of Inpatient Status and Observation Status used in making the determination above are those provided in the CMS Coverage Manual, Chapter 1 and Chapter 6, section 70.4.      Sincerely,      Nelda Machado MD   Utilization Review/ Case Management  [facility].

## 2022-06-15 NOTE — H&P
[facility]    History and Physical - Hospitalist Service       Date of Admission:  6/14/2022  Dictation #: [id number]  Brief Summary (see dictation for more details):    89yo female with PMHx of hypertension, CVA, HFpEF, chronic afib on anticoagulation, and RA was brought to ED for evaluation of increased confusion and generalized weakness. Found to have a UTI.  initially wanted to take her home but was needing assist of 2 to get out of bed so admitted for ongoing care.     UTI   Suspected metabolic encephalopathy dt UTI  Generalized weakness thought to be dt UTI  -- UA abnl, started on ceftriaxone, UC pending  -- IVFs  -- PT/OT consult    Afib -- in rate controlled afib tonight; reportedly on chronic anticoagulation with Eliquis and takes Toprol XL and diltiazem ER for rate control, await confirmation per pharmacy  Hypertension  HFpEF per last echo in 2016  Hx of CVA  RA -- takes methotrexate on Wednesdays  Hypothyroidism    DVT ppx: PCDS  Code Status: DNR/DNI as per POLST in 2019, spouse no longer at bedside when I saw patient      Clinically Significant Risk Factors Present on Admission               # Coagulation Defect: home medication list includes an anticoagulant medication          Lizabeth Finch, DO  Hospitalist Service  [facility]  Securely message with the Vocera Web Console (learn more here)  Text page via imgix Paging/Directory

## 2022-06-15 NOTE — ED NOTES
"[facility]  ED Nurse Handoff Report    ED Chief complaint: Altered Mental Status      ED Diagnosis:   Final diagnoses:   Altered mental status, unspecified altered mental status type   Acute cystitis without hematuria   Generalized muscle weakness       Code Status: Full Code    Allergies:   Allergies   Allergen Reactions     Amitriptyline      Clonazepam Other (See Comments)     Somnolence at 0.5 mg dose     Latex Rash       Patient Story: pt's  reports pt not \"acting right\" or off baseline. Pt also presents with UTI and increased weakness  Focused Assessment:  Pt was going to be d/c'd home but was unable to stand to get into the wheel chair. Pt also reports all over pain but this is baseline for her. Pt presents as confused and forgetful but calm and cooperative and able to follow commands.     Treatments and/or interventions provided: IV blood work ABX   Patient's response to treatments and/or interventions: therapeutic     To be done/followed up on inpatient unit:  uti and proper home care     Does this patient have any cognitive concerns?: Short term memory loss and Baseline dementia    Activity level - Baseline/Home:  Stand with Assist  Activity Level - Current:   Total Care    Patient's Preferred language: English   Needed?: No    Isolation: None  Infection: Not Applicable  Patient tested for COVID 19 prior to admission: YES  Bariatric?: No    Vital Signs:   Vitals:    06/14/22 1802   BP: (!) 188/93   Pulse: 89   Resp: 20   Temp: 99.5  F (37.5  C)   TempSrc: Oral   SpO2: 96%   Weight: 63.5 kg (140 lb)   Height: 1.651 m (5' 5\")       Cardiac Rhythm:     Was the PSS-3 completed:   Yes  What interventions are required if any?               Family Comments: concers for increased weakness   OBS brochure/video discussed/provided to patient/family: Yes              Name of person given brochure if not patient:               Relationship to patient:      For the majority of " the shift this patient's behavior was Green.   Behavioral interventions performed were none .    ED NURSE PHONE NUMBER: RN 4

## 2022-06-15 NOTE — ED NOTES
RECEIVING UNIT ED HANDOFF REVIEW    ED Nurse Handoff Report was reviewed by: Reginald Perla RN on Beatriz 15, 2022 at 12:05 PM

## 2022-06-15 NOTE — PROGRESS NOTES
[facility]    Medicine Progress Note - Hospitalist Service    Date of Admission:  6/14/2022    Assessment & Plan        Marley Stewart is a very pleasant 89-year-old female with past medical history of hypertension, heart failure with preserved ejection fraction, atrial fibrillation on chronic anticoagulation with Eliquis, history of stroke, rheumatoid arthritis, hypothyroidism and peptic ulcer disease, among other medical problems, who presents to the emergency room tonight for evaluation of increased confusion and generalized weakness.  She was found to have a urinary tract infection.  She will be admitted to the hospital [facility] for ongoing evaluation and care.    Urinary tract infection with suspected metabolic encephalopathy and generalized weakness secondary to urinary tract infection:    Her family briefly mentioned a history of urinary tract infections.  She typically ambulates independently per reports.  She has become more weak and increasingly confused in the day or two prior to presentation.    --She is afebrile and hemodynamically stable.    --She has mild leukocytosis 12.5--12.0.  UA is abnormal.    --Continue IV ceftriaxone.    --awaiting urine culture data, then can tailor antibiotics further as needed.    --Will continue IV fluids for now.    --PT, OT consults placed.    Atrial fibrillation:  On chronic anticoagulation with Eliquis and rate controlled with metoprolol. These medications will be continued.     Hypertension, heart failure with preserved ejection fraction, history of cerebrovascular accident:  Chronic and stable on metoprolol and Eliquis.  Hold Lasix while getting IV fluids.     Rheumatoid arthritis:  Takes methotrexate on Wednesdays.  Hold methotrexate given concerns for active infection.    Hypothyroidism:  Chronic and stable on levothyroxine.        Diet: Combination Diet Regular Diet Adult    DVT Prophylaxis: Pneumatic Compression Devices  Armstrong Catheter: Not  present  Central Lines: None  Cardiac Monitoring: None  Code Status:   POLST that was completed in 2019 that confirmed DNR/DNI.  This does corroborate with recent hospitalizations.    Disposition Plan   Expected Discharge: in 1-2 days pending improving mental status, transition to PO abx, and safe disposition.  PT/OT and SW consulted.       The patient's care was discussed with the Attending Physician, Dr. Farrar and Patient.    DAQUAN Carson  Hospitalist Service  [facility]  Securely message with the Vocera Web Console (learn more here)  Text page via [employer] Paging/Directory         Clinically Significant Risk Factors Present on Admission               # Coagulation Defect: home medication list includes an anticoagulant medication        ______________________________________________________________________    Interval History   Patient seen in ED consult borderline, alert and oriented to self and place.  Denies pain currently.  No fever, chills.  No abdominal pain, nausea, vomiting, chest pain, shortness of breath.    Data reviewed today: I reviewed all medications, new labs and imaging results over the last 24 hours.     Physical Exam   Vital Signs: Temp: 99.5  F (37.5  C) Temp src: Oral BP: (!) 142/72 Pulse: 67   Resp: 20 SpO2: 93 % O2 Device: None (Room air)    Weight: 140 lbs 0 oz     Constitutional: Elderly appearing adult female, alert, oriented to person, place, and that she is in the hospital.  Cooperative, lying in bed in NAD.  Respiratory:  Lungs CTAB.  No crackles, wheezes, or rhonchi, no labored breathing.  Cardiovascular:  Heart RRR, no MRG, no edema.  GI:  Abdomen soft, NT/ND and with normoactive BS  Skin/Integumen:  Warm, dry, non-diaphoretic.  MSK: CMS x4 intact.      Data   Recent Labs   Lab 06/15/22  0703 06/14/22  1809 06/14/22  1807   WBC 12.0*  --  12.5*   HGB 13.4  --  14.7   MCV 96  --  96     --  243    140  --    POTASSIUM 3.7 4.0  --     CHLORIDE 108 107  --    CO2 21 26  --    BUN 16 18  --    CR 0.81 0.92  --    ANIONGAP 9 7  --    LONNIE 8.7 9.5  --    * 115*  --    ALBUMIN  --  3.7  --    PROTTOTAL  --  7.6  --    BILITOTAL  --  1.0  --    ALKPHOS  --  77  --    ALT  --  18  --    AST  --  18  --      No results found for this or any previous visit (from the past 24 hour(s)).  Medications     sodium chloride 100 mL/hr at 06/15/22 0558       apixaban ANTICOAGULANT  5 mg Oral BID     cefTRIAXone  1 g Intravenous Q24H     dorzolamide-timolol  1 drop Right Eye BID     latanoprost  1 drop Right Eye At Bedtime     levothyroxine  50 mcg Oral Daily     metoprolol succinate ER  25 mg Oral QAM     prednisoLONE acetate  1 drop Right Eye BID     sodium chloride (PF)  3 mL Intracatheter Q8H     sodium chloride (PF)  3 mL Intracatheter Q8H

## 2022-06-16 ENCOUNTER — APPOINTMENT (OUTPATIENT)
Dept: OCCUPATIONAL THERAPY | Facility: CLINIC | Age: 87
DRG: 871 | End: 2022-06-16
Attending: INTERNAL MEDICINE
Payer: COMMERCIAL

## 2022-06-16 PROCEDURE — 97166 OT EVAL MOD COMPLEX 45 MIN: CPT | Mod: GO

## 2022-06-16 PROCEDURE — 250N000013 HC RX MED GY IP 250 OP 250 PS 637: Performed by: INTERNAL MEDICINE

## 2022-06-16 PROCEDURE — 258N000003 HC RX IP 258 OP 636: Performed by: INTERNAL MEDICINE

## 2022-06-16 PROCEDURE — 99232 SBSQ HOSP IP/OBS MODERATE 35: CPT | Performed by: PHYSICIAN ASSISTANT

## 2022-06-16 PROCEDURE — 120N000001 HC R&B MED SURG/OB

## 2022-06-16 PROCEDURE — 250N000011 HC RX IP 250 OP 636: Performed by: INTERNAL MEDICINE

## 2022-06-16 PROCEDURE — 97530 THERAPEUTIC ACTIVITIES: CPT | Mod: GO

## 2022-06-16 RX ADMIN — DORZOLAMIDE HYDROCHLORIDE AND TIMOLOL MALEATE 1 DROP: 20; 5 SOLUTION/ DROPS OPHTHALMIC at 19:40

## 2022-06-16 RX ADMIN — LATANOPROST 1 DROP: 50 SOLUTION/ DROPS OPHTHALMIC at 21:05

## 2022-06-16 RX ADMIN — PREDNISOLONE ACETATE 1 DROP: 10 SUSPENSION/ DROPS OPHTHALMIC at 09:06

## 2022-06-16 RX ADMIN — SODIUM CHLORIDE, PRESERVATIVE FREE: 5 INJECTION INTRAVENOUS at 01:58

## 2022-06-16 RX ADMIN — LEVOTHYROXINE SODIUM 50 MCG: 50 TABLET ORAL at 07:44

## 2022-06-16 RX ADMIN — APIXABAN 5 MG: 5 TABLET, FILM COATED ORAL at 07:44

## 2022-06-16 RX ADMIN — METOPROLOL SUCCINATE 25 MG: 25 TABLET, EXTENDED RELEASE ORAL at 07:44

## 2022-06-16 RX ADMIN — LATANOPROST 1 DROP: 50 SOLUTION/ DROPS OPHTHALMIC at 00:14

## 2022-06-16 RX ADMIN — ACETAMINOPHEN 650 MG: 325 TABLET ORAL at 15:48

## 2022-06-16 RX ADMIN — APIXABAN 5 MG: 5 TABLET, FILM COATED ORAL at 19:40

## 2022-06-16 RX ADMIN — ACETAMINOPHEN 650 MG: 325 TABLET ORAL at 07:44

## 2022-06-16 RX ADMIN — CEFTRIAXONE SODIUM 1 G: 1 INJECTION, POWDER, FOR SOLUTION INTRAMUSCULAR; INTRAVENOUS at 20:59

## 2022-06-16 RX ADMIN — PREDNISOLONE ACETATE 1 DROP: 10 SUSPENSION/ DROPS OPHTHALMIC at 19:40

## 2022-06-16 RX ADMIN — DORZOLAMIDE HYDROCHLORIDE AND TIMOLOL MALEATE 1 DROP: 20; 5 SOLUTION/ DROPS OPHTHALMIC at 09:06

## 2022-06-16 ASSESSMENT — ACTIVITIES OF DAILY LIVING (ADL)
ADLS_ACUITY_SCORE: 53

## 2022-06-16 NOTE — PROVIDER NOTIFICATION
MD Notification    Notified Person: MD    Notified Person Name: Kevin    Notification Date/Time: Beatriz 15, 2022 @ 8:42 PM     Notification Interaction: Amcom page    Purpose of Notification: FYI - temp 100.8, /85. Tylenol and first dose of rocephin just given. Will continue to monitor vitals.    Orders Received:    Comments:

## 2022-06-16 NOTE — PLAN OF CARE
Goal Outcome Evaluation:        Disoriented to time and situation, mildly forgetful. Tmax 102.1, improved with Tylenol and cool washcloths to forehead. Sepsis triggered, lactic 0.9. Blood cultures pending. Hypertensive, new orders for clonidine added to MAR if BP>160. Denied pain/N/V this shift. Incontinent, purewick in place - good UOP. No BM overnight, pt unable to recall date of LBM. Tolerating regular diet - good appetite. PIV infusing NS @ 100 ml/hr.

## 2022-06-16 NOTE — PROGRESS NOTES
"   06/16/22 0825   Quick Adds   Type of Visit Initial Occupational Therapy Evaluation   Living Environment   People in Home spouse   Current Living Arrangements house   Living Environment Comments Pt lives in a house in [place] with her . Pt reports that there are grab bars in the bathroom but has a hard time providing other information saying,\"I just don't know\". Reports that  is available and capable to provide assist at home.   Self-Care   Usual Activity Tolerance moderate   Current Activity Tolerance poor   Equipment Currently Used at Home walker, standard  (Pt reports using a walker at home but is unable to say which typer of walker.)   Activity/Exercise/Self-Care Comment Pt unable to describe PLOF in areas of self care. Reports independence with some self cares but states \"I don't know\" when asked about specific activities such as dressing and bathing.   Instrumental Activities of Daily Living (IADL)   IADL Comments Has a cleaning service that helps with those tasks   General Information   Onset of Illness/Injury or Date of Surgery 06/15/22   Referring Physician Lizabeth Finch, DO   Patient/Family Therapy Goal Statement (OT) To return home   Additional Occupational Profile Info/Pertinent History of Current Problem Per chart 6/15: \"Marley Stewart is a very pleasant 89-year-old female with past medical history of hypertension, heart failure with preserved ejection fraction, atrial fibrillation on chronic anticoagulation with Eliquis, history of stroke, rheumatoid arthritis, hypothyroidism and peptic ulcer disease, among other medical problems, who presents to the emergency room tonight for evaluation of increased confusion and generalized weakness.  She was found to have a urinary tract infection.  She will be admitted to the hospital [facility] for ongoing evaluation and care.\"   Existing Precautions/Restrictions fall   Cognitive Status Examination   Orientation Status person;place   Follows " Commands follows one-step commands;50-74% accuracy;delayed response/completion;increased processing time needed   Memory Deficit moderate deficit   Cognitive Status Comments Pt oriented to self and place but not time. Pt has difficulty answering questions and recalling information about her history and home.   Posture   Posture forward head position;protracted shoulders   Range of Motion Comprehensive   General Range of Motion bilateral upper extremity ROM WFL   Strength Comprehensive (MMT)   Comment, General Manual Muscle Testing (MMT) Assessment Impaired strength with generalized weakness   Coordination   Upper Extremity Coordination No deficits were identified   Bed Mobility   Bed Mobility rolling right;supine-sit;sit-supine;scooting/bridging   Rolling Right Deuel (Bed Mobility) moderate assist (50% patient effort);minimum assist (75% patient effort)   Scooting/Bridging Deuel (Bed Mobility) minimum assist (75% patient effort)   Supine-Sit Deuel (Bed Mobility) moderate assist (50% patient effort)   Sit-Supine Deuel (Bed Mobility) supervision   Assistive Device (Bed Mobility) bed rails   Transfers   Transfers sit-stand transfer   Sit-Stand Transfer   Sit-Stand Deuel (Transfers) moderate assist (50% patient effort);2 person assist   Sit/Stand Transfer Comments Pt completes sit to stand with mod A x2. returns to sit and resists a second attempt at STS.   Clinical Impression   Criteria for Skilled Therapeutic Interventions Met (OT) Yes, treatment indicated   OT Diagnosis Decreased independence with ADLs and functional mobility   OT Problem List-Impairments impacting ADL problems related to;activity tolerance impaired;cognition;mobility;strength;pain   Assessment of Occupational Performance 3-5 Performance Deficits   Identified Performance Deficits Limited functional mobility, dressing, bathing, home mgmt   Planned Therapy Interventions (OT) ADL retraining;IADL  retraining;strengthening;transfer training;progressive activity/exercise   Clinical Decision Making Complexity (OT) moderate complexity   Risk & Benefits of therapy have been explained evaluation/treatment results reviewed;care plan/treatment goals reviewed;risks/benefits reviewed;current/potential barriers reviewed;participants voiced agreement with care plan;participants included;patient   OT Discharge Planning   OT Discharge Recommendation (DC Rec) Transitional Care Facility   OT Rationale for DC Rec Pt is below baseline and requires mod A for most bed mobility and mod Ax2 for sit-stand this date. Pt would benefit from increased rehab at U to regain strength and independence with ADLs prior to returning home. Pending medical improvement and assist at home, home may be an option. Will continue to asssess,   Total Evaluation Time (Minutes)   Total Evaluation Time (Minutes) 10   OT Goals   Therapy Frequency (OT) Daily   OT Predicted Duration/Target Date for Goal Attainment 06/23/22   OT Goals Hygiene/Grooming;Upper Body Dressing;Lower Body Dressing;Transfers;Toilet Transfer/Toileting   OT: Hygiene/Grooming modified independent   OT: Upper Body Dressing Modified independent   OT: Lower Body Dressing Minimal assist;using adaptive equipment   OT: Transfer Minimal assist;with assistive device   OT: Toilet Transfer/Toileting Minimal assist

## 2022-06-16 NOTE — CONSULTS
"Nutrition Admission Screen: (6/16)  Have you recently lost weight without trying? \"Unsure\"  Have you been eating poorly because of a decreased appetite? \"No\"    Chart reviewed  Diet: Regular  Pt tolerating po  Has been ordering full meals TID  She is being seen daily for meal ordering assistance    Ht: 5'5\"  IBW: 56.8 kg    Wt Readings from Last 10 Encounters:   06/14/22 63.5 kg (140 lb)   08/27/19 67.6 kg (149 lb)   08/20/19 68.7 kg (151 lb 8 oz)   08/15/19 68.7 kg (151 lb 8 oz)   08/12/19 68.5 kg (151 lb)   08/09/19 68.3 kg (150 lb 9.6 oz)   08/08/19 68 kg (150 lb)   08/05/19 68 kg (150 lb)   02/13/19 67.2 kg (148 lb 3.2 oz)   02/07/19 65.8 kg (145 lb)     3/3/22  135 lb  10/19/21 140 lb    No significant wt loss noted      Continue to see daily for meal orders  Will plan to reassess per LOS protocol    Cristiane Arteaga RD, LD  Clinical Dietitian - [facility]   Pager - [phone]      "

## 2022-06-16 NOTE — PROGRESS NOTES
Date/Time: 06/16/2022 [id number]  Summary: Altered mental status due to UTI  Hx: HTN, heart failure, a fib, stroke  Precautions: Fall risk  Cognitive Concerns/Orientation: A&Ox3 d/o to time. Confused. Forgetful.   Behavior and Aggression Color: green  ABNL VS/O2: VSS on RA ex temp. Tmax: 100.2. tylenol given x2. HTN managed with scheduled meds. Pt has order for clonidine if SBP >160  Mobility: Ax2 GB/W. Not OOB. OT/PT worked with pt today. Pt was only able to stand at bedside and became SOB.   Pain Management: denies  Diet: regular diet. Poor appetite. Only having fluids.  Bowel/Bladder: incontinent. purewick in place. 2 soft stools today.  ABNL Labs/BG: blood cultures pending.   Drain/Devices: PIV SL.   Telemetry Rhythm: NA  Skin: scattered bruising. Blanchable redness to sacrum.   Tests/Procedures:   Discharge Date: pending clinical improvement- mental status, PO ABX, and safe disposition. Most likely will discharge to TCU.  Other Info: SW, OT and PT following  Pt's  at bedside- can be demanding and belittling- feels the need to be in control of pt's entire situation and do what he wants even if it in not best for the pt.   I filed a vunerable adult report FYI.

## 2022-06-16 NOTE — PROGRESS NOTES
[facility]    Medicine Progress Note - Hospitalist Service    Date of Admission:  6/14/2022    Assessment & Plan        Marley Stewart is a very pleasant 89-year-old female with past medical history of hypertension, heart failure with preserved ejection fraction, atrial fibrillation on chronic anticoagulation with Eliquis, history of stroke, rheumatoid arthritis, hypothyroidism and peptic ulcer disease, among other medical problems, who presents to the emergency room tonight for evaluation of increased confusion and generalized weakness.  She was found to have a urinary tract infection.  She will be admitted to the hospital [facility] for ongoing evaluation and care.    Urinary tract infection   Metabolic encephalopathy suspected secondary to UTI  Generalized weakness secondary to urinary tract infection  Her family briefly mentioned a history of urinary tract infections.  She typically ambulates independently per reports.  She has become more weak and increasingly confused in the day or two prior to presentation.    -- Patient had fever up to 102.1 overnight.  Sepsis BPA triggered, lactic acid within normal limits.  Remainder of vital signs stable.  -- She has mild leukocytosis 12.5--12.0.  UA is abnormal.    -- Continue to monitor vital signs closely and for fevers.  Tylenol as needed.  -- Continue IV ceftriaxone.    -- Urine culture with positive E. coli, sensitivities pending   -- Okay to discontinue IV fluids for now  -- PT, OT consults placed.  Currently recommending TCU.  --Discussed plan of care with the patient's daughter-in-law via phone on 6/16.      Atrial fibrillation  On chronic anticoagulation with Eliquis and rate controlled with metoprolol.   -- Continue PTA Eliquis and metoprolol    Hypertension, heart failure with preserved ejection fraction, history of cerebrovascular accident  Chronic and stable on metoprolol and Eliquis.    -- Hold Lasix today, can reconsider  tomorrow.    Rheumatoid arthritis  Takes methotrexate on Wednesdays.    --Hold methotrexate given concerns for active infection.    Hypothyroidism  --Chronic and stable on levothyroxine.        Diet: Combination Diet Regular Diet Adult    DVT Prophylaxis: Pneumatic Compression Devices  Armstrong Catheter: Not present  Central Lines: None  Cardiac Monitoring: None  Code Status: No CPR- Do NOT Intubate POLST that was completed in 2019 that confirmed DNR/DNI.  This does corroborate with recent hospitalizations.    Disposition Plan   Expected Discharge: in 1-2 days pending improving mental status, transition to PO abx, and safe disposition.  PT/OT and SW consulted.       The patient's care was discussed with the Attending Physician, Dr. Farrar and Patient.    DAQUAN Carson  Hospitalist Service  [facility]  Securely message with the Vocera Web Console (learn more here)  Text page via [employer] Paging/Directory         Clinically Significant Risk Factors Present on Admission                  ______________________________________________________________________    Interval History   Patient seen lying in bed on my arrival.  Appears very weak and fatigued.  Alert, oriented to self, place, partially to situation.  Not oriented to date.  Denies pain currently.  Patient did have temperature up to 102.1 overnight, currently afebrile.  Lactic acid within normal limits.  No abdominal pain, nausea, vomiting, chest pain, shortness of breath.  Feels generally weak and tired.    Data reviewed today: I reviewed all medications, new labs and imaging results over the last 24 hours.     Physical Exam   Vital Signs: Temp: 99.4  F (37.4  C) Temp src: Oral BP: (!) 154/80 Pulse: 94   Resp: 18 SpO2: 92 % O2 Device: None (Room air)    Weight: 140 lbs 0 oz     Constitutional: Elderly and ill appearing adult female, alert, oriented to person, place, and that she is in the hospital.  Cooperative, lying in bed in  NAD.  Respiratory:  Lungs CTAB.  No crackles, wheezes, or rhonchi, no labored breathing.  Cardiovascular:  Heart RRR, no MRG, no edema.  GI:  Abdomen soft, NT/ND and with normoactive BS  Skin/Integumen:  Warm, dry, non-diaphoretic.  MSK: CMS x4 intact.      Data   Recent Labs   Lab 06/15/22  0703 06/14/22  1809 06/14/22  1807   WBC 12.0*  --  12.5*   HGB 13.4  --  14.7   MCV 96  --  96     --  243    140  --    POTASSIUM 3.7 4.0  --    CHLORIDE 108 107  --    CO2 21 26  --    BUN 16 18  --    CR 0.81 0.92  --    ANIONGAP 9 7  --    LONNIE 8.7 9.5  --    * 115*  --    ALBUMIN  --  3.7  --    PROTTOTAL  --  7.6  --    BILITOTAL  --  1.0  --    ALKPHOS  --  77  --    ALT  --  18  --    AST  --  18  --      No results found for this or any previous visit (from the past 24 hour(s)).  Medications     sodium chloride 100 mL/hr at 06/16/22 0158       apixaban ANTICOAGULANT  5 mg Oral BID     cefTRIAXone  1 g Intravenous Q24H     dorzolamide-timolol  1 drop Right Eye BID     latanoprost  1 drop Right Eye At Bedtime     levothyroxine  50 mcg Oral Daily     metoprolol succinate ER  25 mg Oral QAM     prednisoLONE acetate  1 drop Right Eye BID     sodium chloride (PF)  3 mL Intracatheter Q8H     sodium chloride (PF)  3 mL Intracatheter Q8H

## 2022-06-17 ENCOUNTER — APPOINTMENT (OUTPATIENT)
Dept: OCCUPATIONAL THERAPY | Facility: CLINIC | Age: 87
DRG: 871 | End: 2022-06-17
Payer: COMMERCIAL

## 2022-06-17 ENCOUNTER — APPOINTMENT (OUTPATIENT)
Dept: PHYSICAL THERAPY | Facility: CLINIC | Age: 87
DRG: 871 | End: 2022-06-17
Attending: INTERNAL MEDICINE
Payer: COMMERCIAL

## 2022-06-17 ENCOUNTER — APPOINTMENT (OUTPATIENT)
Dept: GENERAL RADIOLOGY | Facility: CLINIC | Age: 87
DRG: 871 | End: 2022-06-17
Attending: PHYSICIAN ASSISTANT
Payer: COMMERCIAL

## 2022-06-17 LAB
ANION GAP SERPL CALCULATED.3IONS-SCNC: 7 MMOL/L (ref 3–14)
BACTERIA UR CULT: ABNORMAL
BACTERIA UR CULT: ABNORMAL
BUN SERPL-MCNC: 15 MG/DL (ref 7–30)
CALCIUM SERPL-MCNC: 8.7 MG/DL (ref 8.5–10.1)
CHLORIDE BLD-SCNC: 104 MMOL/L (ref 94–109)
CO2 SERPL-SCNC: 22 MMOL/L (ref 20–32)
CREAT SERPL-MCNC: 0.7 MG/DL (ref 0.52–1.04)
ERYTHROCYTE [DISTWIDTH] IN BLOOD BY AUTOMATED COUNT: 13.3 % (ref 10–15)
GFR SERPL CREATININE-BSD FRML MDRD: 82 ML/MIN/1.73M2
GLUCOSE BLD-MCNC: 157 MG/DL (ref 70–99)
HCT VFR BLD AUTO: 38.4 % (ref 35–47)
HGB BLD-MCNC: 13.4 G/DL (ref 11.7–15.7)
LACTATE SERPL-SCNC: 1.1 MMOL/L (ref 0.7–2)
MCH RBC QN AUTO: 33.1 PG (ref 26.5–33)
MCHC RBC AUTO-ENTMCNC: 34.9 G/DL (ref 31.5–36.5)
MCV RBC AUTO: 95 FL (ref 78–100)
PLATELET # BLD AUTO: 240 10E3/UL (ref 150–450)
POTASSIUM BLD-SCNC: 3.6 MMOL/L (ref 3.4–5.3)
PROCALCITONIN SERPL-MCNC: 0.28 NG/ML
RBC # BLD AUTO: 4.05 10E6/UL (ref 3.8–5.2)
SODIUM SERPL-SCNC: 133 MMOL/L (ref 133–144)
TROPONIN I SERPL HS-MCNC: 109 NG/L
TROPONIN I SERPL HS-MCNC: 113 NG/L
WBC # BLD AUTO: 14.9 10E3/UL (ref 4–11)

## 2022-06-17 PROCEDURE — 97110 THERAPEUTIC EXERCISES: CPT | Mod: GP | Performed by: PHYSICAL THERAPIST

## 2022-06-17 PROCEDURE — 84484 ASSAY OF TROPONIN QUANT: CPT | Performed by: PHYSICIAN ASSISTANT

## 2022-06-17 PROCEDURE — 99232 SBSQ HOSP IP/OBS MODERATE 35: CPT | Performed by: PHYSICIAN ASSISTANT

## 2022-06-17 PROCEDURE — 97535 SELF CARE MNGMENT TRAINING: CPT | Mod: GO

## 2022-06-17 PROCEDURE — 250N000011 HC RX IP 250 OP 636: Performed by: INTERNAL MEDICINE

## 2022-06-17 PROCEDURE — 250N000013 HC RX MED GY IP 250 OP 250 PS 637: Performed by: INTERNAL MEDICINE

## 2022-06-17 PROCEDURE — 87040 BLOOD CULTURE FOR BACTERIA: CPT | Performed by: PHYSICIAN ASSISTANT

## 2022-06-17 PROCEDURE — 36415 COLL VENOUS BLD VENIPUNCTURE: CPT | Performed by: PHYSICIAN ASSISTANT

## 2022-06-17 PROCEDURE — 36415 COLL VENOUS BLD VENIPUNCTURE: CPT | Performed by: HOSPITALIST

## 2022-06-17 PROCEDURE — 85027 COMPLETE CBC AUTOMATED: CPT | Performed by: PHYSICIAN ASSISTANT

## 2022-06-17 PROCEDURE — 83605 ASSAY OF LACTIC ACID: CPT | Performed by: INTERNAL MEDICINE

## 2022-06-17 PROCEDURE — 71045 X-RAY EXAM CHEST 1 VIEW: CPT

## 2022-06-17 PROCEDURE — 97162 PT EVAL MOD COMPLEX 30 MIN: CPT | Mod: GP | Performed by: PHYSICAL THERAPIST

## 2022-06-17 PROCEDURE — 84145 PROCALCITONIN (PCT): CPT | Performed by: HOSPITALIST

## 2022-06-17 PROCEDURE — 250N000011 HC RX IP 250 OP 636: Performed by: PHYSICIAN ASSISTANT

## 2022-06-17 PROCEDURE — 93010 ELECTROCARDIOGRAM REPORT: CPT | Performed by: INTERNAL MEDICINE

## 2022-06-17 PROCEDURE — 97530 THERAPEUTIC ACTIVITIES: CPT | Mod: GO

## 2022-06-17 PROCEDURE — 120N000001 HC R&B MED SURG/OB

## 2022-06-17 PROCEDURE — 93005 ELECTROCARDIOGRAM TRACING: CPT

## 2022-06-17 PROCEDURE — 80048 BASIC METABOLIC PNL TOTAL CA: CPT | Performed by: PHYSICIAN ASSISTANT

## 2022-06-17 PROCEDURE — 97530 THERAPEUTIC ACTIVITIES: CPT | Mod: GP | Performed by: PHYSICAL THERAPIST

## 2022-06-17 RX ORDER — AZITHROMYCIN 500 MG/1
500 INJECTION, POWDER, LYOPHILIZED, FOR SOLUTION INTRAVENOUS ONCE
Status: COMPLETED | OUTPATIENT
Start: 2022-06-17 | End: 2022-06-17

## 2022-06-17 RX ORDER — AZITHROMYCIN 250 MG/1
250 TABLET, FILM COATED ORAL DAILY
Status: COMPLETED | OUTPATIENT
Start: 2022-06-18 | End: 2022-06-21

## 2022-06-17 RX ADMIN — ACETAMINOPHEN 650 MG: 325 TABLET ORAL at 18:16

## 2022-06-17 RX ADMIN — PREDNISOLONE ACETATE 1 DROP: 10 SUSPENSION/ DROPS OPHTHALMIC at 20:44

## 2022-06-17 RX ADMIN — LEVOTHYROXINE SODIUM 50 MCG: 50 TABLET ORAL at 09:54

## 2022-06-17 RX ADMIN — ACETAMINOPHEN 650 MG: 325 TABLET ORAL at 03:22

## 2022-06-17 RX ADMIN — DORZOLAMIDE HYDROCHLORIDE AND TIMOLOL MALEATE 1 DROP: 20; 5 SOLUTION/ DROPS OPHTHALMIC at 20:44

## 2022-06-17 RX ADMIN — CEFTRIAXONE SODIUM 1 G: 1 INJECTION, POWDER, FOR SOLUTION INTRAMUSCULAR; INTRAVENOUS at 20:43

## 2022-06-17 RX ADMIN — APIXABAN 5 MG: 5 TABLET, FILM COATED ORAL at 09:54

## 2022-06-17 RX ADMIN — AZITHROMYCIN MONOHYDRATE 500 MG: 500 INJECTION, POWDER, LYOPHILIZED, FOR SOLUTION INTRAVENOUS at 17:51

## 2022-06-17 RX ADMIN — LATANOPROST 1 DROP: 50 SOLUTION/ DROPS OPHTHALMIC at 22:31

## 2022-06-17 RX ADMIN — METOPROLOL SUCCINATE 25 MG: 25 TABLET, EXTENDED RELEASE ORAL at 09:54

## 2022-06-17 RX ADMIN — PREDNISOLONE ACETATE 1 DROP: 10 SUSPENSION/ DROPS OPHTHALMIC at 09:57

## 2022-06-17 RX ADMIN — DORZOLAMIDE HYDROCHLORIDE AND TIMOLOL MALEATE 1 DROP: 20; 5 SOLUTION/ DROPS OPHTHALMIC at 09:55

## 2022-06-17 RX ADMIN — APIXABAN 5 MG: 5 TABLET, FILM COATED ORAL at 20:43

## 2022-06-17 RX ADMIN — CLONIDINE HYDROCHLORIDE 0.1 MG: 0.1 TABLET ORAL at 03:22

## 2022-06-17 ASSESSMENT — ACTIVITIES OF DAILY LIVING (ADL)
ADLS_ACUITY_SCORE: 53

## 2022-06-17 NOTE — PROGRESS NOTES
"   06/17/22 1500   Quick Adds   Type of Visit Initial PT Evaluation       Present no   Language English   Living Environment   People in Home spouse   Current Living Arrangements house   Living Environment Comments Both wife/spouse unable to clarify. Both appear mildly confused throughout this evaluation. Per chart review from 1/4/2019, \"\"Daughter in law (Ashley) says both  and wife are mildly confused, family has urged moving to assisted but they are resistant to it.\"   Self-Care   Usual Activity Tolerance fair   Current Activity Tolerance poor   Activity/Exercise/Self-Care Comment Unknown number of falls, both pt and spouse confused   General Information   Onset of Illness/Injury or Date of Surgery 06/14/22   Referring Physician Lizabeth Finch, DO   Patient/Family Therapy Goals Statement (PT) Unable to state, spouse did appear unwilling to have pt go to TCU if needed   Pertinent History of Current Problem (include personal factors and/or comorbidities that impact the POC) Per chart 6/15: \"Marley Stewart is a very pleasant 89-year-old female with past medical history of hypertension, heart failure with preserved ejection fraction, atrial fibrillation on chronic anticoagulation with Eliquis, history of stroke, rheumatoid arthritis, hypothyroidism and peptic ulcer disease, among other medical problems, who presents to the emergency room tonight for evaluation of increased confusion and generalized weakness.  She was found to have a urinary tract infection.  She will be admitted to the hospital [facility] for ongoing evaluation and care.\"   Existing Precautions/Restrictions fall;spinal   Cognition   Affect/Mental Status (Cognition) confused;anxious;low arousal/lethargic;sad/depressed affect   Orientation Status (Cognition) unable/difficult to assess   Follows Commands (Cognition) follows two-step commands;25-49% accuracy   Behavioral Issues withdrawn   Safety Deficit (Cognition) insight " into deficits/self-awareness;problem-solving;judgment;moderate deficit   Pain Assessment   Patient Currently in Pain Yes, see Vital Sign flowsheet  (Unable to clarify/state where, amount; cringes frequently)   Posture    Posture Forward head position;Protracted shoulders;Kyphosis   Range of Motion (ROM)   Range of Motion ROM deficits secondary to weakness   Strength (Manual Muscle Testing)   Strength Comments Grossly deconditioned, muscle wasting evident, unable to formally assess 2/2 confusion/lethargy   Bed Mobility   Bed Mobility supine-sit   Comment, (Bed Mobility) mod A   Transfers   Transfers sit-stand transfer   Comment, (Transfers) Both pt/spouse refused despite education/encouragement   Sit-Stand Transfer   Comment, (Sit-Stand Transfer) Both pt/spouse refused despite education/encouragement   Balance   Balance Comments Fair sititng balance - Initially leaning heavily L in sitting, improved to CGA with incr time and assistance with scooting to EOC to place both feet on floor.   Sensory Examination   Sensory Perception Comments Unable to state/test due to confusion/lethargy   Coordination   Coordination no deficits were identified   Muscle Tone   Muscle Tone no deficits were identified   Clinical Impression   Criteria for Skilled Therapeutic Intervention Yes, treatment indicated   PT Diagnosis (PT) Impaired fn mobility   Influenced by the following impairments Cognition, strength, balance, activity tolerance   Functional limitations due to impairments Bed mob, transfers, amb   Clinical Presentation (PT Evaluation Complexity) Evolving/Changing   Clinical Presentation Rationale Multiple affecting comorbidities, assessment incl strength, balance, fn mob.   Clinical Decision Making (Complexity) moderate complexity   Planned Therapy Interventions (PT) balance training;bed mobility training;gait training;home exercise program;neuromuscular re-education;strengthening;transfer training   Risk & Benefits of therapy  have been explained care plan/treatment goals reviewed;evaluation/treatment results reviewed;participants included;patient;spouse/significant other;current/potential barriers reviewed;risks/benefits reviewed   PT Discharge Planning   PT Discharge Recommendation (DC Rec) Transitional Care Facility   PT Rationale for DC Rec Pt and her spouse both appeared quite confused during PT evaluation; Pt completed bed mob with mod A, required mod A assistance to come to EOB, refused standing. Per chart review, pt needed modAx2 with OT this morning. Pt does not appear safe to d/c home, and spouse does not appear able to assist with his own mobility concerns. Spouse did endorse PCA help of 1 person part time. Both pt and spouse appeared reluctant to go to a TCU. Pt would benefit from TCU to reduce falls risk and improve indep with fn mobility. She would likely benefit from a more supportive living environment.   PT Brief overview of current status Ax2 transfers, use filiberto lift with nursing staff   Total Evaluation Time   Total Evaluation Time (Minutes) 10   Physical Therapy Goals   PT Frequency 5x/week   PT Predicted Duration/Target Date for Goal Attainment 06/24/22   PT Goals Bed Mobility;Transfers;Gait   PT: Bed Mobility Supervision/stand-by assist   PT: Transfers Supervision/stand-by assist   PT: Gait Supervision/stand-by assist;50 feet   Psychosocial Support   Trust Relationship/Rapport care explained;choices provided;emotional support provided;questions encouraged;reassurance provided;thoughts/feelings acknowledged   Family/Support System Care self-care encouraged;caregiver stress acknowledged;presence promoted;involvement promoted

## 2022-06-17 NOTE — PROVIDER NOTIFICATION
MD Notification    Notified Person:PA    Notified Person Name: Cruz Posey    Notification Date/Time:6/17/2022 at 1753    Notification Interaction: vocera page    Purpose of Notification: Tmax 101.2, would you like to repeat blood cultures?    Orders Received: Cultures ordered.     Comments:

## 2022-06-17 NOTE — PROGRESS NOTES
[facility]    Medicine Progress Note - Hospitalist Service    Date of Admission:  6/14/2022    Assessment & Plan        Marley Stewart is a very pleasant 89-year-old female with past medical history of hypertension, heart failure with preserved ejection fraction, atrial fibrillation on chronic anticoagulation with Eliquis, history of stroke, rheumatoid arthritis, hypothyroidism and peptic ulcer disease, among other medical problems, who presents to the emergency room tonight for evaluation of increased confusion and generalized weakness.  She was found to have a urinary tract infection.  She is admitted to the hospital for ongoing evaluation and care.    Urinary tract infection due to E. coli  Metabolic encephalopathy suspected secondary to UTI  Generalized weakness secondary to urinary tract infection  Her family briefly mentioned a history of urinary tract infections.  She typically ambulates independently per reports.  She has become more weak and increasingly confused in the day or two prior to presentation.    -- Patient had fever up to 102.1 overnight.  Sepsis BPA triggered, lactic acid within normal limits.  Remainder of vital signs stable.  -- She has mild leukocytosis 12.5--12.0.  UA is abnormal.    -- Continue to monitor vital signs closely and for fevers.  Tylenol as needed.  -- Continue IV ceftriaxone.    -- Urine culture with positive E. coli, pansensitive blood culture  -- Okay to discontinue IV fluids mom patient taking adequate p.o.  -- PT, OT consults placed.  Currently recommending TCU.  --Discussed plan of care with the patient's daughter-in-law via phone on 6/16.    Chest pain  Patient reported chest pain morning of 6/17.  Had not had this previously.  Obtained troponin, CXR, EKG.  --EKG with paced rhythm  --Troponin mildly elevated at 113, up from 41 on admission.]  --CXR with possible small left pleural effusion and/or infiltrate  --At this time, clinically doubt ACS.   Chest pain has resolved.  Will trend serial troponin for thoroughness.  Will start azithromycin in addition to the IV ceftriaxone she is already receiving (procalcitonin ordered).  Lactic acid within normal limits.  Hold off on telemetry currently.      Atrial fibrillation  On chronic anticoagulation with Eliquis and rate controlled with metoprolol.   -- Continue PTA Eliquis and metoprolol    Hypertension  Heart failure with preserved ejection fraction  History of of cerebrovascular accident  Chronic and stable on metoprolol and Eliquis.    --Continue metoprolol.  BP elevated, consider adding another agent.  --Continue Eliquis  -- Lasix is being held, can consider resuming when patient closer to baseline p.o. intake.    Rheumatoid arthritis  Takes methotrexate on Wednesdays.    --Hold methotrexate given concerns for active infection.    Hypothyroidism  --Chronic and stable on levothyroxine.        Diet: Combination Diet Regular Diet Adult  Room Service    DVT Prophylaxis: Pneumatic Compression Devices  Armstrong Catheter: Not present  Central Lines: None  Cardiac Monitoring: None  Code Status: No CPR- Do NOT Intubate POLST that was completed in 2019 that confirmed DNR/DNI.  This does corroborate with recent hospitalizations.  I confirmed CODE STATUS with the patient's daughter in law, who also states that she is DNR/DNI.    Disposition Plan   Expected Discharge: in 1-2 days pending improving mental status, transition to PO abx, and safe disposition.  PT/OT and SW consulted.  Likely TCU.  We will start the process for placement.     The patient's care was discussed with the Attending Physician, Dr. Farrar, Patient and Patient's Family.    DAQUAN Carson  Hospitalist Service  [facility]  Securely message with the Vocera Web Console (learn more here)  Text page via [employer] Paging/Directory         Clinically Significant Risk Factors Present on Admission                   ______________________________________________________________________    Interval History   Patient sitting up in the chair on my arrival.  Appears very weak and fatigued.  Alert, oriented to self, place, partially to situation.  Not oriented to date.  Now states she has chest pain.  Says it is dull.  It does not localize.  Patient still having fevers intermittently, up to 101.  Lactic acid again within normal limits.  No abdominal pain, nausea, vomiting, shortness of breath.  Spouse present at bedside.  Daughter updated by phone.    Data reviewed today: I reviewed all medications, new labs and imaging results over the last 24 hours.     Physical Exam   Vital Signs: Temp: 97.5  F (36.4  C) Temp src: Oral BP: 139/74 Pulse: 77   Resp: 18 SpO2: 97 % O2 Device: Nasal cannula Oxygen Delivery: 2.5 LPM  Weight: 140 lbs 0 oz     Constitutional: Elderly and ill appearing adult female, alert, oriented to person, place, and that she is in the hospital.  Cooperative, sitting up in chair in NAD.   Respiratory:  Lungs CTAB.  No crackles, wheezes, or rhonchi, no labored breathing.  Cardiovascular:  Heart RRR, no murmurs heard, no edema.  GI:  Abdomen soft, NT/ND and with normoactive BS  Skin/Integumen:  Warm, dry, non-diaphoretic.  MSK: CMS x4 grossly intact.      Data   Recent Labs   Lab 06/17/22  0355 06/15/22  0703 06/14/22  1809 06/14/22  1807   WBC 14.9* 12.0*  --  12.5*   HGB 13.4 13.4  --  14.7   MCV 95 96  --  96    219  --  243    138 140  --    POTASSIUM 3.6 3.7 4.0  --    CHLORIDE 104 108 107  --    CO2 22 21 26  --    BUN 15 16 18  --    CR 0.70 0.81 0.92  --    ANIONGAP 7 9 7  --    LONNIE 8.7 8.7 9.5  --    * 122* 115*  --    ALBUMIN  --   --  3.7  --    PROTTOTAL  --   --  7.6  --    BILITOTAL  --   --  1.0  --    ALKPHOS  --   --  77  --    ALT  --   --  18  --    AST  --   --  18  --      No results found for this or any previous visit (from the past 24 hour(s)).  Medications       apixaban  ANTICOAGULANT  5 mg Oral BID     cefTRIAXone  1 g Intravenous Q24H     dorzolamide-timolol  1 drop Right Eye BID     latanoprost  1 drop Right Eye At Bedtime     levothyroxine  50 mcg Oral Daily     metoprolol succinate ER  25 mg Oral QAM     prednisoLONE acetate  1 drop Right Eye BID     sodium chloride (PF)  3 mL Intracatheter Q8H

## 2022-06-17 NOTE — PROGRESS NOTES
Care Management Follow Up    Length of Stay (days): 3    Expected Discharge Date: 06/18/2022     Concerns to be Addressed:       Patient plan of care discussed at interdisciplinary rounds: Yes    Anticipated Discharge Disposition:       Anticipated Discharge Services:    Anticipated Discharge DME:      Patient/family educated on Medicare website which has current facility and service quality ratings:    Education Provided on the Discharge Plan:    Patient/Family in Agreement with the Plan:      Referrals Placed by CM/SW:    Private pay costs discussed: Not applicable    Additional Information:  SW received call from Alexus Villela with [facility] ([phone]) who stated that they had received a VA report from this hospital and wanted information regarding patient's discharge plan. At this time they are just gathering information to see if they are going to open the case. She requested that we update her with a discharge plan if patient goes to TCU or not.     MESFIN Nieto, LGSW  [phone]  [facility]

## 2022-06-17 NOTE — PLAN OF CARE
Goal Outcome Evaluation:        Pt is AO x3, disoriented to time, forgetful. Tmax 101, improved w/ tylenol. Sepsis triggered, lactic 1.1. PRN clonidine given for elevated BP (>160) w/ good results. O2 sat at 88% while sleeping, improved to 92 w/ 2.5L NC. Up A2 GB+W, but not OOB this shift. Purewick in place w/ good UOP. 1x soft, brown BM. On regular diet, but poor appetite. New PIV in place to left forearm, SL. Discharge pending clinical improvement.

## 2022-06-17 NOTE — PLAN OF CARE
Disoriented to time and forgetful.  Pt slightly hypertensive (did not meet parameters for PRN catapress), able to be weaned to room air.  Tmax 101.2, repeat blood cultures ordered.  C/O chest discomfort, troponin elevated, continue to trend Q4 hours.  Incontinent, purewick in place.  Mepilex applied to blanchable redness on coccyx.  Poor appetite, po fluids encouraged.  PIV saline locked.  Up to chair with heavy assist of 2, walker and gait belt.  PT recommends TCU at discharge.

## 2022-06-18 LAB
ANION GAP SERPL CALCULATED.3IONS-SCNC: 6 MMOL/L (ref 3–14)
BUN SERPL-MCNC: 16 MG/DL (ref 7–30)
CALCIUM SERPL-MCNC: 9.2 MG/DL (ref 8.5–10.1)
CHLORIDE BLD-SCNC: 105 MMOL/L (ref 94–109)
CO2 SERPL-SCNC: 25 MMOL/L (ref 20–32)
CREAT SERPL-MCNC: 0.75 MG/DL (ref 0.52–1.04)
ERYTHROCYTE [DISTWIDTH] IN BLOOD BY AUTOMATED COUNT: 13.4 % (ref 10–15)
GFR SERPL CREATININE-BSD FRML MDRD: 76 ML/MIN/1.73M2
GLUCOSE BLD-MCNC: 100 MG/DL (ref 70–99)
HCT VFR BLD AUTO: 37.5 % (ref 35–47)
HGB BLD-MCNC: 13.1 G/DL (ref 11.7–15.7)
LACTATE SERPL-SCNC: 1 MMOL/L (ref 0.7–2)
MCH RBC QN AUTO: 33 PG (ref 26.5–33)
MCHC RBC AUTO-ENTMCNC: 34.9 G/DL (ref 31.5–36.5)
MCV RBC AUTO: 95 FL (ref 78–100)
PLATELET # BLD AUTO: 263 10E3/UL (ref 150–450)
POTASSIUM BLD-SCNC: 3.9 MMOL/L (ref 3.4–5.3)
RBC # BLD AUTO: 3.97 10E6/UL (ref 3.8–5.2)
SODIUM SERPL-SCNC: 136 MMOL/L (ref 133–144)
TROPONIN I SERPL HS-MCNC: 94 NG/L
WBC # BLD AUTO: 10.6 10E3/UL (ref 4–11)

## 2022-06-18 PROCEDURE — 83605 ASSAY OF LACTIC ACID: CPT | Performed by: INTERNAL MEDICINE

## 2022-06-18 PROCEDURE — 250N000013 HC RX MED GY IP 250 OP 250 PS 637: Performed by: PHYSICIAN ASSISTANT

## 2022-06-18 PROCEDURE — 99232 SBSQ HOSP IP/OBS MODERATE 35: CPT | Performed by: HOSPITALIST

## 2022-06-18 PROCEDURE — 85018 HEMOGLOBIN: CPT | Performed by: PHYSICIAN ASSISTANT

## 2022-06-18 PROCEDURE — 85041 AUTOMATED RBC COUNT: CPT | Performed by: PHYSICIAN ASSISTANT

## 2022-06-18 PROCEDURE — 250N000011 HC RX IP 250 OP 636: Performed by: INTERNAL MEDICINE

## 2022-06-18 PROCEDURE — 250N000013 HC RX MED GY IP 250 OP 250 PS 637: Performed by: INTERNAL MEDICINE

## 2022-06-18 PROCEDURE — 36415 COLL VENOUS BLD VENIPUNCTURE: CPT | Performed by: PHYSICIAN ASSISTANT

## 2022-06-18 PROCEDURE — 80048 BASIC METABOLIC PNL TOTAL CA: CPT | Performed by: PHYSICIAN ASSISTANT

## 2022-06-18 PROCEDURE — 120N000001 HC R&B MED SURG/OB

## 2022-06-18 RX ADMIN — PREDNISOLONE ACETATE 1 DROP: 10 SUSPENSION/ DROPS OPHTHALMIC at 21:05

## 2022-06-18 RX ADMIN — CEFTRIAXONE SODIUM 1 G: 1 INJECTION, POWDER, FOR SOLUTION INTRAMUSCULAR; INTRAVENOUS at 21:05

## 2022-06-18 RX ADMIN — METOPROLOL SUCCINATE 25 MG: 25 TABLET, EXTENDED RELEASE ORAL at 08:45

## 2022-06-18 RX ADMIN — APIXABAN 5 MG: 5 TABLET, FILM COATED ORAL at 21:05

## 2022-06-18 RX ADMIN — LEVOTHYROXINE SODIUM 50 MCG: 50 TABLET ORAL at 08:45

## 2022-06-18 RX ADMIN — AZITHROMYCIN MONOHYDRATE 250 MG: 250 TABLET ORAL at 18:07

## 2022-06-18 RX ADMIN — PREDNISOLONE ACETATE 1 DROP: 10 SUSPENSION/ DROPS OPHTHALMIC at 08:53

## 2022-06-18 RX ADMIN — LATANOPROST 1 DROP: 50 SOLUTION/ DROPS OPHTHALMIC at 21:04

## 2022-06-18 RX ADMIN — DORZOLAMIDE HYDROCHLORIDE AND TIMOLOL MALEATE 1 DROP: 20; 5 SOLUTION/ DROPS OPHTHALMIC at 21:05

## 2022-06-18 RX ADMIN — DORZOLAMIDE HYDROCHLORIDE AND TIMOLOL MALEATE 1 DROP: 20; 5 SOLUTION/ DROPS OPHTHALMIC at 08:44

## 2022-06-18 RX ADMIN — APIXABAN 5 MG: 5 TABLET, FILM COATED ORAL at 08:45

## 2022-06-18 ASSESSMENT — ACTIVITIES OF DAILY LIVING (ADL)
ADLS_ACUITY_SCORE: 53
ADLS_ACUITY_SCORE: 54
ADLS_ACUITY_SCORE: 53
ADLS_ACUITY_SCORE: 53
ADLS_ACUITY_SCORE: 50
ADLS_ACUITY_SCORE: 53
ADLS_ACUITY_SCORE: 50
ADLS_ACUITY_SCORE: 53

## 2022-06-18 NOTE — PROGRESS NOTES
[facility]    Medicine Progress Note - Hospitalist Service    Date of Admission:  6/14/2022    Assessment & Plan        Marley Stewart is a very pleasant 89-year-old female with past medical history of hypertension, heart failure with preserved ejection fraction, atrial fibrillation on chronic anticoagulation with Eliquis, history of stroke, rheumatoid arthritis, hypothyroidism and peptic ulcer disease, among other medical problems, who presents to the emergency room tonight for evaluation of increased confusion and generalized weakness.  She was found to have a urinary tract infection.  She is admitted to the hospital for ongoing evaluation and care.     Urinary tract infection due to E. coli  Metabolic encephalopathy suspected secondary to UTI  Generalized weakness secondary to urinary tract infection  Her family briefly mentioned a history of urinary tract infections.  She typically ambulates independently per reports.  She has become more weak and increasingly confused in the day or two prior to presentation.    -- Patient had fever up to 102.1 overnight 6/16.  Sepsis BPA triggered, lactic acid within normal limits.  Remainder of vital signs stable.  -- She has mild leukocytosis 12.5--12.0 --> 14.9 --> 10.6 6/18  UA is abnormal.    -- Continue to monitor vital signs closely and for fevers.  Tylenol as needed.  -- Continue IV ceftriaxone.    -- Urine culture with positive E. coli, pansensitive blood culture  -- Okay to discontinue IV fluids when patient taking adequate p.o.  -- PT, OT consults placed.  Currently recommending TCU.  --Discussed plan of care with the patient's daughter-in-law via phone on 6/16.     Chest pain (resolved)  Patient reported chest pain morning of 6/17.  Had not had this previously.  Obtained troponin, CXR, EKG.  --EKG with paced rhythm  --Troponin mildly elevated at 113, up from 41 on admission. - decreased trop 94.  --CXR with possible small left pleural effusion  and/or infiltrate  --Will start azithromycin in addition to the IV ceftriaxone she is already receiving (procalcitonin ordered).  Lactic acid within normal limits.  Hold off on telemetry currently.  - 6/18 - no recurrence today, trops noted to be downtrending yesterday.    -- will obtain echocardiogram (prior echo in Care Everywhere was 10/13/2021 with EF 70%, severely thickened LV wall, and mild MR, AR, TR noted)    Concern of community acquired pneumonia  Some cough/shortness of breath as well as chest pain as mentioned above noted.  Leukocytosis and fever noted previously.  Procalcitonin added on which was normal initially in the ED found to be elevated 0.28 subsequently.  XR with possible infiltrate left base. Azithromycin started 6/17.  - Continue antibiotics  - Incentive spirometry    Atrial fibrillation  On chronic anticoagulation with Eliquis and rate controlled with metoprolol.   -- Continue PTA Eliquis and metoprolol     Hypertension  Heart failure with preserved ejection fraction  History of of cerebrovascular accident  Chronic and stable on metoprolol and Eliquis.    --Continue metoprolol.  BP elevated, consider adding another agent.  --Continue Eliquis  -- Lasix is being held, can consider resuming when patient closer to baseline p.o. intake.     Rheumatoid arthritis  Takes methotrexate on Wednesdays.    --Hold methotrexate given concerns for active infection.     Hypothyroidism  --Chronic and stable on levothyroxine.        Diet: Combination Diet Regular Diet Adult  Room Service    DVT Prophylaxis: DOAC  Armstrong Catheter: Not present  Central Lines: None  Cardiac Monitoring: None  Code Status: No CPR- Do NOT Intubate      Disposition Plan   Expected Discharge: 06/18/2022     Anticipated discharge location:  Awaiting care coordination huddle  Delays:     Placement - TCU            The patient's care was discussed with the Bedside Nurse, Patient and Patient's Family - CHASE Beltran who will relay updates to  Fly.    Kyler Farrar MD  Hospitalist Service  [facility]  Securely message with the [employer] Web Console (learn more here)  Text page via [place] Paging/Directory         Clinically Significant Risk Factors Present on Admission                 ______________________________________________________________________    Interval History   Patient seen and examined midday.  Overall nothing new and no chest pain or pressure today.  Continues on ceftriaxone and azithromycin for both UTI and pneumonia.  Continues to be extremely weak-discussed with her that transitional care would be optimal as home sounds quite unsafe especially as her daughter-in-law explains she has steps at home.  Patient is thinking about it for now.  Reviewed that her chest pain happened while she was exerting and stopped when she relaxed.  We will obtain echocardiogram.    Data reviewed today: I reviewed all medications, new labs and imaging results over the last 24 hours. I personally reviewed no images or EKG's today.    Physical Exam   Vital Signs: Temp: 98.4  F (36.9  C) Temp src: Oral BP: (!) 158/83 Pulse: 94   Resp: 20 SpO2: 96 % O2 Device: None (Room air)    Weight: 140 lbs 0 oz    Gen: NAD, pleasant  HEENT: EOMI, MMM  Resp: no focal crackles,  no wheezes, no increased work of resp  CV: S1S2 heard, irreg irreg rhythm, reg rate  Abdo: soft, nontender, nondistended, bowel sounds present  Ext: calves nontender, well perfused  Neuro: aa, conversing appropriately, CN grossly intact, no facial asymmetry      Data   Recent Labs   Lab 06/18/22  0741 06/17/22  0355 06/15/22  0703 06/14/22  1809   WBC 10.6 14.9* 12.0*  --    HGB 13.1 13.4 13.4  --    MCV 95 95 96  --     240 219  --     133 138 140   POTASSIUM 3.9 3.6 3.7 4.0   CHLORIDE 105 104 108 107   CO2 25 22 21 26   BUN 16 15 16 18   CR 0.75 0.70 0.81 0.92   ANIONGAP 6 7 9 7   LONNIE 9.2 8.7 8.7 9.5   * 157* 122* 115*   ALBUMIN  --   --   --  3.7    PROTTOTAL  --   --   --  7.6   BILITOTAL  --   --   --  1.0   ALKPHOS  --   --   --  77   ALT  --   --   --  18   AST  --   --   --  18     No results found for this or any previous visit (from the past 24 hour(s)).

## 2022-06-18 NOTE — PLAN OF CARE
Disoriented to time, otherwise oriented but forgetful.  Flat affected noted.   VSS, afebrile.  Incontinent of bowel and bladder, purewick in place.  Mepilex on coccyx.  Turn and repo as pt allows.  Very weak, up with lift to chair x2.  PIV SL.  Regular diet, needs encouragement to eat.  Intermittent abx.  Plan for echocardiogram and procalcitonin tomorrow.  Will need TCU at discharge.

## 2022-06-18 NOTE — PLAN OF CARE
Goal Outcome Evaluation:    A&Ox3, disoriented to time. Forgetful. Flat affect. VSS. Denies pain. Inc B/B, purewick in place. Mepilex to coccyx. Up A2 GB/W, q2h turn/repo. Regular diet, encouraging PO fluids. PIV SL. Troponin trending down. Procalcitonin slightly elevated. Repeat blood cultures collected. SW, PT, OT following, recommending TCU.

## 2022-06-19 ENCOUNTER — APPOINTMENT (OUTPATIENT)
Dept: CARDIOLOGY | Facility: CLINIC | Age: 87
DRG: 871 | End: 2022-06-19
Attending: HOSPITALIST
Payer: COMMERCIAL

## 2022-06-19 ENCOUNTER — APPOINTMENT (OUTPATIENT)
Dept: OCCUPATIONAL THERAPY | Facility: CLINIC | Age: 87
DRG: 871 | End: 2022-06-19
Payer: COMMERCIAL

## 2022-06-19 LAB
LVEF ECHO: NORMAL
NT-PROBNP SERPL-MCNC: 5932 PG/ML (ref 0–1800)
PROCALCITONIN SERPL-MCNC: 0.1 NG/ML

## 2022-06-19 PROCEDURE — 93306 TTE W/DOPPLER COMPLETE: CPT | Mod: 26 | Performed by: INTERNAL MEDICINE

## 2022-06-19 PROCEDURE — 250N000011 HC RX IP 250 OP 636: Performed by: INTERNAL MEDICINE

## 2022-06-19 PROCEDURE — 83880 ASSAY OF NATRIURETIC PEPTIDE: CPT | Performed by: HOSPITALIST

## 2022-06-19 PROCEDURE — 99233 SBSQ HOSP IP/OBS HIGH 50: CPT | Performed by: HOSPITALIST

## 2022-06-19 PROCEDURE — 97535 SELF CARE MNGMENT TRAINING: CPT | Mod: GO

## 2022-06-19 PROCEDURE — 250N000013 HC RX MED GY IP 250 OP 250 PS 637: Performed by: INTERNAL MEDICINE

## 2022-06-19 PROCEDURE — 120N000001 HC R&B MED SURG/OB

## 2022-06-19 PROCEDURE — 250N000013 HC RX MED GY IP 250 OP 250 PS 637: Performed by: PHYSICIAN ASSISTANT

## 2022-06-19 PROCEDURE — 999N000208 ECHOCARDIOGRAM COMPLETE

## 2022-06-19 PROCEDURE — 36415 COLL VENOUS BLD VENIPUNCTURE: CPT | Performed by: HOSPITALIST

## 2022-06-19 PROCEDURE — 84145 PROCALCITONIN (PCT): CPT | Performed by: HOSPITALIST

## 2022-06-19 PROCEDURE — 255N000002 HC RX 255 OP 636: Performed by: INTERNAL MEDICINE

## 2022-06-19 RX ADMIN — CEFTRIAXONE SODIUM 1 G: 1 INJECTION, POWDER, FOR SOLUTION INTRAMUSCULAR; INTRAVENOUS at 20:11

## 2022-06-19 RX ADMIN — APIXABAN 5 MG: 5 TABLET, FILM COATED ORAL at 19:54

## 2022-06-19 RX ADMIN — PREDNISOLONE ACETATE 1 DROP: 10 SUSPENSION/ DROPS OPHTHALMIC at 20:12

## 2022-06-19 RX ADMIN — APIXABAN 5 MG: 5 TABLET, FILM COATED ORAL at 09:18

## 2022-06-19 RX ADMIN — DORZOLAMIDE HYDROCHLORIDE AND TIMOLOL MALEATE 1 DROP: 20; 5 SOLUTION/ DROPS OPHTHALMIC at 09:18

## 2022-06-19 RX ADMIN — HUMAN ALBUMIN MICROSPHERES AND PERFLUTREN 9 ML: 10; .22 INJECTION, SOLUTION INTRAVENOUS at 11:35

## 2022-06-19 RX ADMIN — LATANOPROST 1 DROP: 50 SOLUTION/ DROPS OPHTHALMIC at 22:11

## 2022-06-19 RX ADMIN — AZITHROMYCIN MONOHYDRATE 250 MG: 250 TABLET ORAL at 17:46

## 2022-06-19 RX ADMIN — LEVOTHYROXINE SODIUM 50 MCG: 50 TABLET ORAL at 09:18

## 2022-06-19 RX ADMIN — PREDNISOLONE ACETATE 1 DROP: 10 SUSPENSION/ DROPS OPHTHALMIC at 09:20

## 2022-06-19 RX ADMIN — DORZOLAMIDE HYDROCHLORIDE AND TIMOLOL MALEATE 1 DROP: 20; 5 SOLUTION/ DROPS OPHTHALMIC at 20:12

## 2022-06-19 RX ADMIN — METOPROLOL SUCCINATE 25 MG: 25 TABLET, EXTENDED RELEASE ORAL at 09:18

## 2022-06-19 ASSESSMENT — ACTIVITIES OF DAILY LIVING (ADL)
ADLS_ACUITY_SCORE: 50
ADLS_ACUITY_SCORE: 51
ADLS_ACUITY_SCORE: 50

## 2022-06-19 NOTE — PROGRESS NOTES
[facility]    Medicine Progress Note - Hospitalist Service    Date of Admission:  6/14/2022    Assessment & Plan        Marley Stewart is a very pleasant 89-year-old female with past medical history of hypertension, heart failure with preserved ejection fraction, atrial fibrillation on chronic anticoagulation with Eliquis, history of stroke, rheumatoid arthritis, hypothyroidism and peptic ulcer disease, among other medical problems, who presents to the emergency room tonight for evaluation of increased confusion and generalized weakness.  She was found to have a urinary tract infection.  She is admitted to the hospital for ongoing evaluation and care.     Urinary tract infection due to E. coli  Metabolic encephalopathy suspected secondary to UTI  Generalized weakness secondary to urinary tract infection  Her family briefly mentioned a history of urinary tract infections.  She typically ambulates independently per reports.  She has become more weak and increasingly confused in the day or two prior to presentation.    - Patient had fever up to 102.1 overnight 6/16.  Sepsis BPA triggered, lactic acid within normal limits.  Remainder of vital signs stable.  - She has mild leukocytosis 12.5--12.0 --> 14.9 --> 10.6 6/18  UA is abnormal.    - Continue IV ceftriaxone.    - Urine culture with positive E. coli, pansensitive blood culture  - Okay to discontinue IV fluids when patient taking adequate p.o.  - PT, OT consults placed.  Currently recommending TCU.  - Discussed with CHASE Ramirez (RN) on phone 6/18 and left VM 6/19. Discussed TCU recommendation with Fly (spouse) 6/19 who essentially just deferred it to Ashley.      Diastolic heart failure - hypertrophic cardiomyopathy  Chest pain (resolved)  Patient reported chest pain morning of 6/17.  Had not had this previously.  Obtained troponin, CXR, EKG.  - EKG with paced rhythm  - Troponin mildly elevated at 113, up from 41 on admission. - decreased trop  94.  - CXR with possible small left pleural effusion and/or infiltrate  - 6/17 Azithromycin in addition to the IV ceftriaxone she is already receiving. Lactic acid within normal limits.  Hold off on telemetry currently.  - 6/18 - 6/19 no recurrence of chest pain or pressure  -- will obtain echocardiogram (prior echo in Care Everywhere was 10/13/2021 with EF 70%, severely thickened LV wall, and mild MR, AR, TR noted)  - 6/19 - Echo showed progression of diastolic dysfunction and progressive hypertrophic cardiomyopathy.  Will request cardiology consultation mainly for optimization medically - CHASE Ramirez involved in care and they are not in favor of much invasive work up / intervention.   ADDENDUM: 5pm - cancelled cardiology consult after discussing with cardiologist - no changes in management as she is properly beta blocked and NOT on any vasodilators (nifedipine, ACEI, nitrates etc) which would be contraindicated with hypertrophic CM. CHASE Ramirez also relayed that even cardiac MRI would probably be more than patient needs/wants.     Concern of community acquired pneumonia  Some cough/shortness of breath as well as chest pain as mentioned above noted.  Leukocytosis and fever noted previously.  Procalcitonin added on which was normal initially in the ED found to be elevated 0.28 subsequently.  XR with possible infiltrate left base. Azithromycin started 6/17.  - Continue antibiotics  - Incentive spirometry     Atrial fibrillation  On chronic anticoagulation with Eliquis and rate controlled with metoprolol.   - Continue PTA Eliquis and metoprolol     Hypertension  Heart failure with preserved ejection fraction  History of of cerebrovascular accident  Chronic and stable on metoprolol and Eliquis.    - Continue metoprolol.  BP elevated, consider adding another agent.  - Continue Eliquis  - Lasix is being held, can consider resuming when patient closer to baseline p.o. intake.      Rheumatoid arthritis  Takes methotrexate on  Wednesdays.    - Hold methotrexate given concerns for active infection.     Hypothyroidism  - Chronic and stable on levothyroxine.      Diet: Combination Diet Regular Diet Adult  Room Service    DVT Prophylaxis: DOAC  Armstrong Catheter: Not present  Central Lines: None  Cardiac Monitoring: None  Code Status: No CPR- Do NOT Intubate      Disposition Plan   Expected Discharge: 06/20/2022     Anticipated discharge location:  Awaiting care coordination huddle  Delays:     Placement - TCU            The patient's care was discussed with the Bedside Nurse, Care Coordinator/, Patient and Patient's Family.    Kyler Farrar MD  Hospitalist Service    Securely message with the [employer] Web Console (learn more here)  Text page via [email or web address] Paging/Directory     Total encounter time 36 min with >50% spent in discussion and counseling with patient and spouse Fly in room regarding improving infections and therapy rec TCU for strengthening, as well as phone update to Ashley, and coordination of care with RN, staff, and CC/SW.    Clinically Significant Risk Factors Present on Admission                    ______________________________________________________________________    Interval History   Seen and examined midday. Fly at bedside. Patient a bit tired but denies new issues. Still very weak. Explained to them that UTI/Pna appears to be improving etc, no fevers, procal decreasing and that TCU is recommended- Fly deferred to CHASE Ramirez who is a nurse and manages most of Marley's cares. CUATE left on Ashley's personal phone - will try again later.    Data reviewed today: I reviewed all medications, new labs and imaging results over the last 24 hours. I personally reviewed no images or EKG's today.    Physical Exam   Vital Signs: Temp: 97.6  F (36.4  C) Temp src: Oral BP: 135/71 Pulse: 67   Resp: 16 SpO2: 94 % O2 Device: None (Room air)    Weight: 140 lbs 0 oz    Gen: NAD, pleasant  HEENT:  EOMI, MMM  Resp: no focal crackles,  no wheezes, no increased work of resp  CV: S1S2 heard, reg rhythm, reg rate  Abdo: soft, nontender, nondistended, bowel sounds present  Ext: calves nontender, well perfused  Neuro: aa, a bit drowsy, but follows commands and moving ext, CN grossly intact, no facial asymmetry    Data   Recent Labs   Lab 06/18/22  0741 06/17/22  0355 06/15/22  0703 06/14/22  1809   WBC 10.6 14.9* 12.0*  --    HGB 13.1 13.4 13.4  --    MCV 95 95 96  --     240 219  --     133 138 140   POTASSIUM 3.9 3.6 3.7 4.0   CHLORIDE 105 104 108 107   CO2 25 22 21 26   BUN 16 15 16 18   CR 0.75 0.70 0.81 0.92   ANIONGAP 6 7 9 7   LONNIE 9.2 8.7 8.7 9.5   * 157* 122* 115*   ALBUMIN  --   --   --  3.7   PROTTOTAL  --   --   --  7.6   BILITOTAL  --   --   --  1.0   ALKPHOS  --   --   --  77   ALT  --   --   --  18   AST  --   --   --  18     No results found for this or any previous visit (from the past 24 hour(s)).

## 2022-06-19 NOTE — PLAN OF CARE
Goal Outcome Evaluation:    A&Ox3, disoriented to time, forgetful. Flat affect. VSS. Inc B/B, purewick in place. Mepilex to coccyx. Q2h turn/repo. Up A2 lift. PIV SL with int abx. Regular diet. Will have echo and procal completed in the AM. Discharge to TCU when able.

## 2022-06-19 NOTE — PLAN OF CARE
VSS.  Disoriented to time, forgetful.  Flat affect.  Up to chair with lift.  Encourage IS as able.  Incontinent of bowel and bladder, purewick in place.  Mepilex on coccyx.  Regular diet, poor appetite, needs encouragement/help with eating.  PIV SL.  Echo completed today, cardiology consult pending.  Plan for TCU at discharge.

## 2022-06-20 ENCOUNTER — APPOINTMENT (OUTPATIENT)
Dept: OCCUPATIONAL THERAPY | Facility: CLINIC | Age: 87
DRG: 871 | End: 2022-06-20
Payer: COMMERCIAL

## 2022-06-20 LAB
BACTERIA BLD CULT: NO GROWTH
BACTERIA BLD CULT: NO GROWTH

## 2022-06-20 PROCEDURE — 250N000013 HC RX MED GY IP 250 OP 250 PS 637: Performed by: PHYSICIAN ASSISTANT

## 2022-06-20 PROCEDURE — 250N000013 HC RX MED GY IP 250 OP 250 PS 637: Performed by: INTERNAL MEDICINE

## 2022-06-20 PROCEDURE — 250N000011 HC RX IP 250 OP 636: Performed by: INTERNAL MEDICINE

## 2022-06-20 PROCEDURE — 120N000001 HC R&B MED SURG/OB

## 2022-06-20 PROCEDURE — 99232 SBSQ HOSP IP/OBS MODERATE 35: CPT | Performed by: INTERNAL MEDICINE

## 2022-06-20 PROCEDURE — 97530 THERAPEUTIC ACTIVITIES: CPT | Mod: GO

## 2022-06-20 PROCEDURE — 97535 SELF CARE MNGMENT TRAINING: CPT | Mod: GO

## 2022-06-20 RX ADMIN — CEFTRIAXONE SODIUM 1 G: 1 INJECTION, POWDER, FOR SOLUTION INTRAMUSCULAR; INTRAVENOUS at 20:52

## 2022-06-20 RX ADMIN — DORZOLAMIDE HYDROCHLORIDE AND TIMOLOL MALEATE 1 DROP: 20; 5 SOLUTION/ DROPS OPHTHALMIC at 20:04

## 2022-06-20 RX ADMIN — AZITHROMYCIN MONOHYDRATE 250 MG: 250 TABLET ORAL at 18:31

## 2022-06-20 RX ADMIN — PREDNISOLONE ACETATE 1 DROP: 10 SUSPENSION/ DROPS OPHTHALMIC at 20:05

## 2022-06-20 RX ADMIN — APIXABAN 5 MG: 5 TABLET, FILM COATED ORAL at 09:07

## 2022-06-20 RX ADMIN — DORZOLAMIDE HYDROCHLORIDE AND TIMOLOL MALEATE 1 DROP: 20; 5 SOLUTION/ DROPS OPHTHALMIC at 09:07

## 2022-06-20 RX ADMIN — PREDNISOLONE ACETATE 1 DROP: 10 SUSPENSION/ DROPS OPHTHALMIC at 09:14

## 2022-06-20 RX ADMIN — APIXABAN 5 MG: 5 TABLET, FILM COATED ORAL at 20:04

## 2022-06-20 RX ADMIN — METOPROLOL SUCCINATE 25 MG: 25 TABLET, EXTENDED RELEASE ORAL at 09:07

## 2022-06-20 RX ADMIN — LATANOPROST 1 DROP: 50 SOLUTION/ DROPS OPHTHALMIC at 22:41

## 2022-06-20 RX ADMIN — LEVOTHYROXINE SODIUM 50 MCG: 50 TABLET ORAL at 09:07

## 2022-06-20 ASSESSMENT — ACTIVITIES OF DAILY LIVING (ADL)
ADLS_ACUITY_SCORE: 51
ADLS_ACUITY_SCORE: 52
ADLS_ACUITY_SCORE: 52
ADLS_ACUITY_SCORE: 51
ADLS_ACUITY_SCORE: 53
ADLS_ACUITY_SCORE: 52
ADLS_ACUITY_SCORE: 53

## 2022-06-20 NOTE — CONSULTS
Care Management Initial Consult    General Information  Assessment completed with: Patient, Children, Patient and CHASE Beltran  Type of CM/SW Visit: Offer D/C Planning    Primary Care Provider verified and updated as needed:     Readmission within the last 30 days:        Reason for Consult: discharge planning, facility placement  Advance Care Planning: Advance Care Planning Reviewed: questions answered          Communication Assessment  Patient's communication style: spoken language (English or Bilingual)    Hearing Difficulty or Deaf: no   Wear Glasses or Blind: yes    Cognitive  Cognitive/Neuro/Behavioral: .WDL except  Level of Consciousness: confused  Arousal Level: opens eyes spontaneously  Orientation: disoriented to, time  Mood/Behavior: flat affect  Best Language: 0 - No aphasia  Speech: clear, spontaneous, logical    Living Environment:   People in home: spouse  Fly, Spouse  Current living Arrangements: house      Able to return to prior arrangements: yes  Living Arrangement Comments: Lives with spouse at home    Family/Social Support:  Care provided by: self, spouse/significant other  Provides care for: no one  Marital Status:   , Children  Fly       Description of Support System: Supportive, Involved    Support Assessment: Adequate family and caregiver support, Adequate social supports    Current Resources:   Patient receiving home care services: Yes     Community Resources:    Equipment currently used at home: walker, rolling, shower chair, grab bar, toilet, cane, straight, raised toilet seat  Supplies currently used at home:      Employment/Financial:  Employment Status: retired     Employment/ Comments:   Financial Concerns: No concerns identified   Referral to Financial Worker: No       Lifestyle & Psychosocial Needs:  Social Determinants of Health     Tobacco Use: Not on file   Alcohol Use: Not on file   Financial Resource Strain: Not on file   Food  Insecurity: Not on file   Transportation Needs: Not on file   Physical Activity: Not on file   Stress: Not on file   Social Connections: Not on file   Intimate Partner Violence: Not on file   Depression: Not on file   Housing Stability: Not on file       Functional Status:  Prior to admission patient needed assistance:    Patient resides at home with spouse. She receives home with with [facility]. Patient's son and daughter in law are involved and supportive.            Mental Health Status:      None indicated    Chemical Dependency Status:      None indicated          Values/Beliefs:  Spiritual, Cultural Beliefs, [employer] Practices, Values that affect care:                 Additional Information:  SW met with patient to introduce role and discuss discharge planning for patient. SW discussed recommendations for placement regarding TCU and patient in agreement. Patient requested that SW place call to daughter-in-law Ruby to discuss facility choices. SW placed call to Ruby and discussed TCU. She is requesting referrals be sent to facilities in the [facility] area.  Ruby stated that they are deciding if patient is able to return back home after TCU or if they feel she will need more care. She would like to do a TCU facility that has LTC available so patient could stay longer if they felt that was needed.  [facility] sent referrals via DOD. [facility] will continue to assist and follow for discharge.     MESFIN Nieto, LGSW  [phone]  [facility]

## 2022-06-20 NOTE — PROGRESS NOTES
[facility]    Medicine Progress Note - Hospitalist Service       Date of Admission:  6/14/2022    Assessment & Plan   Marley Stewart is a 89 year old female with hx of chronic a-fib s/p PPM on chronic AC with apixaban, hypertrophic cardiomyopathy, dCHF, HTN, RA, and prior stroke who was admitted on 6/14/2022 for UTI and possible pneumonia    Sepsis with acute metabolic encephalopathy due to E.coli UTI, Resolving  Generalized weakness due to physical deconditioning  * Presented with 1-2 day history of increasing confusion and generalized weakness  * Fever to 102.1 and leukocytosis (14.9k) noted this stay. UA on arrival was grossly dirty, and she was empirically initiated on IV ceftriaxone. Urine culture returned with pan-sensitive E.coli  - Continues on ceftriaxone while hospitalized, PO at discharge  - 6/15 blood culture x1 and 6/17 blood culture x1 NGTD    Possible community-acquired bacterial pneumonia  * Cough, shortness of breath, intermittent chest pain noted this stay. CXR (6/17) showed possible LLL infiltrate, and azithromycin was added to her antibiotic regimen.   - On ceftriaxone for UTI as noted above  - Will complete 5-day course of azithromycin on 6/21    Chest pain, Resolved  Hypertrophic cardiomyopathy  Elevated troponin, suspect NSTEMI type II/demand ischemia  * Chest pain noted on 6/17. Troponin was minimally elevated to 113, then decreased. Suspect demand ischemia due to sepsis  * TTE (6/19) showed progression of diastolic dysfunction and progressive hypertrophic cardiomyopathy. Cardiology was initially consulted, but deferred - DIL Ashley involved in care and is not in favor of much invasive work up / intervention. She also relayed that even cardiac MRI would be more than patient needs/wants.   - Continues on PTA metoprolol XL 25 mg daily  - Avoid vasodilators (nifedipine, ACEI, nitrates etc)     Chronic diastolic CHF  - Appears compensated without signs of exacerbation  -  Holding PTA Lasix for now, resume at discharge    Chronic atrial fibrillation s/p PPM  - Continues on PTA Eliquis and metoprolol     Rheumatoid arthritis  Takes methotrexate on Wednesdays.    - Holding methotrexate      Hypothyroidism  Chronic and stable on levothyroxine    History of CVA  - Continues on PTA apixaban    History of glaucoma  - Continues on PTA eye drops     Diet: Combination Diet Regular Diet Adult  Room Service    DVT Prophylaxis: DOAC  Armstrong Catheter: Not present  Code Status: No CPR- Do NOT Intubate         Disposition: Expected discharge to TCU once placement found    The patient's care was discussed with the Patient and Patient's Family.    Jelly Meneses MD  Hospitalist Service  [facility]  Contact information available via [facility] Paging/Directory    ______________________________________________________________________    Interval History   No acute events overnight.      Data reviewed today: I reviewed all medications, new labs and imaging results over the last 24 hours. I personally reviewed no images or EKG's today.    Physical Exam   Vital Signs: Temp: 98  F (36.7  C) Temp src: Oral BP: (!) 146/82 Pulse: 70   Resp: 20 SpO2: 95 % O2 Device: None (Room air)    Weight: 142 lbs 3.15 oz  Constitutional: Resting in bed, NAD  HEENT: Sclera white, MMM  Respiratory: Breathing non-labored. Diminished breath sounds over bilateral bases  Cardiovascular: Heart RRR, no m/r/g. No pedal edema  GI: +BS, abd soft/NT  Skin/Integument: No rash  Musculoskeletal: Normal muscle bulk and tone  Neuro: Awake and appropriate, CAN  Psych: Calm and cooperative    Data   Recent Labs   Lab 06/18/22  0741 06/17/22  0355 06/15/22  0703 06/14/22  1809   WBC 10.6 14.9* 12.0*  --    HGB 13.1 13.4 13.4  --    MCV 95 95 96  --     240 219  --     133 138 140   POTASSIUM 3.9 3.6 3.7 4.0   CHLORIDE 105 104 108 107   CO2 25 22 21 26   BUN 16 15 16 18   CR 0.75 0.70 0.81 0.92   ANIONGAP  6 7 9 7   LONNIE 9.2 8.7 8.7 9.5   * 157* 122* 115*   ALBUMIN  --   --   --  3.7   PROTTOTAL  --   --   --  7.6   BILITOTAL  --   --   --  1.0   ALKPHOS  --   --   --  77   ALT  --   --   --  18   AST  --   --   --  18         Recent Results (from the past 24 hour(s))   Echocardiogram Complete   Result Value    LVEF  55-60%    [facility]    [id number]  [id number]  [id number]^JASMYN^BRITTANY^ALIS     [facility]  Echocardiography Laboratory  [address] [place] [address]     Name: DWAINE CASTRO  MRN: [id number]  : 1932  Study Date: 2022 11:19 AM  Age: 89 yrs  Gender: Female  Patient Location: [facility]  Reason For Study: Chest Pain  Ordering Physician: BRITTANY VINES  Referring Physician: Chandra Ortiz  Performed By: Tashia Fernando     BSA: 1.7 m2  Height: 65 in  Weight: 140 lb  HR: 71  BP: 143/73 mmHg  ______________________________________________________________________________  Procedure  Complete Portable Echo Adult. Optison (NDC #[id number]) given intravenously.  ______________________________________________________________________________  Interpretation Summary     Left ventricular systolic function is normal.  The visual ejection fraction is 55-60%.  Significant assymmetric thickening of the left ventricular walls, prominent  proximal septal thickening as well as apical hypertrophy. Findings suggest  variant of hypertrophic obstructive cardiomyopathy.  Grade III or advanced diastolic dysfunction.  The right ventricle is normal in structure, function and size.  Moderate (46-55mmHg) pulmonary hypertension is present.  Aortic sclerosis without stenosis.  IVC diameter and respiratory changes fall into an intermediate range  suggesting an RA pressure of 8 mmHg.  There is no pericardial effusion.  Moderate left pleural effusion     Recommend cardiac MRI if clinically indicated. Findings compared to study  dated 11/3/2016, there appears to be more advanced  diastolic dysfunction as  well as progressive hypertrophic cardiomyopathy.  ______________________________________________________________________________  Left Ventricle  The left ventricle is normal in size. Significant assymmetric thickening of  the left ventricular walls, prominent proximal septal thickening as well as  apical hypertrophy. Findings suggest variant of hypertrophic obstructive  cardiomyopathy. The visual ejection fraction is 55-60%. Left ventricular  systolic function is normal. Grade III or advanced diastolic dysfunction. Mid  anteroseptum appears hypokinetic however could be appearance in setting of  asymmetric hypertrophy.     Right Ventricle  The right ventricle is normal in structure, function and size.     Atria  The left atrium is mildly dilated. Right atrial size is normal.     Mitral Valve  There is mild mitral annular calcification. There is mild to moderate (1-2+)  mitral regurgitation.     Tricuspid Valve  There is moderate (2+) tricuspid regurgitation. The right ventricular systolic  pressure is approximated at 40mmHg plus the right atrial pressure. Moderate  (46-55mmHg) pulmonary hypertension is present.     Aortic Valve  There is moderate trileaflet aortic sclerosis. There is mild (1+) aortic  regurgitation.     Pulmonic Valve  The pulmonic valve is normal in structure and function.     Vessels  The aortic root is normal size. Normal size ascending aorta. IVC diameter and  respiratory changes fall into an intermediate range suggesting an RA pressure  of 8 mmHg.     Pericardium  There is no pericardial effusion. Moderate left pleural effusion.     ______________________________________________________________________________  MMode/2D Measurements & Calculations  IVSd: 1.0 cm  LVIDd: 4.4 cm  LVIDs: 3.2 cm  LVPWd: 0.99 cm  FS: 27.3 %  LV mass(C)d: 152.4 grams  LV mass(C)dI: 89.6 grams/m2     Ao root diam: 2.7 cm  LA dimension: 4.0 cm  asc Aorta Diam: 3.4 cm  LA/Ao: 1.5  LVOT diam: 2.1  cm  LVOT area: 3.3 cm2  LA Volume (BP): 70.0 ml  LA Volume Index (BP): 41.2 ml/m2  RWT: 0.45     Doppler Measurements & Calculations  MV E max stanford: 127.0 cm/sec  MV A max stanford: 24.3 cm/sec  MV E/A: 5.2  MV max P.4 mmHg  MV mean P.1 mmHg  MV V2 VTI: 29.6 cm  MVA(VTI): 2.2 cm2     MV dec slope: 509.7 cm/sec2  Ao V2 max: 183.5 cm/sec  Ao max P.0 mmHg  Ao V2 mean: 130.0 cm/sec  Ao mean P.5 mmHg  Ao V2 VTI: 33.0 cm  ELIZABETH(I,D): 2.0 cm2  ELIZABETH(V,D): 2.0 cm2  AI P1/2t: 474.7 msec  LV V1 max P.1 mmHg  LV V1 max: 112.6 cm/sec  LV V1 VTI: 20.0 cm  SV(LVOT): 66.4 ml  SI(LVOT): 39.1 ml/m2  PA acc time: 0.11 sec  TR max stanford: 317.6 cm/sec  TR max P.4 mmHg  AV Stanford Ratio (DI): 0.61  ELIZABETH Index (cm2/m2): 1.2  E/E' av.2  Lateral E/e': 20.3  Medial E/e': 24.1     ______________________________________________________________________________  Report approved by: Rashid Victoria 2022 02:31 PM             Medications       apixaban ANTICOAGULANT  5 mg Oral BID     azithromycin  250 mg Oral Daily     cefTRIAXone  1 g Intravenous Q24H     dorzolamide-timolol  1 drop Right Eye BID     latanoprost  1 drop Right Eye At Bedtime     levothyroxine  50 mcg Oral Daily     metoprolol succinate ER  25 mg Oral QAM     prednisoLONE acetate  1 drop Right Eye BID     sodium chloride (PF)  3 mL Intracatheter Q8H

## 2022-06-20 NOTE — PLAN OF CARE
Goal Outcome Evaluation:    9099-6942    AVSS on room air. Denies pain. A+Ox3, disoriented to time, forgetfulness noted. Ax2 w/ ceiling lift, turn/reposition q2h. Working with PT/OT. L PIV SL between IV antibiotics. Tolerating regular diet w/o nausea or vomiting. Reports fair appetite. Incontinent of B/B. LBM 6/19. Utilizing purewick external catheter. Mepilex on coccyx CDI. SW/CM consulted for discharge needs. Plan to discharge to TCU once medically stable. Will continue with POC.

## 2022-06-20 NOTE — PLAN OF CARE
Goal Outcome Evaluation:    VSS. Disoriented to time, forgetful. Flat affect. Regular diet, poor intake, assist with eating. Inc B/B, purewick in place. Mepilex to coccyx. Up A2 lift, turn repo in bed. PIV SL with int abx. TCU recommended at discharge.

## 2022-06-21 ENCOUNTER — APPOINTMENT (OUTPATIENT)
Dept: OCCUPATIONAL THERAPY | Facility: CLINIC | Age: 87
DRG: 871 | End: 2022-06-21
Payer: COMMERCIAL

## 2022-06-21 ENCOUNTER — APPOINTMENT (OUTPATIENT)
Dept: PHYSICAL THERAPY | Facility: CLINIC | Age: 87
DRG: 871 | End: 2022-06-21
Payer: COMMERCIAL

## 2022-06-21 LAB
ATRIAL RATE - MUSE: 76 BPM
DIASTOLIC BLOOD PRESSURE - MUSE: NORMAL MMHG
INTERPRETATION ECG - MUSE: NORMAL
P AXIS - MUSE: 89 DEGREES
PR INTERVAL - MUSE: 186 MS
QRS DURATION - MUSE: 138 MS
QT - MUSE: 468 MS
QTC - MUSE: 526 MS
R AXIS - MUSE: -53 DEGREES
SYSTOLIC BLOOD PRESSURE - MUSE: NORMAL MMHG
T AXIS - MUSE: 133 DEGREES
VENTRICULAR RATE- MUSE: 76 BPM

## 2022-06-21 PROCEDURE — 99232 SBSQ HOSP IP/OBS MODERATE 35: CPT | Performed by: INTERNAL MEDICINE

## 2022-06-21 PROCEDURE — 97535 SELF CARE MNGMENT TRAINING: CPT | Mod: GO | Performed by: OCCUPATIONAL THERAPIST

## 2022-06-21 PROCEDURE — 97530 THERAPEUTIC ACTIVITIES: CPT | Mod: GP

## 2022-06-21 PROCEDURE — 250N000013 HC RX MED GY IP 250 OP 250 PS 637: Performed by: PHYSICIAN ASSISTANT

## 2022-06-21 PROCEDURE — 120N000001 HC R&B MED SURG/OB

## 2022-06-21 PROCEDURE — 250N000013 HC RX MED GY IP 250 OP 250 PS 637: Performed by: INTERNAL MEDICINE

## 2022-06-21 RX ORDER — AMOXICILLIN 875 MG
875 TABLET ORAL EVERY 12 HOURS SCHEDULED
Status: DISCONTINUED | OUTPATIENT
Start: 2022-06-21 | End: 2022-06-23

## 2022-06-21 RX ORDER — DIPHENHYDRAMINE HCL 25 MG
25 CAPSULE ORAL EVERY 6 HOURS PRN
Status: DISCONTINUED | OUTPATIENT
Start: 2022-06-21 | End: 2022-06-24 | Stop reason: HOSPADM

## 2022-06-21 RX ORDER — AMOXICILLIN 875 MG
875 TABLET ORAL EVERY 12 HOURS
DISCHARGE
Start: 2022-06-21 | End: 2022-06-23

## 2022-06-21 RX ORDER — ACETAMINOPHEN 500 MG
1000 TABLET ORAL EVERY 6 HOURS PRN
DISCHARGE
Start: 2022-06-21

## 2022-06-21 RX ADMIN — PREDNISOLONE ACETATE 1 DROP: 10 SUSPENSION/ DROPS OPHTHALMIC at 21:28

## 2022-06-21 RX ADMIN — APIXABAN 5 MG: 5 TABLET, FILM COATED ORAL at 08:15

## 2022-06-21 RX ADMIN — DORZOLAMIDE HYDROCHLORIDE AND TIMOLOL MALEATE 1 DROP: 20; 5 SOLUTION/ DROPS OPHTHALMIC at 08:21

## 2022-06-21 RX ADMIN — DIPHENHYDRAMINE HYDROCHLORIDE 25 MG: 25 CAPSULE ORAL at 14:09

## 2022-06-21 RX ADMIN — METOPROLOL SUCCINATE 25 MG: 25 TABLET, EXTENDED RELEASE ORAL at 08:14

## 2022-06-21 RX ADMIN — PREDNISOLONE ACETATE 1 DROP: 10 SUSPENSION/ DROPS OPHTHALMIC at 08:20

## 2022-06-21 RX ADMIN — DORZOLAMIDE HYDROCHLORIDE AND TIMOLOL MALEATE 1 DROP: 20; 5 SOLUTION/ DROPS OPHTHALMIC at 21:33

## 2022-06-21 RX ADMIN — APIXABAN 5 MG: 5 TABLET, FILM COATED ORAL at 21:28

## 2022-06-21 RX ADMIN — AZITHROMYCIN MONOHYDRATE 250 MG: 250 TABLET ORAL at 18:25

## 2022-06-21 RX ADMIN — AMOXICILLIN 875 MG: 875 TABLET, FILM COATED ORAL at 21:31

## 2022-06-21 RX ADMIN — LEVOTHYROXINE SODIUM 50 MCG: 50 TABLET ORAL at 08:14

## 2022-06-21 RX ADMIN — AMOXICILLIN 875 MG: 875 TABLET, FILM COATED ORAL at 13:14

## 2022-06-21 RX ADMIN — LATANOPROST 1 DROP: 50 SOLUTION/ DROPS OPHTHALMIC at 21:33

## 2022-06-21 ASSESSMENT — ACTIVITIES OF DAILY LIVING (ADL)
ADLS_ACUITY_SCORE: 53
ADLS_ACUITY_SCORE: 57
ADLS_ACUITY_SCORE: 53
ADLS_ACUITY_SCORE: 57
ADLS_ACUITY_SCORE: 57
ADLS_ACUITY_SCORE: 53
ADLS_ACUITY_SCORE: 53
ADLS_ACUITY_SCORE: 51
ADLS_ACUITY_SCORE: 53
ADLS_ACUITY_SCORE: 57

## 2022-06-21 NOTE — PLAN OF CARE
[id number] shift. Vitals stable. Disorientated to time and situation. Up to chair for supper w/ 2 assist gb/walker pivot. Incontinent of bowel and bladder, using purewick. Blanchable redness to coccyx, mepi in place. Abx completed today. Discharge to TCU pending placement/prior auth.

## 2022-06-21 NOTE — PLAN OF CARE
Goal Outcome Evaluation:      1900-0730:    A/O x2, disoriented to time and situation. VSS on RA. Denies pain/ SOB/N/V. C/o tired in general, and itchy skin on the back, managed with lotion. Up with A2, lift with ceiling lift. Incontinent, BM x1, voiding x1. Redness to coccyx, replaced new mepilex, DCI. T/R Q2h, tolerating well. On regular diet, poor appetite. L PIV SL with int abx. On IV rocephin. PO azithromycin. Discharge pending to TCU.

## 2022-06-21 NOTE — PROGRESS NOTES
[facility]    Medicine Progress Note - Hospitalist Service       Date of Admission:  6/14/2022    Assessment & Plan   Marley Stewart is a 89 year old female with hx of chronic a-fib s/p PPM on chronic AC with apixaban, hypertrophic cardiomyopathy, dCHF, HTN, RA, and prior stroke who was admitted on 6/14/2022 for UTI and possible pneumonia    Sepsis with acute metabolic encephalopathy due to E.coli UTI, Resolving  Generalized weakness due to physical deconditioning  * Presented with 1-2 day history of increasing confusion and generalized weakness  * Fever to 102.1 and leukocytosis (14.9k) noted this stay. UA on arrival was grossly dirty, and she was empirically initiated on IV ceftriaxone. Urine culture returned with pan-sensitive E.coli  - Start amoxicillin today, 6/21 to complete total 10 day course of antibiotics  - 6/15 blood culture x1 and 6/17 blood culture x1 NGTD    Possible community-acquired bacterial pneumonia  * Cough, shortness of breath, intermittent chest pain noted this stay. CXR (6/17) showed possible LLL infiltrate, and azithromycin was added to her antibiotic regimen.   - She completed a 7-day course of ceftriaxone during this hospitalization  - Will complete 5-day course of azithromycin today, 6/21    Chest pain, Resolved  Hypertrophic cardiomyopathy  Elevated troponin, suspect NSTEMI type II/demand ischemia  * Chest pain noted on 6/17. Troponin was minimally elevated to 113, then decreased. Suspect demand ischemia due to sepsis  * TTE (6/19) showed progression of diastolic dysfunction and progressive hypertrophic cardiomyopathy. Cardiology was initially consulted, but deferred - DIL Ashley involved in care and is not in favor of much invasive work up / intervention. She also relayed that even cardiac MRI would be more than patient needs/wants.   - Continues on PTA metoprolol XL 25 mg daily  - Avoid vasodilators (nifedipine, ACEI, nitrates etc)     Chronic diastolic CHF  -  Appears compensated without signs of exacerbation  - Holding PTA Lasix for now, resume at discharge    Chronic atrial fibrillation s/p PPM  - Continues on PTA metoprolol and apixaban     Rheumatoid arthritis  Takes methotrexate on Wednesdays.    - Holding methotrexate, resume at discharge     Hypothyroidism  Chronic and stable on levothyroxine    History of CVA  - Continues on PTA apixaban    History of glaucoma  - Continues on PTA eye drops     Diet: Combination Diet Regular Diet Adult  Room Service  Diet    DVT Prophylaxis: DOAC  Armstrong Catheter: Not present  Code Status: No CPR- Do NOT Intubate         Disposition: Expected discharge to TCU once placement found    The patient's care was discussed with the Patient.    Jelly Meneses MD  Hospitalist Service  [facility]  Contact information available via [facility] Paging/Directory    ______________________________________________________________________    Interval History   No acute events overnight. Offers no complaints - denies cp/sob, dizziness/lightheadedness, nausea, or aches/pains. Awaiting TCU    Data reviewed today: I reviewed all medications, new labs and imaging results over the last 24 hours. I personally reviewed no images or EKG's today.    Physical Exam   Vital Signs: Temp: 98.6  F (37  C) Temp src: Oral BP: (!) 146/83 Pulse: 71   Resp: 16 SpO2: 93 % O2 Device: None (Room air)    Weight: 141 lbs 5.04 oz  Constitutional: Resting in bed, NAD  HEENT: Sclera white, MMM  Respiratory: Breathing non-labored. Diminished breath sounds over bilateral bases  Cardiovascular: Heart RRR, no m/r/g. No pedal edema  GI: +BS, abd soft/NT  Skin/Integument: No rash  Musculoskeletal: Normal muscle bulk and tone  Neuro: Alert, appropriate, oriented x3, CNA  Psych: Flat affect. Calm and cooperative    Data   Recent Labs   Lab 06/18/22  0741 06/17/22  0355 06/15/22  0703 06/14/22  1809   WBC 10.6 14.9* 12.0*  --    HGB 13.1 13.4 13.4  --    MCV 95  95 96  --     240 219  --     133 138 140   POTASSIUM 3.9 3.6 3.7 4.0   CHLORIDE 105 104 108 107   CO2 25 22 21 26   BUN 16 15 16 18   CR 0.75 0.70 0.81 0.92   ANIONGAP 6 7 9 7   LONNIE 9.2 8.7 8.7 9.5   * 157* 122* 115*   ALBUMIN  --   --   --  3.7   PROTTOTAL  --   --   --  7.6   BILITOTAL  --   --   --  1.0   ALKPHOS  --   --   --  77   ALT  --   --   --  18   AST  --   --   --  18         No results found for this or any previous visit (from the past 24 hour(s)).    Medications       amoxicillin  875 mg Oral Q12H [facility] (08/20)     apixaban ANTICOAGULANT  5 mg Oral BID     azithromycin  250 mg Oral Daily     dorzolamide-timolol  1 drop Right Eye BID     latanoprost  1 drop Right Eye At Bedtime     levothyroxine  50 mcg Oral Daily     metoprolol succinate ER  25 mg Oral QAM     prednisoLONE acetate  1 drop Right Eye BID     sodium chloride (PF)  3 mL Intracatheter Q8H

## 2022-06-21 NOTE — PLAN OF CARE
[id number]  Pt is A&Ox2, disoriented to situation and time. Flat affect. VSS on RA. CHERRY. Denies pain. Incontinent of B&B. No BM. Purewick in place, good UOP. Blanchable redness to coccyx, mepi in place. Up A2 with lift. T/R when in bed, shifted weight while in chair. On regular diet with RS. L PIV SL. On IV rocephin. PO azithromycin. Discharge to TCU pending improvement.

## 2022-06-21 NOTE — PLAN OF CARE
Goal Outcome Evaluation:       0427-9789     AVSS on room air. Denies pain. A+Ox2, disoriented to time, situation. Pt very forgetful. Ax2 w/ ceiling lift, turn/reposition q2h. Working with PT/OT. L PIV SL removed d/t infiltration, MD okay with pt not having IV access. IV rocephin switched to PO amoxicillin. On PO azithromycin for pneumonia. Tolerating regular diet w/o nausea or vomiting. Reports fair appetite. Incontinent of B/B. LBM this AM. Utilizing purewick external catheter. Mepilex on coccyx CDI. SW/CM consulted for discharge needs. Plan to discharge to TCU once medically stable. Will continue with POC.

## 2022-06-21 NOTE — PROGRESS NOTES
Care Management Follow Up    Length of Stay (days): 7    Expected Discharge Date: 06/22/2022     Concerns to be Addressed:  Discharge planning     Patient plan of care discussed at interdisciplinary rounds: Yes    Anticipated Discharge Disposition: Transitional Care     Anticipated Discharge Services: TCU   Anticipated Discharge DME: None    Patient/family educated on Medicare website which has current facility and service quality ratings: yes  Education Provided on the Discharge Plan:    Patient/Family in Agreement with the Plan: yes    Referrals Placed by CM/SW: Senior Linkage Line, Post Acute Facilities  Private pay costs discussed: Not applicable    Additional Information:  SW received call from patient's son Lukas who stated that they discussed discharge plans for patient with spouse and family and the would [facility] and then their plan is for patient to discharge to [facility] after TCU. They feel that [facility] would be the best option for them at this time and they were all in agreement. SW sent referral to [facility].    Insurance prior authorization was completed and submitted to [facility] for review.     MESFIN Nieto, LGSW  [phone]  [facility]

## 2022-06-22 ENCOUNTER — APPOINTMENT (OUTPATIENT)
Dept: PHYSICAL THERAPY | Facility: CLINIC | Age: 87
DRG: 871 | End: 2022-06-22
Payer: COMMERCIAL

## 2022-06-22 ENCOUNTER — APPOINTMENT (OUTPATIENT)
Dept: OCCUPATIONAL THERAPY | Facility: CLINIC | Age: 87
DRG: 871 | End: 2022-06-22
Payer: COMMERCIAL

## 2022-06-22 LAB
ANION GAP SERPL CALCULATED.3IONS-SCNC: 4 MMOL/L (ref 3–14)
BUN SERPL-MCNC: 13 MG/DL (ref 7–30)
CALCIUM SERPL-MCNC: 8.7 MG/DL (ref 8.5–10.1)
CHLORIDE BLD-SCNC: 106 MMOL/L (ref 94–109)
CO2 SERPL-SCNC: 26 MMOL/L (ref 20–32)
CREAT SERPL-MCNC: 0.75 MG/DL (ref 0.52–1.04)
ERYTHROCYTE [DISTWIDTH] IN BLOOD BY AUTOMATED COUNT: 13.3 % (ref 10–15)
GFR SERPL CREATININE-BSD FRML MDRD: 76 ML/MIN/1.73M2
GLUCOSE BLD-MCNC: 100 MG/DL (ref 70–99)
HCT VFR BLD AUTO: 37 % (ref 35–47)
HGB BLD-MCNC: 12.5 G/DL (ref 11.7–15.7)
MCH RBC QN AUTO: 32.1 PG (ref 26.5–33)
MCHC RBC AUTO-ENTMCNC: 33.8 G/DL (ref 31.5–36.5)
MCV RBC AUTO: 95 FL (ref 78–100)
PLATELET # BLD AUTO: 381 10E3/UL (ref 150–450)
POTASSIUM BLD-SCNC: 3.7 MMOL/L (ref 3.4–5.3)
RBC # BLD AUTO: 3.9 10E6/UL (ref 3.8–5.2)
SODIUM SERPL-SCNC: 136 MMOL/L (ref 133–144)
WBC # BLD AUTO: 11.8 10E3/UL (ref 4–11)

## 2022-06-22 PROCEDURE — 85027 COMPLETE CBC AUTOMATED: CPT | Performed by: INTERNAL MEDICINE

## 2022-06-22 PROCEDURE — 36415 COLL VENOUS BLD VENIPUNCTURE: CPT | Performed by: INTERNAL MEDICINE

## 2022-06-22 PROCEDURE — 120N000001 HC R&B MED SURG/OB

## 2022-06-22 PROCEDURE — 97530 THERAPEUTIC ACTIVITIES: CPT | Mod: GO

## 2022-06-22 PROCEDURE — 97530 THERAPEUTIC ACTIVITIES: CPT | Mod: GP

## 2022-06-22 PROCEDURE — 99232 SBSQ HOSP IP/OBS MODERATE 35: CPT | Performed by: INTERNAL MEDICINE

## 2022-06-22 PROCEDURE — 250N000013 HC RX MED GY IP 250 OP 250 PS 637: Performed by: INTERNAL MEDICINE

## 2022-06-22 PROCEDURE — 82310 ASSAY OF CALCIUM: CPT | Performed by: INTERNAL MEDICINE

## 2022-06-22 PROCEDURE — 97535 SELF CARE MNGMENT TRAINING: CPT | Mod: GO

## 2022-06-22 RX ADMIN — METOPROLOL SUCCINATE 25 MG: 25 TABLET, EXTENDED RELEASE ORAL at 08:52

## 2022-06-22 RX ADMIN — DORZOLAMIDE HYDROCHLORIDE AND TIMOLOL MALEATE 1 DROP: 20; 5 SOLUTION/ DROPS OPHTHALMIC at 08:52

## 2022-06-22 RX ADMIN — PREDNISOLONE ACETATE 1 DROP: 10 SUSPENSION/ DROPS OPHTHALMIC at 19:35

## 2022-06-22 RX ADMIN — DORZOLAMIDE HYDROCHLORIDE AND TIMOLOL MALEATE 1 DROP: 20; 5 SOLUTION/ DROPS OPHTHALMIC at 19:31

## 2022-06-22 RX ADMIN — APIXABAN 5 MG: 5 TABLET, FILM COATED ORAL at 08:52

## 2022-06-22 RX ADMIN — AMOXICILLIN 875 MG: 875 TABLET, FILM COATED ORAL at 19:35

## 2022-06-22 RX ADMIN — AMOXICILLIN 875 MG: 875 TABLET, FILM COATED ORAL at 08:52

## 2022-06-22 RX ADMIN — APIXABAN 5 MG: 5 TABLET, FILM COATED ORAL at 19:31

## 2022-06-22 RX ADMIN — LEVOTHYROXINE SODIUM 50 MCG: 50 TABLET ORAL at 08:52

## 2022-06-22 RX ADMIN — LATANOPROST 1 DROP: 50 SOLUTION/ DROPS OPHTHALMIC at 22:05

## 2022-06-22 RX ADMIN — PREDNISOLONE ACETATE 1 DROP: 10 SUSPENSION/ DROPS OPHTHALMIC at 08:53

## 2022-06-22 ASSESSMENT — ACTIVITIES OF DAILY LIVING (ADL)
ADLS_ACUITY_SCORE: 54
ADLS_ACUITY_SCORE: 57
ADLS_ACUITY_SCORE: 54
ADLS_ACUITY_SCORE: 52
ADLS_ACUITY_SCORE: 52
ADLS_ACUITY_SCORE: 58
ADLS_ACUITY_SCORE: 54
ADLS_ACUITY_SCORE: 58

## 2022-06-22 NOTE — PROGRESS NOTES
[facility]    Medicine Progress Note - Hospitalist Service       Date of Admission:  6/14/2022    Assessment & Plan   Marley Stewart is a 89 year old female with hx of chronic a-fib s/p PPM on chronic AC with apixaban, hypertrophic cardiomyopathy, dCHF, HTN, RA, and prior stroke who was admitted on 6/14/2022 for UTI and possible pneumonia    Sepsis with acute metabolic encephalopathy due to E.coli UTI, Resolving  Generalized weakness due to physical deconditioning  * Presented with 1-2 day history of increasing confusion and generalized weakness  * Fever to 102.1 and leukocytosis (14.9k) noted this stay. UA on arrival was grossly dirty, and she was empirically initiated on IV ceftriaxone. Urine culture returned with pan-sensitive E.coli  - Completed 7-day course of ceftriaxone. Now on amoxicillin today to complete total 10 day course of antibiotics  - 6/15 blood culture x1 and 6/17 blood culture x1 NGTD    Possible community-acquired bacterial pneumonia  * Cough, shortness of breath, intermittent chest pain noted this stay. CXR (6/17) showed possible LLL infiltrate, and azithromycin was added to her antibiotic regimen.   - She completed a 7-day course of ceftriaxone during this hospitalization  - She completed 5-day course of azithromycin on 6/21    Chest pain, Resolved  Hypertrophic cardiomyopathy  Elevated troponin, suspect NSTEMI type II/demand ischemia  * Chest pain noted on 6/17. Troponin was minimally elevated to 113, then decreased. Suspect demand ischemia due to sepsis  * TTE (6/19) showed progression of diastolic dysfunction and progressive hypertrophic cardiomyopathy. Cardiology was initially consulted, but deferred - DIL Ashley involved in care and is not in favor of much invasive work up / intervention. She also relayed that even cardiac MRI would be more than patient needs/wants.   - Continues on PTA metoprolol XL 25 mg daily  - Avoid vasodilators (nifedipine, ACEI, nitrates etc)      Chronic diastolic CHF  - Appears compensated without signs of exacerbation  - Holding PTA Lasix for now, resume at discharge    Chronic atrial fibrillation s/p PPM  - Continues on PTA metoprolol and apixaban     Rheumatoid arthritis  Takes methotrexate on Wednesdays.    - Holding methotrexate, resume at discharge     Hypothyroidism  Chronic and stable on levothyroxine    History of CVA  - Continues on PTA apixaban    History of glaucoma  - Continues on PTA eye drops     Diet: Combination Diet Regular Diet Adult  Room Service  Diet    DVT Prophylaxis: DOAC  Armstrong Catheter: Not present  Code Status: No CPR- Do NOT Intubate         Disposition: Expected discharge to TCU pending insurance auth, bed availability    The patient's care was discussed with the Patient.    Jelly Meneses MD  Hospitalist Service  [facility]  Contact information available via [facility] Paging/Directory    ______________________________________________________________________    Interval History   No acute events overnight. Offers no complaints - denies cp/sob, dizziness/lightheadedness, nausea, or aches/pains. Awaiting TCU    Data reviewed today: I reviewed all medications, new labs and imaging results over the last 24 hours. I personally reviewed no images or EKG's today.    Physical Exam   Vital Signs: Temp: 98.5  F (36.9  C) Temp src: Oral BP: (!) 144/75 Pulse: 70   Resp: 16 SpO2: 93 % O2 Device: None (Room air)    Weight: 143 lbs 15.37 oz  Constitutional: Resting in bed, NAD  HEENT: Sclera white, MMM  Respiratory: Breathing non-labored. Diminished breath sounds over bilateral bases  Cardiovascular: Heart RRR, no m/r/g. No pedal edema  GI: +BS, abd soft/NT  Skin/Integument: No rash  Musculoskeletal: Normal muscle bulk and tone  Neuro: Alert, appropriate, oriented x3, CAN  Psych: Flat affect. Calm and cooperative    Data   Recent Labs   Lab 06/22/22  0710 06/18/22  0741 06/17/22  0355   WBC 11.8* 10.6 14.9*    HGB 12.5 13.1 13.4   MCV 95 95 95    263 240    136 133   POTASSIUM 3.7 3.9 3.6   CHLORIDE 106 105 104   CO2 26 25 22   BUN 13 16 15   CR 0.75 0.75 0.70   ANIONGAP 4 6 7   LONNIE 8.7 9.2 8.7   * 100* 157*         No results found for this or any previous visit (from the past 24 hour(s)).    Medications       amoxicillin  875 mg Oral Q12H [facility] (08/20)     apixaban ANTICOAGULANT  5 mg Oral BID     dorzolamide-timolol  1 drop Right Eye BID     latanoprost  1 drop Right Eye At Bedtime     levothyroxine  50 mcg Oral Daily     metoprolol succinate ER  25 mg Oral QAM     prednisoLONE acetate  1 drop Right Eye BID

## 2022-06-22 NOTE — PLAN OF CARE
Pt tired appearing, flat affect, little conversation.  On oral antibiotics at this point.  Up to chair with Tatiana steady or lift, still requires assist 2-3.  Up in chair for all meals, eating poorly.  Mepilex for protection on sacrum.  Inc of urine, purewick in place.  Plan for discharge to TCU once bed and insurance auth approved.

## 2022-06-22 NOTE — PROGRESS NOTES
[id number]    Vitals stable. Disorientated to time and situation. Up to w/ 2 assist gb/walker pivot or kojo steady. Incontinent of bowel and bladder, using purewick. Blanchable redness to coccyx, mepilex in place. Oral Amoxicillin. No IV. Tolerating regular diet. Discharge to TCU pending placement/prior auth - family wants [facility]

## 2022-06-23 LAB
BACTERIA BLD CULT: NO GROWTH
BACTERIA BLD CULT: NO GROWTH
SARS-COV-2 RNA RESP QL NAA+PROBE: NEGATIVE

## 2022-06-23 PROCEDURE — 250N000013 HC RX MED GY IP 250 OP 250 PS 637: Performed by: HOSPITALIST

## 2022-06-23 PROCEDURE — 250N000011 HC RX IP 250 OP 636: Performed by: INTERNAL MEDICINE

## 2022-06-23 PROCEDURE — 250N000013 HC RX MED GY IP 250 OP 250 PS 637: Performed by: INTERNAL MEDICINE

## 2022-06-23 PROCEDURE — 99233 SBSQ HOSP IP/OBS HIGH 50: CPT | Performed by: INTERNAL MEDICINE

## 2022-06-23 PROCEDURE — U0003 INFECTIOUS AGENT DETECTION BY NUCLEIC ACID (DNA OR RNA); SEVERE ACUTE RESPIRATORY SYNDROME CORONAVIRUS 2 (SARS-COV-2) (CORONAVIRUS DISEASE [COVID-19]), AMPLIFIED PROBE TECHNIQUE, MAKING USE OF HIGH THROUGHPUT TECHNOLOGIES AS DESCRIBED BY CMS-2020-01-R: HCPCS | Performed by: INTERNAL MEDICINE

## 2022-06-23 PROCEDURE — 120N000001 HC R&B MED SURG/OB

## 2022-06-23 RX ORDER — BENZOCAINE/MENTHOL 6 MG-10 MG
LOZENGE MUCOUS MEMBRANE 2 TIMES DAILY
DISCHARGE
Start: 2022-06-23 | End: 2022-06-24

## 2022-06-23 RX ORDER — DIPHENHYDRAMINE HYDROCHLORIDE, ZINC ACETATE 2; .1 G/100G; G/100G
CREAM TOPICAL 3 TIMES DAILY PRN
Status: DISCONTINUED | OUTPATIENT
Start: 2022-06-23 | End: 2022-06-24 | Stop reason: HOSPADM

## 2022-06-23 RX ORDER — BENZOCAINE/MENTHOL 6 MG-10 MG
LOZENGE MUCOUS MEMBRANE 2 TIMES DAILY
Status: DISCONTINUED | OUTPATIENT
Start: 2022-06-23 | End: 2022-06-24 | Stop reason: HOSPADM

## 2022-06-23 RX ADMIN — DORZOLAMIDE HYDROCHLORIDE AND TIMOLOL MALEATE 1 DROP: 20; 5 SOLUTION/ DROPS OPHTHALMIC at 19:53

## 2022-06-23 RX ADMIN — DIPHENHYDRAMINE HYDROCHLORIDE, ZINC ACETATE: 2; .1 CREAM TOPICAL at 03:20

## 2022-06-23 RX ADMIN — LEVOTHYROXINE SODIUM 50 MCG: 50 TABLET ORAL at 09:27

## 2022-06-23 RX ADMIN — HYDROCORTISONE: 1 CREAM TOPICAL at 21:09

## 2022-06-23 RX ADMIN — PREDNISOLONE ACETATE 1 DROP: 10 SUSPENSION/ DROPS OPHTHALMIC at 19:53

## 2022-06-23 RX ADMIN — LATANOPROST 1 DROP: 50 SOLUTION/ DROPS OPHTHALMIC at 22:23

## 2022-06-23 RX ADMIN — APIXABAN 5 MG: 5 TABLET, FILM COATED ORAL at 19:51

## 2022-06-23 RX ADMIN — AMOXICILLIN 875 MG: 875 TABLET, FILM COATED ORAL at 09:27

## 2022-06-23 RX ADMIN — METOPROLOL SUCCINATE 25 MG: 25 TABLET, EXTENDED RELEASE ORAL at 09:27

## 2022-06-23 RX ADMIN — APIXABAN 5 MG: 5 TABLET, FILM COATED ORAL at 09:27

## 2022-06-23 RX ADMIN — ONDANSETRON 4 MG: 4 TABLET, ORALLY DISINTEGRATING ORAL at 08:32

## 2022-06-23 RX ADMIN — HYDROCORTISONE: 1 CREAM TOPICAL at 12:06

## 2022-06-23 RX ADMIN — DIPHENHYDRAMINE HYDROCHLORIDE, ZINC ACETATE: 2; .1 CREAM TOPICAL at 08:34

## 2022-06-23 RX ADMIN — PREDNISOLONE ACETATE 1 DROP: 10 SUSPENSION/ DROPS OPHTHALMIC at 09:26

## 2022-06-23 RX ADMIN — DORZOLAMIDE HYDROCHLORIDE AND TIMOLOL MALEATE 1 DROP: 20; 5 SOLUTION/ DROPS OPHTHALMIC at 09:26

## 2022-06-23 ASSESSMENT — ACTIVITIES OF DAILY LIVING (ADL)
ADLS_ACUITY_SCORE: 54

## 2022-06-23 NOTE — PROGRESS NOTES
Care Management Follow Up    Length of Stay (days): 9    Expected Discharge Date: 06/23/2022     Concerns to be Addressed:       Patient plan of care discussed at interdisciplinary rounds: Yes    Anticipated Discharge Disposition: Transitional Care     Anticipated Discharge Services:    Anticipated Discharge DME: None    Patient/family educated on Medicare website which has current facility and service quality ratings: yes  Education Provided on the Discharge Plan:    Patient/Family in Agreement with the Plan: yes    Referrals Placed by CM/SW: [facility], Post Acute Facilities  Private pay costs discussed: Not applicable    Additional Information:  SW called [employer] and left a message requesting a call back. BJ resent referral to [facility] and informed supervisor of decline. BJ spoke with daughter in Law Ashley and informed of declines and ask that she look into additional TCU options. SW will continue to follow.    Addendum: BJ received decline from [facility]. SW received call from [facility] indicating they can accept patient tomorrow anytime in a shared room. BJ called [employer] and received auth [id number]. BJ called Daughter in law Ashley and was informed they do not want a double room. SW called [facility] and left a message inquiring about a shared room. SW will continue to follow.      Addendum: BJ informed  By [facility] that they will have a private room available tomorrow after 1pm for $45/day. BJ informed they do have two covid cases on LTC unit. BJ called Daughter in Law and was informed they are in agreement with private room costs and covid. BJ discussed transport options and was informed they would like stretcher if nurse feels this is needed. BJ spoke with nurse who indicated stretcher transport would be safest due to being unable to tolerate seated position. BJ called [employer] and scheduled stretcher transport for tomorrow at 1400. BJ called daughter in law and  informed of transport time. BJ called facility and informed of transport time. BJ left message for Alexus with [facility] informing of discharge plans. BJ will continue to follow          CARLOZ De La O    [facility]

## 2022-06-23 NOTE — PLAN OF CARE
Pt up in chair for meals today.  Up with lift.  Eating fair.  Purewick in place.  No IV access.  Itchy rash on back, hydrocortisone cream newly ordered.  Protective mepilex on coccyx.  Therapies following.  Awaiting TCU placement.

## 2022-06-23 NOTE — PROGRESS NOTES
[facility]    Medicine Progress Note - Hospitalist Service       Date of Admission:  6/14/2022    Assessment & Plan   Marley Stewart is a 89 year old female with hx of chronic a-fib s/p PPM on chronic AC with apixaban, hypertrophic cardiomyopathy, dCHF, HTN, RA, and prior stroke who was admitted on 6/14/2022 for UTI and possible pneumonia    Sepsis with acute metabolic encephalopathy due to E.coli UTI, Resolving  Generalized weakness due to physical deconditioning  * Presented with 1-2 day history of increasing confusion and generalized weakness  * Fever to 102.1 and leukocytosis (14.9k) noted this stay. UA on arrival was grossly dirty, and she was empirically initiated on IV ceftriaxone. Urine culture returned with pan-sensitive E.coli  - Completed 7-day course of ceftriaxone. Was transitioned to amoxicillin, but developed a mild rash. Completed total 9 day course of antibiotics  - 6/15 blood culture x1 and 6/17 blood culture x1 NGTD    Possible community-acquired bacterial pneumonia  * Cough, shortness of breath, intermittent chest pain noted this stay. CXR (6/17) showed possible LLL infiltrate, and azithromycin was added to her antibiotic regimen.   - She completed a 7-day course of ceftriaxone during this hospitalization  - She completed 5-day course of azithromycin on 6/21    Mild rash, possible drug eruption  * Reported pruritic rash on back on 6/22. She had been on amoxicillin for about 24 hours.   - WBC mildly increased today; possibly related to drug eruption. She has no new s/sx of infection  - Amoxicillin has been discontinued and added to her allergy list  - Hydrocortisone cream BID until rash resolves  - Repeat CBC in AM    Chest pain, Resolved  Hypertrophic cardiomyopathy  Elevated troponin, suspect NSTEMI type II/demand ischemia  * Chest pain noted on 6/17. Troponin was minimally elevated to 113, then decreased. Suspect demand ischemia due to sepsis  * TTE (6/19) showed  "progression of diastolic dysfunction and progressive hypertrophic cardiomyopathy. Cardiology was initially consulted, but deferred - DIL Ashley involved in care and is not in favor of much invasive work up / intervention. She also relayed that even cardiac MRI would be more than patient needs/wants.   - Continues on PTA metoprolol XL 25 mg daily  - Avoid vasodilators (nifedipine, ACEI, nitrates etc)     Chronic diastolic CHF  - Appears compensated without signs of exacerbation  - Holding PTA Lasix for now, resume at discharge    Chronic atrial fibrillation s/p PPM  - Continues on PTA metoprolol and apixaban     Rheumatoid arthritis  Takes methotrexate on Wednesdays.    - Holding methotrexate, resume at discharge     Hypothyroidism  Chronic and stable on levothyroxine    History of CVA  - Continues on PTA apixaban    History of glaucoma  - Continues on PTA eye drops     Diet: Combination Diet Regular Diet Adult  Room Service  Diet    DVT Prophylaxis: DOAC  Armstrong Catheter: Not present  Code Status: No CPR- Do NOT Intubate         Disposition: Expected discharge to TCU pending insurance auth, bed availability    The patient's care was discussed with the Patient.    Jelly Meneses MD  Hospitalist Service  [facility]  Contact information available via [facility] Paging/Directory    ______________________________________________________________________    Interval History   Developed itchy rash on her back yesterday evening. Reports back is \"sore\" today, but offers no complaints. Rash is resolving. Denies cp/sob, dizziness/lightheadedness, nausea, or aches/pains. Awaiting TCU.   - WBC mildly increased today, possibly due to drug eruption? Will monitor    Data reviewed today: I reviewed all medications, new labs and imaging results over the last 24 hours. I personally reviewed no images or EKG's today.    Physical Exam   Vital Signs: Temp: 98  F (36.7  C) Temp src: Oral BP: 133/86 Pulse: 76   Resp: " 18 SpO2: 95 % O2 Device: None (Room air)    Weight: 141 lbs 8.57 oz  Constitutional: Resting in bed, NAD  HEENT: Sclera white, MMM  Respiratory: Breathing non-labored. Diminished breath sounds over bilateral bases  Cardiovascular: Heart RRR, no m/r/g. No pedal edema  GI: +BS, abd soft/NT  Skin/Integument: Mild rash on back with intermittent punctate lesions  Musculoskeletal: Normal muscle bulk and tone  Neuro: Alert, appropriate, oriented x3, CAN  Psych: Flat affect. Calm and cooperative    Data   Recent Labs   Lab 06/22/22  0710 06/18/22  0741 06/17/22  0355   WBC 11.8* 10.6 14.9*   HGB 12.5 13.1 13.4   MCV 95 95 95    263 240    136 133   POTASSIUM 3.7 3.9 3.6   CHLORIDE 106 105 104   CO2 26 25 22   BUN 13 16 15   CR 0.75 0.75 0.70   ANIONGAP 4 6 7   LONNIE 8.7 9.2 8.7   * 100* 157*         No results found for this or any previous visit (from the past 24 hour(s)).    Medications       apixaban ANTICOAGULANT  5 mg Oral BID     dorzolamide-timolol  1 drop Right Eye BID     hydrocortisone   Topical BID     latanoprost  1 drop Right Eye At Bedtime     levothyroxine  50 mcg Oral Daily     metoprolol succinate ER  25 mg Oral QAM     prednisoLONE acetate  1 drop Right Eye BID

## 2022-06-23 NOTE — PLAN OF CARE
Goal Outcome Evaluation:  6/23/2022 [id number]  Summary: A-fib, cardiomyopathy, RA.  Admitted 6/14 with weakness and AMS.  Primary Diagnosis: UTI and Pneu.  Orientation: A/Ox3, disoriented to time, forgetful.  Aggression Stop Light: Green.  Mobility: Tatiana steady to chair with AX2-3 or lift.  Pain Management: Denies  Diet: Regular   Bowel/Bladder: Purewick, incontinent of B/B.  Abnormal Lab/Assessments: n/a  Drain/Device/Wound: No IV access.  Consults: PT/OT/SW  D/C Day/Goals/Place: TCU placement pending   Shift Note:  No BM this evening.  Mepilex to coccyx.  On oral antibiotics.  MD paged for back rash resulting in new order for Benadryl cream.  Discharge pending to TCU

## 2022-06-23 NOTE — PLAN OF CARE
Goal Outcome Evaluation:        1900-2330:  A&Ox3-4; forgetful.  VSS, RA.  Denies pain.  Regular diet; poor appetite tonight.  Purewick in-place, no BM this evening.  Mepilex to coccyx.  No IV access; on oral antibiotics now.  Discharge to TCU once bed is found and insurance authorizes.

## 2022-06-23 NOTE — PROGRESS NOTES
MD Notification    Notified Person: MD    Notified Person Name:   Idalia    Notification Date/Time: June 23, 2022 @ 0245    Notification Interaction: [employer]    Purpose of Notification:  Pt. complaining of generalized itchy rash on back.  Can we get some hydrocortisone cream or something similar?    Orders Received:  Order for Benadryl cream.    Comments:

## 2022-06-24 VITALS
OXYGEN SATURATION: 92 % | HEIGHT: 65 IN | HEART RATE: 70 BPM | WEIGHT: 141.54 LBS | DIASTOLIC BLOOD PRESSURE: 75 MMHG | RESPIRATION RATE: 16 BRPM | BODY MASS INDEX: 23.58 KG/M2 | SYSTOLIC BLOOD PRESSURE: 141 MMHG | TEMPERATURE: 98.2 F

## 2022-06-24 LAB
ANION GAP SERPL CALCULATED.3IONS-SCNC: 5 MMOL/L (ref 3–14)
BUN SERPL-MCNC: 17 MG/DL (ref 7–30)
CALCIUM SERPL-MCNC: 9 MG/DL (ref 8.5–10.1)
CHLORIDE BLD-SCNC: 107 MMOL/L (ref 94–109)
CO2 SERPL-SCNC: 26 MMOL/L (ref 20–32)
CREAT SERPL-MCNC: 0.83 MG/DL (ref 0.52–1.04)
ERYTHROCYTE [DISTWIDTH] IN BLOOD BY AUTOMATED COUNT: 13.2 % (ref 10–15)
GFR SERPL CREATININE-BSD FRML MDRD: 67 ML/MIN/1.73M2
GLUCOSE BLD-MCNC: 94 MG/DL (ref 70–99)
HCT VFR BLD AUTO: 38.1 % (ref 35–47)
HGB BLD-MCNC: 12.9 G/DL (ref 11.7–15.7)
MCH RBC QN AUTO: 32.4 PG (ref 26.5–33)
MCHC RBC AUTO-ENTMCNC: 33.9 G/DL (ref 31.5–36.5)
MCV RBC AUTO: 96 FL (ref 78–100)
PLATELET # BLD AUTO: 424 10E3/UL (ref 150–450)
POTASSIUM BLD-SCNC: 4 MMOL/L (ref 3.4–5.3)
RBC # BLD AUTO: 3.98 10E6/UL (ref 3.8–5.2)
SODIUM SERPL-SCNC: 138 MMOL/L (ref 133–144)
WBC # BLD AUTO: 9.8 10E3/UL (ref 4–11)

## 2022-06-24 PROCEDURE — 99239 HOSP IP/OBS DSCHRG MGMT >30: CPT | Performed by: INTERNAL MEDICINE

## 2022-06-24 PROCEDURE — 250N000013 HC RX MED GY IP 250 OP 250 PS 637: Performed by: INTERNAL MEDICINE

## 2022-06-24 PROCEDURE — 80048 BASIC METABOLIC PNL TOTAL CA: CPT | Performed by: INTERNAL MEDICINE

## 2022-06-24 PROCEDURE — 85027 COMPLETE CBC AUTOMATED: CPT | Performed by: INTERNAL MEDICINE

## 2022-06-24 PROCEDURE — 36415 COLL VENOUS BLD VENIPUNCTURE: CPT | Performed by: INTERNAL MEDICINE

## 2022-06-24 RX ORDER — BENZOCAINE/MENTHOL 6 MG-10 MG
LOZENGE MUCOUS MEMBRANE 2 TIMES DAILY
Status: ON HOLD | DISCHARGE
Start: 2022-06-24 | End: 2022-07-02

## 2022-06-24 RX ADMIN — DORZOLAMIDE HYDROCHLORIDE AND TIMOLOL MALEATE 1 DROP: 20; 5 SOLUTION/ DROPS OPHTHALMIC at 08:35

## 2022-06-24 RX ADMIN — PREDNISOLONE ACETATE 1 DROP: 10 SUSPENSION/ DROPS OPHTHALMIC at 08:36

## 2022-06-24 RX ADMIN — HYDROCORTISONE: 1 CREAM TOPICAL at 08:35

## 2022-06-24 RX ADMIN — APIXABAN 5 MG: 5 TABLET, FILM COATED ORAL at 08:35

## 2022-06-24 RX ADMIN — LEVOTHYROXINE SODIUM 50 MCG: 50 TABLET ORAL at 08:35

## 2022-06-24 RX ADMIN — METOPROLOL SUCCINATE 25 MG: 25 TABLET, EXTENDED RELEASE ORAL at 08:35

## 2022-06-24 ASSESSMENT — ACTIVITIES OF DAILY LIVING (ADL)
ADLS_ACUITY_SCORE: 50
ADLS_ACUITY_SCORE: 54
ADLS_ACUITY_SCORE: 50
ADLS_ACUITY_SCORE: 54
ADLS_ACUITY_SCORE: 50

## 2022-06-24 NOTE — PROGRESS NOTES
Care Management Discharge Note    Discharge Date: 06/24/2022       Discharge Disposition: Transitional Care    Discharge Services:      Discharge DME: None    Discharge Transportation:      Private pay costs discussed: private room/amenity fees and transportation costs    PAS Confirmation Code: [id number]  Patient/family educated on Medicare website which has current facility and service quality ratings: yes    Education Provided on the Discharge Plan:  Yes  Persons Notified of Discharge Plans: Daughter in law  Patient/Family in Agreement with the Plan: yes    Handoff Referral Completed: No    Additional Information:  SW received discharge orders and noted no paper scripts. SW faxed to facility. SW completed PAS/PCS and added to chart. SW added Prior auth to chart. Daughter I law in agreement with possible transportation costs, and daily private room fee at TCU. Patient/family in agreement with discharge to [facility] today via -for; to (do) Stretcher at 1400.        CARLOZ De La O    [facility]

## 2022-06-24 NOTE — PLAN OF CARE
Occupational Therapy Discharge Summary    Reason for therapy discharge:    Discharged to transitional care facility.    Progress towards therapy goal(s). See goals on Care Plan in [facility] electronic health record for goal details.  Goals not met.  Barriers to achieving goals:   discharge from facility.    Therapy recommendation(s):    Continued therapy is recommended.  Rationale/Recommendations:  Skilled OT in TCU setting to address safety and independence in I/ADLs.

## 2022-06-24 NOTE — DISCHARGE SUMMARY
[facility]  Hospitalist Discharge Summary      Date of Admission:  6/14/2022  Date of Discharge:  6/24/2022  Discharging Provider: Merry Child MD  Discharge Service: Hospitalist Service    Discharge Diagnoses   Sepsis with acute metabolic encephalopathy due to E.coli UTI  Generalized weakness due to physical deconditioning  Possible community-acquired bacterial pneumonia  Mild rash, possible drug eruption  Chest pain, Resolved  Hypertrophic cardiomyopathy  Elevated troponin, suspect NSTEMI type II/demand ischemia  Chronic diastolic HF  Chronic a.fib s/p PPM  RA  Hypothyroidism  Hx of CVA  Hx of Glaucoma    Follow-ups Needed After Discharge   Follow-up Appointments     Follow Up and recommended labs and tests      Follow up with TCU physician.  The following labs/tests are recommended:   CBC and BMP within 1 week.             Unresulted Labs Ordered in the Past 30 Days of this Admission     No orders found from 5/15/2022 to 6/15/2022.          Discharge Disposition   Discharged to short-term care facility  Condition at discharge: Stable  Patient ready to discharge to a skilled nursing facility as soon as possible in order to create capacity for patients related to the COVID-19 pandemic.    Hospital Course    Marley Stewart is a 89 year old female with hx of chronic a-fib s/p PPM on chronic AC with apixaban, hypertrophic cardiomyopathy, dCHF, HTN, RA, and prior stroke who was admitted on 6/14/2022 for UTI and possible pneumonia     Sepsis with acute metabolic encephalopathy due to E.coli UTI  Generalized weakness due to physical deconditioning  * Presented with 1-2 day history of increasing confusion and generalized weakness  * Fever to 102.1 and leukocytosis (14.9k) noted this stay. UA on arrival was grossly dirty, and she was empirically initiated on IV ceftriaxone. Urine culture returned with pan-sensitive E.coli  - Completed 7-day course of ceftriaxone. Was transitioned to  amoxicillin, but developed a mild rash. Completed total 9 day course of antibiotics  - 6/15 blood culture x1 and 6/17 blood culture x1 NGTD     Possible community-acquired bacterial pneumonia  * Cough, shortness of breath, intermittent chest pain noted this stay. CXR (6/17) showed possible LLL infiltrate, and azithromycin was added to her antibiotic regimen.   - She completed a 7-day course of ceftriaxone during this hospitalization  - She completed 5-day course of azithromycin on 6/21     Mild rash, possible drug eruption  * Reported pruritic rash on back on 6/22. She had been on amoxicillin for about 24 hours.   - WBC mildly increased on 6/23,possibly related to drug eruption. She has no new s/sx of infection. Wbc normalized on recheck on 6/24  - Amoxicillin has been discontinued and added to her allergy list  - Hydrocortisone cream BID until x 5 days or until rash resolves     Chest pain, Resolved  Hypertrophic cardiomyopathy  Elevated troponin, suspect NSTEMI type II/demand ischemia  * Chest pain noted on 6/17. Troponin was minimally elevated to 113, then decreased. Suspect demand ischemia due to sepsis  * TTE (6/19) showed progression of diastolic dysfunction and progressive hypertrophic cardiomyopathy. Cardiology was initially consulted, but deferred - DIL Ashley involved in care and is not in favor of much invasive work up / intervention. She also relayed that even cardiac MRI would be more than patient needs/wants.   - Continues on PTA metoprolol XL 25 mg daily  - Avoid vasodilators (nifedipine, ACEI, nitrates etc)      Chronic diastolic CHF  - Appears compensated without signs of exacerbation  - resume Lasix at discharge     Chronic atrial fibrillation s/p PPM  - Continues on PTA metoprolol and apixaban     Rheumatoid arthritis  Takes methotrexate on Wednesdays.    - Holding methotrexate, resume at discharge     Hypothyroidism  Chronic and stable on levothyroxine     History of CVA  - Continues on PTA  apixaban     History of glaucoma  - Continues on PTA eye drops       Consultations This Hospital Stay   CARE MANAGEMENT / SOCIAL WORK IP CONSULT  PHYSICAL THERAPY ADULT IP CONSULT  OCCUPATIONAL THERAPY ADULT IP CONSULT  CARDIOLOGY IP CONSULT  PHYSICAL THERAPY ADULT IP CONSULT  OCCUPATIONAL THERAPY ADULT IP CONSULT    Code Status   No CPR- Do NOT Intubate    Time Spent on this Encounter   IMerry MD, personally saw the patient today and spent greater than 30 minutes discharging this patient.       Merry Child MD  [place] [id number] [facility]  [address] [place], [place] [address]SH[place], [address]  [facility] [address]  Phone: [phone]  ______________________________________________________________________    Physical Exam   Vital Signs: Temp: 98.2  F (36.8  C) Temp src: Oral BP: (!) 141/75 Pulse: 70   Resp: 16 SpO2: 92 % O2 Device: None (Room air)    Weight: 141 lbs 8.57 oz  General Appearance: Alert, awake and no apparent distress  Respiratory: clear to auscultation bilaterally, no wheezing  Cardiovascular: regular rate and rhythm  GI: soft and non-tender  Skin: warm and dry, mild rash on back         Primary Care Physician   Chandra Ortiz    Discharge Orders      General info for SNF    Length of Stay Estimate: Short Term Care: Estimated # of Days <30  Condition at Discharge: Improving  Level of care:skilled   Rehabilitation Potential: Excellent  Admission H&P remains valid and up-to-date: Yes  Recent Chemotherapy: N/A  Use Nursing Home Standing Orders: Yes     Mantoux instructions    Give two-step Mantoux (PPD) Per Facility Policy Yes     Follow Up and recommended labs and tests    Follow up with TCU physician.  The following labs/tests are recommended: CBC and BMP within 1 week.     Reason for your hospital stay    Urinary tract infection and possible pneumonia which are being treated with antibiotics     Activity - Up with nursing assistance     Physical Therapy Adult Consult    Evaluate and  treat as clinically indicated.    Reason:  Generalized weakness, deconditioning     Occupational Therapy Adult Consult    Evaluate and treat as clinically indicated.    Reason:  Generalized weakness, deconditioning     Fall precautions     Diet    Follow this diet upon discharge: Regular diet       Significant Results and Procedures   Most Recent 3 CBC's:  Recent Labs   Lab Test 22  0634 22  0710 22  0741   WBC 9.8 11.8* 10.6   HGB 12.9 12.5 13.1   MCV 96 95 95    381 263     Most Recent 3 BMP's:  Recent Labs   Lab Test 22  0634 22  0710 22  0741    136 136   POTASSIUM 4.0 3.7 3.9   CHLORIDE 107 106 105   CO2 26 26 25   BUN 17 13 16   CR 0.83 0.75 0.75   ANIONGAP 5 4 6   LONNIE 9.0 8.7 9.2   GLC 94 100* 100*   ,   Results for orders placed or performed during the hospital encounter of 22   XR Chest Port 1 View    Narrative    XR CHEST PORT 1 VIEW  2022 11:30 AM       INDICATION: chest pain  COMPARISON: 2019       Impression    IMPRESSION: There is a new 2-lead cardiac pacemaker with leads in the  right atrium and ventricle. No pneumothorax. Small left pleural  effusion with underlying infiltrate or atelectasis in the left lower  lobe.    TULIO SANCHEZ MD         SYSTEM ID:  [id number]   Echocardiogram Complete     Value    LVEF  55-60%    Narrative    [id number]  [id number]  [id number]  [id number]^JASMYN^BRITTANY^ALIS     [facility]  Echocardiography Laboratory  [address] [place] [address]     Name: DWAINE CASTRO  MRN: [id number]  : 1932  Study Date: 2022 11:19 AM  Age: 89 yrs  Gender: Female  Patient Location: [facility]  Reason For Study: Chest Pain  Ordering Physician: BRITTANY VINES  Referring Physician: Chandra Ortiz  Performed By: Tashia Fernando     BSA: 1.7 m2  Height: 65 in  Weight: 140 lb  HR: 71  BP: 143/73  mmHg  ______________________________________________________________________________  Procedure  Complete Portable Echo Adult. Optison (NDC #[id number]) given intravenously.  ______________________________________________________________________________  Interpretation Summary     Left ventricular systolic function is normal.  The visual ejection fraction is 55-60%.  Significant assymmetric thickening of the left ventricular walls, prominent  proximal septal thickening as well as apical hypertrophy. Findings suggest  variant of hypertrophic obstructive cardiomyopathy.  Grade III or advanced diastolic dysfunction.  The right ventricle is normal in structure, function and size.  Moderate (46-55mmHg) pulmonary hypertension is present.  Aortic sclerosis without stenosis.  IVC diameter and respiratory changes fall into an intermediate range  suggesting an RA pressure of 8 mmHg.  There is no pericardial effusion.  Moderate left pleural effusion     Recommend cardiac MRI if clinically indicated. Findings compared to study  dated 11/3/2016, there appears to be more advanced diastolic dysfunction as  well as progressive hypertrophic cardiomyopathy.  ______________________________________________________________________________  Left Ventricle  The left ventricle is normal in size. Significant assymmetric thickening of  the left ventricular walls, prominent proximal septal thickening as well as  apical hypertrophy. Findings suggest variant of hypertrophic obstructive  cardiomyopathy. The visual ejection fraction is 55-60%. Left ventricular  systolic function is normal. Grade III or advanced diastolic dysfunction. Mid  anteroseptum appears hypokinetic however could be appearance in setting of  asymmetric hypertrophy.     Right Ventricle  The right ventricle is normal in structure, function and size.     Atria  The left atrium is mildly dilated. Right atrial size is normal.     Mitral Valve  There is mild mitral annular  calcification. There is mild to moderate (1-2+)  mitral regurgitation.     Tricuspid Valve  There is moderate (2+) tricuspid regurgitation. The right ventricular systolic  pressure is approximated at 40mmHg plus the right atrial pressure. Moderate  (46-55mmHg) pulmonary hypertension is present.     Aortic Valve  There is moderate trileaflet aortic sclerosis. There is mild (1+) aortic  regurgitation.     Pulmonic Valve  The pulmonic valve is normal in structure and function.     Vessels  The aortic root is normal size. Normal size ascending aorta. IVC diameter and  respiratory changes fall into an intermediate range suggesting an RA pressure  of 8 mmHg.     Pericardium  There is no pericardial effusion. Moderate left pleural effusion.     ______________________________________________________________________________  MMode/2D Measurements & Calculations  IVSd: 1.0 cm  LVIDd: 4.4 cm  LVIDs: 3.2 cm  LVPWd: 0.99 cm  FS: 27.3 %  LV mass(C)d: 152.4 grams  LV mass(C)dI: 89.6 grams/m2     Ao root diam: 2.7 cm  LA dimension: 4.0 cm  asc Aorta Diam: 3.4 cm  LA/Ao: 1.5  LVOT diam: 2.1 cm  LVOT area: 3.3 cm2  LA Volume (BP): 70.0 ml  LA Volume Index (BP): 41.2 ml/m2  RWT: 0.45     Doppler Measurements & Calculations  MV E max st[place]ford: 127.0 cm/sec  MV A max st[place]: 24.3 cm/sec  MV E/A: 5.2  MV max P.4 mmHg  MV mean P.1 mmHg  MV V2 VTI: 29.6 cm  MVA(VTI): 2.2 cm2     MV dec slope: 509.7 cm/sec2  Ao V2 max: 183.5 cm/sec  Ao max P.0 mmHg  Ao V2 mean: 130.0 cm/sec  Ao mean P.5 mmHg  Ao V2 VTI: 33.0 cm  ELIZABETH(I,D): 2.0 cm2  ELIZABETH(V,D): 2.0 cm2  AI P1/2t: 474.7 msec  LV V1 max P.1 mmHg  LV V1 max: 112.6 cm/sec  LV V1 VTI: 20.0 cm  SV(LVOT): 66.4 ml  SI(LVOT): 39.1 ml/m2  PA acc time: 0.11 sec  TR max stanford: 317.6 cm/sec  TR max P.4 mmHg  AV Stanford Ratio (DI): 0.61  ELIZABETH Index (cm2/m2): 1.2  E/E' av.2  Lateral E/e': 20.3  Medial E/e': 24.1      ______________________________________________________________________________  Report approved by: Rashid Victoria 06/19/2022 02:31 PM               Discharge Medications   Current Discharge Medication List      START taking these medications    Details   hydrocortisone (CORTAID) 1 % external cream Apply topically 2 times daily for 5 days Or until rash resolves    Associated Diagnoses: Rash and nonspecific skin eruption         CONTINUE these medications which have CHANGED    Details   acetaminophen (TYLENOL) 500 MG tablet Take 2 tablets (1,000 mg) by mouth every 6 hours as needed    Associated Diagnoses: Closed fracture of fourth lumbar vertebra, unspecified fracture morphology, initial encounter (H)         CONTINUE these medications which have NOT CHANGED    Details   amoxicillin (AMOXIL) 500 MG capsule Take 2,000 mg by mouth See Admin Instructions Prior to dentist for prophylaxis      apixaban ANTICOAGULANT (ELIQUIS) 5 MG tablet Take 1 tablet (5 mg) by mouth 2 times daily  Qty: 60 tablet, Refills: 0    Associated Diagnoses: Atrial fibrillation (H)      calcium carb 1250 mg, 500 mg [place],/vitamin D 200 units (OSCAL WITH D) 500-200 MG-UNIT per tablet Take 1 tablet by mouth 2 times daily (with meals)      dorzolamide-timolol (COSOPT) 2-0.5 % ophthalmic solution Place 1 drop into the right eye 2 times daily      FOLIC ACID PO Take 1 mg by mouth daily      furosemide (LASIX) 20 MG tablet Take 1 tablet (20 mg) by mouth every morning  Qty: 30 tablet, Refills: 0    Associated Diagnoses: Acute on chronic diastolic congestive heart failure (H)      latanoprost (XALATAN) 0.005 % ophthalmic solution Place 1 drop into the right eye At Bedtime      levothyroxine (SYNTHROID/LEVOTHROID) 50 MCG tablet Take 50 mcg by mouth daily      metoprolol succinate ER (TOPROL-XL) 25 MG 24 hr tablet Take 25 mg by mouth every morning      prednisoLONE acetate (PRED FORTE) 1 % ophthalmic suspension Place 1 drop into the right eye 2  times daily      methotrexate 2.5 MG tablet Take 15 mg by mouth every 7 days Wednesday  (6 x 2.5 mg)           Allergies   Allergies   Allergen Reactions     Amitriptyline      Clonazepam Other (See Comments)     Somnolence at 0.5 mg dose     Amoxicillin Rash     Latex Rash

## 2022-06-24 NOTE — PLAN OF CARE
Physical Therapy Discharge Summary    Reason for therapy discharge:    Discharged to transitional care facility.    Progress towards therapy goal(s). See goals on Care Plan in [facility] electronic health record for goal details.  Goals partially met.  Barriers to achieving goals:   discharge from facility.    Therapy recommendation(s):    Continued therapy is recommended.  Rationale/Recommendations:  To further increase independence with mobility.

## 2022-06-24 NOTE — PROGRESS NOTES
Discharge Note    Patient discharged to TCU via EMS/BLS accompanied by no family/friend .  IV: Discontinued  Prescriptions N/A.   Belongings reviewed and sent with patient.   Home medications returned to patient: Yes  Equipment sent with: patient, N/A.   patient verbalizes understanding of discharge instructions. AVS given to patient and EMS personnel/patient.

## 2022-06-24 NOTE — PLAN OF CARE
Goal Outcome Evaluation:      1900-0730:     A/O x2, disoriented to time and situation. VSS on RA ex mild HTN. Denies pain/ SOB/N/V. Scheduled hydrocortisone for rash on the back. Up with A2, lift with ceiling lift. Incontinent, no BM, less voiding due to less PO intake.  Redness to coccyx, replaced new mepilex, DCI. T/R Q2h, tolerating well. On regular diet, poor appetite.  Likely to discharge today (6/24/22) to [facility] with stretcher transport around 2 pm.

## 2022-06-25 ENCOUNTER — PATIENT OUTREACH (OUTPATIENT)
Dept: CARE COORDINATION | Facility: CLINIC | Age: 87
End: 2022-06-25

## 2022-06-25 DIAGNOSIS — Z71.89 OTHER SPECIFIED COUNSELING: ICD-10-CM

## 2022-06-25 NOTE — PROGRESS NOTES
[facility]    Background: Care Coordination referral placed from [facility] discharge report for reason of patient meeting criteria for a TCM outreach call by [facility] team.    Assessment: Upon chart review, [facility] Team member will cancel/close the referral for TCM outreach due to reason below:    Patient is not established within [facility]. Upon chart review, CCR[facility] Team member noted patient discharged to TCU    Plan: Care Coordination referral for TCM outreach canceled.      Dina Lyons  Community Health Worker  [facility], [facility]  Ph: [phone]

## 2022-06-27 NOTE — PROGRESS NOTES
SW:  D: call from Pre admission Screening calling to confirm spelling of last name as it appears wrong in the state data base. Writer confirmed spelling.     Katiana Bruno, MESFIN, LGSW   Social Work   [facility]

## 2022-06-28 VITALS
SYSTOLIC BLOOD PRESSURE: 136 MMHG | TEMPERATURE: 96.6 F | RESPIRATION RATE: 19 BRPM | HEART RATE: 71 BPM | DIASTOLIC BLOOD PRESSURE: 81 MMHG | OXYGEN SATURATION: 92 % | HEIGHT: 65 IN | BODY MASS INDEX: 23.59 KG/M2 | WEIGHT: 141.6 LBS

## 2022-06-29 ENCOUNTER — TELEPHONE (OUTPATIENT)
Dept: GERIATRICS | Facility: CLINIC | Age: 87
End: 2022-06-29

## 2022-06-29 ENCOUNTER — HOSPITAL ENCOUNTER (INPATIENT)
Facility: CLINIC | Age: 87
LOS: 3 days | Discharge: NURSING, REHAB OR LONG-TERM CARE | DRG: 206 | End: 2022-07-02
Attending: EMERGENCY MEDICINE | Admitting: HOSPITALIST
Payer: COMMERCIAL

## 2022-06-29 ENCOUNTER — TRANSITIONAL CARE UNIT VISIT (OUTPATIENT)
Dept: GERIATRICS | Facility: CLINIC | Age: 87
End: 2022-06-29
Payer: COMMERCIAL

## 2022-06-29 ENCOUNTER — APPOINTMENT (OUTPATIENT)
Dept: GENERAL RADIOLOGY | Facility: CLINIC | Age: 87
DRG: 206 | End: 2022-06-29
Attending: HOSPITALIST
Payer: COMMERCIAL

## 2022-06-29 DIAGNOSIS — R21 RASH: Primary | ICD-10-CM

## 2022-06-29 DIAGNOSIS — E87.1 HYPONATREMIA: ICD-10-CM

## 2022-06-29 DIAGNOSIS — I42.2 HYPERTROPHIC CARDIOMYOPATHY (H): ICD-10-CM

## 2022-06-29 DIAGNOSIS — I50.32 CHRONIC DIASTOLIC CONGESTIVE HEART FAILURE (H): ICD-10-CM

## 2022-06-29 DIAGNOSIS — I10 PRIMARY HYPERTENSION: ICD-10-CM

## 2022-06-29 DIAGNOSIS — I48.20 CHRONIC A-FIB (H): ICD-10-CM

## 2022-06-29 DIAGNOSIS — E87.1 HYPONATREMIA: Primary | ICD-10-CM

## 2022-06-29 DIAGNOSIS — I27.20 PULMONARY HYPERTENSION (H): ICD-10-CM

## 2022-06-29 DIAGNOSIS — M06.9 RHEUMATOID ARTHRITIS, INVOLVING UNSPECIFIED SITE, UNSPECIFIED WHETHER RHEUMATOID FACTOR PRESENT (H): ICD-10-CM

## 2022-06-29 DIAGNOSIS — N39.0 URINARY TRACT INFECTION WITHOUT HEMATURIA, SITE UNSPECIFIED: ICD-10-CM

## 2022-06-29 DIAGNOSIS — J18.9 PNEUMONIA OF LEFT LOWER LOBE DUE TO INFECTIOUS ORGANISM: ICD-10-CM

## 2022-06-29 DIAGNOSIS — R41.89 COGNITIVE IMPAIRMENT: ICD-10-CM

## 2022-06-29 LAB
ALBUMIN UR-MCNC: NEGATIVE MG/DL
ALBUMIN UR-MCNC: NEGATIVE MG/DL
ANION GAP SERPL CALCULATED.3IONS-SCNC: 6 MMOL/L (ref 3–14)
APPEARANCE UR: CLEAR
APPEARANCE UR: CLEAR
BACTERIA #/AREA URNS HPF: ABNORMAL /HPF
BILIRUB UR QL STRIP: NEGATIVE
BILIRUB UR QL STRIP: NEGATIVE
BUN SERPL-MCNC: 16 MG/DL (ref 7–30)
CALCIUM SERPL-MCNC: 9.8 MG/DL (ref 8.5–10.1)
CHLORIDE BLD-SCNC: 101 MMOL/L (ref 94–109)
CO2 SERPL-SCNC: 26 MMOL/L (ref 20–32)
COLOR UR AUTO: ABNORMAL
COLOR UR AUTO: ABNORMAL
CREAT SERPL-MCNC: 0.82 MG/DL (ref 0.52–1.04)
ERYTHROCYTE [DISTWIDTH] IN BLOOD BY AUTOMATED COUNT: 13.4 % (ref 10–15)
GFR SERPL CREATININE-BSD FRML MDRD: 68 ML/MIN/1.73M2
GLUCOSE BLD-MCNC: 83 MG/DL (ref 70–99)
GLUCOSE UR STRIP-MCNC: NEGATIVE MG/DL
GLUCOSE UR STRIP-MCNC: NEGATIVE MG/DL
HCT VFR BLD AUTO: 40.4 % (ref 35–47)
HGB BLD-MCNC: 13.6 G/DL (ref 11.7–15.7)
HGB UR QL STRIP: ABNORMAL
HGB UR QL STRIP: NEGATIVE
KETONES UR STRIP-MCNC: NEGATIVE MG/DL
KETONES UR STRIP-MCNC: NEGATIVE MG/DL
LEUKOCYTE ESTERASE UR QL STRIP: ABNORMAL
LEUKOCYTE ESTERASE UR QL STRIP: ABNORMAL
MCH RBC QN AUTO: 31.1 PG (ref 26.5–33)
MCHC RBC AUTO-ENTMCNC: 33.7 G/DL (ref 31.5–36.5)
MCV RBC AUTO: 92 FL (ref 78–100)
MUCOUS THREADS #/AREA URNS LPF: PRESENT /LPF
NITRATE UR QL: NEGATIVE
NITRATE UR QL: NEGATIVE
OSMOLALITY SERPL: 283 MMOL/KG (ref 280–301)
OSMOLALITY UR: 376 MMOL/KG (ref 100–1200)
PH UR STRIP: 6.5 [PH] (ref 5–7)
PH UR STRIP: 7.5 [PH] (ref 5–7)
PLATELET # BLD AUTO: 507 10E3/UL (ref 150–450)
POTASSIUM BLD-SCNC: 4 MMOL/L (ref 3.4–5.3)
PROCALCITONIN SERPL-MCNC: <0.05 NG/ML
RBC # BLD AUTO: 4.37 10E6/UL (ref 3.8–5.2)
RBC URINE: 3 /HPF
RBC URINE: 6 /HPF
SARS-COV-2 RNA RESP QL NAA+PROBE: NEGATIVE
SODIUM SERPL-SCNC: 133 MMOL/L (ref 133–144)
SODIUM SERPL-SCNC: 136 MMOL/L (ref 133–144)
SP GR UR STRIP: 1.01 (ref 1–1.03)
SP GR UR STRIP: 1.01 (ref 1–1.03)
SQUAMOUS EPITHELIAL: 1 /HPF
SQUAMOUS EPITHELIAL: <1 /HPF
UROBILINOGEN UR STRIP-MCNC: NORMAL MG/DL
UROBILINOGEN UR STRIP-MCNC: NORMAL MG/DL
WBC # BLD AUTO: 14.4 10E3/UL (ref 4–11)
WBC URINE: 7 /HPF
WBC URINE: 8 /HPF

## 2022-06-29 PROCEDURE — 250N000009 HC RX 250: Performed by: HOSPITALIST

## 2022-06-29 PROCEDURE — C9803 HOPD COVID-19 SPEC COLLECT: HCPCS

## 2022-06-29 PROCEDURE — 83930 ASSAY OF BLOOD OSMOLALITY: CPT | Performed by: EMERGENCY MEDICINE

## 2022-06-29 PROCEDURE — 81001 URINALYSIS AUTO W/SCOPE: CPT | Performed by: HOSPITALIST

## 2022-06-29 PROCEDURE — 84145 PROCALCITONIN (PCT): CPT | Performed by: HOSPITALIST

## 2022-06-29 PROCEDURE — 258N000003 HC RX IP 258 OP 636: Performed by: EMERGENCY MEDICINE

## 2022-06-29 PROCEDURE — 99223 1ST HOSP IP/OBS HIGH 75: CPT | Mod: AI | Performed by: HOSPITALIST

## 2022-06-29 PROCEDURE — 250N000013 HC RX MED GY IP 250 OP 250 PS 637: Performed by: PHYSICIAN ASSISTANT

## 2022-06-29 PROCEDURE — 99285 EMERGENCY DEPT VISIT HI MDM: CPT | Mod: 25

## 2022-06-29 PROCEDURE — 250N000011 HC RX IP 250 OP 636: Performed by: HOSPITALIST

## 2022-06-29 PROCEDURE — 82310 ASSAY OF CALCIUM: CPT | Performed by: EMERGENCY MEDICINE

## 2022-06-29 PROCEDURE — 250N000013 HC RX MED GY IP 250 OP 250 PS 637: Performed by: HOSPITALIST

## 2022-06-29 PROCEDURE — U0005 INFEC AGEN DETEC AMPLI PROBE: HCPCS | Performed by: EMERGENCY MEDICINE

## 2022-06-29 PROCEDURE — 87040 BLOOD CULTURE FOR BACTERIA: CPT | Performed by: HOSPITALIST

## 2022-06-29 PROCEDURE — 120N000001 HC R&B MED SURG/OB

## 2022-06-29 PROCEDURE — 96361 HYDRATE IV INFUSION ADD-ON: CPT

## 2022-06-29 PROCEDURE — 96360 HYDRATION IV INFUSION INIT: CPT

## 2022-06-29 PROCEDURE — 85027 COMPLETE CBC AUTOMATED: CPT | Performed by: EMERGENCY MEDICINE

## 2022-06-29 PROCEDURE — 83935 ASSAY OF URINE OSMOLALITY: CPT | Performed by: EMERGENCY MEDICINE

## 2022-06-29 PROCEDURE — 36415 COLL VENOUS BLD VENIPUNCTURE: CPT | Performed by: HOSPITALIST

## 2022-06-29 PROCEDURE — 99310 SBSQ NF CARE HIGH MDM 45: CPT | Performed by: NURSE PRACTITIONER

## 2022-06-29 PROCEDURE — 71045 X-RAY EXAM CHEST 1 VIEW: CPT

## 2022-06-29 PROCEDURE — 84295 ASSAY OF SERUM SODIUM: CPT | Performed by: HOSPITALIST

## 2022-06-29 PROCEDURE — 36415 COLL VENOUS BLD VENIPUNCTURE: CPT | Performed by: EMERGENCY MEDICINE

## 2022-06-29 RX ORDER — METOPROLOL SUCCINATE 25 MG/1
25 TABLET, EXTENDED RELEASE ORAL EVERY MORNING
Status: DISCONTINUED | OUTPATIENT
Start: 2022-06-30 | End: 2022-07-02 | Stop reason: HOSPADM

## 2022-06-29 RX ORDER — ACETAMINOPHEN 650 MG/1
650 SUPPOSITORY RECTAL EVERY 6 HOURS PRN
Status: DISCONTINUED | OUTPATIENT
Start: 2022-06-29 | End: 2022-07-02 | Stop reason: HOSPADM

## 2022-06-29 RX ORDER — ONDANSETRON 4 MG/1
4 TABLET, ORALLY DISINTEGRATING ORAL EVERY 6 HOURS PRN
Status: DISCONTINUED | OUTPATIENT
Start: 2022-06-29 | End: 2022-07-02 | Stop reason: HOSPADM

## 2022-06-29 RX ORDER — DORZOLAMIDE HYDROCHLORIDE AND TIMOLOL MALEATE 20; 5 MG/ML; MG/ML
1 SOLUTION/ DROPS OPHTHALMIC 2 TIMES DAILY
Status: DISCONTINUED | OUTPATIENT
Start: 2022-06-29 | End: 2022-07-02 | Stop reason: HOSPADM

## 2022-06-29 RX ORDER — LORAZEPAM 0.5 MG/1
0.25 TABLET ORAL ONCE
Status: COMPLETED | OUTPATIENT
Start: 2022-06-29 | End: 2022-06-29

## 2022-06-29 RX ORDER — ONDANSETRON 2 MG/ML
4 INJECTION INTRAMUSCULAR; INTRAVENOUS EVERY 6 HOURS PRN
Status: DISCONTINUED | OUTPATIENT
Start: 2022-06-29 | End: 2022-07-02 | Stop reason: HOSPADM

## 2022-06-29 RX ORDER — SODIUM CHLORIDE 9 MG/ML
INJECTION, SOLUTION INTRAVENOUS ONCE
Status: COMPLETED | OUTPATIENT
Start: 2022-06-29 | End: 2022-06-29

## 2022-06-29 RX ORDER — LEVOTHYROXINE SODIUM 50 UG/1
50 TABLET ORAL DAILY
Status: DISCONTINUED | OUTPATIENT
Start: 2022-06-30 | End: 2022-07-02 | Stop reason: HOSPADM

## 2022-06-29 RX ORDER — PROCHLORPERAZINE MALEATE 5 MG
5 TABLET ORAL EVERY 6 HOURS PRN
Status: DISCONTINUED | OUTPATIENT
Start: 2022-06-29 | End: 2022-07-02 | Stop reason: HOSPADM

## 2022-06-29 RX ORDER — AZITHROMYCIN 250 MG/1
500 TABLET, FILM COATED ORAL ONCE
Status: COMPLETED | OUTPATIENT
Start: 2022-06-29 | End: 2022-06-29

## 2022-06-29 RX ORDER — FOLIC ACID 1 MG/1
1 TABLET ORAL DAILY
Status: DISCONTINUED | OUTPATIENT
Start: 2022-06-30 | End: 2022-07-02 | Stop reason: HOSPADM

## 2022-06-29 RX ORDER — CEFTRIAXONE 1 G/1
1 INJECTION, POWDER, FOR SOLUTION INTRAMUSCULAR; INTRAVENOUS EVERY 24 HOURS
Status: DISCONTINUED | OUTPATIENT
Start: 2022-06-29 | End: 2022-07-01

## 2022-06-29 RX ORDER — PROCHLORPERAZINE 25 MG
12.5 SUPPOSITORY, RECTAL RECTAL EVERY 12 HOURS PRN
Status: DISCONTINUED | OUTPATIENT
Start: 2022-06-29 | End: 2022-07-02 | Stop reason: HOSPADM

## 2022-06-29 RX ORDER — ACETAMINOPHEN 325 MG/1
650 TABLET ORAL EVERY 6 HOURS PRN
Status: DISCONTINUED | OUTPATIENT
Start: 2022-06-29 | End: 2022-07-02 | Stop reason: HOSPADM

## 2022-06-29 RX ORDER — BENZOCAINE/MENTHOL 6 MG-10 MG
LOZENGE MUCOUS MEMBRANE 2 TIMES DAILY
Status: DISCONTINUED | OUTPATIENT
Start: 2022-06-29 | End: 2022-07-02 | Stop reason: HOSPADM

## 2022-06-29 RX ORDER — LATANOPROST 50 UG/ML
1 SOLUTION/ DROPS OPHTHALMIC AT BEDTIME
Status: DISCONTINUED | OUTPATIENT
Start: 2022-06-29 | End: 2022-07-02 | Stop reason: HOSPADM

## 2022-06-29 RX ORDER — LIDOCAINE 40 MG/G
CREAM TOPICAL
Status: DISCONTINUED | OUTPATIENT
Start: 2022-06-29 | End: 2022-07-02 | Stop reason: HOSPADM

## 2022-06-29 RX ORDER — AZITHROMYCIN 250 MG/1
250 TABLET, FILM COATED ORAL DAILY
Status: DISCONTINUED | OUTPATIENT
Start: 2022-06-30 | End: 2022-07-01

## 2022-06-29 RX ADMIN — SODIUM CHLORIDE: 9 INJECTION, SOLUTION INTRAVENOUS at 14:14

## 2022-06-29 RX ADMIN — DORZOLAMIDE HYDROCHLORIDE AND TIMOLOL MALEATE 1 DROP: 20; 5 SOLUTION/ DROPS OPHTHALMIC at 21:33

## 2022-06-29 RX ADMIN — AZITHROMYCIN MONOHYDRATE 500 MG: 250 TABLET ORAL at 21:05

## 2022-06-29 RX ADMIN — LORAZEPAM 0.25 MG: 0.5 TABLET ORAL at 21:05

## 2022-06-29 RX ADMIN — HYDROCORTISONE: 1 CREAM TOPICAL at 21:33

## 2022-06-29 RX ADMIN — APIXABAN 5 MG: 5 TABLET, FILM COATED ORAL at 21:05

## 2022-06-29 RX ADMIN — CEFTRIAXONE SODIUM 1 G: 1 INJECTION, POWDER, FOR SOLUTION INTRAMUSCULAR; INTRAVENOUS at 21:53

## 2022-06-29 RX ADMIN — LATANOPROST 1 DROP: 50 SOLUTION/ DROPS OPHTHALMIC at 21:33

## 2022-06-29 RX ADMIN — LIDOCAINE: 40 CREAM TOPICAL at 21:05

## 2022-06-29 ASSESSMENT — ACTIVITIES OF DAILY LIVING (ADL)
ADLS_ACUITY_SCORE: 45
ADLS_ACUITY_SCORE: 37

## 2022-06-29 ASSESSMENT — ENCOUNTER SYMPTOMS
FATIGUE: 1
ABDOMINAL PAIN: 0
BACK PAIN: 0
NECK PAIN: 0

## 2022-06-29 NOTE — ED PROVIDER NOTES
History   Chief Complaint:  Abnormal Labs        HPI   Marley Stewart is a 89 year old female with history of hypertension, hyperlipidemia, atrial flutter, and atrial fibrillation who presents to the ED for fatigue and evaluation of abnormal labs. Per chart review the patient was admitted to [facility] from 6/14-6/24 for treatment of sepsis with acute metabolic encephalopathy due to E.coli UTI. Upon discharge, she transferred to a rehab facility. Her staff recently noticed fatigue and lack of participation in rehab and had her brought to the ED. She was also found to have hyponatremia today. Today in the ED she reports that she has no pain.    6/29 Labs  CBC: WBC 12.9 (H), HGB 13.0,  (H)  BMP: sodium 118 (L), chloride 88 (L), anion gap 4 (L), glucose 102 (H) o/w normal    Review of Systems   Constitutional: Positive for fatigue.   Cardiovascular: Negative for chest pain.   Gastrointestinal: Negative for abdominal pain.   Musculoskeletal: Negative for back pain and neck pain.   All other systems reviewed and are negative.      Allergies:  Amitriptyline  Clonazepam  Amoxicillin  Latex  Erythromycin   Indomethacin    Medications:  Amoxicillin   Apixaban   Dorzolamide-timolol   Folic acid  Furosemide  Levothyroxine   Methotrexate   Metoprolol succinate  Prednisolone acetate   Losartan   Gabapentin  Denosumab    Past Medical History:     Atrial fibrillation   CHF  Stroke   Respiratory failure   UTI   Cerebral vascular accident  Pulmonic valve regurgitation   Aortic regurgitation   Tricuspid regurgitation   Mitral regurgitation  Hypertension   Vertigo   Acute respiratory failure   Pneumonia   CRF stage 3   Memory loss   Diverticulitis of colon   Hypothyroidism   Pelvic fracture   L4 vertebral fracture   Osteoporosis  Sinus node dysfunction  Hyperlipidemia   Glaucoma  Rheumatoid arthritis   Seborrheic dermatitis   Spinal stenosis   Acquired hypothyroidism   Hypertrophic cardiomyopathy  Iron deficiency  "anemia   Herpes simplex virus  infection  Peptic ulcer   Atrial flutter  Transient ischemic attack  Cerebral infarction  Osteoarthritis  Lyme disease    Past Surgical History:    Femur with plate   Tib/fib repair   Bunion and hammer toe foot surgery  Imo unlisted procedure eyelids    section   [place] teeth extraction   Bilateral cataract removal  Trabeculectomy  Yag Capsulotomy  Dilation and curettage  Knee replacement    Family History:    Father: Bone cancer  Mother: Cerebrovascular disease    Social History:  The patient presents to the ED by herself.  PCP: Chandra Ortiz MBBS    Physical Exam     Patient Vitals for the past 24 hrs:   BP Temp Temp src Pulse Resp SpO2 Height   22 1600 -- -- -- 69 25 97 % --   22 1530 -- -- -- 69 11 97 % --   22 1500 -- -- -- 69 18 95 % --   22 1430 (!) 143/95 -- -- 70 20 93 % --   22 1400 (!) 142/78 -- -- 69 18 95 % --   22 1334 (!) 157/86 98.6  F (37  C) Oral 86 17 95 % 1.626 m (5' 4\")     Physical Exam  Physical Exam   Nursing note and vitals reviewed.  General: Oriented to person, place, and time. Appears well-developed and well-nourished.   Head: No signs of trauma.   Mouth/Throat: Oropharynx is clear and moist.   Eyes:  Pupils are equal, round, and reactive to light. Right eye cornea is cloudy.  Neck: Normal range of motion. No nuchal rigidity.   Cardiovascular: Normal rate and regular rhythm.    Respiratory: Effort normal and breath sounds normal. No respiratory distress.   Abdominal: Soft. There is no tenderness. There is no guarding.   Musculoskeletal: Normal range of motion. No lower extremity edema   Neurological: The patient is alert and oriented to person, place, and time.  PERRLA, EOMI, visual fields intact, strength in upper/lower extremities normal and symmetrical.   Sensation normal. Speech normal  GCS eye subscore is 4. GCS verbal subscore is 5. GCS motor subscore is 6.   Skin: Skin is warm and dry. No rash noted. "   Psychiatric: normal mood and affect. behavior is normal.     Emergency Department Course     Laboratory:  Labs Ordered and Resulted from Time of ED Arrival to Time of ED Departure   CBC WITH PLATELETS - Abnormal       Result Value    WBC Count 14.4 (*)     RBC Count 4.37      Hemoglobin 13.6      Hematocrit 40.4      MCV 92      MCH 31.1      MCHC 33.7      RDW 13.4      Platelet Count 507 (*)    UA MACROSCOPIC WITH REFLEX TO MICRO AND CULTURE - Abnormal    Color Urine Light Yellow      Appearance Urine Clear      Glucose Urine Negative      Bilirubin Urine Negative      Ketones Urine Negative      Specific Gravity Urine 1.011      Blood Urine Negative      pH Urine 6.5      Protein Albumin Urine Negative      Urobilinogen Urine Normal      Nitrite Urine Negative      Leukocyte Esterase Urine Trace (*)     Mucus Urine Present (*)     RBC Urine 3 (*)     WBC Urine 7 (*)     Squamous Epithelials Urine 1     BASIC METABOLIC PANEL - Normal    Sodium 133      Potassium 4.0      Chloride 101      Carbon Dioxide (CO2) 26      Anion Gap 6      Urea Nitrogen 16      Creatinine 0.82      Calcium 9.8      Glucose 83      GFR Estimate 68     OSMOLALITY - Normal    Osmolality Blood 283     OSMOLALITY, RANDOM URINE - Normal    Osmolality Urine 376     COVID-19 VIRUS (CORONAVIRUS) BY PCR - Normal    SARS CoV2 PCR Negative        Emergency Department Course:       Reviewed:  I reviewed nursing notes, vitals, past medical history and Care Everywhere    Assessments:  1403 I obtained history and examined the patient as noted above.     Consults:  1424 I spoke with the hospitalist Dr. Roland regarding admission.     Interventions:  1414 NS 1 L IV    Disposition:  The patient was admitted to the hospital under the care of Dr. Arroyo.     Impression & Plan     CMS Diagnoses: None    Medical Decision Making:  This patient is presenting to the ER with a history of abnormal lab values.  Her sodium was 118 in her care facility this  morning.  She was started on normal saline here at 150 cc/h.  Repeat lab values done in the ED show a sodium back to normal at 133.  The patient has normal blood osmolality as well as normal urine osmolality.  The patient will be admitted to the hospitalist for further evaluation and treatment.      Diagnosis:    ICD-10-CM    1. Hyponatremia  E87.1        Scribe Disclosure:  I, Joshua Kjer and Polly Vick, am serving as a scribe at 2:03 PM on 6/29/2022 to document services personally performed by Vernon Ryder MD based on my observations and the provider's statements to me.            Vernon Ryder MD  06/29/22 1474

## 2022-06-29 NOTE — PROGRESS NOTES
RECEIVING UNIT ED HANDOFF REVIEW    ED Nurse Handoff Report was reviewed by: Citlaly Solano RN on June 29, 2022 at 4:17 PM

## 2022-06-29 NOTE — H&P
[facility]    History and Physical - Hospitalist Service       Date of Admission:  6/29/2022    Assessment & Plan      Marley Stewart is a 89 year old female admitted on 6/29/2022. She has a past medical history most remarkable for recent hospitalization due to sepsis from a urinary tract infection.  She has been staying at a TCU, and staff members noticed decreased activity levels over the past several days.  He was admitted for hyponatremia / weakness.    1.  Weakness, unclear etiology.  Suspect more likely infectious related, rather than hyponatremia.  2.  Hyponatremia-lab error?  Patient was evaluated this morning prior to transfer to the ER and it was found that her sodium was 118.  Repeat in the ER demonstrated a normal sodium, with a white count of 14.  - Hyponatremia evaluation was ordered in the ER including urine and serum osmolarity's, however her sodium returned at 133.  We will repeat this just to ensure it is correct.  - Suspect her weakness is more infectious related, primarily due to recent sepsis from urinary tract infection and elevated white count.  Will obtain urinalysis, procalcitonin, chest x-ray, and blood cultures.  - We will hold off on any empiric antibiotics at this point, but have a low threshold to start.  - Will hold patient's lasix for now until we figure out if she actually is hyponatremic or not  - PT / OT evaluation    Addendum:  3. Community Acquired Pneumonia. Patient has a small left pleural effusion and an elevated white count. Probably developing a pneumonia, will treat with course of antibiotics to see if she feels better.  --Ceftriaxone + Azithromycin ordered.    4.  Recent Drug Rash thought to be secondary to Amoxicillin  - Continue home hydrocortisone    Chronic Issues:  1.  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 Hold lasix for now as above  2.  Moderate Pulmonary Hypertension  3.  Rheumatoid arthritis. Last  dose of methotrexate was this morning  4.  CKD stage III  5.  Hypothyroidism. Home synthroid  6.  History of CVA  7.  Essential hypertension.   8.  History of atrial fibrillation on anticoagulation. Continue home metoprolol, eliquis     Diet:   General Diet  DVT Prophylaxis: Pneumatic Compression Devices  Armstrong Catheter: Not present  Central Lines: None  Cardiac Monitoring: None  Code Status:   DNR / DNI, confirmed with patient and daughter (RN)    Clinically Significant Risk Factors Present on Admission               # Coagulation Defect: home medication list includes an anticoagulant medication            Disposition Plan    Patient is currently admitted to the hospital with weakness of unclear etiology suspect infection.  I anticipate she will be admitted for 2 to 3 days depending on her hospital course.     The patient's care was discussed with the Bedside Nurse, Patient and Patient's Family.    Stewart Arroyo MD PhD  Hospitalist Service  [facility]  Securely message with the Vocera Web Console (learn more here)  Text page via [employer] Paging/Directory         ______________________________________________________________________    Chief Complaint   Weakness    History is obtained from the patient and chart review    History of Present Illness   Marley Stewart is a 89 year old female admitted on 6/29/2022. She has a past medical history most remarkable for recent hospitalization due to sepsis from a urinary tract infection.  She has been staying at a TCU, and staff members noticed decreased activity levels over the past several days.  He was admitted for hyponatremia / weakness.    After patient was admitted over the weekend to the TCU, it was noted that she has had progressive weakness.  While she has no localizing signs symptoms of infection, TCU staff ordered a basic evaluation this morning which demonstrated sodium of 118.  Preliminary labs were repeated in the ER she was admitted  "to medicine for further assessment and and planning.  After labs returned it was found that her sodium was 133.  I interviewed her, and her daughter and she has no complaints other than feeling overall weak.  No fevers, chills, chest pain, shortness of breath, abdominal pain, nausea, vomiting, diarrhea.  No dysuria, urinary frequency, or burning with urination.    Review of Systems    The 10 point Review of Systems is negative other than noted in the HPI or here.    Past Medical History    I have reviewed this patient's medical history and updated it with pertinent information if needed.   Past Medical History:   Diagnosis Date     A-fib -- Chronic      Aortic regurgitation     1-2+ per 4/2015 Echo     Arthritis      CHF (congestive heart failure) (H)     EF 65-70% on 4/2015 Echo     CVA (cerebral vascular accident) (H)      Glaucoma      Hypertension      Mitral regurgitation     2+ per 4/2015 Echo     PUD (peptic ulcer disease)      Pulmonic valve regurgitation     1+ per 4/2015 Echo     RA (rheumatoid arthritis) (H)      Spinal stenosis      Thyroid disease      Tricuspid regurgitation     2+ per 4/2015 Echo       Past Surgical History   I have reviewed this patient's surgical history and updated it with pertinent information if needed.  Past Surgical History:   Procedure Laterality Date     ORTHOPEDIC SURGERY         Social History   I have reviewed this patient's social history and updated it with pertinent information if needed.  Social History     Tobacco Use     Smoking status: Never Smoker     Smokeless tobacco: Never Used   Substance Use Topics     Alcohol use: Yes     Comment: < 1 drink a month     Drug use: No       Family History   I have reviewed this patient's family history and updated it with pertinent information if needed.  Family History   Problem Relation Age of Onset     Cerebrovascular Disease Mother      Cancer Father         \"Bone Cancer\"       Prior to Admission Medications   Prior to " Admission Medications   Prescriptions Last Dose Informant Patient Reported? Taking?   FOLIC ACID PO  Pharmacy Yes No   Sig: Take 1 mg by mouth daily   acetaminophen (TYLENOL) 500 MG tablet   No No   Sig: Take 2 tablets (1,000 mg) by mouth every 6 hours as needed   amoxicillin (AMOXIL) 500 MG capsule   Yes No   Sig: Take 2,000 mg by mouth See Admin Instructions Prior to dentist for prophylaxis   apixaban ANTICOAGULANT (ELIQUIS) 5 MG tablet   No No   Sig: Take 1 tablet (5 mg) by mouth 2 times daily   Patient taking differently: Take 5 mg by mouth 2 times daily   calcium carb 1250 mg, 500 mg [facility],/vitamin D 200 units (OSCAL WITH D) 500-200 MG-UNIT per tablet  Pharmacy Yes No   Sig: Take 1 tablet by mouth 2 times daily (with meals)   dorzolamide-timolol (COSOPT) 2-0.5 % ophthalmic solution   Yes No   Sig: Place 1 drop into the right eye 2 times daily   furosemide (LASIX) 20 MG tablet   No No   Sig: Take 1 tablet (20 mg) by mouth every morning   Patient taking differently: Take 20 mg by mouth every morning   hydrocortisone (CORTAID) 1 % external cream   No No   Sig: Apply topically 2 times daily for 5 days Or until rash resolves   latanoprost (XALATAN) 0.005 % ophthalmic solution  Pharmacy Yes No   Sig: Place 1 drop into the right eye At Bedtime   levothyroxine (SYNTHROID/LEVOTHROID) 50 MCG tablet  Pharmacy Yes No   Sig: Take 50 mcg by mouth daily   methotrexate 2.5 MG tablet  Pharmacy Yes No   Sig: Take 15 mg by mouth every 7 days Wednesday  (6 x 2.5 mg)   metoprolol succinate ER (TOPROL-XL) 25 MG 24 hr tablet  Pharmacy Yes No   Sig: Take 25 mg by mouth every morning   prednisoLONE acetate (PRED FORTE) 1 % ophthalmic suspension   Yes No   Sig: Place 1 drop into the right eye 2 times daily      Facility-Administered Medications: None     Allergies   Allergies   Allergen Reactions     Amitriptyline      Clonazepam Other (See Comments)     Somnolence at 0.5 mg dose     Amoxicillin Rash     Latex Rash       Physical Exam    Vital Signs: Temp: 98.6  F (37  C) Temp src: Oral BP: (!) 157/86 Pulse: 86   Resp: 17 SpO2: 95 % O2 Device: None (Room air)    Weight: 0 lbs 0 oz    General Appearance: Well appearing, no distress. [place]  Eyes: CALIXTO, EOMI  HEENT: NC, AT. MMM.   Respiratory: CTAB, normal work of breathing  Cardiovascular: Regular Rate and Rhythm, normal S1, S2. No murmurs, rubs, gallops  GI: Soft, non-tender, non-distended  Lymph/Hematologic: No asymetric swelling, edema. No bruising.  Genitourinary: Deferred  Skin: No rashes, lesions, wounds.  Musculoskeletal: Warm, well perfused  Neurologic: AOx4, CNII-XII intact.  Psychiatric: Euthymic. Mood appropriate.     Data   Data reviewed today: I reviewed all medications, new labs and imaging results over the last 24 hours. I personally reviewed the chest x-ray image(s) showing (will review once it has resulted).    Recent Labs   Lab 06/29/22  1413 06/24/22  0634   WBC 14.4* 9.8   HGB 13.6 12.9   MCV 92 96   * 424    138   POTASSIUM 4.0 4.0   CHLORIDE 101 107   CO2 26 26   BUN 16 17   CR 0.82 0.83   ANIONGAP 6 5   LONNIE 9.8 9.0   GLC 83 94

## 2022-06-29 NOTE — ED NOTES
"[facility]  ED Nurse Handoff Report    ED Chief complaint: Abnormal Labs (From TCU (recovering from sepsis) and staff noticed fatigue and not participating in rehab as much past few days. Labs ordered and showed hyponatremia so sent to ED)      ED Diagnosis:   Final diagnoses:   Hyponatremia       Code Status: Hospitalist to address    Allergies:   Allergies   Allergen Reactions     Amitriptyline      Clonazepam Other (See Comments)     Somnolence at 0.5 mg dose     Amoxicillin Rash     Latex Rash       Patient Story: Presents from TCU for hyponatremia. Staff reports increasing fatigue and decreased interest in therapy cares.     Focused Assessment:  A&Ox4. Skin intact. Diapered- incontinent. Saline lock right wrist infusing IVF. Pacer left chest wall. Atrial fibrillation. Purewick in place. VS stable    Treatments and/or interventions provided: labs, urine. NS at 125ml/hr.     Patient's response to treatments and/or interventions: stable    To be done/followed up on inpatient unit:  admission orders- hospitalist rounded in ED.     Does this patient have any cognitive concerns?: none    Activity level - Baseline/Home:  Walker  Activity Level - Current:   bedrest    Patient's Preferred language: English   Needed?: No    Isolation: None  Infection: Not Applicable  Patient tested for COVID 19 prior to admission: YES  Bariatric?: No    Vital Signs:   Vitals:    06/29/22 1334 06/29/22 1400   BP: (!) 157/86 (!) 142/78   Pulse: 86 69   Resp: 17 18   Temp: 98.6  F (37  C)    TempSrc: Oral    SpO2: 95% 95%   Height: 1.626 m (5' 4\")      Labs Ordered and Resulted from Time of ED Arrival to Time of ED Departure   CBC WITH PLATELETS - Abnormal       Result Value    WBC Count 14.4 (*)     RBC Count 4.37      Hemoglobin 13.6      Hematocrit 40.4      MCV 92      MCH 31.1      MCHC 33.7      RDW 13.4      Platelet Count 507 (*)    BASIC METABOLIC PANEL - Normal    Sodium 133      Potassium 4.0      " Chloride 101      Carbon Dioxide (CO2) 26      Anion Gap 6      Urea Nitrogen 16      Creatinine 0.82      Calcium 9.8      Glucose 83      GFR Estimate 68     OSMOLALITY - Normal    Osmolality Blood 283     OSMOLALITY, RANDOM URINE   COVID-19 VIRUS (CORONAVIRUS) BY PCR   UA MACROSCOPIC WITH REFLEX TO MICRO AND CULTURE       Cardiac Rhythm:     Was the PSS-3 completed:   Yes  What interventions are required if any?               Family Comments: daughter in law at bedside  OBS brochure/video discussed/provided to patient/family: N/A              Name of person given brochure if not patient: n/a              Relationship to patient: n/a    For the majority of the shift this patient's behavior was Green.   Behavioral interventions performed were none.    ED NURSE PHONE NUMBER: *[phone]

## 2022-06-29 NOTE — PROGRESS NOTES
[facility] GERIATRICS    PRIMARY CARE PROVIDER AND CLINIC:  Chandra Ortiz MD, [facility] [address] / [facility] [id number]  Chief Complaint   Patient presents with     Hospital F/U      [place] Medical Record Number:  [id number]  Place of Service where encounter took place:  [facility] [[id number]]    Marley Stewart  is a 89 year old  (7/17/1932), admitted to the above facility from  [facility]. Hospital stay 6/14/22 through 6/24/22.     HPI:    She has a medical history significant for afib on apixaban, s/p PPM, hypertrophic cardiomyopathy,  diastolic CHF, HTN, RA, CKD stage 3, hypothyroidism, history of CVA, and was hospitalized after presenting to the ED with worsening confusion and weakness. She was febrile at 102.1. WBC was 12.9. UA showed large leuk esterase, WBC >182, WBC clumps, protein 30.  Ceftriaxone was started. She completed a 7 day course and was transitioned to amoxicillin, which was discontinued due to a rash. UC grew >100,000 E coli. Blood cultures no growth. She was noted to have cough, shortness of breath and intermittent chest pain. CXR 6/17/2022 showed possible LLL infiltrate and azithromycin was added to the antibiotic regimen. Troponin was slightly elevated, suspected due to demand ischemia secondary to sepsis. TTE 6/19/2022 showed EF 55-60%, advanced diastolic dysfunction, moderate pulmonary hypertension, moderate left pleural effusion and findings suggestive of hypertrophic obstructive cardiomyopathy. Family declined Cardiology consult and further evaluation.     She is sleepy, awakens easily but keeps her eyes closed most of the time.  Poor historian.  Responds to yes/no questions. States she is tired. Denies cough, shortness of breath or chest pain. Denies pain of any type. Reports constipation and doesn't remember when she last had a BM. Staff reports she is incontinent and had a BM earlier today. Fair appetite. Doesn't  recall working with therapies.   She is ambulating 10 ft with walker and min-mod assist. Requires max assist with cares and has not been cooperative with getting dressed and/or getting out of bed.   BCRS 4.2, indicating moderate impairment    CODE STATUS/ADVANCE DIRECTIVES DISCUSSION:  Prior  DNR / DNI  ALLERGIES:   Allergies   Allergen Reactions     Amitriptyline      Clonazepam Other (See Comments)     Somnolence at 0.5 mg dose     Amoxicillin Rash     Latex Rash      PAST MEDICAL HISTORY:   Past Medical History:   Diagnosis Date     A-fib -- Chronic      Aortic regurgitation     1-2+ per 4/2015 Echo     Arthritis      CHF (congestive heart failure) (H)     EF 65-70% on 4/2015 Echo     CVA (cerebral vascular accident) (H)      Glaucoma      Hypertension      Mitral regurgitation     2+ per 4/2015 Echo     PUD (peptic ulcer disease)      Pulmonic valve regurgitation     1+ per 4/2015 Echo     RA (rheumatoid arthritis) (H)      Spinal stenosis      Thyroid disease      Tricuspid regurgitation     2+ per 4/2015 Echo      PAST SURGICAL HISTORY:   has a past surgical history that includes orthopedic surgery.  FAMILY HISTORY: family history includes Cancer in her father; Cerebrovascular Disease in her mother.  SOCIAL HISTORY:   reports that she has never smoked. She has never used smokeless tobacco. She reports current alcohol use. She reports that she does not use drugs.  Patient's living condition: lives with spouse in a house in [place]. Daughter in law Ruby Stewart is primary contact.     Post Discharge Medication Reconciliation Status: discharge medications reconciled, continue medications without change  Current Outpatient Medications   Medication Sig     acetaminophen (TYLENOL) 500 MG tablet Take 2 tablets (1,000 mg) by mouth every 6 hours as needed     amoxicillin (AMOXIL) 500 MG capsule Take 2,000 mg by mouth See Admin Instructions Prior to dentist for prophylaxis     apixaban ANTICOAGULANT (ELIQUIS) 5 MG  "tablet Take 1 tablet (5 mg) by mouth 2 times daily (Patient taking differently: Take 5 mg by mouth 2 times daily)     calcium carb 1250 mg, 500 mg [place],/vitamin D 200 units (OSCAL WITH D) 500-200 MG-UNIT per tablet Take 1 tablet by mouth 2 times daily (with meals)     dorzolamide-timolol (COSOPT) 2-0.5 % ophthalmic solution Place 1 drop into the right eye 2 times daily     FOLIC ACID PO Take 1 mg by mouth daily     furosemide (LASIX) 20 MG tablet Take 1 tablet (20 mg) by mouth every morning (Patient taking differently: Take 20 mg by mouth every morning)     hydrocortisone (CORTAID) 1 % external cream Apply topically 2 times daily for 5 days Or until rash resolves     latanoprost (XALATAN) 0.005 % ophthalmic solution Place 1 drop into the right eye At Bedtime     levothyroxine (SYNTHROID/LEVOTHROID) 50 MCG tablet Take 50 mcg by mouth daily     methotrexate 2.5 MG tablet Take 15 mg by mouth every 7 days Wednesday  (6 x 2.5 mg)     metoprolol succinate ER (TOPROL-XL) 25 MG 24 hr tablet Take 25 mg by mouth every morning     prednisoLONE acetate (PRED FORTE) 1 % ophthalmic suspension Place 1 drop into the right eye 2 times daily     No current facility-administered medications for this visit.       ROS:  Limited secondary to cognitive impairment. Positives as noted above    Vitals:  /81   Pulse 71   Temp (!) 96.6  F (35.9  C)   Resp 19   Ht 1.651 m (5' 5\")   Wt 64.2 kg (141 lb 9.6 oz)   SpO2 92%   BMI 23.56 kg/m    Exam:  GENERAL: alert, in no distress  EYES: conjunctiva and lids normal  NECK:  no adenopathy, no thyromegaly  RESP:  no respiratory distress, diminished breath sounds both bases  CV:  regular rate and rhythm, no murmur, no edema  ABDOMEN:  normal bowel sounds, soft, nontender, no distension, no masses   M/S:   in bed. CAN with good strength  SKIN:  no visible rashes or open areas  PSYCH:  oriented to self, situation,  insight and judgement impaired, memory impaired , flat " affect    Lab/Diagnostic data:  Recent labs in [facility] reviewed by me today.    Labs today: Na 118, chloride 88, BUN 15, creatinine 0.83, WBC 12.9, Hgb 13.0.     ASSESSMENT / PLAN:  (E87.1) Hyponatremia  (primary encounter diagnosis)  Comment: critical Na 118 today, which likely explains her lethargy and confusion.   Etiology unclear-lasix and poor oral intake may be contributing. She appears euvolemic. Na was 136 at hospital discharge.   TSH 0.44 on 6/14/2022  Plan: send to ED for further evaluation     (N39.0) Urinary tract infection without hematuria, site unspecified  (J18.9) Pneumonia of left lower lobe due to infectious organism  Comment: completed ceftriaxone and azithromycin while inpatient. She's been afebrile, no hypoxia.   WBC 12.9 today   Plan: as above     (I50.32) Chronic diastolic congestive heart failure (H)  (I42.2) Hypertrophic cardiomyopathy (H)  (I27.20) Pulmonary hypertension (H)  Comment: appears euvolemic with weight 141 lbs.   Plan: ED eval as above     Chronic afib  Comment: rate controlled at 68-74, s/p ppm   Plan: continue apixaban, metoprolol     (I10) Primary hypertension  Comment: BP controlled: 116/83, 136/81, 146/81, 128/60   Plan: continue metoprolol     (M06.9) Rheumatoid arthritis, involving unspecified site, unspecified whether rheumatoid factor present (H)  Comment: no s/s of acute flare   Plan: weekly methotrexate, folic acid     (R41.89) Cognitive impairment  Comment: confused and not engaged, suspect she may not be at her baseline   Plan: as above          Electronically signed by:  HUEY Mayo CNP

## 2022-06-29 NOTE — ED NOTES
Bed: ED02  Expected date:   Expected time:   Means of arrival:   Comments:  Pati - 511 - 89 F abnormal labs eta 1320

## 2022-06-29 NOTE — TELEPHONE ENCOUNTER
[facility]     Provider: HUEY Cox CNP   Facility: [facility] Facility Type:  TCU    Allergies   Allergen Reactions     Amitriptyline      Clonazepam Other (See Comments)     Somnolence at 0.5 mg dose     Amoxicillin Rash     Latex Rash       Labs Reviewed by provider: Currently on lasix 20mg every day        Verbal Order/Direction given by Provider: Send to ER for low sodium.     Provider giving Order:  HUEY Cox CNP     Verbal Order given to: Verito Michaels RN

## 2022-06-29 NOTE — LETTER
6/29/2022        RE: Marley Stewart  [address]  [place] [address]        [facility] GERIATRICS    PRIMARY CARE PROVIDER AND CLINIC:  Chandra Ortiz MD, [facility] [address] / [facility] [address]  Chief Complaint   Patient presents with     Hospital F/U      [place] Medical Record Number:  [id number]  Place of Service where encounter took place:  [facility] ([facility] [[id number]]    Marley Stewart  is a 89 year old  (7/17/1932), admitted to the above facility from  [facility]. Hospital stay 6/14/22 through 6/24/22.     HPI:    She has a medical history significant for afib on apixaban, s/p PPM, hypertrophic cardiomyopathy,  diastolic CHF, HTN, RA, CKD stage 3, hypothyroidism, history of CVA, and was hospitalized after presenting to the ED with worsening confusion and weakness. She was febrile at 102.1. WBC was 12.9. UA showed large leuk esterase, WBC >182, WBC clumps, protein 30.  Ceftriaxone was started. She completed a 7 day course and was transitioned to amoxicillin, which was discontinued due to a rash. UC grew <100,000 E coli. Blood cultures no growth. She was noted to have cough, shortness of breath and intermittent chest pain. CXR 6/17/2022 showed possible LLL infiltrate and azithromycin was added to the antibiotic regimen. Troponin was slightly elevated, suspected due to demand ischemia secondary to sepsis. TTE 6/19/2022 showed EF 55-60%, advanced diastolic dysfunction, moderate pulmonary hypertension, moderate left pleural effusion and findings suggestive of hypertrophic obstructive cardiomyopathy. Family declined Cardiology consult and further evaluation.     She is sleepy, awakens easily but keeps her eyes closed most of the time.  Poor historian.  Responds to yes/no questions. States she is tired. Denies cough, shortness of breath or chest pain. Denies pain of any type. Reports constipation and doesn't remember when she last had a BM.  Staff reports she is incontinent and had a BM earlier today. Fair appetite. Doesn't recall working with therapies.   She is ambulating 10 ft with walker and min-mod assist. Requires max assist with cares and has not been cooperative with getting dressed and/or getting out of bed.   BCRS 4.2, indicating moderate impairment    CODE STATUS/ADVANCE DIRECTIVES DISCUSSION:  Prior  DNR / DNI  ALLERGIES:   Allergies   Allergen Reactions     Amitriptyline      Clonazepam Other (See Comments)     Somnolence at 0.5 mg dose     Amoxicillin Rash     Latex Rash      PAST MEDICAL HISTORY:   Past Medical History:   Diagnosis Date     A-fib -- Chronic      Aortic regurgitation     1-2+ per 4/2015 Echo     Arthritis      CHF (congestive heart failure) (H)     EF 65-70% on 4/2015 Echo     CVA (cerebral vascular accident) (H)      Glaucoma      Hypertension      Mitral regurgitation     2+ per 4/2015 Echo     PUD (peptic ulcer disease)      Pulmonic valve regurgitation     1+ per 4/2015 Echo     RA (rheumatoid arthritis) (H)      Spinal stenosis      Thyroid disease      Tricuspid regurgitation     2+ per 4/2015 Echo      PAST SURGICAL HISTORY:   has a past surgical history that includes orthopedic surgery.  FAMILY HISTORY: family history includes Cancer in her father; Cerebrovascular Disease in her mother.  SOCIAL HISTORY:   reports that she has never smoked. She has never used smokeless tobacco. She reports current alcohol use. She reports that she does not use drugs.  Patient's living condition: lives with spouse in a house in [place]. Daughter in law Ruby Stewart is primary contact.     Post Discharge Medication Reconciliation Status: discharge medications reconciled, continue medications without change  Current Outpatient Medications   Medication Sig     acetaminophen (TYLENOL) 500 MG tablet Take 2 tablets (1,000 mg) by mouth every 6 hours as needed     amoxicillin (AMOXIL) 500 MG capsule Take 2,000 mg by mouth See Admin  "Instructions Prior to dentist for prophylaxis     apixaban ANTICOAGULANT (ELIQUIS) 5 MG tablet Take 1 tablet (5 mg) by mouth 2 times daily (Patient taking differently: Take 5 mg by mouth 2 times daily)     calcium carb 1250 mg, 500 mg [place],/vitamin D 200 units (OSCAL WITH D) 500-200 MG-UNIT per tablet Take 1 tablet by mouth 2 times daily (with meals)     dorzolamide-timolol (COSOPT) 2-0.5 % ophthalmic solution Place 1 drop into the right eye 2 times daily     FOLIC ACID PO Take 1 mg by mouth daily     furosemide (LASIX) 20 MG tablet Take 1 tablet (20 mg) by mouth every morning (Patient taking differently: Take 20 mg by mouth every morning)     hydrocortisone (CORTAID) 1 % external cream Apply topically 2 times daily for 5 days Or until rash resolves     latanoprost (XALATAN) 0.005 % ophthalmic solution Place 1 drop into the right eye At Bedtime     levothyroxine (SYNTHROID/LEVOTHROID) 50 MCG tablet Take 50 mcg by mouth daily     methotrexate 2.5 MG tablet Take 15 mg by mouth every 7 days Wednesday  (6 x 2.5 mg)     metoprolol succinate ER (TOPROL-XL) 25 MG 24 hr tablet Take 25 mg by mouth every morning     prednisoLONE acetate (PRED FORTE) 1 % ophthalmic suspension Place 1 drop into the right eye 2 times daily     No current facility-administered medications for this visit.       ROS:  Limited secondary to cognitive impairment. Positives as noted above    Vitals:  /81   Pulse 71   Temp (!) 96.6  F (35.9  C)   Resp 19   Ht 1.651 m (5' 5\")   Wt 64.2 kg (141 lb 9.6 oz)   SpO2 92%   BMI 23.56 kg/m    Exam:  GENERAL: alert, in no distress  EYES: conjunctiva and lids normal  NECK:  no adenopathy, no thyromegaly  RESP:  no respiratory distress, diminished breath sounds both bases  CV:  regular rate and rhythm, no murmur, no edema  ABDOMEN:  normal bowel sounds, soft, nontender, no distension, no masses   M/S:   in bed. CAN with good strength  SKIN:  no visible rashes or open areas  PSYCH:  oriented to self, " situation,  insight and judgement impaired, memory impaired , flat affect    Lab/Diagnostic data:  [facility] reviewed by me today.    Labs today: Na 118, chloride 88, BUN 15, creatinine 0.83, WBC 12.9, Hgb 13.0.     ASSESSMENT / PLAN:  (E87.1) Hyponatremia  (primary encounter diagnosis)  Comment: critical Na 118 today, which likely explains her lethargy and confusion.   Etiology unclear-lasix and poor oral intake may be contributing. She appears euvolemic. Na was 136 at hospital discharge.   TSH 0.44 on 6/14/2022  Plan: send to ED for further evaluation     (N39.0) Urinary tract infection without hematuria, site unspecified  (J18.9) Pneumonia of left lower lobe due to infectious organism  Comment: completed ceftriaxone and azithromycin while inpatient. She's been afebrile, no hypoxia.   WBC 12.9 today   Plan: as above     (I50.32) Chronic diastolic congestive heart failure (H)  (I42.2) Hypertrophic cardiomyopathy (H)  (I27.20) Pulmonary hypertension (H)  Comment: appears euvolemic with weight 141 lbs.   Plan: ED eval as above     Chronic afib  Comment: rate controlled at 68-74, s/p ppm   Plan: continue apixaban, metoprolol     (I10) Primary hypertension  Comment: BP controlled: 116/83, 136/81, 146/81, 128/60   Plan: continue metoprolol     (M06.9) Rheumatoid arthritis, involving unspecified site, unspecified whether rheumatoid factor present (H)  Comment: no s/s of acute flare   Plan: weekly methotrexate, folic acid     (R41.89) Cognitive impairment  Comment: confused and not engaged, suspect she may not be at her baseline   Plan: as above          Electronically signed by:  HUEY Mayo CNP                       Sincerely,        HUEY Mayo CNP

## 2022-06-29 NOTE — PHARMACY-ADMISSION MEDICATION HISTORY
Pharmacy Medication History  Admission medication history interview status for the 6/29/2022  admission is complete. See EPIC admission navigator for prior to admission medications     Location of Interview: Patient room  Medication history sources: Patient's family/friend (Daughter in Law Ashley) and Surescripts, Called [facility] for last doses ([phone])      In the past week, patient estimated taking medication this percent of the time: greater than 90%    Additional medication history information:   The patient's med list was updated 2 weeks ago when she was admitted on 6/14. The patient's daughter in law confirmed that the medications have not changed since then. I called [facility] to verify last doses and that there were no medication changes.    Medication reconciliation completed by provider prior to medication history? No    Time spent in this activity: 15 minutes    Prior to Admission medications    Medication Sig Last Dose Taking? Auth Provider Long Term End Date   acetaminophen (TYLENOL) 500 MG tablet Take 2 tablets (1,000 mg) by mouth every 6 hours as needed Past Week at prn Yes Jelly Meneses MD     apixaban ANTICOAGULANT (ELIQUIS) 5 MG tablet Take 1 tablet (5 mg) by mouth 2 times daily 6/29/2022 at am Yes Marino Sandoval MD     calcium carb 1250 mg, 500 mg [place],/vitamin D 200 units (OSCAL WITH D) 500-200 MG-UNIT per tablet Take 1 tablet by mouth 2 times daily (with meals) 6/29/2022 at am Yes Unknown, Entered By History     dorzolamide-timolol (COSOPT) 2-0.5 % ophthalmic solution Place 1 drop into the right eye 2 times daily 6/29/2022 at am Yes Unknown, Entered By History     folic acid (FOLVITE) 1 MG tablet Take 1 mg by mouth daily 6/29/2022 at am Yes Reported, Patient     furosemide (LASIX) 20 MG tablet Take 1 tablet (20 mg) by mouth every morning 6/29/2022 at am Yes Marino Sandoval MD Yes    hydrocortisone (CORTAID) 1 % external cream Apply  topically 2 times daily for 5 days Or until rash resolves 6/29/2022 at am Yes Merry Child MD  6/29/22   latanoprost (XALATAN) 0.005 % ophthalmic solution Place 1 drop into the right eye At Bedtime 6/28/2022 at pm Yes Unknown, Entered By History Yes    levothyroxine (SYNTHROID/LEVOTHROID) 50 MCG tablet Take 50 mcg by mouth daily 6/29/2022 at am Yes Unknown, Entered By History No    methotrexate 2.5 MG tablet Take 15 mg by mouth every 7 days Wednesday  (6 x 2.5 mg) 6/29/2022 at am Yes Unknown, Entered By History No    metoprolol succinate ER (TOPROL-XL) 25 MG 24 hr tablet Take 25 mg by mouth every morning 6/29/2022 at am Yes Unknown, Entered By History No    prednisoLONE acetate (PRED FORTE) 1 % ophthalmic suspension Place 1 drop into the right eye 2 times daily 6/29/2022 at am Yes Unknown, Entered By History     amoxicillin (AMOXIL) 500 MG capsule Take 2,000 mg by mouth See Admin Instructions Prior to dentist for prophylaxis  at not started  Unknown, Entered By History         The information provided in this note is only as accurate as the sources available at the time of update(s)   Janeen Nolasco  Pharmacy Intern

## 2022-06-29 NOTE — ED TRIAGE NOTES
From TCU (recovering from sepsis) and staff noticed fatigue and not participating in rehab as much past few days. Labs ordered and showed hyponatremia so sent to ED     Triage Assessment     Row Name 06/29/22 2984       Triage Assessment (Adult)    Airway WDL WDL       Respiratory WDL    Respiratory WDL WDL       Skin Circulation/Temperature WDL    Skin Circulation/Temperature WDL WDL       Cardiac WDL    Cardiac WDL WDL       Peripheral/Neurovascular WDL    Peripheral Neurovascular WDL WDL       Cognitive/Neuro/Behavioral WDL    Cognitive/Neuro/Behavioral WDL X  baseline memory issues. No change in neuro status per staff a at TCU

## 2022-06-30 ENCOUNTER — APPOINTMENT (OUTPATIENT)
Dept: OCCUPATIONAL THERAPY | Facility: CLINIC | Age: 87
DRG: 206 | End: 2022-06-30
Attending: HOSPITALIST
Payer: COMMERCIAL

## 2022-06-30 ENCOUNTER — APPOINTMENT (OUTPATIENT)
Dept: PHYSICAL THERAPY | Facility: CLINIC | Age: 87
DRG: 206 | End: 2022-06-30
Attending: HOSPITALIST
Payer: COMMERCIAL

## 2022-06-30 LAB
ALBUMIN SERPL-MCNC: 2.8 G/DL (ref 3.4–5)
ALP SERPL-CCNC: 67 U/L (ref 40–150)
ALT SERPL W P-5'-P-CCNC: 18 U/L (ref 0–50)
ANION GAP SERPL CALCULATED.3IONS-SCNC: 8 MMOL/L (ref 3–14)
AST SERPL W P-5'-P-CCNC: 13 U/L (ref 0–45)
BILIRUB SERPL-MCNC: 0.9 MG/DL (ref 0.2–1.3)
BUN SERPL-MCNC: 16 MG/DL (ref 7–30)
CALCIUM SERPL-MCNC: 9.7 MG/DL (ref 8.5–10.1)
CHLORIDE BLD-SCNC: 103 MMOL/L (ref 94–109)
CO2 SERPL-SCNC: 25 MMOL/L (ref 20–32)
CREAT SERPL-MCNC: 0.78 MG/DL (ref 0.52–1.04)
ERYTHROCYTE [DISTWIDTH] IN BLOOD BY AUTOMATED COUNT: 13.4 % (ref 10–15)
GFR SERPL CREATININE-BSD FRML MDRD: 72 ML/MIN/1.73M2
GLUCOSE BLD-MCNC: 111 MG/DL (ref 70–99)
HCT VFR BLD AUTO: 39 % (ref 35–47)
HGB BLD-MCNC: 13.1 G/DL (ref 11.7–15.7)
MCH RBC QN AUTO: 31.9 PG (ref 26.5–33)
MCHC RBC AUTO-ENTMCNC: 33.6 G/DL (ref 31.5–36.5)
MCV RBC AUTO: 95 FL (ref 78–100)
PLATELET # BLD AUTO: 480 10E3/UL (ref 150–450)
POTASSIUM BLD-SCNC: 3.9 MMOL/L (ref 3.4–5.3)
PROT SERPL-MCNC: 6.6 G/DL (ref 6.8–8.8)
RBC # BLD AUTO: 4.11 10E6/UL (ref 3.8–5.2)
SODIUM SERPL-SCNC: 136 MMOL/L (ref 133–144)
WBC # BLD AUTO: 13.8 10E3/UL (ref 4–11)

## 2022-06-30 PROCEDURE — 250N000013 HC RX MED GY IP 250 OP 250 PS 637: Performed by: INTERNAL MEDICINE

## 2022-06-30 PROCEDURE — 99232 SBSQ HOSP IP/OBS MODERATE 35: CPT | Performed by: INTERNAL MEDICINE

## 2022-06-30 PROCEDURE — 250N000011 HC RX IP 250 OP 636: Performed by: HOSPITALIST

## 2022-06-30 PROCEDURE — 258N000003 HC RX IP 258 OP 636: Performed by: INTERNAL MEDICINE

## 2022-06-30 PROCEDURE — G0378 HOSPITAL OBSERVATION PER HR: HCPCS

## 2022-06-30 PROCEDURE — 85027 COMPLETE CBC AUTOMATED: CPT | Performed by: HOSPITALIST

## 2022-06-30 PROCEDURE — 80053 COMPREHEN METABOLIC PANEL: CPT | Performed by: HOSPITALIST

## 2022-06-30 PROCEDURE — 97530 THERAPEUTIC ACTIVITIES: CPT | Mod: GP

## 2022-06-30 PROCEDURE — 250N000013 HC RX MED GY IP 250 OP 250 PS 637: Performed by: HOSPITALIST

## 2022-06-30 PROCEDURE — 82040 ASSAY OF SERUM ALBUMIN: CPT | Performed by: HOSPITALIST

## 2022-06-30 PROCEDURE — 97162 PT EVAL MOD COMPLEX 30 MIN: CPT | Mod: GP

## 2022-06-30 PROCEDURE — 99207 PR APP CREDIT; MD BILLING SHARED VISIT: CPT | Performed by: HOSPITALIST

## 2022-06-30 PROCEDURE — 97166 OT EVAL MOD COMPLEX 45 MIN: CPT | Mod: GO | Performed by: OCCUPATIONAL THERAPIST

## 2022-06-30 PROCEDURE — 36415 COLL VENOUS BLD VENIPUNCTURE: CPT | Performed by: HOSPITALIST

## 2022-06-30 PROCEDURE — 120N000001 HC R&B MED SURG/OB

## 2022-06-30 PROCEDURE — G0463 HOSPITAL OUTPT CLINIC VISIT: HCPCS

## 2022-06-30 RX ORDER — SODIUM CHLORIDE 9 MG/ML
INJECTION, SOLUTION INTRAVENOUS CONTINUOUS
Status: ACTIVE | OUTPATIENT
Start: 2022-06-30 | End: 2022-06-30

## 2022-06-30 RX ADMIN — APIXABAN 5 MG: 5 TABLET, FILM COATED ORAL at 20:49

## 2022-06-30 RX ADMIN — AZITHROMYCIN MONOHYDRATE 250 MG: 250 TABLET ORAL at 17:51

## 2022-06-30 RX ADMIN — SODIUM CHLORIDE: 9 INJECTION, SOLUTION INTRAVENOUS at 06:34

## 2022-06-30 RX ADMIN — DORZOLAMIDE HYDROCHLORIDE AND TIMOLOL MALEATE 1 DROP: 20; 5 SOLUTION/ DROPS OPHTHALMIC at 08:40

## 2022-06-30 RX ADMIN — MICONAZOLE NITRATE: 2 POWDER TOPICAL at 20:48

## 2022-06-30 RX ADMIN — APIXABAN 5 MG: 5 TABLET, FILM COATED ORAL at 08:39

## 2022-06-30 RX ADMIN — METOPROLOL SUCCINATE 25 MG: 25 TABLET, EXTENDED RELEASE ORAL at 08:39

## 2022-06-30 RX ADMIN — HYDROCORTISONE: 1 CREAM TOPICAL at 20:49

## 2022-06-30 RX ADMIN — HYDROCORTISONE: 1 CREAM TOPICAL at 08:39

## 2022-06-30 RX ADMIN — FOLIC ACID 1 MG: 1 TABLET ORAL at 08:39

## 2022-06-30 RX ADMIN — LEVOTHYROXINE SODIUM 50 MCG: 50 TABLET ORAL at 06:34

## 2022-06-30 RX ADMIN — DORZOLAMIDE HYDROCHLORIDE AND TIMOLOL MALEATE 1 DROP: 20; 5 SOLUTION/ DROPS OPHTHALMIC at 20:56

## 2022-06-30 RX ADMIN — CEFTRIAXONE SODIUM 1 G: 1 INJECTION, POWDER, FOR SOLUTION INTRAMUSCULAR; INTRAVENOUS at 21:36

## 2022-06-30 RX ADMIN — LATANOPROST 1 DROP: 50 SOLUTION/ DROPS OPHTHALMIC at 22:43

## 2022-06-30 ASSESSMENT — ACTIVITIES OF DAILY LIVING (ADL)
ADLS_ACUITY_SCORE: 46
ADLS_ACUITY_SCORE: 46
ADLS_ACUITY_SCORE: 47
ADLS_ACUITY_SCORE: 46

## 2022-06-30 NOTE — PROVIDER NOTIFICATION
MD Notification    Notified Person: MD    Notified Person Name: Gi    Notification Date/Time: 6/30/22 0616    Notification Interaction: amcom page    Purpose of Notification: Pt had low UOP overnight, no IV fluids. Bladder scan of 72. Any new orders?    Orders Received: NaCl infusion @ 75mL/hr.     Comments:

## 2022-06-30 NOTE — PROGRESS NOTES
[facility]      OUTPATIENT PHYSICAL THERAPY EVALUATION  PLAN OF TREATMENT FOR OUTPATIENT REHABILITATION  (COMPLETE FOR INITIAL CLAIMS ONLY)  Patient's Last Name, First Name, M.I.  YOB: 1932  Marley Stewart                        Provider's Name  [facility] Medical Record No.  [id number]                               Onset Date:  06/29/22   Start of Care Date:  06/30/22      Type:     _X_PT   ___OT   ___SLP Medical Diagnosis:  Weakness/deconditioning                        PT Diagnosis:  Impaired functional mobility   Visits from SOC:  1   _________________________________________________________________________________  Plan of Treatment/Functional Goals    Planned Interventions: balance training, bed mobility training, gait training, home exercise program, neuromuscular re-education, strengthening, transfer training, patient/family education, postural re-education, stair training, progressive activity/exercise     Goals: See Physical Therapy Goals on Care Plan in [facility] electronic health record.    Therapy Frequency: 5x/week  Predicted Duration of Therapy Intervention: 07/05/22  _________________________________________________________________________________    I CERTIFY THE NEED FOR THESE SERVICES FURNISHED UNDER        THIS PLAN OF TREATMENT AND WHILE UNDER MY CARE     (Physician co-signature of this document indicates review and certification of the therapy plan).              Certification date from: 06/30/22, Certification date to: 07/07/22    Referring Physician: Stewart Arroyo MD            Initial Assessment        See Physical Therapy evaluation dated 06/30/22 in Epic electronic health record.

## 2022-06-30 NOTE — PROVIDER NOTIFICATION
MD Notification    Notified Person: MD    Notified Person Name: Ana Rosa Copeland    Notification Date/Time: 6/29/2022 at 2011    Notification Interaction: 123people messaging    Purpose of Notification:  817 - pt is very anxious about getting a new IV, agreed to take anxiety medication to help, can you order Ativan? Tried 4x unsuccessful. Thanks! Citlaly RN *[phone]    Orders Received: one-time ativan ordered    Comments:

## 2022-06-30 NOTE — PROGRESS NOTES
[facility]    Medicine Progress Note - Hospitalist Service    Date of Admission:  6/29/2022    Assessment & Plan          Marley Stewart is a pleasant 89-year-old female with chronic atrial fibrillation, chronic diastolic CHF, previous CVA, hypertension, rheumatoid arthritis, hypothyroidism who presented on 6/29 due to generalized weakness.  She was most recently hospitalized from 6/14-6/24 due to acute cystitis with sepsis due to E. Coli and probable left lower lobe pneumonia.  She was treated with 7 days of IV ceftriaxone +2 days of amoxicillin.  Developed drug rash with amoxicillin.  Family indicated preference for noninvasive work-up.  She was now brought back as she had remained weak at TCU.  Labs obtained at TCU showed sodium of 118. On repeat testing in ER, this was 133.    #.  Hyponatremia ruled out  -Was sent to ER from TCU due to sodium of 118.  Recheck in ER showed sodium of 133, now 136 today.  Sodium of 118 obtained at TCU was likely a lab error.  Patient does not have hyponatremia.    #.  Probable left lower lobe bacterial pneumonia  -Chest x-ray shows ill-defined retrocardiac left lower lobe infiltrate concerning for pneumonia/pneumonitis.  Patient also has leukocytosis.  She was noted to have left lower lobe infiltrate during her recent admission and does not entirely clear if this is a new infiltrate.  -On IV ceftriaxone and azithromycin, continue    #.  Generalized weakness-most likely secondary to recent UTI/pneumonia and physical deconditioning  -PT/OT evaluation    #.  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  Grade 3 diastolic dysfunction  -Echo obtained 6/19 showed EF 55-60%, asymmetric thickening of LV walls, prominent proximal septal thickening, apical hypertrophy.  Findings were concerning for variant of hypertrophic obstructive cardiomyopathy.  -Cardiology consultation was deferred as per family's choice.  Family indicated they do not want any  aggressive/invasive procedures  -Receiving IV fluids at 75 mm/h due to low urine output.  We will continue for now    #.  Chronic diastolic CHF with preserved EF of 55-60%  #.  Moderate pulmonary hypertension  -No acute exacerbation    #.  Hypoalbuminemia, albumin 2.8    #.  Recent admission for acute cystitis with sepsis due to E. coli and probable left lower lobe pneumonia, hospitalized 6/14-6/24.    - Treated with 7 days of IV ceftriaxone +2 days of amoxicillin    #.  Mild drug rash due to amoxicillin  -Continue hydrocortisone cream    #.  Chronic atrial fibrillation  #.  S/p pacemaker implantation  - Continues on PTA metoprolol and apixaban     #.  Rheumatoid arthritis  Takes methotrexate on Wednesdays.  Last took on 6/29  - Holding methotrexate, resume at discharge     #.  Hypothyroidism  Chronic and stable on levothyroxine     #.  History of CVA  - Continues on PTA apixaban    #.  Glaucoma  - Continues on PTA eye drops           Diet: Combination Diet Regular Diet Adult    DVT Prophylaxis: Low Risk/Ambulatory with no VTE prophylaxis indicated  Armstrong Catheter: Not present  Central Lines: None  Cardiac Monitoring: None  Code Status: No CPR- Do NOT Intubate      Disposition Plan      Expected Discharge Date: 07/02/2022                The patient's care was discussed with the Patient.    Anita Vargas MD  Hospitalist Service  [facility]  Securely message with the Vocera Web Console (learn more here)  Text page via Gather Paging/Directory         Clinically Significant Risk Factors Present on Admission             # Hypoalbuminemia: Albumin = 2.8 g/dL (Ref range: 3.4 - 5.0 g/dL) on admission, will monitor as appropriate   # Coagulation Defect: home medication list includes an anticoagulant medication            ______________________________________________________________________    Interval History   Patient reports she feels okay.  Remains weak and tired  No chest pain or shortness of  breath  No nausea or vomiting      Data reviewed today: I reviewed all medications, new labs and imaging results over the last 24 hours. I personally reviewed no images or EKG's today.    Echo 6/19/2022  Left ventricular systolic function is normal.  The visual ejection fraction is 55-60%.  Significant assymmetric thickening of the left ventricular walls, prominent proximal septal thickening as well as apical hypertrophy. Findings suggest variant of hypertrophic obstructive cardiomyopathy.  Grade III or advanced diastolic dysfunction.  The right ventricle is normal in structure, function and size.  Moderate (46-55mmHg) pulmonary hypertension is present.  Aortic sclerosis without stenosis.  IVC diameter and respiratory changes fall into an intermediate range suggesting an RA pressure of 8 mmHg.  There is no pericardial effusion.  Moderate left pleural effusion     Recommend cardiac MRI if clinically indicated. Findings compared to study dated 11/3/2016, there appears to be more advanced diastolic dysfunction as well as progressive hypertrophic cardiomyopathy.    Physical Exam   Vital Signs: Temp: 97.8  F (36.6  C) Temp src: Oral BP: (!) 151/83 Pulse: 72   Resp: 16 SpO2: 98 % O2 Device: None (Room air)    Weight: 0 lbs 0 oz     Constitutional-patient is awake and alert, resting in bed, in no acute distress  Cardiovascular-irregular rhythm, normal rate, no murmurs, no edema  Pulmonary-lungs are clear to auscultation bilaterally, no wheezing or rhonchi  GI-abdomen is soft, nontender, nondistended, no hepatosplenomegaly or masses  Integumentary-skin is warm and dry, no rashes or ulcers  Neurological-patient is awake, alert.  Moving all 4 extremities, normal speech, no focal deficits    Data   Recent Labs   Lab 06/30/22  0708 06/29/22  1736 06/29/22  1413 06/24/22  0634   WBC 13.8*  --  14.4* 9.8   HGB 13.1  --  13.6 12.9   MCV 95  --  92 96   *  --  507* 424    136 133 138   POTASSIUM 3.9  --  4.0 4.0    CHLORIDE 103  --  101 107   CO2 25  --  26 26   BUN 16  --  16 17   CR 0.78  --  0.82 0.83   ANIONGAP 8  --  6 5   LONNIE 9.7  --  9.8 9.0   *  --  83 94   ALBUMIN 2.8*  --   --   --    PROTTOTAL 6.6*  --   --   --    BILITOTAL 0.9  --   --   --    ALKPHOS 67  --   --   --    ALT 18  --   --   --    AST 13  --   --   --      Recent Results (from the past 24 hour(s))   XR Chest Port 1 View    Narrative    EXAM: XR CHEST PORT 1 VIEW  LOCATION: [facility]  DATE/TIME: 6/29/2022 9:15 PM    INDICATION: Fever  COMPARISON: 06/17/2022      Impression    IMPRESSION: There is opacification retrocardiac region on the left with patchy ill-defined opacity which could represent pneumonia/pneumonitis in the appropriate clinical setting. There is minimal stranding opacity in the left midlung. The right lung is   clear. Cardiac silhouette is at the upper limits of normal. Dual lead cardiac implantable device redemonstrated. Osseous structures unchanged.     Medications     sodium chloride 75 mL/hr at 06/30/22 0634       apixaban ANTICOAGULANT  5 mg Oral BID     azithromycin  250 mg Oral Daily     cefTRIAXone  1 g Intravenous Q24H     dorzolamide-timolol  1 drop Right Eye BID     folic acid  1 mg Oral Daily     hydrocortisone   Topical BID     latanoprost  1 drop Right Eye At Bedtime     levothyroxine  50 mcg Oral Daily     metoprolol succinate ER  25 mg Oral QAM     sodium chloride (PF)  3 mL Intracatheter Q8H

## 2022-06-30 NOTE — UTILIZATION REVIEW
"Admission Status; Secondary Review Determination     Under the authority of the Utilization Management Committee, the utilization review process indicated a secondary review on the above patient.  The review outcome is based on review of the medical records, discussions with staff, and applying clinical experience noted on the date of the review.          (x) Observation Status Appropriate - This patient does not meet hospital inpatient criteria and is placed in observation status. If this patient's primary payer is Medicare and was admitted as an inpatient, Condition Code 44 should be used and patient status changed to \"observation\".     RATIONALE FOR DETERMINATION     Admitted for what was thought to be symptomatic hyponatremia (weakness) with TCU lab checl of sodium 118. However, several rechecks both in the ED and inpatient setting have revealed Na 133-136 range. Outpatient lab order suspected. Patient already had physical deconditioning which was what she was overcoming at the TCU. Antibiotics for presumed pneumonia but other than mild leukocytosis has minimal findings consistent with clinically significant pneumonia.     The severity of illness, intensity of service provided, expected LOS and risk for adverse outcome make the care appropriate for further observation; however, doesn't meet criteria for hospital inpatient admission. Dr. Vargas notified of this determination.    This document was produced using voice recognition software.      The information on this document is developed by the utilization review team in order for the business office to ensure compliance.  This only denotes the appropriateness of proper admission status and does not reflect the quality of care rendered.         The definitions of Inpatient Status and Observation Status used in making the determination above are those provided in the CMS Coverage Manual, Chapter 1 and Chapter 6, section 70.4.      Sincerely,  Jelly Finch, " MD  Utilization Review  Physician Advisor  [facility].

## 2022-06-30 NOTE — CONSULTS
Care Management Initial Consult    General Information  Assessment completed with: Patient, Children, Patient and Daughter in law Ruby  Type of CM/SW Visit: Offer D/C Planning    Primary Care Provider verified and updated as needed:     Readmission within the last 30 days:        Reason for Consult: discharge planning, facility placement  Advance Care Planning: Advance Care Planning Reviewed: questions answered          Communication Assessment  Patient's communication style: spoken language (English or Bilingual)    Hearing Difficulty or Deaf: no   Wear Glasses or Blind: no    Cognitive  Cognitive/Neuro/Behavioral: .WDL except  Level of Consciousness: confused  Arousal Level: opens eyes spontaneously  Orientation: disoriented to, situation, time  Mood/Behavior: calm  Best Language: 0 - No aphasia  Speech: clear, spontaneous    Living Environment:   People in home: spouse     Current living Arrangements: house      Able to return to prior arrangements: yes  Living Arrangement Comments: Lives with spouse at home    Family/Social Support:  Care provided by: self, spouse/significant other  Provides care for: no one  Marital Status:   , Children  Fly       Description of Support System: Supportive, Involved    Support Assessment: Adequate family and caregiver support, Adequate social supports    Current Resources:   Patient receiving home care services: Yes     Community Resources: Home Care  Equipment currently used at home: walker, rolling, shower chair, grab bar, toilet, cane, straight, raised toilet seat  Supplies currently used at home:      Employment/Financial:  Employment Status: retired     Employment/ Comments:   Financial Concerns: No concerns identified   Referral to Financial Worker: No       Lifestyle & Psychosocial Needs:  Social Determinants of Health     Tobacco Use: Low Risk      Smoking Tobacco Use: Never Smoker     Smokeless Tobacco Use: Never Used    Alcohol Use: Not on file   Financial Resource Strain: Not on file   Food Insecurity: Not on file   Transportation Needs: Not on file   Physical Activity: Not on file   Stress: Not on file   Social Connections: Not on file   Intimate Partner Violence: Not on file   Depression: Not on file   Housing Stability: Not on file       Functional Status:  Prior to admission patient needed assistance:    Patient admitted to the hospital on 6/29/22 from [facility] TCU.  Patient had a prior hospitalization at this hospital from 6/14 - 6/24. Patient reports she had received home care with [facility]. Patient's spouse, son and daughter in law are involved and supportive.        Mental Health Status:      None indicated    Chemical Dependency Status:      None indicated          Values/Beliefs:  Spiritual, Cultural Beliefs, Mosque Practices, Values that affect care:                 Additional Information:  SW met with patient to introduce role and discuss discharge planning for patient. SW discussed recommendations for placement regarding returning to TCU and patient in agreement. Patient requested that SW place call to daughter-in-law Ruby. SW placed call to Ruby and [facility]. SW spoke with Yesica in admissions at [facility] and they would be able to accept patient back to their facility. SW will submit for insurance prior authorization and continue to assist with discharge.     MESFIN Nieto, LGSW  [phone]  [facility]

## 2022-06-30 NOTE — PROGRESS NOTES
06/30/22 1134   Quick Adds   Type of Visit Initial Occupational Therapy Evaluation   Living Environment   People in Home spouse   Current Living Arrangements house   Home Accessibility stairs to enter home;stairs within home   Number of Stairs, Main Entrance 4   Number of Stairs, Within Home, Primary other (see comments)  (Pt. unnsure about steps. Able to state she lives in 2 level home)   Living Environment Comments reports living in a two level home with bathrrom and bedroom upstairs.   Self-Care   Usual Activity Tolerance fair   Current Activity Tolerance poor   Equipment Currently Used at Home walker, rolling;shower chair;grab bar, toilet;cane, straight;raised toilet seat   Number of times patient has fallen within last six months 2   Activity/Exercise/Self-Care Comment Pt. provides limitted hx on responsibilities and PLOF while at home.   Instrumental Activities of Daily Living (IADL)   Previous Responsibilities   (Pt. did not sate. Expressed frustrations with questions regaring previous home set up and level of care.)   General Information   Onset of Illness/Injury or Date of Surgery 06/29/22   Referring Physician Stewart Arroyo MD   Patient/Family Therapy Goal Statement (OT) Pt. did not state. Chart report was return home   Additional Occupational Profile Info/Pertinent History of Current Problem Marley Stewart is a 89 year old female with history of hypertension, hyperlipidemia, atrial flutter, and atrial fibrillation who presents to the ED for fatigue and evaluation of abnormal labs. .   Cognitive Status Examination   Orientation Status person;place   Behavioral Issues withdrawn  (Pt. with limited responses. Frustrated comments with OT questions.)   Pain Assessment   Patient Currently in Pain Yes, see Vital Sign flowsheet  (reports 8/10 pain in left knee while in supine)   Range of Motion Comprehensive   Comment, General Range of Motion UE shoulder AROM 0-90. Genral ROM defcits limtting bed  mobility   Strength Comprehensive (MMT)   Comment, General Manual Muscle Testing (MMT) Assessment UE strength impaired. Genral strength impairments limitting mobility   Bed Mobility   Rolling Left [place] (Bed Mobility) minimum assist (75% patient effort)   Rolling Right [place] (Bed Mobility) minimum assist (75% patient effort)   Scooting/Bridging [place] (Bed Mobility) moderate assist (50% patient effort)   Supine-Sit [place] (Bed Mobility) moderate assist (50% patient effort)   Sit-Supine [place] (Bed Mobility) moderate assist (50% patient effort)   Assistive Device (Bed Mobility) bed rails   Comment (Bed Mobility) Pt was resistive to OT assist with bed mobility. Stating she did not wish to be touched.   Sit-Stand Transfer   Sit-Stand [place] (Transfers) unable to assess   Sit/Stand Transfer Comments PT. declined furtehr assessment. Required external support for EOB sit.   Balance   Balance Comments Poor EOB sit balance. Declining OT assist stated she did not want to be touched.   Clinical Impression   Criteria for Skilled Therapeutic Interventions Met (OT) Yes, treatment indicated   OT Diagnosis weakness, decreased [place] with mobility and ADL   OT Problem List-Impairments impacting ADL problems related to;activity tolerance impaired;balance;cognition;mobility;strength;pain   Assessment of Occupational Performance 3-5 Performance Deficits   Identified Performance Deficits Limitted Mobility, UE ADl, LE ADL, Dressing toileting transfers   Planned Therapy Interventions (OT) ADL retraining;cognition;IADL retraining;strengthening;transfer training;progressive activity/exercise   Clinical Decision Making Complexity (OT) moderate complexity   Risk & Benefits of therapy have been explained evaluation/treatment results reviewed;care plan/treatment goals reviewed;risks/benefits reviewed;current/potential barriers reviewed;participants voiced agreement with care plan;participants  included;patient   OT Discharge Planning   OT Discharge Recommendation (DC Rec) Transitional Care Facility;home with home care occupational therapy;home with assist   OT Rationale for DC Rec Pt. is below baseline. Demonstrates poor activity tolerance and weakness limiting bed mobility transfers, and basic self cares. She would need TCU placement to progress with strengthenning and ADL/IADL retraining with further cognitive assessment to direct additional home needs. Per chart rview, spouse can provide 5-6 hours of HHA. Pt. currently would need 24/7 care assist if returning home.   OT Brief overview of current status MOd assist bed  mobility. Poor sitting tolerance   Total Evaluation Time (Minutes)   Total Evaluation Time (Minutes) 29   OT Goals   Therapy Frequency (OT) 5 times/wk   OT Predicted Duration/Target Date for Goal Attainment 07/06/22   OT Goals Hygiene/Grooming;Upper Body Dressing;Lower Body Dressing;Toilet Transfer/Toileting   OT: Hygiene/Grooming supervision/stand-by assist   OT: Upper Body Dressing Supervision/stand-by assist   OT: Lower Body Dressing Minimal assist   OT: Transfer Minimal assist   OT: Toilet Transfer/Toileting Minimal assist

## 2022-06-30 NOTE — PROVIDER NOTIFICATION
MD Notification    Notified Person: MD    Notified Person Name: Dina    Notification Date/Time: 6/29/22 5264     Notification Interaction: amcom page    Purpose of Notification: FYI: Repeat UA resulted.     Orders Received:    Comments:

## 2022-06-30 NOTE — PROGRESS NOTES
XCoverage: paged by RN re low UO, bladder scan 72cc; will order NS at 75cc/h for 10 hours.    Maria M Angelo MD

## 2022-06-30 NOTE — PROGRESS NOTES
06/30/22 1134   Quick Adds   Type of Visit Initial Occupational Therapy Evaluation   Living Environment   People in Home spouse   Current Living Arrangements house   Home Accessibility stairs to enter home;stairs within home   Number of Stairs, Main Entrance 4   Number of Stairs, Within Home, Primary other (see comments)  (Pt. unnsure about steps. Able to state she lives in 2 level home)   Living Environment Comments reports living in a two level home with bathrrom and bedroom upstairs.   Self-Care   Usual Activity Tolerance fair   Current Activity Tolerance poor   Equipment Currently Used at Home walker, rolling;shower chair;grab bar, toilet;cane, straight;raised toilet seat   Number of times patient has fallen within last six months 2   Activity/Exercise/Self-Care Comment Pt. provides limitted hx on responsibilities and PLOF while at home.   Instrumental Activities of Daily Living (IADL)   Previous Responsibilities   (Pt. did not sate. Expressed frustrations with questions regaring previous home set up and level of care.)   General Information   Onset of Illness/Injury or Date of Surgery 06/29/22   Referring Physician Stewart Arroyo MD   Patient/Family Therapy Goal Statement (OT) Pt. did not state. Chart report was return home   Cognitive Status Examination   Orientation Status person;place   Behavioral Issues withdrawn  (Pt. with limited responses. Frustrated comments with OT questions.)   Pain Assessment   Patient Currently in Pain Yes, see Vital Sign flowsheet  (reports 8/10 pain in left knee while in supine)   Range of Motion Comprehensive   Comment, General Range of Motion UE shoulder AROM 0-90. Genral ROM defcits limtting bed mobility   Strength Comprehensive (MMT)   Comment, General Manual Muscle Testing (MMT) Assessment UE strength impaired. Genral strength impairments limitting mobility   Bed Mobility   Rolling Left [place] (Bed Mobility) minimum assist (75% patient effort)   Rolling  Right [place] (Bed Mobility) minimum assist (75% patient effort)   Scooting/Bridging [place] (Bed Mobility) moderate assist (50% patient effort)   Supine-Sit [place] (Bed Mobility) moderate assist (50% patient effort)   Sit-Supine [place] (Bed Mobility) moderate assist (50% patient effort)   Assistive Device (Bed Mobility) bed rails   Comment (Bed Mobility) Pt was resistive to OT assist with bed mobility. Stating she did not wish to be touched.   Sit-Stand Transfer   Sit-Stand [place] (Transfers) unable to assess   Sit/Stand Transfer Comments PT. declined furtehr assessment. Required external support for EOB sit.   Balance   Balance Comments Poor EOB sit balance. Declining OT assist stated she did not want to be touched.   Clinical Impression   Criteria for Skilled Therapeutic Interventions Met (OT) Yes, treatment indicated   OT Diagnosis weakness, decreased [place] with mobility and ADL   OT Problem List-Impairments impacting ADL problems related to;activity tolerance impaired;balance;cognition;mobility;strength;pain   Assessment of Occupational Performance 3-5 Performance Deficits   Identified Performance Deficits Limitted Mobility, UE ADl, LE ADL, Dressing toileting transfers   Planned Therapy Interventions (OT) ADL retraining;cognition;IADL retraining;strengthening;transfer training;progressive activity/exercise   Clinical Decision Making Complexity (OT) moderate complexity   Risk & Benefits of therapy have been explained evaluation/treatment results reviewed;care plan/treatment goals reviewed;risks/benefits reviewed;current/potential barriers reviewed;participants voiced agreement with care plan;participants included;patient   OT Discharge Planning   OT Discharge Recommendation (DC Rec) Transitional Care Facility;home with home care occupational therapy;home with assist   OT Rationale for DC Rec Pt. is below baseline. Demonstrates poor activity tolerance and weakness limiting bed  mobility transfers, and basic self cares. She would need TCU placement to progress with strengthenning and ADL/IADL retraining with further cognitive assessment to direct additional home needs. Per chart rview, spouse can provide 5-6 hours of HHA. Pt. currently would need 24/7 care assist if returning home.   OT Brief overview of current status MOd assist bed  mobility. Poor sitting tolerance   Total Evaluation Time (Minutes)   Total Evaluation Time (Minutes) 29   OT Goals   Therapy Frequency (OT) 5 times/wk   OT Predicted Duration/Target Date for Goal Attainment 07/06/22   OT Goals Hygiene/Grooming;Upper Body Dressing;Lower Body Dressing;Toilet Transfer/Toileting   OT: Hygiene/Grooming supervision/stand-by assist   OT: Upper Body Dressing Supervision/stand-by assist   OT: Lower Body Dressing Minimal assist   OT: Transfer Minimal assist   OT: Toilet Transfer/Toileting Minimal assist

## 2022-06-30 NOTE — PROVIDER NOTIFICATION
MD Notification    Notified Person: MD    Notified Person Name:  Dr. Vargas    Notification Date/Time:  1014 6/30/22    Notification Interaction:  [employer] messaging    Purpose of Notification:  Ashley, daughter, would like an update on the patient and anticipated discharge date. She can be reached at [phone]    Orders Received:  No orders anticipated. MD read message at 1012    Comments:  Spoke with daughter Ashley over the phone and provided her with an update.  Informed her I would send the MD a message relaying she wished to talk to them.

## 2022-06-30 NOTE — PLAN OF CARE
Goal Outcome Evaluation:        Pt arrived from ED around 1700. Pt A/O x3, disoriented to time. Denies N/V. C/o pain at old IV site (R hand), removed, new IV placed with lidocaine cream and PRN ativan for anxiety. LS diminished, abx started for pneumonia. Up A1 gb/w, not OOB this shift, t/r q2h while in bed. Regular diet, tolerating well, fair appetite. Incontinent B/B, purewick in place with low UOP, bladder scanned at 73, 2x BM this shift. Redness to bottom, sacral mepi applied, WOC nurse consult pending. Perineal rash, hydrocortisone cream scheduled. Dry scab to L 2nd toe, RHONDA. Pt has very sensitive skin, will jump and withdraw from slight touch. UA sent down to lab, results pending. R hand PIV SL, flushing well, int abx. Discharge pending improvement.

## 2022-06-30 NOTE — PLAN OF CARE
Goal Outcome Evaluation:    Plan of care discussed with patient at bedside.     A/O x 2-3; disoriented to situation and time at times; able to state month; mentation fluctuates from a/ox4 to confused; forgetful---per daughterashley, the fluctuation in mentation and confusion is her baseline.  Hypertensive otherwise VSS on RA.  Denies pain, nausea, vomiting, or shortness of breath.  Turn and reposition Q2H; appears to be in pain with repositioning, pt declines any interventions.  Mepilex on coccyx; changed today--blanchable redness.  Up with strong Ax 1 gait belt; walker.  IVF stopped at 1700.  Incontinent of bowel and bladder; Purewick in place; small amount of output; MD aware; BM x 1 this shift.  DaughterAshley, will be here tomorrow at 0815 to work with PT.  Discharge to TCU; possibly tomorrow or saturday, bed on hold.

## 2022-06-30 NOTE — PLAN OF CARE
A&Ox3, disoriented to time. VSS on RA ex HTN. Denies pain/nausea. Not oob this shift, reported to be up A1/gb/w. T/R q2h while in bed. LS diminished. Regular diet. Incontinent of B/B, purewick in place with low UOP. Bladder scan: 72. IVF initiated. PIV infusing NS @ 75mL/hr with int abx. BM x 1 this shift. Sacral mepilex in place. Na: 136. BC pending. Plan to discharge to TCU pending improvement.

## 2022-06-30 NOTE — PROGRESS NOTES
06/30/22 1600   Quick Adds   Quick Adds Certification   Type of Visit Initial PT Evaluation       Present no   Living Environment   People in Home spouse   Current Living Arrangements house   Home Accessibility stairs to enter home;stairs within home   Living Environment Comments Patient recently admitted to [facility] from 6/14-6/24 for treatment of sepsis with acute metabolic encephalopathy due to E.coli UTI, discharged to [facility] TCU.  At baseline, patient lives with spouse in multi-level home.   Self-Care   Usual Activity Tolerance moderate   Current Activity Tolerance fair   Fall history within last six months yes   Number of times patient has fallen within last six months   (Patient unable to state number of falls.)   Activity/Exercise/Self-Care Comment Patient unable to describe prior level of function, expresses frustration about question.  Patient stating her  can help her if she needs help.   General Information   Onset of Illness/Injury or Date of Surgery 06/29/22   Referring Physician Stewart Arroyo MD   Patient/Family Therapy Goals Statement (PT) Patient does not state.   Pertinent History of Current Problem (include personal factors and/or comorbidities that impact the POC) 89-year-old female with chronic atrial fibrillation, chronic diastolic CHF, previous CVA, hypertension, rheumatoid arthritis, hypothyroidism who presented on 6/29 due to generalized weakness.  She was most recently hospitalized from 6/14-6/24 due to acute cystitis with sepsis due to E. Coli and probable left lower lobe pneumonia.  She was treated with 7 days of IV ceftriaxone +2 days of amoxicillin.  Developed drug rash with amoxicillin.  Family indicated preference for noninvasive work-up.  She was now brought back as she had remained weak at TCU.  Labs obtained at TCU showed sodium of 118. On repeat testing in ER, this was 133.   Existing  Precautions/Restrictions fall   Cognition   Affect/Mental Status (Cognition) confused;anxious;low arousal/lethargic   Orientation Status (Cognition) oriented to;person;unable/difficult to assess   Follows Commands (Cognition) follows one-step commands;follows two-step commands;25-49% accuracy;delayed response/completion;increased processing time needed;repetition of directions required;verbal cues/prompting required   Behavioral Issues withdrawn;overwhelmed easily   Pain Assessment   Patient Currently in Pain No   Integumentary/Edema   Integumentary/Edema Comments Age-related skin changes   Posture    Posture Forward head position;Protracted shoulders   Range of Motion (ROM)   Range of Motion ROM deficits secondary to weakness   Strength (Manual Muscle Testing)   Strength (Manual Muscle Testing) Deficits observed during functional mobility   Bed Mobility   Comment, (Bed Mobility) Patient requires minAx1 at lower extremities for sit > supine transfer with HOB flat and no bed rail.  Patient able to roll R and L mod I using bed rails for upper extremity support.   Transfers   Comment, (Transfers) Patient able to complete sit <> stand transfer from recliner chair with CGAx1, initially with poor hand placement pulling on walker.  Patient does not like staff assisting with transfer.   Gait/Stairs (Locomotion)   Comment, (Gait/Stairs) Patient able to take x5 very short steps with bilateral upper extremity support on FWW and CGA.  Patient initially with forward flexed posture and downward gaze.  Patient fatigues rapidly with standing/gait, requires seated rest.   Balance   Balance Comments Impaired dynamic balance   Sensory Examination   Sensory Perception patient reports no sensory changes   Clinical Impression   Criteria for Skilled Therapeutic Intervention Yes, treatment indicated   PT Diagnosis (PT) Impaired functional mobility   Influenced by the following impairments Cognitive status, weakness, deconditioning, poor  activity tolerance   Functional limitations due to impairments Impaired independence with bed mobility, transfers, gait, and stair negotiation   Clinical Presentation (PT Evaluation Complexity) Evolving/Changing   Clinical Presentation Rationale Clinical judgement, PMH, social support   Clinical Decision Making (Complexity) moderate complexity   Planned Therapy Interventions (PT) balance training;bed mobility training;gait training;home exercise program;neuromuscular re-education;strengthening;transfer training;patient/family education;postural re-education;stair training;progressive activity/exercise   Anticipated Equipment Needs at Discharge (PT) walker, rolling   Risk & Benefits of therapy have been explained evaluation/treatment results reviewed;care plan/treatment goals reviewed;risks/benefits reviewed;participants voiced agreement with care plan;participants included;patient   PT Discharge Planning   PT Discharge Recommendation (DC Rec) Transitional Care Facility   PT Rationale for DC Rec Patient present significantly below prior level of function, presents from TCU.  Patient requires significant time and encouragement for activity but able to complete sit <> stand transfer with CGA x 1-2.  Patient unable to progress gait distance beyond a few steps with FWW.  Patient fatigues rapidly.  Recommend TCU to progress safety and independence with mobility prior to returning home with spouse assistance.   Therapy Certification   Start of care date 06/30/22   Certification date from 06/30/22   Certification date to 07/07/22   Medical Diagnosis Weakness/deconditioning   Total Evaluation Time   Total Evaluation Time (Minutes) 8   Physical Therapy Goals   PT Frequency 5x/week   PT Predicted Duration/Target Date for Goal Attainment 07/05/22   PT Goals Bed Mobility;Transfers;Gait;Stairs   PT: Bed Mobility Supervision/stand-by assist;Supine to/from sit;Rolling   PT: Transfers Supervision/stand-by assist;Sit to/from  stand;Bed to/from chair;Assistive device   PT: Gait Supervision/stand-by assist;100 feet;Rolling walker   PT: Stairs Minimal assist;4 stairs;Assistive device

## 2022-07-01 ENCOUNTER — APPOINTMENT (OUTPATIENT)
Dept: PHYSICAL THERAPY | Facility: CLINIC | Age: 87
DRG: 206 | End: 2022-07-01
Payer: COMMERCIAL

## 2022-07-01 ENCOUNTER — APPOINTMENT (OUTPATIENT)
Dept: CT IMAGING | Facility: CLINIC | Age: 87
DRG: 206 | End: 2022-07-01
Attending: INTERNAL MEDICINE
Payer: COMMERCIAL

## 2022-07-01 ENCOUNTER — APPOINTMENT (OUTPATIENT)
Dept: SPEECH THERAPY | Facility: CLINIC | Age: 87
DRG: 206 | End: 2022-07-01
Attending: INTERNAL MEDICINE
Payer: COMMERCIAL

## 2022-07-01 LAB
ALBUMIN SERPL-MCNC: 2.8 G/DL (ref 3.4–5)
ALP SERPL-CCNC: 67 U/L (ref 40–150)
ALT SERPL W P-5'-P-CCNC: 18 U/L (ref 0–50)
ANION GAP SERPL CALCULATED.3IONS-SCNC: 7 MMOL/L (ref 3–14)
AST SERPL W P-5'-P-CCNC: 15 U/L (ref 0–45)
BILIRUB SERPL-MCNC: 0.9 MG/DL (ref 0.2–1.3)
BUN SERPL-MCNC: 17 MG/DL (ref 7–30)
CALCIUM SERPL-MCNC: 9.3 MG/DL (ref 8.5–10.1)
CHLORIDE BLD-SCNC: 103 MMOL/L (ref 94–109)
CO2 SERPL-SCNC: 24 MMOL/L (ref 20–32)
CREAT SERPL-MCNC: 0.8 MG/DL (ref 0.52–1.04)
ERYTHROCYTE [DISTWIDTH] IN BLOOD BY AUTOMATED COUNT: 13.2 % (ref 10–15)
GFR SERPL CREATININE-BSD FRML MDRD: 70 ML/MIN/1.73M2
GLUCOSE BLD-MCNC: 122 MG/DL (ref 70–99)
HCT VFR BLD AUTO: 39 % (ref 35–47)
HGB BLD-MCNC: 12.8 G/DL (ref 11.7–15.7)
MCH RBC QN AUTO: 31.5 PG (ref 26.5–33)
MCHC RBC AUTO-ENTMCNC: 32.8 G/DL (ref 31.5–36.5)
MCV RBC AUTO: 96 FL (ref 78–100)
PLATELET # BLD AUTO: 444 10E3/UL (ref 150–450)
POTASSIUM BLD-SCNC: 3.6 MMOL/L (ref 3.4–5.3)
PROT SERPL-MCNC: 6.6 G/DL (ref 6.8–8.8)
RBC # BLD AUTO: 4.06 10E6/UL (ref 3.8–5.2)
SODIUM SERPL-SCNC: 134 MMOL/L (ref 133–144)
WBC # BLD AUTO: 14.3 10E3/UL (ref 4–11)

## 2022-07-01 PROCEDURE — 99232 SBSQ HOSP IP/OBS MODERATE 35: CPT | Performed by: INTERNAL MEDICINE

## 2022-07-01 PROCEDURE — 92610 EVALUATE SWALLOWING FUNCTION: CPT | Mod: GN | Performed by: SPEECH-LANGUAGE PATHOLOGIST

## 2022-07-01 PROCEDURE — 120N000001 HC R&B MED SURG/OB

## 2022-07-01 PROCEDURE — 97116 GAIT TRAINING THERAPY: CPT | Mod: GP

## 2022-07-01 PROCEDURE — 250N000011 HC RX IP 250 OP 636: Performed by: INTERNAL MEDICINE

## 2022-07-01 PROCEDURE — 80053 COMPREHEN METABOLIC PANEL: CPT | Performed by: HOSPITALIST

## 2022-07-01 PROCEDURE — 250N000013 HC RX MED GY IP 250 OP 250 PS 637: Performed by: HOSPITALIST

## 2022-07-01 PROCEDURE — 85027 COMPLETE CBC AUTOMATED: CPT | Performed by: HOSPITALIST

## 2022-07-01 PROCEDURE — 71250 CT THORAX DX C-: CPT

## 2022-07-01 PROCEDURE — 36415 COLL VENOUS BLD VENIPUNCTURE: CPT | Performed by: HOSPITALIST

## 2022-07-01 PROCEDURE — 97530 THERAPEUTIC ACTIVITIES: CPT | Mod: GP

## 2022-07-01 RX ORDER — LEVOFLOXACIN 5 MG/ML
750 INJECTION, SOLUTION INTRAVENOUS
Status: DISCONTINUED | OUTPATIENT
Start: 2022-07-01 | End: 2022-07-02 | Stop reason: HOSPADM

## 2022-07-01 RX ADMIN — APIXABAN 5 MG: 5 TABLET, FILM COATED ORAL at 21:54

## 2022-07-01 RX ADMIN — ACETAMINOPHEN 650 MG: 325 TABLET ORAL at 03:46

## 2022-07-01 RX ADMIN — LEVOTHYROXINE SODIUM 50 MCG: 50 TABLET ORAL at 06:36

## 2022-07-01 RX ADMIN — FOLIC ACID 1 MG: 1 TABLET ORAL at 09:39

## 2022-07-01 RX ADMIN — HYDROCORTISONE: 1 CREAM TOPICAL at 09:40

## 2022-07-01 RX ADMIN — LEVOFLOXACIN 750 MG: 750 INJECTION, SOLUTION INTRAVENOUS at 16:30

## 2022-07-01 RX ADMIN — LATANOPROST 1 DROP: 50 SOLUTION/ DROPS OPHTHALMIC at 21:54

## 2022-07-01 RX ADMIN — MICONAZOLE NITRATE: 2 POWDER TOPICAL at 21:53

## 2022-07-01 RX ADMIN — APIXABAN 5 MG: 5 TABLET, FILM COATED ORAL at 09:39

## 2022-07-01 RX ADMIN — METOPROLOL SUCCINATE 25 MG: 25 TABLET, EXTENDED RELEASE ORAL at 09:39

## 2022-07-01 RX ADMIN — DORZOLAMIDE HYDROCHLORIDE AND TIMOLOL MALEATE 1 DROP: 20; 5 SOLUTION/ DROPS OPHTHALMIC at 21:54

## 2022-07-01 RX ADMIN — DORZOLAMIDE HYDROCHLORIDE AND TIMOLOL MALEATE 1 DROP: 20; 5 SOLUTION/ DROPS OPHTHALMIC at 09:40

## 2022-07-01 RX ADMIN — MICONAZOLE NITRATE: 2 POWDER TOPICAL at 09:40

## 2022-07-01 ASSESSMENT — ACTIVITIES OF DAILY LIVING (ADL)
ADLS_ACUITY_SCORE: 48

## 2022-07-01 NOTE — CONSULTS
[facility] Nurse Inpatient Wound Assessment   Reason for consultation: Evaluate and treat  suspected PI sacrum/coccyx;    Assessment  No pressure injuries on coccyx or sacrum (see photos)  Noted groin/upper medial thigh rash, erythema, dermatitis, itching and satellite lesions indicative of candidiasis   Status: initial assessment    Treatment Plan  Pressure Injury prevention  1. Turn every 2 hours, side to side avoid supine on pressure redistribution support surface   2.   Float heels off bed with use of pillows under legs, if ineffective, order offloading boots: Heel Lift boots ([employer] #[id number])  3.   PerianalCleanse with incontinent cleanser (Deanna spray # [id number]) followed by skin barrier protectant (Critic Aid paste #[id number])  BID and after each incontinent episode  4.   Prevent sliding and shear by limiting HOB to 30 degrees or less unless contraindicated, use knee gatch first if not contraindicated  5.   If sitting, order pressure redistribution chair cushion from [facility] (#[id number]); if unable to shift weight every hour, please limit sitting to an hour at a time  6.   Protective foam dressings PRN,Change at least q 4 days, peel back, peek and replace for daily skin inspection.  7.   Optimize nutrition       Orders Written and in AVS  Recommended provider order: Antifungal RN(Cindy states she will notify MD)  WO Nurse follow-up plan:weekly  Nursing to notify the Provider(s) and re-consult the [facility] Nurse if wound(s) deteriorates or new skin concern.    Patient History  According to provider note(s):  89-year-old female with chronic atrial fibrillation, chronic diastolic CHF, previous CVA, hypertension, rheumatoid arthritis, hypothyroidism who presented on 6/29 due to generalized weakness.  She was most recently hospitalized from 6/14-6/24 due to acute cystitis with sepsis due to E. Coli and probable left lower lobe pneumonia.  She was treated with 7 days of IV ceftriaxone +2 days of amoxicillin.  Developed drug rash with  amoxicillin.  Family indicated preference for noninvasive work-up.  She was now brought back as she had remained weak at TCU.  Labs obtained at TCU showed sodium of 118. On repeat testing in ER, this was 133.    Objective Data     Allergies   Allergen Reactions     Amitriptyline      Clonazepam Other (See Comments)     Somnolence at 0.5 mg dose     Amoxicillin Rash     Latex Rash     Armstrong Catheter: Not present  Containment of urine/stool: Incontinence Protocol (ordered) purewick female external urinary cath    Active Diet Order  Orders Placed This Encounter      Combination Diet Regular Diet Adult      Output:   I/O last 3 completed shifts:  In: 120 [P.O.:120]  Out: 500 [Urine:500]    Risk Assessment:   Sensory Perception: 4-->no impairment  Moisture: 3-->occasionally moist  Activity: 2-->chairfast  Mobility: 2-->very limited  Nutrition: 2-->probably inadequate  Friction and Shear: 2-->potential problem  Charbel Score: 15                          Labs:   Recent Labs   Lab 22  0708   ALBUMIN 2.8*   HGB 13.1   WBC 13.8*     Temp (24hrs), Av.9  F (36.6  C), Min:97.4  F (36.3  C), Max:98.4  F (36.9  C)    Physical Exam  Areas of skin assessed: coccyx, perianal, groin thighs (sacrum and coccyx intact without pressure injury)    2022 Bilateral groin upper thighs   2022 Mild Perianal Dermatitis  2022 coccyx intact mole, erythema resolved  Itching dermatitis with satellite lesions    Interventions  Visual inspection and assessment completed   Skin Care Rationale: moisture management and pressure injury prevention  Wound Care: completed by RN  Supplies: floor stock   Current off-loading measures:   1. Turn every 2 hours, side to side positioning with pillows   2. Float heels off bed with use of pillows under legs  3. Prevent sliding and shear by limiting HOB to 30 degrees or less unless contraindicated, use knee gatch first if not contraindicated  Current support surface: Standard  Atmos Air  mattress  Education provided to: importance of repositioning, plan of care and Moisture management  Discussed plan of care with Patient and Family    Aidee Cervantes MS RN CWOCN    Dept. Pager: [phone]  Dept. Office Number: [phone].

## 2022-07-01 NOTE — DISCHARGE INSTRUCTIONS
Pressure Injury prevention  Turn every 2 hours, side to side avoid supine on pressure redistribution support surface   2.   Float heels off bed with use of pillows under legs, if ineffective, order offloading boots  3.   Incontinence Cleanse with incontinent cleanser (eg Deanna spray ) followed by skin barrier protectant (eg Critic Aid paste)  twice daily and after each incontinent episode  4.   Prevent sliding and shear by limiting HOB to 30 degrees or less unless contraindicated, use knee gatch first if not contraindicated  5.   Pressure redistribution chair cushion, if unable to shift weight every hour, please limit sitting to an hour at a time  6.   Protective foam dressings PRN,Change at least q 4 days, peel back, peek and replace for daily skin inspection.  7.   Optimize nutrition     Groin rash-clean with incontinent cleanser apply antifungal product (per provider order)

## 2022-07-01 NOTE — PROGRESS NOTES
BJ assisting in notifying CHASE Ramirez of pt discharge time of 1030 on 7/2/22 to TCU via stretcher.  Ashley states that she spoke with MD today, and MD said pt might be 1-2 more days (7/2-7/3).  Ashley wants to ensure that everyone is in agreement.    BJ text paged MD and requested that MD notify BJ if ride should be cancelled for 7/2/22.

## 2022-07-01 NOTE — PROGRESS NOTES
[facility]    Medicine Progress Note - Hospitalist Service    Date of Admission:  6/29/2022    Assessment & Plan          Marley Stewart is a pleasant 89-year-old female with chronic atrial fibrillation, chronic diastolic CHF, previous CVA, hypertension, rheumatoid arthritis, hypothyroidism who presented on 6/29 due to generalized weakness.  She was most recently hospitalized from 6/14-6/24 due to acute cystitis with sepsis due to E. Coli and probable left lower lobe pneumonia.  She was treated with 7 days of IV ceftriaxone +2 days of amoxicillin.  Developed drug rash with amoxicillin.  Family indicated preference for noninvasive work-up.  She was now brought back as she had remained weak at TCU.  Labs obtained at TCU showed sodium of 118. On repeat testing in ER, this was 133.    #.  Hyponatremia ruled out  -Was sent to ER from TCU due to sodium of 118.  Recheck in ER showed sodium of 133, now 136.  Sodium of 118 obtained at TCU was likely a lab error.  Patient does not have hyponatremia.    #.  Probable left lower lobe bacterial pneumonia  -Chest x-ray shows ill-defined retrocardiac left lower lobe infiltrate concerning for pneumonia/pneumonitis.  Patient also has leukocytosis.  She was noted to have left lower lobe infiltrate during her recent admission and its not entirely clear if this is a new infiltrate.  -On IV ceftriaxone and azithromycin, continue  - had a low grade temp of 100.9 today    #.  Generalized weakness-most likely secondary to recent UTI/pneumonia and physical deconditioning  -PT/OT evaluation    #.  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  Grade 3 diastolic dysfunction  -Echo obtained 6/19 showed EF 55-60%, asymmetric thickening of LV walls, prominent proximal septal thickening, apical hypertrophy.  Findings were concerning for variant of hypertrophic obstructive cardiomyopathy.  -Cardiology consultation was deferred as per family's choice.  Family  indicated they do not want any aggressive/invasive procedures  -Received 750 ml IV fluid    #.  Chronic diastolic CHF with preserved EF of 55-60%  #.  Moderate pulmonary hypertension  -No acute exacerbation    #.  Hypoalbuminemia, albumin 2.8    #.  Recent admission for acute cystitis with sepsis due to E. coli and probable left lower lobe pneumonia, hospitalized 6/14-6/24.    - Treated with 7 days of IV ceftriaxone +2 days of amoxicillin    #.  Mild drug rash due to amoxicillin  -Continue hydrocortisone cream    #.  Chronic atrial fibrillation  #.  S/p pacemaker implantation  - Continues on PTA metoprolol and apixaban     #.  Rheumatoid arthritis  Takes methotrexate on Wednesdays.  Last took on 6/29  - Holding methotrexate, resume at discharge     #.  Hypothyroidism  Chronic and stable on levothyroxine     #.  History of CVA  - Continues on PTA apixaban    #.  Glaucoma  - Continues on PTA eye drops           Diet: Combination Diet Regular Diet Adult    DVT Prophylaxis: Low Risk/Ambulatory with no VTE prophylaxis indicated  Armstrong Catheter: Not present  Central Lines: None  Cardiac Monitoring: None  Code Status: No CPR- Do NOT Intubate      Disposition Plan     Expected Discharge Date: 07/02/2022                The patient's care was discussed with the Patient and Patient's Family.    Anita Vargas MD  Hospitalist Service    Securely message with the [employer] Web Console (learn more here)  Text page via [employer] Paging/Directory         Clinically Significant Risk Factors Present on Admission                      ______________________________________________________________________    Interval History   Patient reports she does not feel well  Remains weak and tired  Low grade fever of 100.9  No chest pain or shortness of breath  No nausea or vomiting      Data reviewed today: I reviewed all medications, new labs and imaging results over the last 24 hours. I personally reviewed no  images or EKG's today.    Echo 6/19/2022  Left ventricular systolic function is normal.  The visual ejection fraction is 55-60%.  Significant assymmetric thickening of the left ventricular walls, prominent proximal septal thickening as well as apical hypertrophy. Findings suggest variant of hypertrophic obstructive cardiomyopathy.  Grade III or advanced diastolic dysfunction.  The right ventricle is normal in structure, function and size.  Moderate (46-55mmHg) pulmonary hypertension is present.  Aortic sclerosis without stenosis.  IVC diameter and respiratory changes fall into an intermediate range suggesting an RA pressure of 8 mmHg.  There is no pericardial effusion.  Moderate left pleural effusion     Recommend cardiac MRI if clinically indicated. Findings compared to study dated 11/3/2016, there appears to be more advanced diastolic dysfunction as well as progressive hypertrophic cardiomyopathy.    Physical Exam   Vital Signs: Temp: 97.7  F (36.5  C) Temp src: Oral BP: 122/67 Pulse: 70   Resp: 18 SpO2: 98 % O2 Device: None (Room air)    Weight: 0 lbs 0 oz     Constitutional-patient is awake and alert, resting in bed, in no acute distress  Cardiovascular-irregular rhythm, normal rate, no murmurs, no edema  Pulmonary-lungs are clear to auscultation bilaterally, no wheezing or rhonchi  GI-abdomen is soft, nontender, nondistended, no hepatosplenomegaly or masses  Integumentary-skin is warm and dry, no rashes or ulcers  Neurological-patient is awake, alert.  Moving all 4 extremities, normal speech, no focal deficits    Data   Recent Labs   Lab 07/01/22  0929 06/30/22  0708 06/29/22  1736 06/29/22  1413   WBC 14.3* 13.8*  --  14.4*   HGB 12.8 13.1  --  13.6   MCV 96 95  --  92    480*  --  507*    136 136 133   POTASSIUM 3.6 3.9  --  4.0   CHLORIDE 103 103  --  101   CO2 24 25  --  26   BUN 17 16  --  16   CR 0.80 0.78  --  0.82   ANIONGAP 7 8  --  6   LONNIE 9.3 9.7  --  9.8   * 111*  --  83    ALBUMIN 2.8* 2.8*  --   --    PROTTOTAL 6.6* 6.6*  --   --    BILITOTAL 0.9 0.9  --   --    ALKPHOS 67 67  --   --    ALT 18 18  --   --    AST 15 13  --   --      No results found for this or any previous visit (from the past 24 hour(s)).  Medications       apixaban ANTICOAGULANT  5 mg Oral BID     azithromycin  250 mg Oral Daily     cefTRIAXone  1 g Intravenous Q24H     dorzolamide-timolol  1 drop Right Eye BID     folic acid  1 mg Oral Daily     hydrocortisone   Topical BID     latanoprost  1 drop Right Eye At Bedtime     levothyroxine  50 mcg Oral Daily     metoprolol succinate ER  25 mg Oral QAM     miconazole   Topical BID     sodium chloride (PF)  3 mL Intracatheter Q8H

## 2022-07-01 NOTE — PROGRESS NOTES
Care Management Discharge Note    Discharge Date: 07/02/2022       Discharge Disposition: Transitional Care - [facility]    Discharge Services:      Discharge DME:      Discharge Transportation:   Stretcher transport arranged for 10:30 on 7/2. Stretcher secondary to need for confusion, memory loss, assist of 2 with lift.     Private pay costs discussed: Not applicable    PAS Confirmation Code: [id number]  Patient/family educated on Medicare website which has current facility and service quality ratings:      Education Provided on the Discharge Plan:    Persons Notified of Discharge Plans: Patient and DIL Ashley,   Patient/Family in Agreement with the Plan: yes    Handoff Referral Completed: No    Additional Information:   SW placed call to  Transport to arrange for a stretcher ride at 10:30 on 7/2.        CARLOZ Nieto

## 2022-07-01 NOTE — PROVIDER NOTIFICATION
MD Notification    Notified Person: MD    Notified Person Name: Isela    Notification Date/Time:07/01/22 6:57 PM     Notification Interaction: text paged MD    Purpose of Notification:Chest CT shows changes from previous Please advise     Orders Received: Continue vitals Q4, monitor BP, 12 lead EKG    Comments:

## 2022-07-01 NOTE — PROGRESS NOTES
"   07/01/22 1513   General Information   Onset of Illness/Injury or Date of Surgery 06/29/22   Referring Physician Anita Vargas MD   Patient/Family Therapy Goal Statement (SLP) unable   Pertinent History of Current Problem Marley Stewart is a pleasant 89-year-old female with chronic atrial fibrillation, chronic diastolic CHF, previous CVA, hypertension, rheumatoid arthritis, hypothyroidism who presented on 6/29 due to generalized weakness.  She was most recently hospitalized from 6/14-6/24 due to acute cystitis with sepsis due to E. Coli and probable left lower lobe pneumonia.  She was treated with 7 days of IV ceftriaxone +2 days of amoxicillin.  Developed drug rash with amoxicillin.  Family indicated preference for noninvasive work-up.  She was now brought back as she had remained weak at TCU.  Labs obtained at TCU showed sodium of 118. On repeat testing in ER, this was 133. Probable left lower lobe bacterial pneumonia. Chest x-ray shows ill-defined retrocardiac left lower lobe infiltrate concerning for pneumonia/pneumonitis.  Patient also has leukocytosis.  She was noted to have left lower lobe infiltrate during her recent admission and its not entirely clear if this is a new infiltrate.   General Observations Pt asleep, but easily aroused and responded \"yea\" or \"okay\" to questions. Unable to follow for oral motor exam or hx review   Past History of Dysphagia Noted SLP noted in 2015 and VFSS; pt discharged on a regular diet and thin liquids with minimal dysphagia noted   Type of Evaluation   Type of Evaluation Swallow Evaluation   Oral Motor   Oral Musculature unable to assess due to poor participation/comprehension   Vocal Quality/Secretion Management (Oral Motor)   Vocal Quality (Oral Motor) WNL   General Swallowing Observations   Respiratory Support (General Swallowing Observations) none   Current Diet/Method of Nutritional Intake (General Swallowing Observations, NIS) regular diet;thin liquids (level 0) "   Swallowing Evaluation Clinical swallow evaluation   Esophageal Phase of Swallow   Patient reports or presents with symptoms of esophageal dysphagia Yes   Esophageal comments belching noted   Swallowing Recommendations   Diet Consistency Recommendations regular diet;thin liquids (level 0)   Supervision Level for Intake patient independent   Mode of Delivery Recommendations bolus size, small;slow rate of intake;food moistened   Swallowing Maneuver Recommendations alternate food and liquid intake   Monitoring/Assistance Required (Eating/Swallowing) stop eating activities when fatigue is present;monitor for cough or change in vocal quality with intake   Recommended Feeding/Eating Techniques (Swallow Eval) maintain upright sitting position for eating;maintain upright posture during/after eating for 30 minutes;provide 6 smaller meals throughout day;schedule meals prior to therapy to avoid therapy fatigue   Medication Administration Recommendations, Swallowing (SLP) per pt preference   Clinical Impression   Criteria for Skilled Therapeutic Interventions Met (SLP Eval) No problems identified which require skilled intervention   Clinical Impression Comments SLP: Pt presents with no appreciable dysphagia on clinical swallow evaluation. . Pt did self feed soda by straw and entire cookie. No oral deficits, good timing and no overt s/sx of aspiration throughout the evaluation. No oral residue noted. Occasional belching and possible esophageal dysphagia noted. Okay to continue a regular diet and thin liquids with smaller/frequent meals, fully upright with intake and stay up for 30 minutes after eating. No further SLP services indicated at this time.   SLP Discharge Planning   SLP Discharge Recommendation Transitional Care Facility  (per PT/OT)   SLP Rationale for DC Rec SLP evaluation only; no further SLP services indicated at this time   SLP Brief overview of current status  Okay to continue a regular diet and thin liquids  with smaller/frequent meals, fully upright with intake and stay up for 30 minutes after eating.    Total Evaluation Time   Total Evaluation Time (Minutes) 15

## 2022-07-01 NOTE — PLAN OF CARE
VSS, alert, oriented to self and place.  Up with a lift, pt is very weak and even has difficulty with sitting on EOB without falling back.  Incontinent of bowel and bladder.  New protective mepilex placed on coccyx.  Groin erythema, powder applied.  Levaquin started today.  Plan for discharge via stretcher back to [facility] TCU tomorrow at 1030.

## 2022-07-01 NOTE — PLAN OF CARE
Goal Outcome Evaluation:        6/30/22 5302-0368  Pt A/O x2, fluctuates. On RA, max temp 100.9, PRN tylenol given w/ relief. PIV SL. Incont B/B, low UOP. Mepi to coccyx. Up A2 w/ lift. T/R q2h. On regular diet w/ poor appetite. Meds whole w/ water. Discharge back to [facility] TCU possibly 7/2 pending improvement.

## 2022-07-01 NOTE — PROVIDER NOTIFICATION
MD Notification    Notified Person: MD    Notified Person Name: Dr. Angelo    Notification Date/Time: 0316 7/1/22    Notification Interaction: AMCOM    Purpose of Notification:  Pt has new temp of 100.9.    Orders Received: Give tylenol and monitor    Comments:

## 2022-07-01 NOTE — UTILIZATION REVIEW
Admission Status; Secondary Review Determination    Under the authority of the Utilization Management Committee, the utilization review process indicated a secondary review on the above patient. The review outcome is based on review of the medical records, discussions with staff, and applying clinical experience noted on the date of the review.    (x) Inpatient Status Appropriate - This patient's medical care is consistent with medical management for inpatient care and reasonable inpatient medical practice.    RATIONALE FOR DETERMINATION: 89-year-old female with history of hypertension, atrial fibs/flutter was hospitalized for 10 days in mid June for sepsis with acute metabolic encephalopathy due to E. coli urinary tract infection and then transferred to TCU.  While at rehab patient has developed worsening fatigue and presents to the hospital with initial hyponatremia at the TCU felt to be a lab error with repeat sodium level 133 but patient has new leukocytosis with a chest x-ray revealing a retrocardiac infiltrate concerning for pneumonia with intermittent tachypnea, normal O2 saturation.  With patient's management of new pneumonia with increased risk as patient has been recently in the hospital as well as TCU with new documented fever in the hospital 100.9 degrees on hospital day 3 and persistent unchanged leukocytosis, inpatient management appropriate.    At the time of admission with the information available to the attending physician more than 2 nights Hospital complex care was anticipated, based on patient risk of adverse outcome if treated as outpatient and complex care required. Inpatient admission is appropriate based on the Medicare guidelines.    This document was produced using voice recognition software    The information on this document is developed by the utilization review team in order for the business office to ensure compliance. This only denotes the appropriateness of proper admission  status and does not reflect the quality of care rendered.    The definitions of Inpatient Status and Observation Status used in making the determination above are those provided in the CMS Coverage Manual, Chapter 1 and Chapter 6, section 70.4.    Sincerely,    Librado Henriquez MD  Utilization Review  Physician Advisor  [facility].

## 2022-07-02 VITALS
OXYGEN SATURATION: 96 % | DIASTOLIC BLOOD PRESSURE: 71 MMHG | HEIGHT: 64 IN | RESPIRATION RATE: 20 BRPM | TEMPERATURE: 98.1 F | HEART RATE: 66 BPM | BODY MASS INDEX: 24.31 KG/M2 | SYSTOLIC BLOOD PRESSURE: 135 MMHG

## 2022-07-02 LAB
CRP SERPL-MCNC: 108 MG/L (ref 0–8)
ERYTHROCYTE [DISTWIDTH] IN BLOOD BY AUTOMATED COUNT: 13 % (ref 10–15)
HCT VFR BLD AUTO: 37.1 % (ref 35–47)
HGB BLD-MCNC: 12.4 G/DL (ref 11.7–15.7)
MCH RBC QN AUTO: 31.5 PG (ref 26.5–33)
MCHC RBC AUTO-ENTMCNC: 33.4 G/DL (ref 31.5–36.5)
MCV RBC AUTO: 94 FL (ref 78–100)
PLATELET # BLD AUTO: 409 10E3/UL (ref 150–450)
RBC # BLD AUTO: 3.94 10E6/UL (ref 3.8–5.2)
WBC # BLD AUTO: 12.8 10E3/UL (ref 4–11)

## 2022-07-02 PROCEDURE — 36415 COLL VENOUS BLD VENIPUNCTURE: CPT | Performed by: INTERNAL MEDICINE

## 2022-07-02 PROCEDURE — 86140 C-REACTIVE PROTEIN: CPT | Performed by: INTERNAL MEDICINE

## 2022-07-02 PROCEDURE — 85027 COMPLETE CBC AUTOMATED: CPT | Performed by: HOSPITALIST

## 2022-07-02 PROCEDURE — 99239 HOSP IP/OBS DSCHRG MGMT >30: CPT | Performed by: INTERNAL MEDICINE

## 2022-07-02 PROCEDURE — 250N000013 HC RX MED GY IP 250 OP 250 PS 637: Performed by: INTERNAL MEDICINE

## 2022-07-02 PROCEDURE — 250N000013 HC RX MED GY IP 250 OP 250 PS 637: Performed by: HOSPITALIST

## 2022-07-02 PROCEDURE — 36415 COLL VENOUS BLD VENIPUNCTURE: CPT | Performed by: HOSPITALIST

## 2022-07-02 RX ORDER — BENZOCAINE/MENTHOL 6 MG-10 MG
LOZENGE MUCOUS MEMBRANE 2 TIMES DAILY
Qty: 15 G | Refills: 0 | DISCHARGE
Start: 2022-07-02 | End: 2022-10-21

## 2022-07-02 RX ORDER — FUROSEMIDE 20 MG
20 TABLET ORAL EVERY MORNING
Status: DISCONTINUED | OUTPATIENT
Start: 2022-07-02 | End: 2022-07-02 | Stop reason: HOSPADM

## 2022-07-02 RX ADMIN — FOLIC ACID 1 MG: 1 TABLET ORAL at 09:32

## 2022-07-02 RX ADMIN — DORZOLAMIDE HYDROCHLORIDE AND TIMOLOL MALEATE 1 DROP: 20; 5 SOLUTION/ DROPS OPHTHALMIC at 09:28

## 2022-07-02 RX ADMIN — METOPROLOL SUCCINATE 25 MG: 25 TABLET, EXTENDED RELEASE ORAL at 09:32

## 2022-07-02 RX ADMIN — FUROSEMIDE 20 MG: 20 TABLET ORAL at 09:36

## 2022-07-02 RX ADMIN — LEVOTHYROXINE SODIUM 50 MCG: 50 TABLET ORAL at 06:37

## 2022-07-02 RX ADMIN — APIXABAN 5 MG: 5 TABLET, FILM COATED ORAL at 09:32

## 2022-07-02 RX ADMIN — MICONAZOLE NITRATE: 2 POWDER TOPICAL at 09:30

## 2022-07-02 ASSESSMENT — ACTIVITIES OF DAILY LIVING (ADL)
ADLS_ACUITY_SCORE: 48
ADLS_ACUITY_SCORE: 48
ADLS_ACUITY_SCORE: 44
ADLS_ACUITY_SCORE: 48
ADLS_ACUITY_SCORE: 44
ADLS_ACUITY_SCORE: 48
ADLS_ACUITY_SCORE: 48

## 2022-07-02 NOTE — PLAN OF CARE
Goal Outcome Evaluation:      Shift Note: [id number]  VSS ex HTN, alert and oriented to self and place.  Assist 2 with a lift, pt with gen weakness.  Incontinent of bowel and bladder. No bm during shift. PW in place with good lalita output. Pt has  mepilex placed on coccyx. Groin erythema, Miconazole powder applied. R KIRK SL. Plan for discharge via stretcher back to [facility] TCU today at 1030.

## 2022-07-02 NOTE — SIGNIFICANT EVENT
Significant Event Note    Time of event: 1903 7/1/22    Description of event:  At 1857 consult for was notified by nursing of CT scanning had resulted with abnormal findings.  CT scan shows small bilateral pleural effusions that have increased from previous and moderate pericardial effusion measuring up to 12 mm posteriorly and 8 mm anteriorly.  This puts patient at moderate to severe risk of cardiac tamponade.  While evaluating patient's chart it was noted that family has deferred to any invasive or aggressive procedures for which a pericardial effusion would require.  Emergency contact, Ruby was contacted and informed of current CT findings.  She states that she would like to continue with no aggressive or invasive procedures.  Patient is currently DNR/DNI.    Plan:  Continue with current course of treatment and family anticipates discharge by hospitalist when applicable.      Discussed with: patient's family/emergency contact and bedside nurse    Shawn Weiss NP

## 2022-07-02 NOTE — PLAN OF CARE
VSS, Up with a lift, incontinent of bowel and bladder.  Regular diet, poor appetite.  Groin with rash, antifungal powder applied.  [facility] via [facility].  Discharge discussed with MD, onesimo PO antibiotics at discharge.

## 2022-07-02 NOTE — DISCHARGE SUMMARY
[facility]  Hospitalist Discharge Summary      Date of Admission:  6/29/2022  Date of Discharge:  7/2/2022  Discharging Provider: Anita Vargas MD  Discharge Service: Hospitalist Service    Discharge Diagnoses  and Hospital Course     Marley Stewart is a pleasant 89-year-old female with chronic atrial fibrillation, chronic diastolic CHF, previous CVA, hypertension, rheumatoid arthritis, hypothyroidism who presented on 6/29 due to generalized weakness.  She was most recently hospitalized from 6/14-6/24 due to acute cystitis with sepsis due to E. Coli and probable left lower lobe pneumonia.  She was treated with 7 days of IV ceftriaxone +2 days of amoxicillin.  Developed drug rash with amoxicillin.  Family indicated preference for noninvasive work-up.    She was discharged to TCU and readmitted on 6/29 due to ongoing weakness.  Labs obtained at TCU showed sodium of 118 which turned out to be a lab error. On repeat testing in ER, this was 133.  She was started on IV ceftriaxone and then switched to levofloxacin due to concern for pneumonia.  CT chest was obtained that showed no pneumonia but dependent atelectasis.  Antibiotics were discontinued.  CT scan also showed moderate pericardial effusion and small bilateral pleural effusions.  Her Lasix was resumed.  This was discussed with her daughter-in-law, Ashley who indicated no aggressive or invasive work-up.  Echo was deferred.  She was discharged back to TCU.  She was also seen by speech pathology, no dysphagia was noted.     #.  Hyponatremia - ruled out  -Was sent to ER from TCU due to sodium of 118.  Recheck in ER showed sodium of 133, now 136.  Sodium of 118 obtained at TCU was likely a lab error.  Patient does not have hyponatremia.     #.  Probable left lower lobe bacterial pneumonia versus atelectasis  -Chest x-ray shows ill-defined retrocardiac left lower lobe infiltrate concerning for pneumonia/pneumonitis.    She also has leukocytosis.     Infiltrate was no different than seen during previous admission.  Also recently received 7 days of IV ceftriaxone +2 days of amoxicillin.    -Was initially started on IV ceftriaxone plus azithromycin and then transition to levofloxacin.    -CT chest was obtained that showed atelectasis, did not seem to have pneumonia.    -Antibiotics were discontinued        #.  Generalized weakness-most likely secondary to recent UTI/pneumonia and physical deconditioning  -Continue PT/OT at TCU     #.  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  Grade 3 diastolic dysfunction  #.  Chronic diastolic CHF with preserved EF of 55-60%  #.  Moderate pulmonary hypertension  -Echo obtained 6/19 showed EF 55-60%, asymmetric thickening of LV walls, prominent proximal septal thickening, apical hypertrophy.  Findings were concerning for variant of hypertrophic obstructive cardiomyopathy.  -Cardiology consultation was deferred as per family's choice.  Family indicated they do not want any aggressive/invasive procedures  -Received 750 ml IV fluid  -On Lasix 20 mg daily, continue    #.  Pericardial effusion-moderate on CT chest  -CT chest obtained 7/1 showed moderate pericardial effusion.  Vital signs stable, clinically no concern for tamponade  -After discussion with patient's daughter-in-law, Ashley, further work-up was deferred as family indicated preference for no aggressive or invasive procedures or work-ups  -Recent echo obtained 6/19/2022 had not shown any pericardial effusion     #.  Hypoalbuminemia, albumin 2.8     #.  Recent admission for acute cystitis with sepsis due to E. coli and probable left lower lobe pneumonia, hospitalized 6/14-6/24.    - Treated with 7 days of IV ceftriaxone +2 days of amoxicillin     #.  Mild drug rash due to amoxicillin  -Continue hydrocortisone cream     #.  Chronic atrial fibrillation  #.  S/p pacemaker implantation  - Continues on PTA metoprolol and apixaban     #.  Rheumatoid arthritis  Takes  methotrexate on Wednesdays.  Last took on 6/29  -Resume at discharge      #.  Hypothyroidism  Chronic and stable on levothyroxine     #.  History of CVA  - Continues apixaban     #.  Glaucoma  - Continues on PTA eye drops      Echo 6/19/2022  Left ventricular systolic function is normal.  The visual ejection fraction is 55-60%.  Significant assymmetric thickening of the left ventricular walls, prominent proximal septal thickening as well as apical hypertrophy. Findings suggest variant of hypertrophic obstructive cardiomyopathy.  Grade III or advanced diastolic dysfunction.  The right ventricle is normal in structure, function and size.  Moderate (46-55mmHg) pulmonary hypertension is present.  Aortic sclerosis without stenosis.  IVC diameter and respiratory changes fall into an intermediate range suggesting an RA pressure of 8 mmHg.  There is no pericardial effusion.  Moderate left pleural effusion     Recommend cardiac MRI if clinically indicated. Findings compared to study dated 11/3/2016, there appears to be more advanced diastolic dysfunction as well as progressive hypertrophic cardiomyopathy.       Follow-ups Needed After Discharge   Follow-up Appointments     Follow Up and recommended labs and tests      Follow up with primary care provider in 1 week No follow up labs or test   are needed.         {Additional follow-up instructions/to-do's for PCP    :    Unresulted Labs Ordered in the Past 30 Days of this Admission     Date and Time Order Name Status Description    6/29/2022  4:38 PM Blood Culture Arm, Left Preliminary     6/29/2022  4:38 PM Blood Culture Hand, Right Preliminary       These results will be followed up by     Discharge Disposition   Discharged to short-term care facility  Condition at discharge: Stable      Consultations This Hospital Stay   PHYSICAL THERAPY ADULT IP CONSULT  OCCUPATIONAL THERAPY ADULT IP CONSULT  CARE MANAGEMENT / SOCIAL WORK IP CONSULT  WOUND OSTOMY CONTINENCE NURSE  IP  CONSULT  SPEECH LANGUAGE PATH ADULT IP CONSULT  PHYSICAL THERAPY ADULT IP CONSULT  OCCUPATIONAL THERAPY ADULT IP CONSULT    Code Status   No CPR- Do NOT Intubate    Time Spent on this Encounter   I, Anita Vargas MD, personally saw the patient today and spent greater than 30 minutes discharging this patient.       Anita Vargas MD  [place] [facility] [id number] [facility]  [address]  [place] [address]  Phone: [phone]  ______________________________________________________________________    Physical Exam   Vital Signs: Temp: 98.1  F (36.7  C) Temp src: Oral BP: 135/71 Pulse: 66   Resp: 20 SpO2: 96 % O2 Device: None (Room air)    Weight: 0 lbs 0 oz    Constitutional-patient is awake and alert, resting in bed, in no acute distress  Cardiovascular-irregular rhythm, normal rate, no murmurs, no edema  Pulmonary-lungs are clear to auscultation bilaterally, no wheezing or rhonchi  GI-abdomen is soft, nontender, nondistended, no hepatosplenomegaly or masses  Integumentary-skin is warm and dry, no rashes or ulcers  Neurological-patient is awake, alert.  Moving all 4 extremities, normal speech, no focal deficits       Primary Care Physician   Chandra Ortiz    Discharge Orders      General info for SNF    Length of Stay Estimate: Short Term Care: Estimated # of Days <30  Condition at Discharge: Stable  Level of care:skilled   Rehabilitation Potential: Fair  Admission H&P remains valid and up-to-date: Yes  Recent Chemotherapy: N/A  Use Nursing Home Standing Orders: Yes     Mantoux instructions    Give two-step Mantoux (PPD) Per Facility Policy Yes     Follow Up and recommended labs and tests    Follow up with primary care provider in 1 week No follow up labs or test are needed.     Reason for your hospital stay    Generalized weakness     Activity - Up with nursing assistance     Activity - Up with assistive device     Physical Therapy Adult Consult    Evaluate and treat as clinically  indicated.    Reason:  generalized weakness, recent UTI and pneumonia     Occupational Therapy Adult Consult    Evaluate and treat as clinically indicated.    Reason:  generalized weakness, recent UTI and pneumonia     Fall precautions     Diet    Follow this diet upon discharge: Regular       Significant Results and Procedures   Results for orders placed or performed during the hospital encounter of 06/29/22   XR Chest Port 1 View    Narrative    EXAM: XR CHEST PORT 1 VIEW  LOCATION: [facility]  DATE/TIME: 6/29/2022 9:15 PM    INDICATION: Fever  COMPARISON: 06/17/2022      Impression    IMPRESSION: There is opacification retrocardiac region on the left with patchy ill-defined opacity which could represent pneumonia/pneumonitis in the appropriate clinical setting. There is minimal stranding opacity in the left midlung. The right lung is   clear. Cardiac silhouette is at the upper limits of normal. Dual lead cardiac implantable device redemonstrated. Osseous structures unchanged.   CT Chest w/o Contrast    Narrative    EXAM: CT CHEST W/O CONTRAST  LOCATION: [facility]  DATE/TIME: 7/1/2022 5:43 PM    INDICATION: recurrent pneumonia  COMPARISON: 5/27/2017  TECHNIQUE: CT chest without IV contrast. Multiplanar reformats were obtained. Dose reduction techniques were used.  CONTRAST: None.    FINDINGS:   LUNGS AND PLEURA: Small bilateral pleural effusions increased compared the previous study. There is also increased discoid opacity dependently within the lungs favored to represent discoid atelectasis rather than pneumonia.    MEDIASTINUM/AXILLAE: No lymphadenopathy. There is a new moderate pericardial effusion measuring up to 12 mm posteriorly and 8 mm anteriorly. Heart size appears normal. Interval placement of pacemaker.    CORONARY ARTERY CALCIFICATION: Moderately severe.    UPPER ABDOMEN: No significant finding.    MUSCULOSKELETAL: Unremarkable.      Impression     IMPRESSION:   1.  Moderate pericardial effusion.  2.  Small but increasing size of bilateral pleural effusions.  3.  Dependent lung opacities favored to represent discoid atelectasis.           Discharge Medications   Current Discharge Medication List      CONTINUE these medications which have CHANGED    Details   hydrocortisone (CORTAID) 1 % external cream Apply topically 2 times daily Apply to rash for 10 days  Qty: 15 g, Refills: 0    Associated Diagnoses: Rash         CONTINUE these medications which have NOT CHANGED    Details   acetaminophen (TYLENOL) 500 MG tablet Take 2 tablets (1,000 mg) by mouth every 6 hours as needed    Associated Diagnoses: Closed fracture of fourth lumbar vertebra, unspecified fracture morphology, initial encounter (H)      apixaban ANTICOAGULANT (ELIQUIS) 5 MG tablet Take 1 tablet (5 mg) by mouth 2 times daily  Qty: 60 tablet, Refills: 0    Associated Diagnoses: Atrial fibrillation (H)      calcium carb 1250 mg, 500 mg [place],/vitamin D 200 units (OSCAL WITH D) 500-200 MG-UNIT per tablet Take 1 tablet by mouth 2 times daily (with meals)      dorzolamide-timolol (COSOPT) 2-0.5 % ophthalmic solution Place 1 drop into the right eye 2 times daily      folic acid (FOLVITE) 1 MG tablet Take 1 mg by mouth daily      furosemide (LASIX) 20 MG tablet Take 1 tablet (20 mg) by mouth every morning  Qty: 30 tablet, Refills: 0    Associated Diagnoses: Acute on chronic diastolic congestive heart failure (H)      latanoprost (XALATAN) 0.005 % ophthalmic solution Place 1 drop into the right eye At Bedtime      levothyroxine (SYNTHROID/LEVOTHROID) 50 MCG tablet Take 50 mcg by mouth daily      methotrexate 2.5 MG tablet Take 15 mg by mouth every 7 days Wednesday  (6 x 2.5 mg)      metoprolol succinate ER (TOPROL-XL) 25 MG 24 hr tablet Take 25 mg by mouth every morning      prednisoLONE acetate (PRED FORTE) 1 % ophthalmic suspension Place 1 drop into the right eye 2 times daily         STOP taking these  medications       amoxicillin (AMOXIL) 500 MG capsule Comments:   Reason for Stopping:             Allergies   Allergies   Allergen Reactions     Amitriptyline      Clonazepam Other (See Comments)     Somnolence at 0.5 mg dose     Amoxicillin Rash     Latex Rash

## 2022-07-04 LAB
BACTERIA BLD CULT: NO GROWTH
BACTERIA BLD CULT: NO GROWTH

## 2022-07-05 ENCOUNTER — PATIENT OUTREACH (OUTPATIENT)
Dept: CARE COORDINATION | Facility: CLINIC | Age: 87
End: 2022-07-05

## 2022-07-05 ENCOUNTER — TRANSITIONAL CARE UNIT VISIT (OUTPATIENT)
Dept: GERIATRICS | Facility: CLINIC | Age: 87
End: 2022-07-05
Payer: COMMERCIAL

## 2022-07-05 VITALS
WEIGHT: 136 LBS | HEIGHT: 64 IN | TEMPERATURE: 97.4 F | SYSTOLIC BLOOD PRESSURE: 146 MMHG | BODY MASS INDEX: 23.22 KG/M2 | RESPIRATION RATE: 14 BRPM | OXYGEN SATURATION: 93 % | HEART RATE: 70 BPM | DIASTOLIC BLOOD PRESSURE: 78 MMHG

## 2022-07-05 DIAGNOSIS — M62.81 GENERALIZED MUSCLE WEAKNESS: ICD-10-CM

## 2022-07-05 DIAGNOSIS — Z71.89 OTHER SPECIFIED COUNSELING: ICD-10-CM

## 2022-07-05 DIAGNOSIS — I10 PRIMARY HYPERTENSION: ICD-10-CM

## 2022-07-05 DIAGNOSIS — R41.89 COGNITIVE IMPAIRMENT: ICD-10-CM

## 2022-07-05 DIAGNOSIS — I42.2 HYPERTROPHIC CARDIOMYOPATHY (H): ICD-10-CM

## 2022-07-05 DIAGNOSIS — M06.9 RHEUMATOID ARTHRITIS, INVOLVING UNSPECIFIED SITE, UNSPECIFIED WHETHER RHEUMATOID FACTOR PRESENT (H): ICD-10-CM

## 2022-07-05 DIAGNOSIS — I48.20 CHRONIC A-FIB (H): ICD-10-CM

## 2022-07-05 DIAGNOSIS — I27.20 PULMONARY HYPERTENSION (H): ICD-10-CM

## 2022-07-05 DIAGNOSIS — J90 PLEURAL EFFUSION: Primary | ICD-10-CM

## 2022-07-05 PROCEDURE — 99305 1ST NF CARE MODERATE MDM 35: CPT | Performed by: INTERNAL MEDICINE

## 2022-07-05 NOTE — PLAN OF CARE
Occupational Therapy Discharge Summary    Reason for therapy discharge:    Discharged to transitional care facility.    Progress towards therapy goal(s). See goals on Care Plan in [facility] electronic health record for goal details.  Goals partially met.  Barriers to achieving goals:   discharge from facility.    Therapy recommendation(s):    Continued therapy is recommended.  Rationale/Recommendations:  improve strength and ADL/IADL retraining with further cognitive assessment to direct additional home needs..

## 2022-07-05 NOTE — LETTER
7/5/2022        RE: Marley Stewart  [address]  [place] [address]        Marley Stewart is a 89 year old female seen July 5, 2022 at [facility] TCU where she was admitted after [facility] hospitalizations 6/14-24 and 6/29-7/2.   First admission was for E coli UTI with sepsis and metabolic encephalopathy.   She had presented with confusion, fever and weakness.   Found to have WBC 14.9 and UC showed a pansensitive E coli.   She was also noted to have intermittent CP, cough and dyspnea, with possible LLL infiltrate on CXR, so treated for CAP as well.    Treated with abx, she developed a rash on amoxicillin and that was d/c'd   She discharged to TCU, but was rehospitalized with hyponatremia and ongoing weakness.   She was found to have a moderate pericardial effusion and small bilateral pleural effusions on chest CT; the former not verified on ECHO.  Started on furosemide, family declined aggressive or invasive workup and she was discharged back to TCU for ongoing recovery and Rehab.     By chart review, pt has a history of HTN, HOCM, atrial fib anticoagulated with apixaban and s/p pacemaker placement, hypothyroidism and cognitive decline.    She was living at home with her  and a PCA caregiver until her hospitalization   Today pt is seen in her room resting abed.   She is quiet, does not answer all questions, states she still feels fatigued and weak, has been working with therapies although not always participating and sometimes declines to get out of bed.           Past Medical History:   Diagnosis Date     A-fib -- Chronic      Aortic regurgitation     1-2+ per 4/2015 Echo     Arthritis      CHF (congestive heart failure) (H)     EF 65-70% on 4/2015 Echo     CVA (cerebral vascular accident) (H)      Glaucoma      Hypertension      Mitral regurgitation     2+ per 4/2015 Echo     PUD (peptic ulcer disease)      Pulmonic valve regurgitation     1+ per 4/2015 Echo     RA (rheumatoid arthritis) (H)       "Spinal stenosis      Thyroid disease      Tricuspid regurgitation     2+ per 4/2015 Echo       Past Surgical History:   Procedure Laterality Date     ORTHOPEDIC SURGERY         Family History   Problem Relation Age of Onset     Cerebrovascular Disease Mother      Cancer Father         \"Bone Cancer\"       Social History     Tobacco Use     Smoking status: Never Smoker     Smokeless tobacco: Never Used   Substance Use Topics     Alcohol use: Yes     Comment: < 1 drink a month      SH:  Lives with her  Fly, house in [place]. Pt has a caregiver as well.     Daughter-in-law Ruby is primary contact        Review Of Systems  Skin: recent rash with amoxicillin     Eyes: impaired vision  Ears/Nose/Throat: hearing loss  Respiratory: No shortness of breath, dyspnea on exertion, cough, or hemoptysis  Cardiovascular: negative  Gastrointestinal: poor appetite, weight loss   Genitourinary: incontinence, may be functional  Musculoskeletal: generalized weakness, needs assist for transfers, ADLs     Neurologic: BIMS 7/15    BCRS 4.2   Psychiatric: anxiety and depression  Hematologic/Lymphatic/Immunologic: negative  Endocrine: thyroid disorder   Wt Readings from Last 5 Encounters:   07/05/22 61.7 kg (136 lb)   06/28/22 64.2 kg (141 lb 9.6 oz)   06/23/22 64.2 kg (141 lb 8.6 oz)   08/27/19 67.6 kg (149 lb)   08/20/19 68.7 kg (151 lb 8 oz)       GENERAL APPEARANCE: frail, NAD  BP (!) 146/78   Pulse 70   Temp 97.4  F (36.3  C)   Resp 14   Ht 1.626 m (5' 4\")   Wt 61.7 kg (136 lb)   SpO2 93%   BMI 23.34 kg/m     HEENT: normocephalic, no lesion or abnormalities  NECK: no adenopathy, no asymmetry, masses, or scars and thyroid normal to palpation, JVD 10cm lying flat     RESP: lungs clear to auscultation - no rales, rhonchi or wheezes  CV: irregular rate and rhythm, normal S1 S2  ABDOMEN:  soft, nontender, no HSM or masses and bowel sounds normal  MS: extremities normal- no ext edema     SKIN: no suspicious lesions or " rashes  NEURO: generalized weakness, very limited history     PSYCH: affect flat     LYMPHATICS: No cervical,  or supraclavicular nodes     Last Comprehensive Metabolic Panel:  Sodium   Date Value Ref Range Status   07/01/2022 134 133 - 144 mmol/L Final     Potassium   Date Value Ref Range Status   07/01/2022 3.6 3.4 - 5.3 mmol/L Final     Carbon Dioxide (CO2)   Date Value Ref Range Status   07/01/2022 24 20 - 32 mmol/L Final     Glucose   Date Value Ref Range Status   07/01/2022 122 (H) 70 - 99 mg/dL Final     Urea Nitrogen   Date Value Ref Range Status   07/01/2022 17 7 - 30 mg/dL Final     Creatinine   Date Value Ref Range Status   07/01/2022 0.80 0.52 - 1.04 mg/dL Final     GFR Estimate   Date Value Ref Range Status   07/01/2022 70 >60 mL/min/1.73m2 Final     Calcium   Date Value Ref Range Status   07/01/2022 9.3 8.5 - 10.1 mg/dL Final     Lab Results   Component Value Date    AST 15 07/01/2022      ALBUMIN 2.8 07/01/2022      ALKPHOS 67 07/01/2022     Lab Results   Component Value Date    WBC 12.8 07/02/2022      HGB 12.4 07/02/2022      MCV 94 07/02/2022       07/02/2022    7/2        Echo 6/19/2022  Left ventricular systolic function is normal.   The visual ejection fraction is 55-60%.  Significant assymmetric thickening of the left ventricular walls, prominent proximal septal thickening as well as apical hypertrophy. Findings suggest variant of hypertrophic obstructive cardiomyopathy.  Grade III or advanced diastolic dysfunction.  The right ventricle is normal in structure, function and size.     Moderate (46-55mmHg) pulmonary hypertension is present.  Aortic sclerosis without stenosis.  IVC diameter and respiratory changes fall into an intermediate range suggesting an RA pressure of 8 mmHg.  There is no pericardial effusion.    Moderate left pleural effusion  Recommend cardiac MRI if clinically indicated. Findings compared to study dated 11/3/2016, there appears to be more advanced diastolic  dysfunction as well as progressive hypertrophic cardiomyopathy.     Head CT 2017                                                                  1. No acute pathology. No bleed, mass, or acute infarcts are seen.  2. Atrophy of the brain.  White matter changes consistent with small vessel ischemic disease.   3. Chronic areas of encephalomalacia are present in the left inferior cerebellum and right occipital lobe these are unchanged.         IMP/PLAN:   (J90) Pleural effusion    Comment: and atelectasis on CXR  Was treated for CAP, now no further evidence of this.     Plan: furosemide 20 mg/day   Monitor symptoms and exam       (I42.2) Hypertrophic cardiomyopathy (H)  (I27.20) Pulmonary hypertension (H)  Comment: by ECHO as above   Plan: she is on a beta blocker and diuretic.    [facility]      (I48.20) Chronic a-fib (H)  Comment:  CHADS-VASc score 7    Pulse Readings from Last 4 Encounters:   07/05/22 70   07/02/22 66   06/28/22 71   06/24/22 70      Plan: metoprolol 25 mg/day for VR control   Apixaban 5 mg bid for stroke prophylaxis        (I10) Primary hypertension  Comment:   BP Readings from Last 3 Encounters:   07/05/22 (!) 146/78   07/02/22 135/71   06/28/22 136/81      Plan: metoprolol 25 mg/day     (M06.9) Rheumatoid arthritis, involving unspecified site, unspecified whether rheumatoid factor present (H)  Comment: last seen by Dr Galindo in October 2021    Plan: methotrexate 15 mg/week with folic acid 1mg daily   Routine labs  Pt is also on Prolia for osteoporosis and h/o pelvic fractures      (R41.89) Cognitive impairment  Comment: scores as above, very low functional status  Apathetic features     With CT as above, likely vascular dementia     Plan: supportive care    Needs encouragement to participate with therapies and make progress.          (M62.81) Generalized muscle weakness  Comment: currently needing assist with all cares and ADLs.      Plan: PHYSICAL THERAPY / OCCUPATIONAL  THERAPY for strengthening, transfers, gait, ADLs.   Discharge goal is return home with C and family support         Radha Aburto MD         Sincerely,        Radha Aburto MD

## 2022-07-05 NOTE — PROGRESS NOTES
[facility] [place]    Background: Care Coordination referral placed from [facility] discharge report for reason of patient meeting criteria for a TCM outreach call by [facility] team.    Assessment: Upon chart review, [facility] Team member will cancel/close the referral for TCM outreach due to reason below:    Patient is not established within [facility]. Upon chart review, [facility] Team member noted patient discharged to TCU    Plan: Care Coordination referral for TCM outreach canceled.      Liliana Hill MA  [facility], [facility]

## 2022-07-05 NOTE — PROGRESS NOTES
Marley Stewart is a 89 year old female seen July 5, 2022 at [facility] TCU where she was admitted after [facility] hospitalizations 6/14-24 and 6/29-7/2.   First admission was for E coli UTI with sepsis and metabolic encephalopathy.   She had presented with confusion, fever and weakness.   Found to have WBC 14.9 and UC showed a pansensitive E coli.   She was also noted to have intermittent CP, cough and dyspnea, with possible LLL infiltrate on CXR, so treated for CAP as well.    Treated with abx, she developed a rash on amoxicillin and that was d/c'd   She discharged to TCU, but was rehospitalized with hyponatremia and ongoing weakness.   She was found to have a moderate pericardial effusion and small bilateral pleural effusions on chest CT; the former not verified on ECHO.  Started on furosemide, family declined aggressive or invasive workup and she was discharged back to TCU for ongoing recovery and Rehab.     By chart review, pt has a history of HTN, HOCM, atrial fib anticoagulated with apixaban and s/p pacemaker placement, hypothyroidism and cognitive decline.    She was living at home with her  and a PCA caregiver until her hospitalization   Today pt is seen in her room resting abed.   She is quiet, does not answer all questions, states she still feels fatigued and weak, has been working with therapies although not always participating and sometimes declines to get out of bed.           Past Medical History:   Diagnosis Date     A-fib -- Chronic      Aortic regurgitation     1-2+ per 4/2015 Echo     Arthritis      CHF (congestive heart failure) (H)     EF 65-70% on 4/2015 Echo     CVA (cerebral vascular accident) (H)      Glaucoma      Hypertension      Mitral regurgitation     2+ per 4/2015 Echo     PUD (peptic ulcer disease)      Pulmonic valve regurgitation     1+ per 4/2015 Echo     RA (rheumatoid arthritis) (H)      Spinal stenosis      Thyroid disease      Tricuspid regurgitation     2+ per 4/2015  "Echo       Past Surgical History:   Procedure Laterality Date     ORTHOPEDIC SURGERY         Family History   Problem Relation Age of Onset     Cerebrovascular Disease Mother      Cancer Father         \"Bone Cancer\"       Social History     Tobacco Use     Smoking status: Never Smoker     Smokeless tobacco: Never Used   Substance Use Topics     Alcohol use: Yes     Comment: < 1 drink a month      SH:  Lives with her  Fly, house in [place]. Pt has a caregiver as well.     Daughter-in-law Ruby is primary contact        Review Of Systems  Skin: recent rash with amoxicillin     Eyes: impaired vision  Ears/Nose/Throat: hearing loss  Respiratory: No shortness of breath, dyspnea on exertion, cough, or hemoptysis  Cardiovascular: negative  Gastrointestinal: poor appetite, weight loss   Genitourinary: incontinence, may be functional  Musculoskeletal: generalized weakness, needs assist for transfers, ADLs     Neurologic: BIMS 7/15    BCRS 4.2   Psychiatric: anxiety and depression  Hematologic/Lymphatic/Immunologic: negative  Endocrine: thyroid disorder   Wt Readings from Last 5 Encounters:   07/05/22 61.7 kg (136 lb)   06/28/22 64.2 kg (141 lb 9.6 oz)   06/23/22 64.2 kg (141 lb 8.6 oz)   08/27/19 67.6 kg (149 lb)   08/20/19 68.7 kg (151 lb 8 oz)       GENERAL APPEARANCE: frail, NAD  BP (!) 146/78   Pulse 70   Temp 97.4  F (36.3  C)   Resp 14   Ht 1.626 m (5' 4\")   Wt 61.7 kg (136 lb)   SpO2 93%   BMI 23.34 kg/m     HEENT: normocephalic, no lesion or abnormalities  NECK: no adenopathy, no asymmetry, masses, or scars and thyroid normal to palpation, JVD 10cm lying flat     RESP: lungs clear to auscultation - no rales, rhonchi or wheezes  CV: irregular rate and rhythm, normal S1 S2  ABDOMEN:  soft, nontender, no HSM or masses and bowel sounds normal  MS: extremities normal- no ext edema     SKIN: no suspicious lesions or rashes  NEURO: generalized weakness, very limited history     PSYCH: affect flat   "   LYMPHATICS: No cervical,  or supraclavicular nodes     Last Comprehensive Metabolic Panel:  Sodium   Date Value Ref Range Status   07/01/2022 134 133 - 144 mmol/L Final     Potassium   Date Value Ref Range Status   07/01/2022 3.6 3.4 - 5.3 mmol/L Final     Carbon Dioxide (CO2)   Date Value Ref Range Status   07/01/2022 24 20 - 32 mmol/L Final     Glucose   Date Value Ref Range Status   07/01/2022 122 (H) 70 - 99 mg/dL Final     Urea Nitrogen   Date Value Ref Range Status   07/01/2022 17 7 - 30 mg/dL Final     Creatinine   Date Value Ref Range Status   07/01/2022 0.80 0.52 - 1.04 mg/dL Final     GFR Estimate   Date Value Ref Range Status   07/01/2022 70 >60 mL/min/1.73m2 Final     Calcium   Date Value Ref Range Status   07/01/2022 9.3 8.5 - 10.1 mg/dL Final     Lab Results   Component Value Date    AST 15 07/01/2022      ALBUMIN 2.8 07/01/2022      ALKPHOS 67 07/01/2022     Lab Results   Component Value Date    WBC 12.8 07/02/2022      HGB 12.4 07/02/2022      MCV 94 07/02/2022       07/02/2022    7/2        Echo 6/19/2022  Left ventricular systolic function is normal.   The visual ejection fraction is 55-60%.  Significant assymmetric thickening of the left ventricular walls, prominent proximal septal thickening as well as apical hypertrophy. Findings suggest variant of hypertrophic obstructive cardiomyopathy.  Grade III or advanced diastolic dysfunction.  The right ventricle is normal in structure, function and size.     Moderate (46-55mmHg) pulmonary hypertension is present.  Aortic sclerosis without stenosis.  IVC diameter and respiratory changes fall into an intermediate range suggesting an RA pressure of 8 mmHg.  There is no pericardial effusion.    Moderate left pleural effusion  Recommend cardiac MRI if clinically indicated. Findings compared to study dated 11/3/2016, there appears to be more advanced diastolic dysfunction as well as progressive hypertrophic cardiomyopathy.     Head CT 2017                                                                   1. No acute pathology. No bleed, mass, or acute infarcts are seen.  2. Atrophy of the brain.  White matter changes consistent with small vessel ischemic disease.   3. Chronic areas of encephalomalacia are present in the left inferior cerebellum and right occipital lobe these are unchanged.         IMP/PLAN:   (J90) Pleural effusion    Comment: and atelectasis on CXR  Was treated for CAP, now no further evidence of this.     Plan: furosemide 20 mg/day   Monitor symptoms and exam       (I42.2) Hypertrophic cardiomyopathy (H)  (I27.20) Pulmonary hypertension (H)  Comment: by ECHO as above   Plan: she is on a beta blocker and diuretic.    Follow up with [facility]      (I48.20) Chronic a-fib (H)  Comment:  CHADS-VASc score 7    Pulse Readings from Last 4 Encounters:   07/05/22 70   07/02/22 66   06/28/22 71   06/24/22 70      Plan: metoprolol 25 mg/day for VR control   Apixaban 5 mg bid for stroke prophylaxis        (I10) Primary hypertension  Comment:   BP Readings from Last 3 Encounters:   07/05/22 (!) 146/78   07/02/22 135/71   06/28/22 136/81      Plan: metoprolol 25 mg/day     (M06.9) Rheumatoid arthritis, involving unspecified site, unspecified whether rheumatoid factor present (H)  Comment: last seen by Dr Galindo in October 2021    Plan: methotrexate 15 mg/week with folic acid 1mg daily   Routine labs  Pt is also on Prolia for osteoporosis and h/o pelvic fractures      (R41.89) Cognitive impairment  Comment: scores as above, very low functional status  Apathetic features     With CT as above, likely vascular dementia     Plan: supportive care    Needs encouragement to participate with therapies and make progress.          (M62.81) Generalized muscle weakness  Comment: currently needing assist with all cares and ADLs.      Plan: PHYSICAL THERAPY / OCCUPATIONAL THERAPY for strengthening, transfers, gait, ADLs.   Discharge goal is return home with  [facility] and family support         Radha Aburto MD

## 2022-07-05 NOTE — PROGRESS NOTES
Physical Therapy Discharge Summary    Reason for therapy discharge:    Discharged to transitional care facility.    Progress towards therapy goal(s). See goals on Care Plan in [facility] electronic health record for goal details.  Goals partially met.  Barriers to achieving goals:   discharge from facility.    Therapy recommendation(s):    Continued therapy is recommended.  Rationale/Recommendations:  see rehab flowsheet for details..

## 2022-07-06 ENCOUNTER — TELEPHONE (OUTPATIENT)
Dept: GERIATRICS | Facility: CLINIC | Age: 87
End: 2022-07-06

## 2022-07-06 NOTE — TELEPHONE ENCOUNTER
[facility] Nurse Triage Telephone Note    Provider: HUEY Cox CNP   Facility: [facility]   Facility Type:  TCU    Caller: Sowmya    Allergies   Allergen Reactions     Amitriptyline      Clonazepam Other (See Comments)     Somnolence at 0.5 mg dose     Erythromycin Other (See Comments)     Other reaction(s): *Unknown - Pt Doesn't Remember  MACROLIDES  PN: MACROLIDES       Indomethacin Other (See Comments)     Other reaction(s): *Unknown  depression  depression  PN: depression       Amoxicillin Rash     Latex Rash       Reason for call: Pt has rash noted by nursing in bilateral groin.     Verbal Order/Direction given by Provider:   MCS STANDING ORDER GIVEN:  - Nystatin powder 100,000 units tid for fungal rash til healed then TID prn    Provider giving Order:  HUEY Cox CNP     Verbal Order given to: Sowmya Bernal RN

## 2022-07-11 VITALS
DIASTOLIC BLOOD PRESSURE: 85 MMHG | TEMPERATURE: 96.9 F | HEART RATE: 69 BPM | HEIGHT: 64 IN | OXYGEN SATURATION: 96 % | RESPIRATION RATE: 18 BRPM | WEIGHT: 136 LBS | SYSTOLIC BLOOD PRESSURE: 138 MMHG | BODY MASS INDEX: 23.22 KG/M2

## 2022-07-12 ENCOUNTER — TRANSITIONAL CARE UNIT VISIT (OUTPATIENT)
Dept: GERIATRICS | Facility: CLINIC | Age: 87
End: 2022-07-12
Payer: COMMERCIAL

## 2022-07-12 DIAGNOSIS — R53.81 PHYSICAL DECONDITIONING: ICD-10-CM

## 2022-07-12 DIAGNOSIS — I48.20 CHRONIC A-FIB (H): ICD-10-CM

## 2022-07-12 DIAGNOSIS — I42.2 HYPERTROPHIC CARDIOMYOPATHY (H): Primary | ICD-10-CM

## 2022-07-12 DIAGNOSIS — I27.20 PULMONARY HYPERTENSION (H): ICD-10-CM

## 2022-07-12 DIAGNOSIS — I50.32 CHRONIC DIASTOLIC CONGESTIVE HEART FAILURE (H): ICD-10-CM

## 2022-07-12 DIAGNOSIS — J90 PLEURAL EFFUSION: ICD-10-CM

## 2022-07-12 DIAGNOSIS — M06.9 RHEUMATOID ARTHRITIS, INVOLVING UNSPECIFIED SITE, UNSPECIFIED WHETHER RHEUMATOID FACTOR PRESENT (H): ICD-10-CM

## 2022-07-12 DIAGNOSIS — R41.89 COGNITIVE IMPAIRMENT: ICD-10-CM

## 2022-07-12 DIAGNOSIS — I10 PRIMARY HYPERTENSION: ICD-10-CM

## 2022-07-12 PROCEDURE — 99310 SBSQ NF CARE HIGH MDM 45: CPT | Performed by: NURSE PRACTITIONER

## 2022-07-12 RX ORDER — NYSTATIN 100000 [USP'U]/G
POWDER TOPICAL 3 TIMES DAILY
COMMUNITY

## 2022-07-12 NOTE — PROGRESS NOTES
[facility]    Chief Complaint   Patient presents with     RECHECK     HPI:  Marley Stewart is a 89 year old  (7/17/1932), who is being seen today for an episodic care visit at: [facility] ([facility]) [[id number]].     Medical history significant for afib on apixaban, s/p PPM, hypertrophic cardiomyopathy,  diastolic CHF, HTN, RA, CKD stage 3, hypothyroidism, history of CVA,  She initially came to this facility 6/24/2022 for short term rehab and medical management following hospitalization after presenting to the ED 6/14/2022 with worsening confusion and weakness. She received ceftriaxone and azithromycin for UTI and possible pneumonia. TTE 6/19/2022 showed EF 55-60%, advanced diastolic dysfunction, moderate pulmonary hypertension, moderate left pleural effusion and findings suggestive of hypertrophic obstructive cardiomyopathy. Family declined Cardiology consult and further evaluation.     She was sent to the ED 6/29/2022 with lethargy and Na 118. Labs in the ED showed Na 133. Ceftriaxone and levaquin were started for possible pneumonia, both discontinued after CT chest showed atelectasis and no infiltrate. Moderate pericardial effusion and small bilateral pleural effusions were noted. Lasix 20 mg daily was continued. Family declined further work up. Speech therapy evaluated and no signs of dysphagia.   She returned to the facility 7/2/2022 for ongoing rehab.     Today's concern is:      Hypertrophic cardiomyopathy (H)  Chronic diastolic congestive heart failure (H)  Pulmonary hypertension (H)  Pleural effusion  Chronic a-fib (H)  Rheumatoid arthritis, involving unspecified site, unspecified whether rheumatoid factor present (H)  Primary hypertension  Cognitive impairment  Physical deconditioning  She is a poor historian and doesn't respond to most questions. Reports she's tired. Denies feeling ill. Denies pain. Staff reports she needs encouragement to eat and to participate in  "therapies.   Nonambulatory and requires max assist of 1 with cares.   SLUMS 14/30       Allergies, and PMH/PSH reviewed in EPIC today    REVIEW OF SYSTEMS:  Limited secondary to cognitive impairment. Positives as noted above     Objective:   /85   Pulse 69   Temp 96.9  F (36.1  C)   Resp 18   Ht 1.626 m (5' 4\")   Wt 61.7 kg (136 lb)   SpO2 96%   BMI 23.34 kg/m    GENERAL APPEARANCE:  Alert, in no distress  ENT:  [place], oropharynx clear  EYES:  conjunctiva and lids normal  NECK:  no adenopathy, no thyromegaly  RESP:  lungs clear to auscultation-diminished both bases, no respiratory distress  CV:  regular rate and rhythm, no murmur, no edema  ABDOMEN:  soft, non-tender, no distension, no masses  M/S:   in bed. CNA with generalized weakness. No joint inflammation  SKIN:  no rashes or open areas  PSYCH:  oriented to self, situation, insight and judgement impaired, memory impaired , flat affect    Recent labs in [facility] reviewed by me today.     ASSESSMENT / PLAN:  (I42.2) Hypertrophic cardiomyopathy (H)  (primary encounter diagnosis)  (I50.32) Chronic diastolic congestive heart failure (H)  (I27.20) Pulmonary hypertension (H)  (J90) Pleural effusion  Comment: appears euvolemic. Her weight is down 5 lbs-likely due to diuretic as well as poor appetite. Pneumonia appears resolved.   Plan: continue lasix 20 mg daily. Continue metoprolol. Follow weight, symptoms. BMP    Spoke with her daughter in law Ruby Saleem, who reports patient is ambulatory with a walker, fairly independent with cares and more conversational at baseline. She plans to come in to help with therapy sessions. She confirms DNR/DNI.     (I48.20) Chronic a-fib (H)  Comment: rate controlled in the 60-70s.   Plan: continue metoprolol, apixaban     (M06.9) Rheumatoid arthritis, involving unspecified site, unspecified whether rheumatoid factor present (H)  Comment: no s/s of acute flare   Plan: continue methotrexate, folic acid. Rheumatology follow up " per usual schedule.     (I10) Primary hypertension  Comment: controlled with BPs: 138/85, 126/65, 132/71, outlier 164/92    Plan: continue metoprolol, lasix     (R41.89) Cognitive impairment  Comment: moderate deficits with SLUMS 14/30. She is confused but fully interactive at baseline, per daughter in law. Question if depression is contributing  Plan: continue therapies, staff assistance with cares. Consider starting antidepressant-discussed with daughter in law.     (R53.81) Physical deconditioning  Comment: poor progress in therapies. At baseline,she is ambulatory and independent with managing incontinence   Plan: continue PHYSICAL THERAPY/OT. Family's goal is for her to return home with services. Care conference per .         Electronically signed by: HUEY Mayo CNP

## 2022-07-12 NOTE — LETTER
7/12/2022        RE: Marley Stewart  [address]  [place] [address]        [facility]    Chief Complaint   Patient presents with     RECHECK     HPI:  Marley Stewart is a 89 year old  (7/17/1932), who is being seen today for an episodic care visit at: [facility] ([facility]) [[id number]].     Medical history significant for afib on apixaban, s/p PPM, hypertrophic cardiomyopathy,  diastolic CHF, HTN, RA, CKD stage 3, hypothyroidism, history of CVA,  She initially came to this facility 6/24/2022 for short term rehab and medical management following hospitalization after presenting to the ED 6/14/2022 with worsening confusion and weakness. She received ceftriaxone and azithromycin for UTI and possible pneumonia. TTE 6/19/2022 showed EF 55-60%, advanced diastolic dysfunction, moderate pulmonary hypertension, moderate left pleural effusion and findings suggestive of hypertrophic obstructive cardiomyopathy. Family declined Cardiology consult and further evaluation.     She was sent to the ED 6/29/2022 with lethargy and Na 118. Labs in the ED showed Na 133. Ceftriaxone and levaquin were started for possible pneumonia, both discontinued after CT chest showed atelectasis and no infiltrate. Moderate pericardial effusion and small bilateral pleural effusions were noted. Lasix 20 mg daily was continued. Family declined further work up. Speech therapy evaluated and no signs of dysphagia.   She returned to the facility 7/2/2022 for ongoing rehab.     Today's concern is:      Hypertrophic cardiomyopathy (H)  Chronic diastolic congestive heart failure (H)  Pulmonary hypertension (H)  Pleural effusion  Chronic a-fib (H)  Rheumatoid arthritis, involving unspecified site, unspecified whether rheumatoid factor present (H)  Primary hypertension  Cognitive impairment  Physical deconditioning  She is a poor historian and doesn't respond to most questions. Reports she's tired. Denies feeling ill.  "Denies pain. Staff reports she needs encouragement to eat and to participate in therapies.   Nonambulatory and requires max assist of 1 with cares.   SLUMS 14/30       Allergies, and PMH/PSH reviewed in EPIC today    REVIEW OF SYSTEMS:  Limited secondary to cognitive impairment. Positives as noted above     Objective:   /85   Pulse 69   Temp 96.9  F (36.1  C)   Resp 18   Ht 1.626 m (5' 4\")   Wt 61.7 kg (136 lb)   SpO2 96%   BMI 23.34 kg/m    GENERAL APPEARANCE:  Alert, in no distress  ENT:  [place], oropharynx clear  EYES:  conjunctiva and lids normal  NECK:  no adenopathy, no thyromegaly  RESP:  lungs clear to auscultation-diminished both bases, no respiratory distress  CV:  regular rate and rhythm, no murmur, no edema  ABDOMEN:  soft, non-tender, no distension, no masses  M/S:   in bed. CAN with generalized weakness. No joint inflammation  SKIN:  no rashes or open areas  PSYCH:  oriented to self, situation, insight and judgement impaired, memory impaired , flat affect    Recent labs in [facility] reviewed by me today.     ASSESSMENT / PLAN:  (I42.2) Hypertrophic cardiomyopathy (H)  (primary encounter diagnosis)  (I50.32) Chronic diastolic congestive heart failure (H)  (I27.20) Pulmonary hypertension (H)  (J90) Pleural effusion  Comment: appears euvolemic. Her weight is down 5 lbs-likely due to diuretic as well as poor appetite. Pneumonia appears resolved.   Plan: continue lasix 20 mg daily. Continue metoprolol. Follow weight, symptoms. BMP    Spoke with her daughter in law Ruby Saleem, who reports patient is ambulatory with a walker, fairly independent with cares and more conversational at baseline. She plans to come in to help with therapy sessions. She confirms DNR/DNI.     (I48.20) Chronic a-fib (H)  Comment: rate controlled in the 60-70s.   Plan: continue metoprolol, apixaban     (M06.9) Rheumatoid arthritis, involving unspecified site, unspecified whether rheumatoid factor present (H)  Comment: no s/s " of acute flare   Plan: continue methotrexate, folic acid. Rheumatology follow up per usual schedule.     (I10) Primary hypertension  Comment: controlled with BPs: 138/85, 126/65, 132/71, outlier 164/92    Plan: continue metoprolol, lasix     (R41.89) Cognitive impairment  Comment: moderate deficits with SLUMS 14/30. She is confused but fully interactive at baseline, per daughter in law. Question if depression is contributing  Plan: continue therapies, staff assistance with cares. Consider starting antidepressant-discussed with daughter in law.     (R53.81) Physical deconditioning  Comment: poor progress in therapies. At baseline,she is ambulatory and independent with managing incontinence   Plan: continue PHYSICAL THERAPY/OT. Family's goal is for her to return home with services. Care conference per .         Electronically signed by: HUEY Mayo CNP             Sincerely,        HUEY Mayo CNP

## 2022-07-15 ENCOUNTER — TRANSITIONAL CARE UNIT VISIT (OUTPATIENT)
Dept: GERIATRICS | Facility: CLINIC | Age: 87
End: 2022-07-15
Payer: COMMERCIAL

## 2022-07-15 VITALS
HEIGHT: 64 IN | OXYGEN SATURATION: 95 % | BODY MASS INDEX: 21.17 KG/M2 | SYSTOLIC BLOOD PRESSURE: 122 MMHG | DIASTOLIC BLOOD PRESSURE: 69 MMHG | HEART RATE: 77 BPM | WEIGHT: 124 LBS | TEMPERATURE: 98 F | RESPIRATION RATE: 17 BRPM

## 2022-07-15 DIAGNOSIS — I10 PRIMARY HYPERTENSION: ICD-10-CM

## 2022-07-15 DIAGNOSIS — J90 PLEURAL EFFUSION: ICD-10-CM

## 2022-07-15 DIAGNOSIS — R63.4 WEIGHT LOSS: ICD-10-CM

## 2022-07-15 DIAGNOSIS — I50.32 CHRONIC DIASTOLIC CONGESTIVE HEART FAILURE (H): ICD-10-CM

## 2022-07-15 DIAGNOSIS — I42.2 HYPERTROPHIC CARDIOMYOPATHY (H): ICD-10-CM

## 2022-07-15 DIAGNOSIS — R41.89 COGNITIVE IMPAIRMENT: ICD-10-CM

## 2022-07-15 DIAGNOSIS — U07.1 INFECTION DUE TO 2019 NOVEL CORONAVIRUS: Primary | ICD-10-CM

## 2022-07-15 DIAGNOSIS — I27.20 PULMONARY HYPERTENSION (H): ICD-10-CM

## 2022-07-15 DIAGNOSIS — I48.20 CHRONIC A-FIB (H): ICD-10-CM

## 2022-07-15 DIAGNOSIS — R53.81 PHYSICAL DECONDITIONING: ICD-10-CM

## 2022-07-15 PROCEDURE — 99310 SBSQ NF CARE HIGH MDM 45: CPT | Mod: CS | Performed by: NURSE PRACTITIONER

## 2022-07-15 NOTE — PROGRESS NOTES
[facility]    Chief Complaint   Patient presents with     RECHECK     HPI:  Marley Stewart is a 89 year old  (7/17/1932), who is being seen today for an episodic care visit at: [facility] ([facility]) [[id number]].   Medical history significant for afib on apixaban, s/p PPM, hypertrophic cardiomyopathy,  diastolic CHF, HTN, RA, CKD stage 3, hypothyroidism, history of CVA with cognitive impairment.     She initially came to this facility 6/24/2022 for short term rehab and medical management following hospitalization after presenting to the ED 6/14/2022 with worsening confusion and weakness. She received ceftriaxone and azithromycin for UTI and possible pneumonia. TTE 6/19/2022 showed EF 55-60%, advanced diastolic dysfunction, moderate pulmonary hypertension, moderate left pleural effusion and findings suggestive of hypertrophic obstructive cardiomyopathy. Family declined Cardiology consult and further evaluation.      She was sent to the ED 6/29/2022 with lethargy and Na 118. Labs in the ED showed Na 133. Ceftriaxone and levaquin were started for possible pneumonia, both discontinued after CT chest showed atelectasis and no infiltrate. Moderate pericardial effusion and small bilateral pleural effusions were noted. Lasix 20 mg daily was continued. Family declined further work up. Speech therapy evaluated and no signs of dysphagia.   She returned to the facility 7/2/2022 for ongoing rehab.     Today's concern is:      Infection due to 2019 novel coronavirus  Chronic diastolic congestive heart failure (H)  Hypertrophic cardiomyopathy (H)  Pulmonary hypertension (H)  Pleural effusion  Chronic a-fib (H)  Primary hypertension  Cognitive impairment  Physical deconditioning   She is seen today after testing positive for COVID-19 yesterday. There is an outbreak at the facility. She is a poor historian. Denies feeling ill. Denies cough, shortness of breath or chest pain. Denies pain of any type.  "She's been afebrile, no hypoxia.   Participation and progress in therapies has been poor. Nonambulatory and requires assist of 1 with cares. Staff reports she needs encouragement to eat and participate in cares.       Allergies, and PMH/PSH reviewed in EPIC today    REVIEW OF SYSTEMS:  Limited secondary to cognitive impairment. Positives as noted under HPI    Objective:   /69   Pulse 77   Temp 98  F (36.7  C)   Resp 17   Ht 1.626 m (5' 4\")   Wt 56.2 kg (124 lb)   SpO2 95%   BMI 21.28 kg/m    GENERAL APPEARANCE:  Alert, in no distress  ENT:  [place], oropharynx clear  EYES:  conjunctiva and lids normal  NECK:  no adenopathy, no thyromegaly  RESP:  diminished breath sounds both bases, no respiratory distress  CV:  regular rate and rhythm, no murmur, no edema  ABDOMEN:  soft, non-tender, no distension, no masses  M/S:   in bed. CAN with generalized weakness. No joint inflammation  SKIN:  no rashes or open areas  PSYCH:  oriented to self, situation, insight and judgement impaired, memory impaired,  flat affect    Recent labs in [facility] reviewed by me today.     ASSESSMENT / PLAN:  (U07.1) Infection due to 2019 novel coronavirus  (primary encounter diagnosis)  Comment: appears asymptomatic. Afebrile, no hypoxia  Plan: monitor VS, O2 sats, symptoms.   Discussed with her daughter in law Ruby Saleem and decided to hold off on antiviral since she is asymptomatic, has weight loss and overall decline in status.   Discussed with nurse manager    (I50.32) Chronic diastolic congestive heart failure (H)  (I42.2) Hypertrophic cardiomyopathy (H)  (I27.20) Pulmonary hypertension (H)  (J90) Pleural effusion  Comment: appears euvolemic. See below re: weight loss  Plan: continue lasix, metoprolol. Follow weight, symptoms. CBC, BMP next week     (I48.20) Chronic a-fib (H)  Comment: rate controlled: 69-77  Plan: continue metoprolol, apixaban     Weight loss  Comment: weight obtained yesterday is down 12 lbs from 7/3/2022. Intake " is poor. No s/s of swallow or acute GI symptoms. Depression may be contributing  Plan: supplements as started by dietician. Follow weight. Consider mirtazapine-will discuss with daughter in law at next visit.     (I10) Primary hypertension  Comment: controlled with BP: 122/69, 133/67, 132/80    Plan: continue metoprolol, lasix     (R41.89) Cognitive impairment  Comment: SLUMS 14/30, moderate to severe deficits. She is not at baseline, per family   Plan: continue therapies. Staff assistance with cares, meals, activities.     (R53.81) Physical deconditioning  Comment: poor progress in therapies   Plan: continue PHYSICAL THERAPY/OT. Disposition unclear at this time. Family's plan is to return home with her  and home care services. Care conference is later today.         Electronically signed by: HUEY Mayo CNP

## 2022-07-15 NOTE — LETTER
7/15/2022        RE: Marley Stewart  [address]  [place] [address]        [facility] GERIATRICS    Chief Complaint   Patient presents with     RECHECK     HPI:  Marley Stewart is a 89 year old  (7/17/1932), who is being seen today for an episodic care visit at: [facility] ([facility]) [[id number]].   Medical history significant for afib on apixaban, s/p PPM, hypertrophic cardiomyopathy,  diastolic CHF, HTN, RA, CKD stage 3, hypothyroidism, history of CVA with cognitive impairment.     She initially came to this facility 6/24/2022 for short term rehab and medical management following hospitalization after presenting to the ED 6/14/2022 with worsening confusion and weakness. She received ceftriaxone and azithromycin for UTI and possible pneumonia. TTE 6/19/2022 showed EF 55-60%, advanced diastolic dysfunction, moderate pulmonary hypertension, moderate left pleural effusion and findings suggestive of hypertrophic obstructive cardiomyopathy. Family declined Cardiology consult and further evaluation.      She was sent to the ED 6/29/2022 with lethargy and Na 118. Labs in the ED showed Na 133. Ceftriaxone and levaquin were started for possible pneumonia, both discontinued after CT chest showed atelectasis and no infiltrate. Moderate pericardial effusion and small bilateral pleural effusions were noted. Lasix 20 mg daily was continued. Family declined further work up. Speech therapy evaluated and no signs of dysphagia.   She returned to the facility 7/2/2022 for ongoing rehab.     Today's concern is:      Infection due to 2019 novel coronavirus  Chronic diastolic congestive heart failure (H)  Hypertrophic cardiomyopathy (H)  Pulmonary hypertension (H)  Pleural effusion  Chronic a-fib (H)  Primary hypertension  Cognitive impairment  Physical deconditioning   She is seen today after testing positive for COVID-19 yesterday. There is an outbreak at the facility. She is a poor historian. Denies  "feeling ill. Denies cough, shortness of breath or chest pain. Denies pain of any type. She's been afebrile, no hypoxia.   Participation and progress in therapies has been poor. Nonambulatory and requires assist of 1 with cares. Staff reports she needs encouragement to eat and participate in cares.       Allergies, and PMH/PSH reviewed in EPIC today    REVIEW OF SYSTEMS:  Limited secondary to cognitive impairment. Positives as noted under HPI    Objective:   /69   Pulse 77   Temp 98  F (36.7  C)   Resp 17   Ht 1.626 m (5' 4\")   Wt 56.2 kg (124 lb)   SpO2 95%   BMI 21.28 kg/m    GENERAL APPEARANCE:  Alert, in no distress  ENT:  [place], oropharynx clear  EYES:  conjunctiva and lids normal  NECK:  no adenopathy, no thyromegaly  RESP:  diminished breath sounds both bases, no respiratory distress  CV:  regular rate and rhythm, no murmur, no edema  ABDOMEN:  soft, non-tender, no distension, no masses  M/S:   in bed. CAN with generalized weakness. No joint inflammation  SKIN:  no rashes or open areas  PSYCH:  oriented to self, situation, insight and judgement impaired, memory impaired,  flat affect    Recent labs in [facility] reviewed by me today.     ASSESSMENT / PLAN:  (U07.1) Infection due to 2019 novel coronavirus  (primary encounter diagnosis)  Comment: appears asymptomatic. Afebrile, no hypoxia  Plan: monitor VS, O2 sats, symptoms.   Discussed with her daughter in law Ruby Saleem and decided to hold off on antiviral since she is asymptomatic, has weight loss and overall decline in status.   Discussed with nurse manager    (I50.32) Chronic diastolic congestive heart failure (H)  (I42.2) Hypertrophic cardiomyopathy (H)  (I27.20) Pulmonary hypertension (H)  (J90) Pleural effusion  Comment: appears euvolemic. See below re: weight loss  Plan: continue lasix, metoprolol. Follow weight, symptoms. CBC, BMP next week     (I48.20) Chronic a-fib (H)  Comment: rate controlled: 69-77  Plan: continue metoprolol, apixaban "     Weight loss  Comment: weight obtained yesterday is down 12 lbs from 7/3/2022. Intake is poor. No s/s of swallow or acute GI symptoms. Depression may be contributing  Plan: supplements as started by dietician. Follow weight. Consider mirtazapine-will discuss with daughter in law at next visit.     (I10) Primary hypertension  Comment: controlled with BP: 122/69, 133/67, 132/80    Plan: continue metoprolol, lasix     (R41.89) Cognitive impairment  Comment: SLUMS 14/30, moderate to severe deficits. She is not at baseline, per family   Plan: continue therapies. Staff assistance with cares, meals, activities.     (R53.81) Physical deconditioning  Comment: poor progress in therapies   Plan: continue PHYSICAL THERAPY/OT. Disposition unclear at this time. Family's plan is to return home with her  and home care services. Care conference is later today.         Electronically signed by: HUEY Mayo CNP             Sincerely,        HUEY Mayo CNP

## 2022-07-18 VITALS
OXYGEN SATURATION: 96 % | RESPIRATION RATE: 18 BRPM | SYSTOLIC BLOOD PRESSURE: 127 MMHG | BODY MASS INDEX: 21.17 KG/M2 | HEART RATE: 72 BPM | DIASTOLIC BLOOD PRESSURE: 70 MMHG | WEIGHT: 124 LBS | TEMPERATURE: 97.5 F | HEIGHT: 64 IN

## 2022-07-18 NOTE — PROGRESS NOTES
"[facility] GERIATRICS    Chief Complaint   Patient presents with     RECHECK     HPI:  Marley Stewart is a 89 year old  (7/17/1932), who is being seen today for an episodic care visit at: [facility] ([facility]) [[id number]].     She initially came to this facility 6/24/2022 for short term rehab and medical management following hospitalization after presenting to the ED 6/14/2022 with worsening confusion and weakness. She received ceftriaxone and azithromycin for UTI and possible pneumonia. TTE 6/19/2022 showed EF 55-60%, advanced diastolic dysfunction, moderate pulmonary hypertension, moderate left pleural effusion and findings suggestive of hypertrophic obstructive cardiomyopathy. Family declined Cardiology consult and further evaluation.   She was sent to the ED 6/29/2022 with lethargy and Na 118. Labs in the ED showed Na 133. Ceftriaxone and levaquin were started for possible pneumonia, both discontinued after CT chest showed atelectasis and no infiltrate. Moderate pericardial effusion and small bilateral pleural effusions were noted. Lasix 20 mg daily was continued. Family declined further work up. Speech therapy evaluated and no signs of dysphagia.   She returned to the facility 7/2/2022 for ongoing rehab.     Today's concern is:      Infection due to 2019 novel coronavirus  Chronic diastolic congestive heart failure (H)  Hypertrophic cardiomyopathy (H)  Pulmonary hypertension (H)  Pleural effusion  Chronic a-fib (H)  Rheumatoid arthritis, involving unspecified site, unspecified whether rheumatoid factor present (H)  Primary hypertension  Weight loss  Cognitive impairment  Physical deconditioning  She is seen today to follow up after testing positive for COVID-19 on 7/14/2022. There is an outbreak at the facility. She is a poor historian and irritable today. States that she's \"tired\" of being here. Denies feeling ill. Denies respiratory symptoms. Afebrile, no hypoxia. Appetite remains poor. " "  Staff reports minimal progress in therapies and poor participation. Nonambulatory and requires assist of 1 with cares.     Allergies, and PMH/PSH reviewed in EPIC today    REVIEW OF SYSTEMS:  Limited secondary to cognitive impairment. Positives as noted under HPI     Objective:   /70   Pulse 72   Temp 97.5  F (36.4  C)   Resp 18   Ht 1.626 m (5' 4\")   Wt 56.2 kg (124 lb)   SpO2 96%   BMI 21.28 kg/m    GENERAL APPEARANCE:  Alert, in no distress  ENT:  [place], oropharynx clear  EYES:  conjunctiva and lids normal  NECK:  no adenopathy, no thyromegaly  RESP:  lungs clear to auscultation  CV:  regular rate and rhythm, no murmur, no edema  ABDOMEN:  soft, non-tender, no distension, no masses  M/S:   in bed. CAN with generalized weakness. No joint inflammation  SKIN:  no rashes or open areas  PSYCH:  oriented to self, situation, insight and judgement impaired, memory impaired,  flat affect    Recent labs in [facility] reviewed by me today.     ASSESSMENT / PLAN:  (U07.1) Infection due to 2019 novel coronavirus  (primary encounter diagnosis)  Comment: tested positive 7/14/2022 and appears asymptomatic   Plan: monitor VS, O2 sats, symptoms.   Discussed with her daughter in law Ruby Saleem. Family is looking for long term placement due to heavy care needs.     (I50.32) Chronic diastolic congestive heart failure (H)  (I42.2) Hypertrophic cardiomyopathy (H)  (I27.20) Pulmonary hypertension (H)  (J90) Pleural effusion  Comment: euvolemic.Weight loss as below. No acute cardiac symptoms   Creatinine 0.99 on 7/13/2022  Plan: continue lasix, metoprolol. Follow weight, symptoms.     (I48.20) Chronic a-fib (H)  Comment: rate controlled: 68-75  Plan: continue metoprolol, apixaban     (M06.9) Rheumatoid arthritis, involving unspecified site, unspecified whether rheumatoid factor present (H)  Comment: no s/s of acute flare.   Plan: continue methotrexate and folic acid. Follow up with Rheumatology per usual schedule.     (I10) " Primary hypertension  Comment: controlled with BPs: 127/70, 125/76, 122/80    Plan: continue metoprolol, lasix     (R63.4) Weight loss  Comment: recorded weight of 124 lbs on 7/14/2022 is down 12 lbs. Oral intake is poor, no acute GI symptoms. Her mood is down, which appears to be contributing to her lack of interest in eating.    Plan: start mirtazapine 7.5 mg HS. Potential benefits and side effects discussed with daughter in law. Continue nutritional supplements. Follow weight. Dietician is involved.     (R41.89) Cognitive impairment  Comment: SLUMS 14/30, BCRS 4.2, indicating moderate to severe deficits, consistent with dementia. Recommendation is for 24 hr care.   Plan: staff assistance with cares, meals, activities. Family is looking for placement.     (R53.81) Physical deconditioning  Comment: poor progress in therapies   Plan: continue therapies. Disposition per .           Electronically signed by: HUEY Mayo CNP

## 2022-07-19 ENCOUNTER — TRANSITIONAL CARE UNIT VISIT (OUTPATIENT)
Dept: GERIATRICS | Facility: CLINIC | Age: 87
End: 2022-07-19
Payer: COMMERCIAL

## 2022-07-19 DIAGNOSIS — R53.81 PHYSICAL DECONDITIONING: ICD-10-CM

## 2022-07-19 DIAGNOSIS — J90 PLEURAL EFFUSION: ICD-10-CM

## 2022-07-19 DIAGNOSIS — R63.4 WEIGHT LOSS: ICD-10-CM

## 2022-07-19 DIAGNOSIS — I48.20 CHRONIC A-FIB (H): ICD-10-CM

## 2022-07-19 DIAGNOSIS — I27.20 PULMONARY HYPERTENSION (H): ICD-10-CM

## 2022-07-19 DIAGNOSIS — U07.1 INFECTION DUE TO 2019 NOVEL CORONAVIRUS: Primary | ICD-10-CM

## 2022-07-19 DIAGNOSIS — R41.89 COGNITIVE IMPAIRMENT: ICD-10-CM

## 2022-07-19 DIAGNOSIS — I10 PRIMARY HYPERTENSION: ICD-10-CM

## 2022-07-19 DIAGNOSIS — I50.32 CHRONIC DIASTOLIC CONGESTIVE HEART FAILURE (H): ICD-10-CM

## 2022-07-19 DIAGNOSIS — M06.9 RHEUMATOID ARTHRITIS, INVOLVING UNSPECIFIED SITE, UNSPECIFIED WHETHER RHEUMATOID FACTOR PRESENT (H): ICD-10-CM

## 2022-07-19 DIAGNOSIS — I42.2 HYPERTROPHIC CARDIOMYOPATHY (H): ICD-10-CM

## 2022-07-19 PROCEDURE — 99310 SBSQ NF CARE HIGH MDM 45: CPT | Performed by: NURSE PRACTITIONER

## 2022-07-19 NOTE — LETTER
"    7/19/2022        RE: Marley Stewart  [address]  [place] [address]        [facility] GERIATRICS    Chief Complaint   Patient presents with     RECHECK     HPI:  Marley Stewart is a 89 year old  (7/17/1932), who is being seen today for an episodic care visit at: [facility] ([facility] [[id number]].     She initially came to this facility 6/24/2022 for short term rehab and medical management following hospitalization after presenting to the ED 6/14/2022 with worsening confusion and weakness. She received ceftriaxone and azithromycin for UTI and possible pneumonia. TTE 6/19/2022 showed EF 55-60%, advanced diastolic dysfunction, moderate pulmonary hypertension, moderate left pleural effusion and findings suggestive of hypertrophic obstructive cardiomyopathy. Family declined Cardiology consult and further evaluation.   She was sent to the ED 6/29/2022 with lethargy and Na 118. Labs in the ED showed Na 133. Ceftriaxone and levaquin were started for possible pneumonia, both discontinued after CT chest showed atelectasis and no infiltrate. Moderate pericardial effusion and small bilateral pleural effusions were noted. Lasix 20 mg daily was continued. Family declined further work up. Speech therapy evaluated and no signs of dysphagia.   She returned to the facility 7/2/2022 for ongoing rehab.     Today's concern is:      Infection due to 2019 novel coronavirus  Chronic diastolic congestive heart failure (H)  Hypertrophic cardiomyopathy (H)  Pulmonary hypertension (H)  Pleural effusion  Chronic a-fib (H)  Rheumatoid arthritis, involving unspecified site, unspecified whether rheumatoid factor present (H)  Primary hypertension  Weight loss  Cognitive impairment  Physical deconditioning  She is seen today to follow up after testing positive for COVID-19 on 7/14/2022. There is an outbreak at the facility. She is a poor historian and irritable today. States that she's \"tired\" of being here. Denies " "feeling ill. Denies respiratory symptoms. Afebrile, no hypoxia. Appetite remains poor.   Staff reports minimal progress in therapies and poor participation. Nonambulatory and requires assist of 1 with cares.     Allergies, and PMH/PSH reviewed in EPIC today    REVIEW OF SYSTEMS:  Limited secondary to cognitive impairment. Positives as noted under HPI     Objective:   /70   Pulse 72   Temp 97.5  F (36.4  C)   Resp 18   Ht 1.626 m (5' 4\")   Wt 56.2 kg (124 lb)   SpO2 96%   BMI 21.28 kg/m    GENERAL APPEARANCE:  Alert, in no distress  ENT:  [place], oropharynx clear  EYES:  conjunctiva and lids normal  NECK:  no adenopathy, no thyromegaly  RESP:  lungs clear to auscultation  CV:  regular rate and rhythm, no murmur, no edema  ABDOMEN:  soft, non-tender, no distension, no masses  M/S:   in bed. CAN with generalized weakness. No joint inflammation  SKIN:  no rashes or open areas  PSYCH:  oriented to self, situation, insight and judgement impaired, memory impaired,  flat affect    Recent labs in [facility] reviewed by me today.     ASSESSMENT / PLAN:  (U07.1) Infection due to 2019 novel coronavirus  (primary encounter diagnosis)  Comment: tested positive 7/14/2022 and appears asymptomatic   Plan: monitor VS, O2 sats, symptoms.   Discussed with her daughter in law Ruby Saleem. Family is looking for long term placement due to heavy care needs.     (I50.32) Chronic diastolic congestive heart failure (H)  (I42.2) Hypertrophic cardiomyopathy (H)  (I27.20) Pulmonary hypertension (H)  (J90) Pleural effusion  Comment: euvolemic.Weight loss as below. No acute cardiac symptoms   Creatinine 0.99 on 7/13/2022  Plan: continue lasix, metoprolol. Follow weight, symptoms.     (I48.20) Chronic a-fib (H)  Comment: rate controlled: 68-75  Plan: continue metoprolol, apixaban     (M06.9) Rheumatoid arthritis, involving unspecified site, unspecified whether rheumatoid factor present (H)  Comment: no s/s of acute flare.   Plan: continue " methotrexate and folic acid. Follow up with Rheumatology per usual schedule.     (I10) Primary hypertension  Comment: controlled with BPs: 127/70, 125/76, 122/80    Plan: continue metoprolol, lasix     (R63.4) Weight loss  Comment: recorded weight of 124 lbs on 7/14/2022 is down 12 lbs. Oral intake is poor, no acute GI symptoms   Plan: continue nutritional supplements. Follow weight. Dietician is involved.     (R41.89) Cognitive impairment  Comment: SLUMS 14/30, BCRS 4.2, indicating moderate to severe deficits, consistent with dementia. Recommendation is for 24 hr care.   Plan: staff assistance with cares, meals, activities. Family is looking for placement.     (R53.81) Physical deconditioning  Comment: poor progress in therapies   Plan: continue therapies. Disposition per .           Electronically signed by: HUEY Mayo CNP             Sincerely,        HUEY Mayo CNP

## 2022-07-25 VITALS
TEMPERATURE: 98.6 F | HEIGHT: 64 IN | DIASTOLIC BLOOD PRESSURE: 85 MMHG | OXYGEN SATURATION: 95 % | WEIGHT: 134.4 LBS | BODY MASS INDEX: 22.94 KG/M2 | HEART RATE: 82 BPM | RESPIRATION RATE: 18 BRPM | SYSTOLIC BLOOD PRESSURE: 140 MMHG

## 2022-07-26 ENCOUNTER — TRANSITIONAL CARE UNIT VISIT (OUTPATIENT)
Dept: GERIATRICS | Facility: CLINIC | Age: 87
End: 2022-07-26
Payer: COMMERCIAL

## 2022-07-26 DIAGNOSIS — U07.1 INFECTION DUE TO 2019 NOVEL CORONAVIRUS: Primary | ICD-10-CM

## 2022-07-26 DIAGNOSIS — F03.90 DEMENTIA WITHOUT BEHAVIORAL DISTURBANCE, UNSPECIFIED DEMENTIA TYPE: ICD-10-CM

## 2022-07-26 DIAGNOSIS — R63.4 WEIGHT LOSS: ICD-10-CM

## 2022-07-26 DIAGNOSIS — I27.20 PULMONARY HYPERTENSION (H): ICD-10-CM

## 2022-07-26 DIAGNOSIS — J90 PLEURAL EFFUSION: ICD-10-CM

## 2022-07-26 DIAGNOSIS — I48.20 CHRONIC A-FIB (H): ICD-10-CM

## 2022-07-26 DIAGNOSIS — I10 PRIMARY HYPERTENSION: ICD-10-CM

## 2022-07-26 DIAGNOSIS — I50.32 CHRONIC DIASTOLIC CONGESTIVE HEART FAILURE (H): ICD-10-CM

## 2022-07-26 DIAGNOSIS — I42.2 HYPERTROPHIC CARDIOMYOPATHY (H): ICD-10-CM

## 2022-07-26 DIAGNOSIS — R53.81 PHYSICAL DECONDITIONING: ICD-10-CM

## 2022-07-26 PROCEDURE — 99309 SBSQ NF CARE MODERATE MDM 30: CPT | Performed by: NURSE PRACTITIONER

## 2022-07-26 NOTE — PROGRESS NOTES
[facility] GERIATRICS    Chief Complaint   Patient presents with     RECHECK     HPI:  Marley Stewart is a 89 year old  (7/17/1932), who is being seen today for an episodic care visit at: [facility] ([facility]) [[id number]].   She initially came to this facility 6/24/2022 for short term rehab and medical management following hospitalization after presenting to the ED 6/14/2022 with worsening confusion and weakness. She received ceftriaxone and azithromycin for UTI and possible pneumonia. TTE 6/19/2022 showed EF 55-60%, advanced diastolic dysfunction, moderate pulmonary hypertension, moderate left pleural effusion and findings suggestive of hypertrophic obstructive cardiomyopathy. Family declined Cardiology consult and further evaluation.   She was sent to the ED 6/29/2022 with lethargy and Na 118. Labs in the ED showed Na 133. Ceftriaxone and levaquin were started for possible pneumonia, both discontinued after CT chest showed atelectasis and no infiltrate. Moderate pericardial effusion and small bilateral pleural effusions were noted. Lasix 20 mg daily was continued. Family declined further work up. Speech therapy evaluated and no signs of dysphagia.   She returned to the facility 7/2/2022 for ongoing rehab.     Today's concern is:      Infection due to 2019 novel coronavirus  Chronic diastolic congestive heart failure (H)  Hypertrophic cardiomyopathy (H)  Pulmonary hypertension (H)  Pleural effusion  Chronic a-fib (H)  Dementia without behavioral disturbance, unspecified dementia type (H)  Weight loss  Primary hypertension  Physical deconditioning  She is a poor historian, but somewhat more talkative today. Denies feeling ill. Denies pain. Oral intake remains poor. States she doesn't like staff trying to help her eat. Therapies were discontinued due to poor participation and lack of progress. Nonambulatory and requires assist of 1 with cares. Staff reports no new issues.     Allergies, and  "PMH/PSH reviewed in EPIC today    REVIEW OF SYSTEMS:  Limited secondary to cognitive impairment. Positives as noted under HPI    Objective:   BP (!) 140/85   Pulse 82   Temp 98.6  F (37  C)   Resp 18   Ht 1.626 m (5' 4\")   Wt 61 kg (134 lb 6.4 oz)   SpO2 95%   BMI 23.07 kg/m     Exam is unchanged   GENERAL APPEARANCE:  Alert, in no distress  ENT:  [place], oropharynx clear  EYES:  conjunctiva and lids normal  NECK:  no adenopathy, no thyromegaly  RESP:  lungs clear to auscultation  CV:  irregular rate and rhythm, no murmur, no edema  ABDOMEN:  soft, non-tender, no distension, no masses  M/S:   in bed. CAN with generalized weakness. No joint inflammation  SKIN:  no rashes or open areas  PSYCH:  oriented to self, situation, insight and judgement impaired, memory impaired,  flat affect    Recent labs in [facility] reviewed by me today.     ASSESSMENT / PLAN:  (U07.1) Infection due to 2019 novel coronavirus  (primary encounter diagnosis)  Comment: tested positive 7/14/2022. Appears asymptomatic, though her overall status has declined with weight loss and inability to progress in therapies.   Plan: monitor VS, O2 sats, symptoms.     (I50.32) Chronic diastolic congestive heart failure (H)  (I42.2) Hypertrophic cardiomyopathy (H)  (I27.20) Pulmonary hypertension (H)  (J90) Pleural effusion  Comment: euvolemic. No acute cardiac symptoms. Weight loss due to poor intake-see below.   Creatinine 0.99 on 7/13/2022  Plan: continue lasix, metoprolol. Follow weight, symptoms.     (I48.20) Chronic a-fib (H)  Comment: rate controlled: 68-88  Plan: continue metoprolol, apixaban     (F03.90) Dementia without behavioral disturbance, unspecified dementia type (H)  Comment: progressive disease with significant decline in cognition and functional status in recent months. Acute illnesses, including COVID-19, and recurrent hospitalizations likely contributing.   SLUMS 14/30, BCRS 4.2, indicating moderate to severe deficits. Requires 24 hr care. "   Plan: staff assistance with all cares. Family is looking for placement.     (R63.4) Weight loss  Comment: weight was down to 124 lbs, but possibly an error as weight is back to 134 lbs. Still down 7 lbs from admission. Mirtazapine was started last week for mood and appetite  Plan: continue mirtazapine. Continue nutritional supplements. Follow weight. Dietician is involved     (I10) Primary hypertension  Comment: controlled with BPs: 140/85, 110/85, 129/79  Plan: continue metoprolol, lasix     (R53.81) Physical deconditioning  Comment: unable to progress in therapies   Plan: disposition per . Family is looking for placement.           Electronically signed by: HUEY Mayo CNP

## 2022-07-26 NOTE — LETTER
7/26/2022        RE: Marley Stewart  [address]  [place] [address]        [facility] GERIATRICS    Chief Complaint   Patient presents with     RECHECK     HPI:  Marley Stewart is a 89 year old  (7/17/1932), who is being seen today for an episodic care visit at: [facility] ([facility]) [[id number]].   She initially came to this facility 6/24/2022 for short term rehab and medical management following hospitalization after presenting to the ED 6/14/2022 with worsening confusion and weakness. She received ceftriaxone and azithromycin for UTI and possible pneumonia. TTE 6/19/2022 showed EF 55-60%, advanced diastolic dysfunction, moderate pulmonary hypertension, moderate left pleural effusion and findings suggestive of hypertrophic obstructive cardiomyopathy. Family declined Cardiology consult and further evaluation.   She was sent to the ED 6/29/2022 with lethargy and Na 118. Labs in the ED showed Na 133. Ceftriaxone and levaquin were started for possible pneumonia, both discontinued after CT chest showed atelectasis and no infiltrate. Moderate pericardial effusion and small bilateral pleural effusions were noted. Lasix 20 mg daily was continued. Family declined further work up. Speech therapy evaluated and no signs of dysphagia.   She returned to the facility 7/2/2022 for ongoing rehab.     Today's concern is:      Infection due to 2019 novel coronavirus  Chronic diastolic congestive heart failure (H)  Hypertrophic cardiomyopathy (H)  Pulmonary hypertension (H)  Pleural effusion  Chronic a-fib (H)  Dementia without behavioral disturbance, unspecified dementia type (H)  Weight loss  Primary hypertension  Physical deconditioning  She is a poor historian, but somewhat more talkative today. Denies feeling ill. Denies pain. Oral intake remains poor. States she doesn't like staff trying to help her eat. Therapies were discontinued due to poor participation and lack of progress. Nonambulatory and  "requires assist of 1 with cares. Staff reports no new issues.     Allergies, and PMH/PSH reviewed in EPIC today    REVIEW OF SYSTEMS:  Limited secondary to cognitive impairment. Positives as noted under HPI    Objective:   BP (!) 140/85   Pulse 82   Temp 98.6  F (37  C)   Resp 18   Ht 1.626 m (5' 4\")   Wt 61 kg (134 lb 6.4 oz)   SpO2 95%   BMI 23.07 kg/m     Exam is unchanged   GENERAL APPEARANCE:  Alert, in no distress  ENT:  [place], oropharynx clear  EYES:  conjunctiva and lids normal  NECK:  no adenopathy, no thyromegaly  RESP:  lungs clear to auscultation  CV:  irregular rate and rhythm, no murmur, no edema  ABDOMEN:  soft, non-tender, no distension, no masses  M/S:   in bed. CAN with generalized weakness. No joint inflammation  SKIN:  no rashes or open areas  PSYCH:  oriented to self, situation, insight and judgement impaired, memory impaired,  flat affect    Recent labs in [facility] reviewed by me today.     ASSESSMENT / PLAN:  (U07.1) Infection due to 2019 novel coronavirus  (primary encounter diagnosis)  Comment: tested positive 7/14/2022. Appears asymptomatic, though her overall status has declined with weight loss and inability to progress in therapies.   Plan: monitor VS, O2 sats, symptoms.     (I50.32) Chronic diastolic congestive heart failure (H)  (I42.2) Hypertrophic cardiomyopathy (H)  (I27.20) Pulmonary hypertension (H)  (J90) Pleural effusion  Comment: euvolemic. No acute cardiac symptoms. Weight loss due to poor intake-see below.   Creatinine 0.99 on 7/13/2022  Plan: continue lasix, metoprolol. Follow weight, symptoms.     (I48.20) Chronic a-fib (H)  Comment: rate controlled: 68-88  Plan: continue metoprolol, apixaban     (F03.90) Dementia without behavioral disturbance, unspecified dementia type (H)  Comment: progressive disease with significant decline in cognition and functional status in recent months. Acute illnesses, including COVID-19, and recurrent hospitalizations likely contributing. "   SLUMS 14/30, BCRS 4.2, indicating moderate to severe deficits. Requires 24 hr care.   Plan: staff assistance with all cares. Family is looking for placement.     (R63.4) Weight loss  Comment: weight was down to 124 lbs, but possibly an error as weight is back to 134 lbs. Still down 7 lbs from admission. Mirtazapine was started last week for mood and appetite  Plan: continue mirtazapine. Continue nutritional supplements. Follow weight. Dietician is involved     (I10) Primary hypertension  Comment: controlled with BPs: 140/85, 110/85, 129/79  Plan: continue metoprolol, lasix     (R53.81) Physical deconditioning  Comment: unable to progress in therapies   Plan: disposition per . Family is looking for placement.           Electronically signed by: HUEY Mayo CNP             Sincerely,        HUEY Mayo CNP

## 2022-07-29 RX ORDER — MIRTAZAPINE 7.5 MG/1
7.5 TABLET, FILM COATED ORAL AT BEDTIME
COMMUNITY

## 2022-08-08 VITALS
OXYGEN SATURATION: 97 % | DIASTOLIC BLOOD PRESSURE: 63 MMHG | RESPIRATION RATE: 16 BRPM | TEMPERATURE: 97.7 F | BODY MASS INDEX: 22.94 KG/M2 | WEIGHT: 134.4 LBS | HEIGHT: 64 IN | HEART RATE: 67 BPM | SYSTOLIC BLOOD PRESSURE: 128 MMHG

## 2022-08-09 ENCOUNTER — TRANSITIONAL CARE UNIT VISIT (OUTPATIENT)
Dept: GERIATRICS | Facility: CLINIC | Age: 87
End: 2022-08-09
Payer: COMMERCIAL

## 2022-08-09 DIAGNOSIS — J90 PLEURAL EFFUSION: ICD-10-CM

## 2022-08-09 DIAGNOSIS — F03.90 DEMENTIA WITHOUT BEHAVIORAL DISTURBANCE, UNSPECIFIED DEMENTIA TYPE: Primary | ICD-10-CM

## 2022-08-09 DIAGNOSIS — I27.20 PULMONARY HYPERTENSION (H): ICD-10-CM

## 2022-08-09 DIAGNOSIS — I10 PRIMARY HYPERTENSION: ICD-10-CM

## 2022-08-09 DIAGNOSIS — R63.4 WEIGHT LOSS: ICD-10-CM

## 2022-08-09 DIAGNOSIS — I48.20 CHRONIC A-FIB (H): ICD-10-CM

## 2022-08-09 DIAGNOSIS — U07.1 INFECTION DUE TO 2019 NOVEL CORONAVIRUS: ICD-10-CM

## 2022-08-09 DIAGNOSIS — I42.2 HYPERTROPHIC CARDIOMYOPATHY (H): ICD-10-CM

## 2022-08-09 DIAGNOSIS — I50.32 CHRONIC DIASTOLIC CONGESTIVE HEART FAILURE (H): ICD-10-CM

## 2022-08-09 DIAGNOSIS — M06.9 RHEUMATOID ARTHRITIS, INVOLVING UNSPECIFIED SITE, UNSPECIFIED WHETHER RHEUMATOID FACTOR PRESENT (H): ICD-10-CM

## 2022-08-09 PROCEDURE — 99309 SBSQ NF CARE MODERATE MDM 30: CPT | Mod: CD | Performed by: NURSE PRACTITIONER

## 2022-08-09 NOTE — PROGRESS NOTES
[facility] GERIATRICS    Chief Complaint   Patient presents with     RECHECK     HPI:  Marley Stewart is a 89 year old  (7/17/1932), who is being seen today for an episodic care visit at: [facility] ([facility]) [[id number]].   She initially came to this facility 6/24/2022 for short term rehab and medical management following hospitalization after presenting to the ED 6/14/2022 with worsening confusion and weakness. She received ceftriaxone and azithromycin for UTI and possible pneumonia. TTE 6/19/2022 showed EF 55-60%, advanced diastolic dysfunction, moderate pulmonary hypertension, moderate left pleural effusion and findings suggestive of hypertrophic obstructive cardiomyopathy. Family declined Cardiology consult and further evaluation.   She was sent to the ED 6/29/2022 with lethargy and Na 118. Labs in the ED showed Na 133. Ceftriaxone and levaquin were started for possible pneumonia, both discontinued after CT chest showed atelectasis and no infiltrate. Moderate pericardial effusion and small bilateral pleural effusions were noted. Lasix 20 mg daily was continued. Family declined further work up. Speech therapy evaluated and no signs of dysphagia.   She returned to the facility 7/2/2022 for ongoing rehab.Was unable to progress in therapies and has moved to long term care.     Today's concern is:      Dementia without behavioral disturbance, unspecified dementia type (H)  Weight loss  Chronic diastolic congestive heart failure (H)  Hypertrophic cardiomyopathy (H)  Pulmonary hypertension (H)  Pleural effusion  Chronic a-fib (H)  Rheumatoid arthritis, involving unspecified site, unspecified whether rheumatoid factor present (H)  Infection due to 2019 novel coronavirus  Primary hypertension  She is dressed and up in a wheelchair this morning. More alert and interactive, poor historian. Denies feeling ill. Denies pain. Appetite remains fairly poor. Wheelchair bound and requires assist of 1 with  "cares. No new issues per staff.   SLUMS 14/30, BCRS 4.2,    Allergies, and PMH/PSH reviewed in EPIC today    REVIEW OF SYSTEMS:  Unable to obtain due to dementia. Positives as noted under HPI     Objective:   /63   Pulse 67   Temp 97.7  F (36.5  C)   Resp 16   Ht 1.626 m (5' 4\")   Wt 61 kg (134 lb 6.4 oz)   SpO2 97%   BMI 23.07 kg/m    GENERAL APPEARANCE:  Alert, in no distress  ENT:  [place], oropharynx clear  EYES:  conjunctiva and lids normal  NECK:  no adenopathy, no thyromegaly  RESP:  lungs clear to auscultation  CV:  irregular rate and rhythm, no murmur, no LE edema  ABDOMEN:  soft, non-tender, no distension, no masses  M/S:  wheelchair. CAN with generalized weakness. No joint inflammation  SKIN:  no rashes or open areas  PSYCH:  oriented to self, situation, insight and judgement impaired, memory impaired, mood normal     Recent labs in [facility] reviewed by me today.     ASSESSMENT / PLAN:  (F03.90) Dementia without behavioral disturbance, unspecified dementia type (H)  (primary encounter diagnosis)  Comment: moderate to severe deficits on cognitive testing, requires 24 hr care.Mood is better today-possibly due to mirtazapine.   Plan: continue mirtazapine. Long term care staff assistance with all cares.     (R63.4) Weight loss  Comment: weight 134 lbs is down 7 lbs from admission, but stable  Mirtazapine was started 7/19/2022  Plan: continue mirtazapine. Staff assistance at meals. Follow weight     (I50.32) Chronic diastolic congestive heart failure (H)  (I42.2) Hypertrophic cardiomyopathy (H)  (I27.20) Pulmonary hypertension (H)  (J90) Pleural effusion  Comment: no acute cardiac issues, appears euvolemic   Creatinine 0.99 on 7/13/2022  Plan: continue lasix, metoprolol. Follow symptoms, weight. Follow up Cardiology as scheduled.     (I48.20) Chronic a-fib (H)  Comment: rate controlled: 67-76  Plan: continue metoprolol, apixaban     (M06.9) Rheumatoid arthritis, involving unspecified site, unspecified " whether rheumatoid factor present (H)  Comment: no s/s of flare or significant pain   Plan: continue methotrexate, folic acid, tylenol prn. Rheumatology follow up per usual schedule     (U07.1) Infection due to 2019 novel coronavirus  Comment: tested positive 7/14/2022 and has minimal symptoms   Plan: monitor     (I10) Primary hypertension  Comment: controlled with BPs: 128/63, 127/62, 121/60   Plan: continue metoprolol, lasix. Avoid hypotension due to advanced age and fall risk         Electronically signed by: HUEY Mayo CNP

## 2022-08-09 NOTE — LETTER
8/9/2022        RE: Marley Stewart  [address]  [place] [address]        [facility] GERIATRICS    Chief Complaint   Patient presents with     RECHECK     HPI:  Marley Stewart is a 89 year old  (7/17/1932), who is being seen today for an episodic care visit at: [facility] ([facility]) [[id number]].   She initially came to this facility 6/24/2022 for short term rehab and medical management following hospitalization after presenting to the ED 6/14/2022 with worsening confusion and weakness. She received ceftriaxone and azithromycin for UTI and possible pneumonia. TTE 6/19/2022 showed EF 55-60%, advanced diastolic dysfunction, moderate pulmonary hypertension, moderate left pleural effusion and findings suggestive of hypertrophic obstructive cardiomyopathy. Family declined Cardiology consult and further evaluation.   She was sent to the ED 6/29/2022 with lethargy and Na 118. Labs in the ED showed Na 133. Ceftriaxone and levaquin were started for possible pneumonia, both discontinued after CT chest showed atelectasis and no infiltrate. Moderate pericardial effusion and small bilateral pleural effusions were noted. Lasix 20 mg daily was continued. Family declined further work up. Speech therapy evaluated and no signs of dysphagia.   She returned to the facility 7/2/2022 for ongoing rehab.Was unable to progress in therapies and has moved to long term care.     Today's concern is:      Dementia without behavioral disturbance, unspecified dementia type (H)  Weight loss  Chronic diastolic congestive heart failure (H)  Hypertrophic cardiomyopathy (H)  Pulmonary hypertension (H)  Pleural effusion  Chronic a-fib (H)  Rheumatoid arthritis, involving unspecified site, unspecified whether rheumatoid factor present (H)  Infection due to 2019 novel coronavirus  Primary hypertension  She is dressed and up in a wheelchair this morning. More alert and interactive, poor historian. Denies feeling ill. Denies  "pain. Appetite remains fairly poor. Wheelchair bound and requires assist of 1 with cares. No new issues per staff.   SLUMS 14/30, BCRS 4.2,    Allergies, and PMH/PSH reviewed in EPIC today    REVIEW OF SYSTEMS:  Unable to obtain due to dementia. Positives as noted under HPI     Objective:   /63   Pulse 67   Temp 97.7  F (36.5  C)   Resp 16   Ht 1.626 m (5' 4\")   Wt 61 kg (134 lb 6.4 oz)   SpO2 97%   BMI 23.07 kg/m    GENERAL APPEARANCE:  Alert, in no distress  ENT:  [place], oropharynx clear  EYES:  conjunctiva and lids normal  NECK:  no adenopathy, no thyromegaly  RESP:  lungs clear to auscultation  CV:  irregular rate and rhythm, no murmur, no LE edema  ABDOMEN:  soft, non-tender, no distension, no masses  M/S:  wheelchair. CAN with generalized weakness. No joint inflammation  SKIN:  no rashes or open areas  PSYCH:  oriented to self, situation, insight and judgement impaired, memory impaired, mood normal     Recent labs in [facility] reviewed by me today.     ASSESSMENT / PLAN:  (F03.90) Dementia without behavioral disturbance, unspecified dementia type (H)  (primary encounter diagnosis)  Comment: moderate to severe deficits on cognitive testing, requires 24 hr care.Mood is better today-possibly due to mirtazapine.   Plan: continue mirtazapine. Long term care staff assistance with all cares.     (R63.4) Weight loss  Comment: weight 134 lbs is down 7 lbs from admission, but stable  Mirtazapine was started 7/19/2022  Plan: continue mirtazapine. Staff assistance at meals. Follow weight     (I50.32) Chronic diastolic congestive heart failure (H)  (I42.2) Hypertrophic cardiomyopathy (H)  (I27.20) Pulmonary hypertension (H)  (J90) Pleural effusion  Comment: no acute cardiac issues, appears euvolemic   Creatinine 0.99 on 7/13/2022  Plan: continue lasix, metoprolol. Follow symptoms, weight. Follow up Cardiology as scheduled.     (I48.20) Chronic a-fib (H)  Comment: rate controlled: 67-76  Plan: continue metoprolol, " apixaban     (M06.9) Rheumatoid arthritis, involving unspecified site, unspecified whether rheumatoid factor present (H)  Comment: no s/s of flare or significant pain   Plan: continue methotrexate, folic acid, tylenol prn. Rheumatology follow up per usual schedule     (U07.1) Infection due to 2019 novel coronavirus  Comment: tested positive 7/14/2022 and has minimal symptoms   Plan: monitor     (I10) Primary hypertension  Comment: controlled with BPs: 128/63, 127/62, 121/60   Plan: continue metoprolol, lasix. Avoid hypotension due to advanced age and fall risk         Electronically signed by: HUEY Mayo CNP             Sincerely,        HUEY Mayo CNP

## 2022-09-06 ENCOUNTER — NURSING HOME VISIT (OUTPATIENT)
Dept: GERIATRICS | Facility: CLINIC | Age: 87
End: 2022-09-06
Payer: COMMERCIAL

## 2022-09-06 VITALS
RESPIRATION RATE: 18 BRPM | HEIGHT: 64 IN | HEART RATE: 75 BPM | OXYGEN SATURATION: 97 % | WEIGHT: 133.4 LBS | DIASTOLIC BLOOD PRESSURE: 76 MMHG | TEMPERATURE: 98.1 F | SYSTOLIC BLOOD PRESSURE: 139 MMHG | BODY MASS INDEX: 22.77 KG/M2

## 2022-09-06 DIAGNOSIS — E03.9 HYPOTHYROIDISM, UNSPECIFIED TYPE: ICD-10-CM

## 2022-09-06 DIAGNOSIS — F01.50 VASCULAR DEMENTIA WITHOUT BEHAVIORAL DISTURBANCE (H): ICD-10-CM

## 2022-09-06 DIAGNOSIS — I42.2 HYPERTROPHIC CARDIOMYOPATHY (H): ICD-10-CM

## 2022-09-06 DIAGNOSIS — N18.30 CRF (CHRONIC RENAL FAILURE), STAGE 3 (MODERATE) (H): ICD-10-CM

## 2022-09-06 DIAGNOSIS — I48.20 CHRONIC A-FIB (H): ICD-10-CM

## 2022-09-06 DIAGNOSIS — M06.9 RHEUMATOID ARTHRITIS INVOLVING MULTIPLE JOINTS (H): ICD-10-CM

## 2022-09-06 DIAGNOSIS — I27.20 PULMONARY HYPERTENSION (H): ICD-10-CM

## 2022-09-06 DIAGNOSIS — I50.32 CHRONIC DIASTOLIC CONGESTIVE HEART FAILURE (H): Primary | ICD-10-CM

## 2022-09-06 PROBLEM — Z86.73 HISTORY OF STROKE: Status: ACTIVE | Noted: 2017-04-13

## 2022-09-06 PROBLEM — M15.0 PRIMARY OSTEOARTHRITIS INVOLVING MULTIPLE JOINTS: Status: ACTIVE | Noted: 2019-06-19

## 2022-09-06 PROBLEM — Z95.0 PACEMAKER: Status: ACTIVE | Noted: 2019-05-15
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
## 2022-09-06 PROBLEM — Z79.01 LONG TERM (CURRENT) USE OF ANTICOAGULANTS: Status: ACTIVE | Noted: 2019-01-28

## 2022-09-06 PROBLEM — Z87.81 PERSONAL HISTORY OF (HEALED) TRAUMATIC FRACTURE: Status: ACTIVE | Noted: 2019-08-08

## 2022-09-06 PROBLEM — R26.2 DIFFICULTY IN WALKING, NOT ELSEWHERE CLASSIFIED: Status: ACTIVE | Noted: 2019-01-28

## 2022-09-06 PROBLEM — Z96.659 S/P KNEE REPLACEMENT: Status: ACTIVE | Noted: 2017-01-31

## 2022-09-06 PROBLEM — S32.599A PUBIC RAMUS FRACTURE (H): Status: ACTIVE | Noted: 2019-02-01

## 2022-09-06 PROBLEM — M62.838 OTHER MUSCLE SPASM: Status: ACTIVE | Noted: 2019-08-09

## 2022-09-06 PROBLEM — K59.00 CONSTIPATION, UNSPECIFIED: Status: ACTIVE | Noted: 2019-01-28

## 2022-09-06 PROBLEM — E78.5 HYPERLIPIDEMIA, UNSPECIFIED: Status: ACTIVE | Noted: 2017-01-31

## 2022-09-06 PROBLEM — Z86.79 HISTORY OF ATRIAL FIBRILLATION: Status: ACTIVE | Noted: 2017-04-13

## 2022-09-06 PROBLEM — M54.50 LOW BACK PAIN: Status: ACTIVE | Noted: 2017-05-02

## 2022-09-06 PROBLEM — R13.12 DYSPHAGIA, OROPHARYNGEAL PHASE: Status: ACTIVE | Noted: 2019-08-09

## 2022-09-06 PROBLEM — R27.8 OTHER LACK OF COORDINATION: Status: ACTIVE | Noted: 2019-01-28

## 2022-09-06 PROBLEM — Z79.899 HIGH RISK MEDICATION USE: Status: ACTIVE | Noted: 2019-06-19

## 2022-09-06 PROBLEM — R41.89 OTHER SYMPTOMS AND SIGNS INVOLVING COGNITIVE FUNCTIONS AND AWARENESS: Status: ACTIVE | Noted: 2019-08-08

## 2022-09-06 PROCEDURE — 99309 SBSQ NF CARE MODERATE MDM 30: CPT | Performed by: INTERNAL MEDICINE

## 2022-09-06 NOTE — LETTER
9/6/2022        RE: Marley Stewart  [address]  [place] [address]        Marley Stewart is a 89 year old female seen September 6, 2022 at [facility] where she has resided for 2 months (admit 7/2022) seen to follow up atrial fib, HOCM and dementia.   Pt is seen in her room up to WC   Reports feeling okay, no dyspnea.    Not able to give much meaningful history, but she asks questions about her 's progress in TCU.   No new concerns reported by [facility] nursing staff           By chart review, pt has a history of HTN, HOCM, atrial fib anticoagulated with apixaban and s/p pacemaker placement, hypothyroidism and cognitive decline.    She was living at home with her  and a PCA caregiver until her hospitalizations      Pt had 2 [facility] hospitalizations 6/14-24 and 6/29-7/2.   First admission was for E coli UTI with sepsis and metabolic encephalopathy.   She had presented with confusion, fever and weakness.   Found to have WBC 14.9 and UC showed a pansensitive E coli.   She was also noted to have intermittent CP, cough and dyspnea, with possible LLL infiltrate on CXR, so treated for CAP as well.    Treated with abx, she developed a rash on amoxicillin and that was d/c'd   She discharged to TCU, but was rehospitalized with hyponatremia and ongoing weakness.   She was found to have a moderate pericardial effusion and small bilateral pleural effusions on chest CT; the former not verified on ECHO.  Started on furosemide, family declined aggressive or invasive workup and she was discharged back to TCU for ongoing recovery and Rehab.    Pt did not make enough functional gains to return home, so has transitioned to LTC for placement     Pt found to be COVID19+ in July 2022, with minimal symptoms and now recovered.     Past Medical History:   Diagnosis Date     A-fib -- Chronic      Aortic regurgitation     1-2+ per 4/2015 Echo     Arthritis      CHF (congestive heart failure) (H)     EF 65-70% on 4/2015  "Echo     CVA (cerebral vascular accident) (H)      Glaucoma      Hypertension      Mitral regurgitation     2+ per 4/2015 Echo     PUD (peptic ulcer disease)      Pulmonic valve regurgitation     1+ per 4/2015 Echo     RA (rheumatoid arthritis) (H)      Spinal stenosis      Thyroid disease      Tricuspid regurgitation     2+ per 4/2015 Echo       Past Surgical History:   Procedure Laterality Date     ORTHOPEDIC SURGERY       SH: Previously lived with her  [place], house in [place]. Pt has a caregiver as well.     Daughter-in-law Ruby is primary contact     Non smoker      Review Of Systems  Genitourinary: incontinence, may be functional  Musculoskeletal: generalized weakness, needs assist for transfers, ADLs     Neurologic: BIMS 7/15    BCRS 4.2  SLUMS 14/30   Wt Readings from Last 5 Encounters:   09/04/22 60.5 kg (133 lb 6.4 oz)   08/08/22 61 kg (134 lb 6.4 oz)   07/25/22 61 kg (134 lb 6.4 oz)   07/18/22 56.2 kg (124 lb)   07/15/22 56.2 kg (124 lb)      EXAM:  NAD  /76   Pulse 75   Temp 98.1  F (36.7  C)   Resp 18   Ht 1.626 m (5' 4\")   Wt 60.5 kg (133 lb 6.4 oz)   SpO2 97%   BMI 22.90 kg/m     Neck supple without adenopathy  Lungs clear bilaterally with fair air movement   Heart RRR s1s2  Abd soft, NT, no distention or guarding, +BS  Ext without edema  Neuro: conversational, WC bound, no focal findings  Psych: affect okay, pleasant       Labs reviewed: on 7/13/2022  BUN/Cr 15/0.99   GFR 55      hgb 14.7    Echo 6/19/2022  Left ventricular systolic function is normal.   The visual ejection fraction is 55-60%.  Significant assymmetric thickening of the left ventricular walls, prominent proximal septal thickening as well as apical hypertrophy. Findings suggest variant of hypertrophic obstructive cardiomyopathy.                 Grade III or advanced diastolic dysfunction.  The right ventricle is normal in structure, function and size.     Moderate (46-55mmHg) pulmonary hypertension is present.       "  Aortic sclerosis without stenosis.  IVC diameter and respiratory changes fall into an intermediate range suggesting an RA pressure of 8 mmHg.  There is no pericardial effusion.    Moderate left pleural effusion  Recommend cardiac MRI if clinically indicated. Findings compared to study dated 11/3/2016, there appears to be more advanced diastolic dysfunction as well as progressive hypertrophic cardiomyopathy.     Head CT 2017                                                                  1. No acute pathology. No bleed, mass, or acute infarcts are seen.  2. Atrophy of the brain.  White matter changes consistent with small vessel ischemic disease.   3. Chronic areas of encephalomalacia are present in the left inferior cerebellum and right occipital lobe these are unchanged.       IMP/PLAN:    (I50.32) Chronic diastolic congestive heart failure (H)    (I42.2) Hypertrophic cardiomyopathy (H)  (I27.20) Pulmonary hypertension (H)  Comment: stable volume status     Plan: she is on a beta blocker and diuretic.  Latter currently furosemide 20 mg/day   Follow up with [facility]      (I48.20) Chronic a-fib (H)  Comment:  CHADS-VASc score 7  Pulse Readings from Last 4 Encounters:   09/05/22 75   08/08/22 67   07/25/22 82   07/18/22 72      Plan: metoprolol 25 mg/day for VR control   Apixaban 5 mg bid for stroke prophylaxis        (I10) Primary hypertension  (N18.30) CRF (chronic renal failure), stage 3 (moderate) (H)  Comment:   BP Readings from Last 3 Encounters:   09/05/22 139/76   08/08/22 128/63   07/25/22 (!) 140/85      Plan: metoprolol 25 mg/day     (M06.9) Rheumatoid arthritis involving multiple joints (H)  (M06.9) Rheumatoid arthritis, involving unspecified site, unspecified whether rheumatoid factor present (H)  Comment: last seen by Dr Galindo in October 2021    Plan: methotrexate 15 mg/week with folic acid 1mg daily   Routine labs  Pt is also on Prolia for osteoporosis and h/o pelvic fractures      (E03.9)  Hypothyroidism, unspecified type  Comment:   TSH   Date Value Ref Range Status   06/14/2022 0.44 0.40 - 4.00 mU/L Final   01/04/2019 9.77 (H) 0.40 - 4.00 mU/L Final      Plan: levothyroxine 50 mcg/day   Follow TSH and if remains low would decrease dose.        (F01.50) Vascular dementia without behavioral disturbance (H)  Comment: scores as above, very low functional status  With CT showing SVID and old infarcts, likely vascular dementia     (M62.81) Generalized muscle weakness  Comment: currently needing assist with all cares and ADLs.      Plan:  LTC support for med admin, meals, activity and cares        Radha Aburto MD         Sincerely,        Radha Aburto MD

## 2022-09-24 PROBLEM — I48.91 A-FIB (H): Status: RESOLVED | Noted: 2019-01-04 | Resolved: 2022-09-24

## 2022-09-24 NOTE — PROGRESS NOTES
Marley Stewart is a 89 year old female seen September 6, 2022 at [facility] where she has resided for 2 months (admit 7/2022) seen to follow up atrial fib, HOCM and dementia.   Pt is seen in her room up to WC   Reports feeling okay, no dyspnea.    Not able to give much meaningful history, but she asks questions about her 's progress in TCU.   No new concerns reported by [facility] nursing staff           By chart review, pt has a history of HTN, HOCM, atrial fib anticoagulated with apixaban and s/p pacemaker placement, hypothyroidism and cognitive decline.    She was living at home with her  and a PCA caregiver until her hospitalizations      Pt had 2 [facility] hospitalizations 6/14-24 and 6/29-7/2.   First admission was for E coli UTI with sepsis and metabolic encephalopathy.   She had presented with confusion, fever and weakness.   Found to have WBC 14.9 and UC showed a pansensitive E coli.   She was also noted to have intermittent CP, cough and dyspnea, with possible LLL infiltrate on CXR, so treated for CAP as well.    Treated with abx, she developed a rash on amoxicillin and that was d/c'd   She discharged to TCU, but was rehospitalized with hyponatremia and ongoing weakness.   She was found to have a moderate pericardial effusion and small bilateral pleural effusions on chest CT; the former not verified on ECHO.  Started on furosemide, family declined aggressive or invasive workup and she was discharged back to TCU for ongoing recovery and Rehab.    Pt did not make enough functional gains to return home, so has transitioned to LTC for placement     Pt found to be COVID19+ in July 2022, with minimal symptoms and now recovered.     Past Medical History:   Diagnosis Date     A-fib -- Chronic      Aortic regurgitation     1-2+ per 4/2015 Echo     Arthritis      CHF (congestive heart failure) (H)     EF 65-70% on 4/2015 Echo     CVA (cerebral vascular accident) (H)      Glaucoma      Hypertension       "Mitral regurgitation     2+ per 4/2015 Echo     PUD (peptic ulcer disease)      Pulmonic valve regurgitation     1+ per 4/2015 Echo     RA (rheumatoid arthritis) (H)      Spinal stenosis      Thyroid disease      Tricuspid regurgitation     2+ per 4/2015 Echo       Past Surgical History:   Procedure Laterality Date     ORTHOPEDIC SURGERY       SH: Previously lived with her  Fly, house in [place]. Pt has a caregiver as well.     Daughter-in-law Ruby is primary contact     Non smoker      Review Of Systems  Genitourinary: incontinence, may be functional  Musculoskeletal: generalized weakness, needs assist for transfers, ADLs     Neurologic: BIMS 7/15    BCRS 4.2  SLUMS 14/30   Wt Readings from Last 5 Encounters:   09/04/22 60.5 kg (133 lb 6.4 oz)   08/08/22 61 kg (134 lb 6.4 oz)   07/25/22 61 kg (134 lb 6.4 oz)   07/18/22 56.2 kg (124 lb)   07/15/22 56.2 kg (124 lb)      EXAM:  NAD  /76   Pulse 75   Temp 98.1  F (36.7  C)   Resp 18   Ht 1.626 m (5' 4\")   Wt 60.5 kg (133 lb 6.4 oz)   SpO2 97%   BMI 22.90 kg/m     Neck supple without adenopathy  Lungs clear bilaterally with fair air movement   Heart RRR s1s2  Abd soft, NT, no distention or guarding, +BS  Ext without edema  Neuro: conversational, WC bound, no focal findings  Psych: affect okay, pleasant       Labs reviewed: on 7/13/2022  BUN/Cr 15/0.99   GFR 55      hgb 14.7    Echo 6/19/2022  Left ventricular systolic function is normal.   The visual ejection fraction is 55-60%.  Significant assymmetric thickening of the left ventricular walls, prominent proximal septal thickening as well as apical hypertrophy. Findings suggest variant of hypertrophic obstructive cardiomyopathy.                 Grade III or advanced diastolic dysfunction.  The right ventricle is normal in structure, function and size.     Moderate (46-55mmHg) pulmonary hypertension is present.        Aortic sclerosis without stenosis.  IVC diameter and respiratory changes fall " into an intermediate range suggesting an RA pressure of 8 mmHg.  There is no pericardial effusion.    Moderate left pleural effusion  Recommend cardiac MRI if clinically indicated. Findings compared to study dated 11/3/2016, there appears to be more advanced diastolic dysfunction as well as progressive hypertrophic cardiomyopathy.     Head CT 2017                                                                  1. No acute pathology. No bleed, mass, or acute infarcts are seen.  2. Atrophy of the brain.  White matter changes consistent with small vessel ischemic disease.   3. Chronic areas of encephalomalacia are present in the left inferior cerebellum and right occipital lobe these are unchanged.       IMP/PLAN:    (I50.32) Chronic diastolic congestive heart failure (H)    (I42.2) Hypertrophic cardiomyopathy (H)  (I27.20) Pulmonary hypertension (H)  Comment: stable volume status     Plan: she is on a beta blocker and diuretic.  Latter currently furosemide 20 mg/day   Follow up with [facility]      (I48.20) Chronic a-fib (H)  Comment:  CHADS-VASc score 7  Pulse Readings from Last 4 Encounters:   09/05/22 75   08/08/22 67   07/25/22 82   07/18/22 72      Plan: metoprolol 25 mg/day for VR control   Apixaban 5 mg bid for stroke prophylaxis        (I10) Primary hypertension  (N18.30) CRF (chronic renal failure), stage 3 (moderate) (H)  Comment:   BP Readings from Last 3 Encounters:   09/05/22 139/76   08/08/22 128/63   07/25/22 (!) 140/85      Plan: metoprolol 25 mg/day     (M06.9) Rheumatoid arthritis involving multiple joints (H)  (M06.9) Rheumatoid arthritis, involving unspecified site, unspecified whether rheumatoid factor present (H)  Comment: last seen by Dr Galindo in October 2021    Plan: methotrexate 15 mg/week with folic acid 1mg daily   Routine labs  Pt is also on Prolia for osteoporosis and h/o pelvic fractures      (E03.9) Hypothyroidism, unspecified type  Comment:   TSH   Date Value Ref Range Status    06/14/2022 0.44 0.40 - 4.00 mU/L Final   01/04/2019 9.77 (H) 0.40 - 4.00 mU/L Final      Plan: levothyroxine 50 mcg/day   Follow TSH and if remains low would decrease dose.        (F01.50) Vascular dementia without behavioral disturbance (H)  Comment: scores as above, very low functional status  With CT showing SVID and old infarcts, likely vascular dementia     (M62.81) Generalized muscle weakness  Comment: currently needing assist with all cares and ADLs.      Plan:  LTC support for med admin, meals, activity and cares        Radha Aburto MD

## 2022-10-17 NOTE — PROGRESS NOTES
[facility] GERIATRICS    Chief Complaint   Patient presents with     RECHECK     HPI:  Marley Stewart is a 89 year old  (7/17/1932), who is being seen today for an episodic care visit at: [facility] ([facility]) [[id number]].   She initially came to this facility 6/24/2022 for short term rehab and medical management following hospitalization after presenting to the ED 6/14/2022 with worsening confusion and weakness. She received ceftriaxone and azithromycin for UTI and possible pneumonia. TTE 6/19/2022 showed EF 55-60%, advanced diastolic dysfunction, moderate pulmonary hypertension, moderate left pleural effusion and findings suggestive of hypertrophic obstructive cardiomyopathy. Family declined Cardiology consult and further evaluation.   She was sent to the ED 6/29/2022 with lethargy and Na 118. Labs in the ED showed Na 133. Ceftriaxone and levaquin were started for possible pneumonia, both discontinued after CT chest showed atelectasis and no infiltrate. Moderate pericardial effusion and small bilateral pleural effusions were noted. Lasix 20 mg daily was continued. Family declined further work up. Speech therapy evaluated and no signs of dysphagia.   She returned to the facility 7/2/2022 for ongoing rehab.Was unable to progress in therapies and moved to long term care.   She tested positive for COVID-19 7/14/2022 and had minimal symptoms.     Today's concern is:      Vascular dementia without behavioral disturbance (H)  Chronic a-fib (H)  Chronic diastolic congestive heart failure (H)  Pulmonary hypertension (H)  Hypertrophic cardiomyopathy (H)  CRF (chronic renal failure), stage 3 (moderate) (H)  Primary hypertension  Weight loss  She is a poor historian. Alert and interactive. Denies feeling ill. Reports a good appetite. Denies pain. Wheelchair bound and requires max assist of 1 with cares. She was found on the floor in her room 10/5/2022 without injury, apparently she had tried to transfer  "independently. No other issues reported by staff. She's more engaged and participating in activities since transitioning to long term care.   SLUMS 14/30, BCRS 4.2     Allergies, and PMH/PSH reviewed in EPIC today    REVIEW OF SYSTEMS:  Limited due to dementia. Positives as noted under HPI    Objective:   /62   Pulse 63   Temp 97.4  F (36.3  C)   Resp 16   Ht 1.626 m (5' 4\")   Wt 60.4 kg (133 lb 1.6 oz)   SpO2 96%   BMI 22.85 kg/m    GENERAL APPEARANCE:  Alert, in no distress  ENT:  [place], oropharynx clear  EYES:  conjunctiva and lids normal  NECK:  no adenopathy, no thyromegaly  RESP:  lungs clear to auscultation bilaterally  CV:  irregular rate and rhythm, no murmur, no LE edema  ABDOMEN:  soft, non-tender, no distension, no masses  M/S:  wheelchair. CAN.  No joint inflammation  SKIN:  no rashes or open areas  PSYCH:  oriented to self, situation, insight and judgement impaired, memory impaired, mood normal     Recent labs in [facility] reviewed by me today.     ASSESSMENT / PLAN:  (F01.50) Vascular dementia without behavioral disturbance (H)  (primary encounter diagnosis)  Comment: moderate to severe deficits. Doing well in long term care. Mood and engagement have improved with mirtazapine (started 7/19/2022)  Plan: continue mirtazapine. Staff assistance with cares, meals, activities.   Message left for son Lukas Stewart.     (I48.20) Chronic a-fib (H)  Comment: rate controlled in the 60s.   Plan: continue apixaban, metoprolol     (I50.32) Chronic diastolic congestive heart failure (H)  (I27.20) Pulmonary hypertension (H)  (I42.2) Hypertrophic cardiomyopathy (H)  Comment: appears euvolemic with weight stable at 133 lbs   Plan: continue lasix, metoprolol. Follow weight, symptoms.   She was previously followed by [facility], last visit 10/20/2021. Family has declined Cardiology consult during her inpatient stay.     (N18.30) CRF (chronic renal failure), stage 3 (moderate) (H)  Comment: creatinine 0.99 with " GFR 55 on 7/13/2022  Plan: follow BMP. Avoid nephrotoxins.     (M06.9) Rheumatoid arthritis, involving unspecified site, unspecified whether rheumatoid factor present (H)  Comment: no s/s of acute flare. Appears comfortable with cares.   Followed by Dr Galindo and last seen 10/2021  CBC normal 7/13/2022, creatinine 0.99, AST, ALT, alk phos normal 7/1/2022.   Plan: continue methotrexate, folic acid. Will discuss Rheumatology follow up with family.     (I10) Primary hypertension  Comment: controlled with recent /62  Plan: continue metoprolol, lasix. Avoid hypotension due to advanced age and fall risk     (R63.4) Weight loss  Comment: weight has stabilized 133-134 lbs. Weight was 141 lbs on admission, but she had increased edema at that time. Receiving Ensure bid.   Plan: continue mirtazapine. Continue nutritional supplement, staff supervision during meals         Electronically signed by: HUEY Mayo CNP

## 2022-10-18 ENCOUNTER — NURSING HOME VISIT (OUTPATIENT)
Dept: GERIATRICS | Facility: CLINIC | Age: 87
End: 2022-10-18
Payer: COMMERCIAL

## 2022-10-18 VITALS
OXYGEN SATURATION: 96 % | HEART RATE: 63 BPM | WEIGHT: 133.1 LBS | TEMPERATURE: 97.4 F | BODY MASS INDEX: 22.72 KG/M2 | HEIGHT: 64 IN | SYSTOLIC BLOOD PRESSURE: 131 MMHG | DIASTOLIC BLOOD PRESSURE: 62 MMHG | RESPIRATION RATE: 16 BRPM

## 2022-10-18 DIAGNOSIS — F01.50 VASCULAR DEMENTIA WITHOUT BEHAVIORAL DISTURBANCE (H): Primary | ICD-10-CM

## 2022-10-18 DIAGNOSIS — R63.4 WEIGHT LOSS: ICD-10-CM

## 2022-10-18 DIAGNOSIS — I42.2 HYPERTROPHIC CARDIOMYOPATHY (H): ICD-10-CM

## 2022-10-18 DIAGNOSIS — M06.9 RHEUMATOID ARTHRITIS, INVOLVING UNSPECIFIED SITE, UNSPECIFIED WHETHER RHEUMATOID FACTOR PRESENT (H): ICD-10-CM

## 2022-10-18 DIAGNOSIS — I10 PRIMARY HYPERTENSION: ICD-10-CM

## 2022-10-18 DIAGNOSIS — I27.20 PULMONARY HYPERTENSION (H): ICD-10-CM

## 2022-10-18 DIAGNOSIS — I50.32 CHRONIC DIASTOLIC CONGESTIVE HEART FAILURE (H): ICD-10-CM

## 2022-10-18 DIAGNOSIS — I48.20 CHRONIC A-FIB (H): ICD-10-CM

## 2022-10-18 DIAGNOSIS — N18.30 CRF (CHRONIC RENAL FAILURE), STAGE 3 (MODERATE) (H): ICD-10-CM

## 2022-10-18 PROCEDURE — 99309 SBSQ NF CARE MODERATE MDM 30: CPT | Performed by: NURSE PRACTITIONER

## 2022-10-18 NOTE — LETTER
10/18/2022        RE: Marley Stewart  [address]  [place] [address]        [facility] GERIATRICS    Chief Complaint   Patient presents with     RECHECK     HPI:  Marley Stewart is a 89 year old  (7/17/1932), who is being seen today for an episodic care visit at: [facility]) [[id number]].   She initially came to this facility 6/24/2022 for short term rehab and medical management following hospitalization after presenting to the ED 6/14/2022 with worsening confusion and weakness. She received ceftriaxone and azithromycin for UTI and possible pneumonia. TTE 6/19/2022 showed EF 55-60%, advanced diastolic dysfunction, moderate pulmonary hypertension, moderate left pleural effusion and findings suggestive of hypertrophic obstructive cardiomyopathy. Family declined Cardiology consult and further evaluation.   She was sent to the ED 6/29/2022 with lethargy and Na 118. Labs in the ED showed Na 133. Ceftriaxone and levaquin were started for possible pneumonia, both discontinued after CT chest showed atelectasis and no infiltrate. Moderate pericardial effusion and small bilateral pleural effusions were noted. Lasix 20 mg daily was continued. Family declined further work up. Speech therapy evaluated and no signs of dysphagia.   She returned to the facility 7/2/2022 for ongoing rehab.Was unable to progress in therapies and moved to long term care.   She tested positive for COVID-19 7/14/2022 and had minimal symptoms.     Today's concern is:      Vascular dementia without behavioral disturbance (H)  Chronic a-fib (H)  Chronic diastolic congestive heart failure (H)  Pulmonary hypertension (H)  Hypertrophic cardiomyopathy (H)  CRF (chronic renal failure), stage 3 (moderate) (H)  Primary hypertension  Weight loss  She is a poor historian. Alert and interactive. Denies feeling ill. Reports a good appetite. Denies pain. Wheelchair bound and requires max assist of 1 with cares. She was found on the  "floor in her room 10/5/2022 without injury, apparently she had tried to transfer independently. No other issues reported by staff. She's more engaged and participating in activities since transitioning to long term care.   SLUMS 14/30, BCRS 4.2     Allergies, and PMH/PSH reviewed in EPIC today    REVIEW OF SYSTEMS:  Limited due to dementia. Positives as noted under HPI    Objective:   /62   Pulse 63   Temp 97.4  F (36.3  C)   Resp 16   Ht 1.626 m (5' 4\")   Wt 60.4 kg (133 lb 1.6 oz)   SpO2 96%   BMI 22.85 kg/m    GENERAL APPEARANCE:  Alert, in no distress  ENT:  [place], oropharynx clear  EYES:  conjunctiva and lids normal  NECK:  no adenopathy, no thyromegaly  RESP:  lungs clear to auscultation bilaterally  CV:  irregular rate and rhythm, no murmur, no LE edema  ABDOMEN:  soft, non-tender, no distension, no masses  M/S:  wheelchair. CAN.  No joint inflammation  SKIN:  no rashes or open areas  PSYCH:  oriented to self, situation, insight and judgement impaired, memory impaired, mood normal     Recent labs in [facility] reviewed by me today.     ASSESSMENT / PLAN:  (F01.50) Vascular dementia without behavioral disturbance (H)  (primary encounter diagnosis)  Comment: moderate to severe deficits. Doing well in long term care. Mood and engagement have improved with mirtazapine (started 7/19/2022)  Plan: continue mirtazapine. Staff assistance with cares, meals, activities.   Message left for son Lukas Stewart.     (I48.20) Chronic a-fib (H)  Comment: rate controlled in the 60s.   Plan: continue apixaban, metoprolol     (I50.32) Chronic diastolic congestive heart failure (H)  (I27.20) Pulmonary hypertension (H)  (I42.2) Hypertrophic cardiomyopathy (H)  Comment: appears euvolemic with weight stable at 133 lbs   Plan: continue lasix, metoprolol. Follow weight, symptoms.   She was previously followed by [facility], last visit 10/20/2021. Family has declined Cardiology consult during her inpatient stay.     (N18.30) " CRF (chronic renal failure), stage 3 (moderate) (H)  Comment: creatinine 0.99 with GFR 55 on 7/13/2022  Plan: follow BMP. Avoid nephrotoxins.     (M06.9) Rheumatoid arthritis, involving unspecified site, unspecified whether rheumatoid factor present (H)  Comment: no s/s of acute flare. Appears comfortable with cares.   Followed by Dr Galindo and last seen 10/2021  CBC normal 7/13/2022, creatinine 0.99, AST, ALT, alk phos normal 7/1/2022.   Plan: continue methotrexate, folic acid. Will discuss Rheumatology follow up with family.     (I10) Primary hypertension  Comment: controlled with recent /62  Plan: continue metoprolol, lasix. Avoid hypotension due to advanced age and fall risk     (R63.4) Weight loss  Comment: weight has stabilized 133-134 lbs. Weight was 141 lbs on admission, but she had increased edema at that time. Receiving Ensure bid.   Plan: continue mirtazapine. Continue nutritional supplement, staff supervision during meals         Electronically signed by: HUEY Mayo CNP           Sincerely,        HUEY Mayo CNP

## 2022-11-15 ENCOUNTER — NURSING HOME VISIT (OUTPATIENT)
Dept: GERIATRICS | Facility: CLINIC | Age: 87
End: 2022-11-15
Payer: COMMERCIAL

## 2022-11-15 VITALS
HEART RATE: 70 BPM | RESPIRATION RATE: 18 BRPM | HEIGHT: 64 IN | SYSTOLIC BLOOD PRESSURE: 138 MMHG | BODY MASS INDEX: 24.52 KG/M2 | TEMPERATURE: 97.5 F | WEIGHT: 143.6 LBS | DIASTOLIC BLOOD PRESSURE: 71 MMHG | OXYGEN SATURATION: 92 %

## 2022-11-15 DIAGNOSIS — I10 PRIMARY HYPERTENSION: ICD-10-CM

## 2022-11-15 DIAGNOSIS — I48.20 CHRONIC A-FIB (H): ICD-10-CM

## 2022-11-15 DIAGNOSIS — M06.9 RHEUMATOID ARTHRITIS, INVOLVING UNSPECIFIED SITE, UNSPECIFIED WHETHER RHEUMATOID FACTOR PRESENT (H): ICD-10-CM

## 2022-11-15 DIAGNOSIS — N18.30 CRF (CHRONIC RENAL FAILURE), STAGE 3 (MODERATE) (H): ICD-10-CM

## 2022-11-15 DIAGNOSIS — E03.9 HYPOTHYROIDISM, UNSPECIFIED TYPE: ICD-10-CM

## 2022-11-15 DIAGNOSIS — I27.20 PULMONARY HYPERTENSION (H): ICD-10-CM

## 2022-11-15 DIAGNOSIS — F01.50 VASCULAR DEMENTIA WITHOUT BEHAVIORAL DISTURBANCE (H): Primary | ICD-10-CM

## 2022-11-15 DIAGNOSIS — I42.2 HYPERTROPHIC CARDIOMYOPATHY (H): ICD-10-CM

## 2022-11-15 DIAGNOSIS — I50.32 CHRONIC DIASTOLIC CONGESTIVE HEART FAILURE (H): ICD-10-CM

## 2022-11-15 PROCEDURE — 99309 SBSQ NF CARE MODERATE MDM 30: CPT | Performed by: NURSE PRACTITIONER

## 2022-11-15 NOTE — PROGRESS NOTES
[facility] GERIATRICS    Chief Complaint   Patient presents with     RECHECK     HPI:  Marley Stewart is a 89 year old  (7/17/1932), who is being seen today for an episodic care visit at: [facility] ([facility]) [[id number]].   She initially came to this facility 6/24/2022 for short term rehab and medical management following hospitalization after presenting to the ED 6/14/2022 with worsening confusion and weakness. She received ceftriaxone and azithromycin for UTI and possible pneumonia. TTE 6/19/2022 showed EF 55-60%, advanced diastolic dysfunction, moderate pulmonary hypertension, moderate left pleural effusion and findings suggestive of hypertrophic obstructive cardiomyopathy. Family declined Cardiology consult and further evaluation.   She was sent to the ED 6/29/2022 with lethargy and Na 118. Labs in the ED showed Na 133. Ceftriaxone and levaquin were started for possible pneumonia, both discontinued after CT chest showed atelectasis and no infiltrate. Moderate pericardial effusion and small bilateral pleural effusions were noted. Lasix 20 mg daily was continued. Family declined further work up. Speech therapy evaluated and no signs of dysphagia.   She returned to the facility 7/2/2022 for ongoing rehab.Was unable to progress in therapies and moved to long term care.   She tested positive for COVID-19 7/14/2022 and had minimal symptoms.    Today's concern is:      Vascular dementia without behavioral disturbance (H)  Chronic a-fib (H)  Chronic diastolic congestive heart failure (H)  Pulmonary hypertension (H)  Hypertrophic cardiomyopathy (H)  CRF (chronic renal failure), stage 3 (moderate) (H)  Rheumatoid arthritis, involving unspecified site, unspecified whether rheumatoid factor present (H)  Primary hypertension  Hypothyroidism, unspecified type  She is a poor historian. Denies feeling ill. Denies pain. Good appetite. Reports that she is allergic to eggs, but has been getting them for  "breakfast. Staff was not aware of an egg allergy. Participation in activities has improved after her move to long term care. Wheelchair bound and requires assist of 1 with transfers and cares.   SLUMS 14/30, BCRS 4.2     Upon chart review, she saw her previous PCP Dr Artis and had labs done 10/26/2022. Staff was not aware she went out for this appointment.     Allergies, and PMH/PSH reviewed in EPIC today    REVIEW OF SYSTEMS:  Limited due to dementia. Positives as noted under HPI      Objective:   /71   Pulse 70   Temp 97.5  F (36.4  C)   Resp 18   Ht 1.626 m (5' 4\")   Wt 65.1 kg (143 lb 9.6 oz)   SpO2 92%   BMI 24.65 kg/m     Exam unchanged   GENERAL APPEARANCE:  Alert, in no distress  ENT:  [place], oropharynx clear  EYES:  conjunctiva and lids normal  NECK:  no adenopathy, no thyromegaly  RESP:  lungs clear to auscultation bilaterally  CV:  irregular rate and rhythm, no murmur, no LE edema  ABDOMEN:  soft, non-tender, no distension, no masses  M/S:  wheelchair. CAN.  No joint inflammation  SKIN:  no rashes or open areas  PSYCH:  oriented to self, situation, insight and judgement impaired, memory impaired, mood normal     Recent labs in [facility] reviewed by me today.     ASSESSMENT / PLAN:  (F01.50) Vascular dementia without behavioral disturbance (H)  (primary encounter diagnosis)  Comment: moderate to severe deficits. Mood and oral intake have improved with mirtazapine (started 7/2022)  Plan: continue mirtazapine. Long term care staff assistance with cares, meals,activities.    reports family is making arrangements for patient to transfer to [facility] within the next 1-2 weeks.     (I48.20) Chronic a-fib (H)  Comment: rate controlled: 70-75  Plan: continue apixaban, metoprolol     (I50.32) Chronic diastolic congestive heart failure (H)  (I27.20) Pulmonary hypertension (H)  (I42.2) Hypertrophic cardiomyopathy (H)  Comment: appears euvolemic. Weight is up 10 lbs, most likely due to " improved oral intake.   Plan: continue lasix, metoprolol. Follow weight, symptoms     (N18.30) CRF (chronic renal failure), stage 3 (moderate) (H)  Comment: creatinine 0.94 on 10/26/2022  Plan: follow BMP. Avoid nephrotoxins     (M06.9) Rheumatoid arthritis, involving unspecified site, unspecified whether rheumatoid factor present (H)  Comment: pain controlled, no s/s of flare  Labs within range 10/26/2022  Plan: continue methotrexate, folic acid. Follow up with Dr Galindo     (I10) Primary hypertension  Comment: controlled with BPs: 138/71, 131/68, 121/62   Plan: continue metoprolol, lasix.     (E03.9) Hypothyroidism, unspecified type  Comment: TSH 1.59 on 10/26/2022  Plan: continue levothyroxine 50 mcg daily             Electronically signed by: HUEY Mayo CNP

## 2022-11-15 NOTE — LETTER
11/15/2022        RE: Marley Stewart  [address]  [place] [address]        [facility] GERIATRICS    Chief Complaint   Patient presents with     RECHECK     HPI:  Marley Stewart is a 89 year old  (7/17/1932), who is being seen today for an episodic care visit at: [facility]) [[id number]].   She initially came to this facility 6/24/2022 for short term rehab and medical management following hospitalization after presenting to the ED 6/14/2022 with worsening confusion and weakness. She received ceftriaxone and azithromycin for UTI and possible pneumonia. TTE 6/19/2022 showed EF 55-60%, advanced diastolic dysfunction, moderate pulmonary hypertension, moderate left pleural effusion and findings suggestive of hypertrophic obstructive cardiomyopathy. Family declined Cardiology consult and further evaluation.   She was sent to the ED 6/29/2022 with lethargy and Na 118. Labs in the ED showed Na 133. Ceftriaxone and levaquin were started for possible pneumonia, both discontinued after CT chest showed atelectasis and no infiltrate. Moderate pericardial effusion and small bilateral pleural effusions were noted. Lasix 20 mg daily was continued. Family declined further work up. Speech therapy evaluated and no signs of dysphagia.   She returned to the facility 7/2/2022 for ongoing rehab.Was unable to progress in therapies and moved to long term care.   She tested positive for COVID-19 7/14/2022 and had minimal symptoms.    Today's concern is:      Vascular dementia without behavioral disturbance (H)  Chronic a-fib (H)  Chronic diastolic congestive heart failure (H)  Pulmonary hypertension (H)  Hypertrophic cardiomyopathy (H)  CRF (chronic renal failure), stage 3 (moderate) (H)  Rheumatoid arthritis, involving unspecified site, unspecified whether rheumatoid factor present (H)  Primary hypertension  Hypothyroidism, unspecified type  She is a poor historian. Denies feeling ill. Denies pain. Good  "appetite. Reports that she is allergic to eggs, but has been getting them for breakfast. Staff was not aware of an egg allergy. Participation in activities has improved after her move to long term care. Wheelchair bound and requires assist of 1 with transfers and cares.   SLUMS 14/30, BCRS 4.2     Upon chart review, she saw her previous PCP Dr Artis and had labs done 10/26/2022. Staff was not aware she went out for this appointment.     Allergies, and PMH/PSH reviewed in EPIC today    REVIEW OF SYSTEMS:  Limited due to dementia. Positives as noted under HPI      Objective:   /71   Pulse 70   Temp 97.5  F (36.4  C)   Resp 18   Ht 1.626 m (5' 4\")   Wt 65.1 kg (143 lb 9.6 oz)   SpO2 92%   BMI 24.65 kg/m     Exam unchanged   GENERAL APPEARANCE:  Alert, in no distress  ENT:  [place], oropharynx clear  EYES:  conjunctiva and lids normal  NECK:  no adenopathy, no thyromegaly  RESP:  lungs clear to auscultation bilaterally  CV:  irregular rate and rhythm, no murmur, no LE edema  ABDOMEN:  soft, non-tender, no distension, no masses  M/S:  wheelchair. CAN.  No joint inflammation  SKIN:  no rashes or open areas  PSYCH:  oriented to self, situation, insight and judgement impaired, memory impaired, mood normal     Recent labs in [facility] reviewed by me today.     ASSESSMENT / PLAN:  (F01.50) Vascular dementia without behavioral disturbance (H)  (primary encounter diagnosis)  Comment: moderate to severe deficits. Mood and oral intake have improved with mirtazapine (started 7/2022)  Plan: continue mirtazapine. Long term care staff assistance with cares, meals,activities.    reports family is making arrangements for patient to transfer to [facility] within the next 1-2 weeks.     (I48.20) Chronic a-fib (H)  Comment: rate controlled: 70-75  Plan: continue apixaban, metoprolol     (I50.32) Chronic diastolic congestive heart failure (H)  (I27.20) Pulmonary hypertension (H)  (I42.2) Hypertrophic cardiomyopathy " (H)  Comment: appears euvolemic. Weight is up 10 lbs, most likely due to improved oral intake.   Plan: continue lasix, metoprolol. Follow weight, symptoms     (N18.30) CRF (chronic renal failure), stage 3 (moderate) (H)  Comment: creatinine 0.94 on 10/26/2022  Plan: follow BMP. Avoid nephrotoxins     (M06.9) Rheumatoid arthritis, involving unspecified site, unspecified whether rheumatoid factor present (H)  Comment: pain controlled, no s/s of flare  Labs within range 10/26/2022  Plan: continue methotrexate, folic acid. Follow up with Dr Galindo     (I10) Primary hypertension  Comment: controlled with BPs: 138/71, 131/68, 121/62   Plan: continue metoprolol, lasix.     (E03.9) Hypothyroidism, unspecified type  Comment: TSH 1.59 on 10/26/2022  Plan: continue levothyroxine 50 mcg daily             Electronically signed by: HUEY Mayo CNP             Sincerely,        HUEY Mayo CNP

## 2022-11-28 NOTE — ED NOTES
"[facility]  ED Nurse Handoff Report    ED Chief complaint: Fall      ED Diagnosis:   Final diagnoses:   Other closed fracture of fourth lumbar vertebra, initial encounter (H)   Fall, initial encounter       Code Status: DNR / DNI    Allergies:   Allergies   Allergen Reactions     Amitriptyline      Clonazepam Other (See Comments)     Somnolence at 0.5 mg dose     Latex Rash       Activity level - Baseline/Home:  Independent  Activity Level - Current:   Total Care    Patient's Preferred language: English   Needed?: No    Isolation: No  Infection: Not Applicable  Bariatric?: No    Vital Signs:   Vitals:    08/05/19 1510 08/05/19 1511 08/05/19 1622 08/05/19 1623   BP: (!) 170/80  (!) 153/74    Pulse: 74  62    Resp:       Temp:       TempSrc:       SpO2:  96%  98%   Weight:       Height:           Cardiac Rhythm: ,        Pain level: 0-10 Pain Scale: 7    Is this patient confused?: No   Does this patient have a guardian?  No         If yes, is there guardianship documents in the Epic \"Code/ACP\" activity?  N/A         Guardian Notified?  No  [place] - Suicide Severity Rating Scale Completed?  Yes  If yes, what color did the patient score?  White    Patient Report: Initial Complaint: Pt presents to the ED complaining of back pain post fall.    Focused Assessment: Pt reports falling this am while eating at the [employer] this am.  Pt states she lost her balance and fell backwards this am.  Pt now complains of lower back pain, reports some incontinence at baseline.  Pt alert and oriented x 4, vital signs unremarkable.  Pt on Eliquis.      Tests Performed:   Labs Ordered and Resulted from Time of ED Arrival Up to the Time of Departure from the ED   BASIC METABOLIC PANEL - Abnormal; Notable for the following components:       Result Value    GFR Estimate 55 (*)     Calcium 10.2 (*)     All other components within normal limits   INR - Abnormal; Notable for the following components:    INR 1.42 (*)  " Please call Deann   HIV screen is typically part of the fatigue work up.  Does she want this tested too?   Pt declines getting HIV to the labs at this time.   Lab orders have been placed.   Pt verbalizes understanding.       All other components within normal limits   CBC WITH PLATELETS DIFFERENTIAL   PARTIAL THROMBOPLASTIN TIME   CT imaging shows:    1.Acute mildly displaced fracture involving the anterior  superior aspect of the L4 vertebral body.  2.  Interval healing of previous left pubic rami fractures. No  recent-appearing fracture is demonstrated.     Abnormal Results: See above  Treatments provided: 18 G IV placed, pt given IV dilaudid 0.5mg x2.    Family Comments: None    OBS brochure/video discussed/provided to patient/family: Yes              Name of person given brochure if not patient: NA              Relationship to patient:NA    ED Medications:   Medications   HYDROmorphone (PF) (DILAUDID) injection 0.5 mg (0.5 mg Intravenous Given 8/5/19 1317)   HYDROmorphone (PF) (DILAUDID) injection 0.5 mg (0.5 mg Intravenous Given 8/5/19 1619)       Drips infusing?:  No    For the majority of the shift this patient was [place].   Interventions performed were NA.    Severe Sepsis OR Septic Shock Diagnosis Present: No    To be done/followed up on inpatient unit:  NA    ED NURSE PHONE NUMBER: [phone]

## 2022-12-05 ENCOUNTER — TELEPHONE (OUTPATIENT)
Dept: GERIATRICS | Facility: CLINIC | Age: 87
End: 2022-12-05

## 2022-12-05 DIAGNOSIS — F01.C3 SEVERE VASCULAR DEMENTIA WITH MOOD DISTURBANCE (H): ICD-10-CM

## 2022-12-05 PROBLEM — F01.C0: Status: ACTIVE | Noted: 2022-12-05

## 2022-12-05 NOTE — TELEPHONE ENCOUNTER
Received notice from the  at [facility] that patient is discharging to [facility] today.   Requested staff fax orders to be signed.   She will need follow up with her PCP (Dr Ortiz)  within 1-2 weeks    HUEY Mayo CNP on 12/5/2022 at 10:10 AM

## 2023-01-19 ENCOUNTER — TELEPHONE (OUTPATIENT)
Dept: WOUND CARE | Facility: CLINIC | Age: 88
End: 2023-01-19
Payer: COMMERCIAL

## 2023-01-19 NOTE — TELEPHONE ENCOUNTER
[facility]    Who is the name of the provider?:  New    What is the location you see this provider at/preferred location?: [place]  Person calling: Ruby - daughter-in-law   Phone number:  [phone]   Nurse call back needed:  Not Applicable     Reason for call:   Requesting new appointment very painful, blister like wound on side of right foot. Patient is memory care assisted living.  Caller can send picture.  Please call Ruby, daughter-in-law, to schedule appointment.      Pharmacy location:  NA  Outside Imaging: Not Applicable   Can we leave a detailed message on this number?  YES

## 2023-01-19 NOTE — TELEPHONE ENCOUNTER
Consult received via Phone from daughter in law for wound of the right foot.    Please schedule with Dr. Antunez, Dr. Hobson, Dr. Gonzalez, Dr. Baker, or Mirta MIXON at [facility] [place] for next available appointment.    Is patient a PHYLLIS lift? PLEASE INQUIRE WHEN MAKING THE APPOINTMENT AND PUT IN APPOINTMENT NOTES    Routing to  Wound Healing Scheduling.

## 2023-01-20 ENCOUNTER — HOSPITAL ENCOUNTER (OUTPATIENT)
Dept: WOUND CARE | Facility: CLINIC | Age: 88
Discharge: HOME | End: 2023-01-20
Attending: PODIATRIST
Payer: COMMERCIAL

## 2023-01-20 VITALS — DIASTOLIC BLOOD PRESSURE: 77 MMHG | TEMPERATURE: 97.9 F | SYSTOLIC BLOOD PRESSURE: 158 MMHG | HEART RATE: 81 BPM

## 2023-01-20 DIAGNOSIS — M06.9 RHEUMATOID ARTHRITIS INVOLVING MULTIPLE JOINTS (H): ICD-10-CM

## 2023-01-20 DIAGNOSIS — M79.671 RIGHT FOOT PAIN: ICD-10-CM

## 2023-01-20 DIAGNOSIS — L89.891 PRESSURE ULCER OF RIGHT FOOT, STAGE 1: Primary | ICD-10-CM

## 2023-01-20 DIAGNOSIS — L89.891 PRESSURE ULCER OF LEFT FOOT, STAGE 1: ICD-10-CM

## 2023-01-20 DIAGNOSIS — L89.891 PRESSURE ULCER OF TOE OF LEFT FOOT, STAGE 1: ICD-10-CM

## 2023-01-20 DIAGNOSIS — I73.9 PAD (PERIPHERAL ARTERY DISEASE) (H): ICD-10-CM

## 2023-01-20 PROCEDURE — 99203 OFFICE O/P NEW LOW 30 MIN: CPT | Performed by: PODIATRIST

## 2023-01-20 NOTE — PROGRESS NOTES
[facility] [place] Progress Note    Subject: Patient was seen at wound center.  Patient presents the clinic for the first time for right foot wound.  Patient is not diabetic but has rheumatoid arthritis.  Here with her daughter.  Patient resides in a care facility.  She started to have pain to the right foot and ankle about a week ago.  She was seen by her primary care doctor and they think that it is a gout attack but she does have concerning areas on her right ankle and heel.  Also notes an area on her left great toe that looks concerning.  Has not had any drainage from these areas.  Denies injury.  Patient has not mobile per daughter.    PMH:   Past Medical History:   Diagnosis Date     A-fib -- Chronic      Aortic regurgitation     1-2+ per 4/2015 Echo     Arthritis      CHF (congestive heart failure) (H)     EF 65-70% on 4/2015 Echo     CVA (cerebral vascular accident) (H)      Glaucoma      Hypertension      Mitral regurgitation     2+ per 4/2015 Echo     PUD (peptic ulcer disease)      Pulmonic valve regurgitation     1+ per 4/2015 Echo     RA (rheumatoid arthritis) (H)      Spinal stenosis      Thyroid disease      Tricuspid regurgitation     2+ per 4/2015 Echo       Social Hx:   Social History     Socioeconomic History     Marital status:      Spouse name: Not on file     Number of children: 3     Years of education: Not on file     Highest education level: Not on file   Occupational History     Occupation: Retired    Tobacco Use     Smoking status: Never     Smokeless tobacco: Never   Substance and Sexual Activity     Alcohol use: Yes     Comment: < 1 drink a month     Drug use: No     Sexual activity: Not on file   Other Topics Concern     Parent/sibling w/ CABG, MI or angioplasty before 65F 55M? Not Asked      Service Not Asked     Blood Transfusions Not Asked     Caffeine Concern Not Asked     Occupational Exposure Not Asked     Hobby Hazards Not Asked     Sleep  "Concern Not Asked     Stress Concern Not Asked     Weight Concern Not Asked     Special Diet Not Asked     Back Care Not Asked     Exercise Yes     Comment: stretches, golf      Bike Helmet Not Asked     Seat Belt Not Asked     Self-Exams Not Asked   Social History Narrative    , 3 children, she lives in a \"double home\" with her  who is slightly forgetful.  She desires DNR/DNI status.  Daughter-in-law, Ashley, is a nurse and checks on them. (last updated 1/4/2019)      Social Determinants of Health     Financial Resource Strain: Not on file   Food Insecurity: Not on file   Transportation Needs: Not on file   Physical Activity: Not on file   Stress: Not on file   Social Connections: Not on file   Intimate Partner Violence: Not on file   Housing Stability: Not on file       Surgical Hx:   Past Surgical History:   Procedure Laterality Date     ORTHOPEDIC SURGERY         Allergies:    Allergies   Allergen Reactions     Amitriptyline      Clonazepam Other (See Comments)     Somnolence at 0.5 mg dose     Erythromycin Other (See Comments)     Other reaction(s): *Unknown - Pt Doesn't Remember  MACROLIDES  PN: MACROLIDES       Indomethacin Other (See Comments)     Other reaction(s): *Unknown  depression  depression  PN: depression       Amoxicillin Rash     Latex Rash       Medications:   Current Outpatient Medications   Medication     acetaminophen (TYLENOL) 500 MG tablet     apixaban ANTICOAGULANT (ELIQUIS) 5 MG tablet     calcium carb 1250 mg, 500 mg [place],/vitamin D 200 units (OSCAL WITH D) 500-200 MG-UNIT per tablet     dorzolamide-timolol (COSOPT) 2-0.5 % ophthalmic solution     folic acid (FOLVITE) 1 MG tablet     furosemide (LASIX) 20 MG tablet     latanoprost (XALATAN) 0.005 % ophthalmic solution     levothyroxine (SYNTHROID/LEVOTHROID) 50 MCG tablet     methotrexate 2.5 MG tablet     metoprolol succinate ER (TOPROL-XL) 25 MG 24 hr tablet     mirtazapine (REMERON) 7.5 MG tablet     nystatin (MYCOSTATIN) " 055268 UNIT/GM external powder     prednisoLONE acetate (PRED FORTE) 1 % ophthalmic suspension     No current facility-administered medications for this encounter.         Objective:  Vitals:  BP (!) 158/77   Pulse 81   Temp 97.9  F (36.6  C)     General:  Patient is alert and orientated.  NAD     Dermatologic: Stage I pressure ulceration to the right distal fibula as well as the right heel and left great toe.  No ascending cellulitis, drainage or signs of acute infection noted.    Vascular: DP & PT pulses are nonpalpable bilaterally.  No significant edema but varicosities noted.  CFT and skin temperature is normal to both lower extremities.     Neurologic: Lower extremity sensation is intact to light touch.  No evidence of weakness or contracture in the lower extremities.  No evidence of neuropathy.     Musculoskeletal: Patient is in wheelchair..    Assessment:    Pressure ulcer of right foot, stage 1  Pressure ulcer of toe of left foot, stage 1  Pressure ulcer of left foot, stage 1  Right foot pain  PAD (peripheral artery disease) (H)  Rheumatoid arthritis involving multiple joints (H)    Plan: At this time there does not appear to be any open area but we want to get pressure off of these areas.  Recommend iodine to them to try to help prevent infection and Mepilex or PolyMem cushion dressings to the area daily.  Also recommended that she be in [place] boots for both feet especially when she is in bed to keep pressure off of these areas.  Discussed that I cannot palpate pulses and I would recommend an EVARISTO to assess blood flow.  We had also talked about a vascular surgery referral however patient's daughter does not really want to proceed with aggressive measures and so we will hold off on that at this time.  We discussed that if the blood flow is poor that these may be chronic and could open at some point but we would try to protect the foot as best possible with the Rooke boots.  We will have them follow-up in 1  month for reassessment.  Once we get the EVARISTO results we will call them.    All questions were answered to patient and patient's daughter satisfaction and they will call further questions or concerns.        Carmella Antunez DPM, Podiatry/Foot and Ankle Surgery        .          Further instructions from your care team       [place] Stewart      7/17/1932    A DME order was not completed because the supplies are ordered by home care or at a care facility  Patient resides at [facility]  Tel [phone]    Wound Dressing Change:    RIGHT HEEL  RIGHT LATERAL ANKLE  LEFT BIG TOE  - Cleanse all areas with soap and water or commercial wound cleanser  - Pat dry   - Apply iodine solution to all three areas  - Protect RIGHT foot with bulky gauze dressing  - Protect LEFT big toe with tubular gauze  - Change dressing every day     Wear ROOKE BOOTS all the time    Schedule appointment with imaging department for bilateral EVARISTO,  We placed referral today   If you do not hear from them you can call [place]: [phone]         Carmella Antunez, D.P.M. January 20, 2023    Call us at [phone] if you have any questions about your wounds, have redness or swelling around your wound, have a fever of 101 or greater or if you have any other problems or concerns. We answer the phone Monday through Friday 8 am to 4 pm, please leave a message as we check the voicemail frequently throughout the day.     If you had a positive experience please indicate that on your patient satisfaction survey form that [facility] will be sending you.    It was a pleasure meeting with you today.  Thank you for allowing me and my team the privilege of caring for you today.  YOU are the reason we are here, and I truly hope we provided you with the excellent service you deserve.  Please let us know if there is anything else we can do for you so that we can be sure you are leaving completely satisfied with your  care experience.      If you have any billing related questions please call the [facility] Business office at [phone]. The clinic staff does not handle billing related matters.    If you are scheduled to have a follow up appointment, you will receive a reminder call the day before your visit. On the appointment day please arrive 15 minutes prior to your appointment time. If you are unable to keep that appointment, please call the clinic to cancel or reschedule. If you are more than 10 minutes late or greater for your appointment, the clinic policy is that you may be asked to reschedule.

## 2023-01-20 NOTE — ADDENDUM NOTE
Encounter addended by: Stacy Sheppard RN on: 1/20/2023 11:38 AM   Actions taken: LDA properties accepted, Flowsheet accepted

## 2023-01-20 NOTE — DISCHARGE INSTRUCTIONS
Marley DYE Terry      7/17/1932    A DME order was not completed because the supplies are ordered by home care or at a care facility  Patient resides at [facility] [place]  Tel [phone]    Wound Dressing Change:    RIGHT HEEL  RIGHT LATERAL ANKLE  LEFT BIG TOE  - Cleanse all areas with soap and water or commercial wound cleanser  - Pat dry   - Apply iodine solution to all three areas  - Protect RIGHT foot with bulky gauze dressing  - Protect LEFT big toe with tubular gauze  - Change dressing every day     Wear ROOKE BOOTS all the time    Schedule appointment with imaging department for bilateral EVARISTO,  We placed referral today   If you do not hear from them you can call [place]: [phone]         MARTÍN Carr.P.M. January 20, 2023    Call us at [phone] if you have any questions about your wounds, have redness or swelling around your wound, have a fever of 101 or greater or if you have any other problems or concerns. We answer the phone Monday through Friday 8 am to 4 pm, please leave a message as we check the voicemail frequently throughout the day.     If you had a positive experience please indicate that on your patient satisfaction survey form that [facility] will be sending you.    It was a pleasure meeting with you today.  Thank you for allowing me and my team the privilege of caring for you today.  YOU are the reason we are here, and I truly hope we provided you with the excellent service you deserve.  Please let us know if there is anything else we can do for you so that we can be sure you are leaving completely satisfied with your care experience.      If you have any billing related questions please call the [facility] Business office at [phone]. The clinic staff does not handle billing related matters.    If you are scheduled to have a follow up appointment, you will receive a reminder call the day before your visit. On the appointment day  please arrive 15 minutes prior to your appointment time. If you are unable to keep that appointment, please call the clinic to cancel or reschedule. If you are more than 10 minutes late or greater for your appointment, the clinic policy is that you may be asked to reschedule.

## 2023-01-23 ENCOUNTER — TELEPHONE (OUTPATIENT)
Dept: WOUND CARE | Facility: CLINIC | Age: 88
End: 2023-01-23
Payer: COMMERCIAL

## 2023-01-23 NOTE — TELEPHONE ENCOUNTER
Mobility:    Does the patient use an assistive device? (i.e. walker, wheelchair) Yes. Wheelchair    Does the patient need assistance getting on/off the ultrasound table? Yes    Does the patient need assistance undressing/redressing the wounds? No         Wound wraps/dressings:    Is a 2 layer wrap being used? No (If yes, vein staff to assist patient in cutting this off.)    Is [place]ear being used? No (If yes, vein team not to cut, but able to remove dressing.)     Will the patient bring dressings to reapply after US? No    o   If not, can they be sent home with a chux to have home care or family member redress? Yes    o   Do they need a coordinated appointment for a nurse visit at wound to redress? No       If coordinated appointment required, wound nurse to alert wound  who will call Vein  at [phone] to schedule US while wound  looks for nurse visit.    The dressing can always be removed if testing is ordered.     Routing to the [facility] Appointment Scheduling Pool for EVARISTO/arterial duplex orders and [facility] Scheduling Pool for Venous Competency and also routing to  Wound Healing Scheduling Pool if coordinated appointment is needed.     Carmella Antunez D.P.M.

## 2023-01-23 NOTE — ADDENDUM NOTE
Encounter addended by: Sosa Pérez RN on: 1/23/2023 1:25 PM   Actions taken: Order list changed, Diagnosis association updated

## 2023-01-23 NOTE — TELEPHONE ENCOUNTER
Gege from [facility] called regarding recent orders placed for the patient. The orders were mistakenly placed as an inpatient and so had to be cancelled. Please resubmit for patient as an outpatient.

## 2023-01-24 NOTE — TELEPHONE ENCOUNTER
Left voicemail with instructions for patient to call back to schedule their appointment(s)    January 24, 2023 , 8:37 AM

## 2023-01-24 NOTE — TELEPHONE ENCOUNTER
Future Appointments   Date Time Provider Department Center   2/8/2023 10:30 AM [facility]

## 2023-02-03 ENCOUNTER — DOCUMENTATION ONLY (OUTPATIENT)
Dept: OTHER | Facility: CLINIC | Age: 88
End: 2023-02-03
Payer: COMMERCIAL

## 2023-02-08 ENCOUNTER — HOSPITAL ENCOUNTER (OUTPATIENT)
Dept: ULTRASOUND IMAGING | Facility: CLINIC | Age: 88
Discharge: HOME | End: 2023-02-08
Attending: PODIATRIST | Admitting: PODIATRIST
Payer: COMMERCIAL

## 2023-02-08 DIAGNOSIS — I73.9 PAD (PERIPHERAL ARTERY DISEASE) (H): ICD-10-CM

## 2023-02-08 PROCEDURE — 93922 UPR/L XTREMITY ART 2 LEVELS: CPT

## 2023-02-13 ENCOUNTER — TELEPHONE (OUTPATIENT)
Dept: WOUND CARE | Facility: CLINIC | Age: 88
End: 2023-02-13
Payer: COMMERCIAL

## 2023-02-13 NOTE — TELEPHONE ENCOUNTER
----- Message from Carmella Antunez DPM, Podiatry/Foot and Ankle Surgery sent at 2/12/2023  9:37 AM CST -----  EVARISTO results show bloodflow is diminished but okay.     Please let patient know.     Thanks,     Carmella Antunez DPM

## 2023-02-13 NOTE — TELEPHONE ENCOUNTER
Messaged Dr. Antunez to ask:  EVARISTO and TBI on right show moderate PAD plus waveforms are monophasic. Would vascular surgery referral be advised?    Awaiting Dr. Antunez's response.

## 2023-02-14 NOTE — TELEPHONE ENCOUNTER
Ashley returned call to clinic. EVARISTO results reviewed with her. No further questions or concerns.

## 2023-02-14 NOTE — TELEPHONE ENCOUNTER
Patient is 89 years old and per last note with discussion with family, they do not want to pursue more aggressive treatment, so no, not going to do a vascular referral per family wishes.      Carmella Antunez DPM

## 2023-02-14 NOTE — TELEPHONE ENCOUNTER
Voicemail left with Ashley, who is the mobile number listed on patients chart to call clinic when able.

## 2023-06-06 ENCOUNTER — LAB REQUISITION (OUTPATIENT)
Dept: LAB | Facility: CLINIC | Age: 88
End: 2023-06-06
Payer: COMMERCIAL

## 2023-06-06 DIAGNOSIS — L89.519 PRESSURE ULCER OF RIGHT ANKLE, UNSPECIFIED STAGE: ICD-10-CM

## 2023-06-06 PROCEDURE — 87070 CULTURE OTHR SPECIMN AEROBIC: CPT | Mod: ORL | Performed by: FAMILY MEDICINE

## 2023-06-10 LAB
BACTERIA WND CULT: ABNORMAL

## 2023-08-08 ENCOUNTER — LAB REQUISITION (OUTPATIENT)
Dept: LAB | Facility: CLINIC | Age: 88
End: 2023-08-08
Payer: COMMERCIAL

## 2023-08-08 DIAGNOSIS — N39.0 URINARY TRACT INFECTION, SITE NOT SPECIFIED: ICD-10-CM

## 2023-08-08 LAB
ALBUMIN UR-MCNC: 30 MG/DL
APPEARANCE UR: ABNORMAL
BACTERIA #/AREA URNS HPF: ABNORMAL /HPF
BILIRUB UR QL STRIP: NEGATIVE
COLOR UR AUTO: YELLOW
GLUCOSE UR STRIP-MCNC: NEGATIVE MG/DL
HGB UR QL STRIP: NEGATIVE
HYALINE CASTS: 1 /LPF
KETONES UR STRIP-MCNC: NEGATIVE MG/DL
LEUKOCYTE ESTERASE UR QL STRIP: ABNORMAL
MUCOUS THREADS #/AREA URNS LPF: PRESENT /LPF
NITRATE UR QL: NEGATIVE
PH UR STRIP: 5.5 [PH] (ref 5–7)
RBC URINE: 3 /HPF
SP GR UR STRIP: 1.02 (ref 1–1.03)
SQUAMOUS EPITHELIAL: <1 /HPF
UROBILINOGEN UR STRIP-MCNC: NORMAL MG/DL
WBC CLUMPS #/AREA URNS HPF: PRESENT /HPF
WBC URINE: 41 /HPF

## 2023-08-08 PROCEDURE — 81001 URINALYSIS AUTO W/SCOPE: CPT | Mod: ORL | Performed by: FAMILY MEDICINE

## 2023-08-08 PROCEDURE — 87086 URINE CULTURE/COLONY COUNT: CPT | Mod: ORL | Performed by: FAMILY MEDICINE

## 2023-08-12 LAB
BACTERIA UR CULT: ABNORMAL
BACTERIA UR CULT: ABNORMAL

## 2023-12-28 ENCOUNTER — DOCUMENTATION ONLY (OUTPATIENT)
Dept: OTHER | Facility: CLINIC | Age: 88
End: 2023-12-28
Payer: COMMERCIAL

## 2024-01-14 ENCOUNTER — HEALTH MAINTENANCE LETTER (OUTPATIENT)
Age: 89
End: 2024-01-14

## 2024-03-06 ENCOUNTER — LAB REQUISITION (OUTPATIENT)
Dept: LAB | Facility: CLINIC | Age: 89
End: 2024-03-06
Payer: COMMERCIAL

## 2024-03-06 DIAGNOSIS — E03.9 HYPOTHYROIDISM, UNSPECIFIED: ICD-10-CM

## 2024-03-07 PROCEDURE — P9603 ONE-WAY ALLOW PRORATED MILES: HCPCS | Mod: ORL | Performed by: FAMILY MEDICINE

## 2024-03-07 PROCEDURE — 36415 COLL VENOUS BLD VENIPUNCTURE: CPT | Mod: ORL | Performed by: FAMILY MEDICINE

## 2024-03-07 PROCEDURE — 84439 ASSAY OF FREE THYROXINE: CPT | Mod: ORL | Performed by: FAMILY MEDICINE

## 2024-03-07 PROCEDURE — 84443 ASSAY THYROID STIM HORMONE: CPT | Mod: ORL | Performed by: FAMILY MEDICINE

## 2024-03-08 LAB
T4 FREE SERPL-MCNC: 1.7 NG/DL (ref 0.9–1.7)
TSH SERPL DL<=0.005 MIU/L-ACNC: 0.78 UIU/ML (ref 0.3–4.2)

## 2024-08-02 ENCOUNTER — DOCUMENTATION ONLY (OUTPATIENT)
Dept: OTHER | Facility: CLINIC | Age: 89
End: 2024-08-02
Payer: COMMERCIAL

## 2024-09-25 ENCOUNTER — LAB REQUISITION (OUTPATIENT)
Dept: LAB | Facility: CLINIC | Age: 89
End: 2024-09-25
Payer: COMMERCIAL

## 2024-09-25 DIAGNOSIS — I10 ESSENTIAL (PRIMARY) HYPERTENSION: ICD-10-CM

## 2024-09-25 DIAGNOSIS — E03.9 HYPOTHYROIDISM, UNSPECIFIED: ICD-10-CM

## 2024-09-26 ENCOUNTER — HOSPITAL ENCOUNTER (INPATIENT)
Facility: CLINIC | Age: 89
LOS: 5 days | Discharge: NURSING, REHAB OR LONG-TERM CARE | DRG: 690 | End: 2024-10-01
Attending: EMERGENCY MEDICINE | Admitting: INTERNAL MEDICINE
Payer: COMMERCIAL

## 2024-09-26 DIAGNOSIS — N39.0 ACUTE UTI: ICD-10-CM

## 2024-09-26 DIAGNOSIS — R53.1 WEAKNESS: ICD-10-CM

## 2024-09-26 DIAGNOSIS — I35.1 NONRHEUMATIC AORTIC VALVE INSUFFICIENCY: Primary | Chronic | ICD-10-CM

## 2024-09-26 DIAGNOSIS — R41.0 DELIRIUM: ICD-10-CM

## 2024-09-26 DIAGNOSIS — I48.20 CHRONIC ATRIAL FIBRILLATION (H): ICD-10-CM

## 2024-09-26 DIAGNOSIS — I48.91 ATRIAL FIBRILLATION (H): ICD-10-CM

## 2024-09-26 DIAGNOSIS — R47.89 WORD FINDING DIFFICULTY: ICD-10-CM

## 2024-09-26 LAB
ALBUMIN SERPL BCG-MCNC: 3.6 G/DL (ref 3.5–5.2)
ALBUMIN UR-MCNC: 20 MG/DL
ALP SERPL-CCNC: 91 U/L (ref 40–150)
ALT SERPL W P-5'-P-CCNC: 17 U/L (ref 0–50)
ANION GAP SERPL CALCULATED.3IONS-SCNC: 11 MMOL/L (ref 7–15)
ANION GAP SERPL CALCULATED.3IONS-SCNC: 9 MMOL/L (ref 7–15)
APPEARANCE UR: ABNORMAL
AST SERPL W P-5'-P-CCNC: 29 U/L (ref 0–45)
BACTERIA #/AREA URNS HPF: ABNORMAL /HPF
BASOPHILS # BLD AUTO: 0.1 10E3/UL (ref 0–0.2)
BASOPHILS # BLD AUTO: 0.1 10E3/UL (ref 0–0.2)
BASOPHILS NFR BLD AUTO: 1 %
BASOPHILS NFR BLD AUTO: 1 %
BILIRUB SERPL-MCNC: 0.4 MG/DL
BILIRUB UR QL STRIP: NEGATIVE
BUN SERPL-MCNC: 13.2 MG/DL (ref 8–23)
BUN SERPL-MCNC: 13.3 MG/DL (ref 8–23)
CALCIUM SERPL-MCNC: 9.4 MG/DL (ref 8.8–10.4)
CALCIUM SERPL-MCNC: 9.6 MG/DL (ref 8.8–10.4)
CHLORIDE SERPL-SCNC: 103 MMOL/L (ref 98–107)
CHLORIDE SERPL-SCNC: 104 MMOL/L (ref 98–107)
COLOR UR AUTO: ABNORMAL
CREAT SERPL-MCNC: 0.95 MG/DL (ref 0.51–0.95)
CREAT SERPL-MCNC: 0.95 MG/DL (ref 0.51–0.95)
EGFRCR SERPLBLD CKD-EPI 2021: 56 ML/MIN/1.73M2
EGFRCR SERPLBLD CKD-EPI 2021: 56 ML/MIN/1.73M2
EOSINOPHIL # BLD AUTO: 0.4 10E3/UL (ref 0–0.7)
EOSINOPHIL # BLD AUTO: 0.4 10E3/UL (ref 0–0.7)
EOSINOPHIL NFR BLD AUTO: 4 %
EOSINOPHIL NFR BLD AUTO: 4 %
ERYTHROCYTE [DISTWIDTH] IN BLOOD BY AUTOMATED COUNT: 12.8 % (ref 10–15)
ERYTHROCYTE [DISTWIDTH] IN BLOOD BY AUTOMATED COUNT: 13.2 % (ref 10–15)
GLUCOSE SERPL-MCNC: 112 MG/DL (ref 70–99)
GLUCOSE SERPL-MCNC: 121 MG/DL (ref 70–99)
GLUCOSE UR STRIP-MCNC: NEGATIVE MG/DL
HCO3 SERPL-SCNC: 25 MMOL/L (ref 22–29)
HCO3 SERPL-SCNC: 28 MMOL/L (ref 22–29)
HCT VFR BLD AUTO: 40.6 % (ref 35–47)
HCT VFR BLD AUTO: 41 % (ref 35–47)
HGB BLD-MCNC: 13.3 G/DL (ref 11.7–15.7)
HGB BLD-MCNC: 13.6 G/DL (ref 11.7–15.7)
HGB UR QL STRIP: ABNORMAL
HOLD SPECIMEN: NORMAL
IMM GRANULOCYTES # BLD: 0.1 10E3/UL
IMM GRANULOCYTES # BLD: 0.1 10E3/UL
IMM GRANULOCYTES NFR BLD: 1 %
IMM GRANULOCYTES NFR BLD: 1 %
KETONES UR STRIP-MCNC: NEGATIVE MG/DL
LACTATE SERPL-SCNC: 1 MMOL/L (ref 0.7–2)
LEUKOCYTE ESTERASE UR QL STRIP: ABNORMAL
LYMPHOCYTES # BLD AUTO: 2.4 10E3/UL (ref 0.8–5.3)
LYMPHOCYTES # BLD AUTO: 2.8 10E3/UL (ref 0.8–5.3)
LYMPHOCYTES NFR BLD AUTO: 24 %
LYMPHOCYTES NFR BLD AUTO: 28 %
MCH RBC QN AUTO: 30 PG (ref 26.5–33)
MCH RBC QN AUTO: 30.2 PG (ref 26.5–33)
MCHC RBC AUTO-ENTMCNC: 32.8 G/DL (ref 31.5–36.5)
MCHC RBC AUTO-ENTMCNC: 33.2 G/DL (ref 31.5–36.5)
MCV RBC AUTO: 90 FL (ref 78–100)
MCV RBC AUTO: 92 FL (ref 78–100)
MONOCYTES # BLD AUTO: 0.8 10E3/UL (ref 0–1.3)
MONOCYTES # BLD AUTO: 0.9 10E3/UL (ref 0–1.3)
MONOCYTES NFR BLD AUTO: 8 %
MONOCYTES NFR BLD AUTO: 9 %
MUCOUS THREADS #/AREA URNS LPF: PRESENT /LPF
NEUTROPHILS # BLD AUTO: 5.8 10E3/UL (ref 1.6–8.3)
NEUTROPHILS # BLD AUTO: 6.3 10E3/UL (ref 1.6–8.3)
NEUTROPHILS NFR BLD AUTO: 58 %
NEUTROPHILS NFR BLD AUTO: 63 %
NITRATE UR QL: NEGATIVE
NRBC # BLD AUTO: 0 10E3/UL
NRBC # BLD AUTO: 0 10E3/UL
NRBC BLD AUTO-RTO: 0 /100
NRBC BLD AUTO-RTO: 0 /100
PH UR STRIP: 7.5 [PH] (ref 5–7)
PLATELET # BLD AUTO: 319 10E3/UL (ref 150–450)
PLATELET # BLD AUTO: 340 10E3/UL (ref 150–450)
POTASSIUM SERPL-SCNC: 3.7 MMOL/L (ref 3.4–5.3)
POTASSIUM SERPL-SCNC: 4.1 MMOL/L (ref 3.4–5.3)
PROT SERPL-MCNC: 8 G/DL (ref 6.4–8.3)
RBC # BLD AUTO: 4.4 10E6/UL (ref 3.8–5.2)
RBC # BLD AUTO: 4.54 10E6/UL (ref 3.8–5.2)
RBC URINE: >182 /HPF
SODIUM SERPL-SCNC: 139 MMOL/L (ref 135–145)
SODIUM SERPL-SCNC: 141 MMOL/L (ref 135–145)
SP GR UR STRIP: 1.01 (ref 1–1.03)
SQUAMOUS EPITHELIAL: 2 /HPF
TSH SERPL DL<=0.005 MIU/L-ACNC: 0.71 UIU/ML (ref 0.3–4.2)
UROBILINOGEN UR STRIP-MCNC: 2 MG/DL
WBC # BLD AUTO: 10 10E3/UL (ref 4–11)
WBC # BLD AUTO: 9.9 10E3/UL (ref 4–11)
WBC CLUMPS #/AREA URNS HPF: PRESENT /HPF
WBC URINE: >182 /HPF

## 2024-09-26 PROCEDURE — 82947 ASSAY GLUCOSE BLOOD QUANT: CPT | Performed by: EMERGENCY MEDICINE

## 2024-09-26 PROCEDURE — 84155 ASSAY OF PROTEIN SERUM: CPT | Performed by: EMERGENCY MEDICINE

## 2024-09-26 PROCEDURE — 258N000003 HC RX IP 258 OP 636: Performed by: INTERNAL MEDICINE

## 2024-09-26 PROCEDURE — 36415 COLL VENOUS BLD VENIPUNCTURE: CPT | Mod: ORL

## 2024-09-26 PROCEDURE — 250N000013 HC RX MED GY IP 250 OP 250 PS 637: Performed by: INTERNAL MEDICINE

## 2024-09-26 PROCEDURE — 99285 EMERGENCY DEPT VISIT HI MDM: CPT | Mod: 25

## 2024-09-26 PROCEDURE — 87040 BLOOD CULTURE FOR BACTERIA: CPT | Performed by: EMERGENCY MEDICINE

## 2024-09-26 PROCEDURE — 99223 1ST HOSP IP/OBS HIGH 75: CPT | Performed by: INTERNAL MEDICINE

## 2024-09-26 PROCEDURE — 96365 THER/PROPH/DIAG IV INF INIT: CPT

## 2024-09-26 PROCEDURE — 87186 SC STD MICRODIL/AGAR DIL: CPT | Performed by: EMERGENCY MEDICINE

## 2024-09-26 PROCEDURE — 36415 COLL VENOUS BLD VENIPUNCTURE: CPT | Performed by: EMERGENCY MEDICINE

## 2024-09-26 PROCEDURE — 83605 ASSAY OF LACTIC ACID: CPT | Performed by: EMERGENCY MEDICINE

## 2024-09-26 PROCEDURE — P9603 ONE-WAY ALLOW PRORATED MILES: HCPCS | Mod: ORL

## 2024-09-26 PROCEDURE — 84443 ASSAY THYROID STIM HORMONE: CPT | Mod: ORL

## 2024-09-26 PROCEDURE — 85025 COMPLETE CBC W/AUTO DIFF WBC: CPT | Performed by: EMERGENCY MEDICINE

## 2024-09-26 PROCEDURE — 81001 URINALYSIS AUTO W/SCOPE: CPT | Performed by: EMERGENCY MEDICINE

## 2024-09-26 PROCEDURE — 250N000011 HC RX IP 250 OP 636: Performed by: EMERGENCY MEDICINE

## 2024-09-26 PROCEDURE — 120N000001 HC R&B MED SURG/OB

## 2024-09-26 PROCEDURE — 80053 COMPREHEN METABOLIC PANEL: CPT | Mod: ORL

## 2024-09-26 PROCEDURE — 85025 COMPLETE CBC W/AUTO DIFF WBC: CPT | Mod: ORL

## 2024-09-26 RX ORDER — AMOXICILLIN 250 MG
1 CAPSULE ORAL 2 TIMES DAILY PRN
Status: DISCONTINUED | OUTPATIENT
Start: 2024-09-26 | End: 2024-10-01 | Stop reason: HOSPADM

## 2024-09-26 RX ORDER — MIRTAZAPINE 7.5 MG/1
7.5 TABLET, FILM COATED ORAL AT BEDTIME
Status: DISCONTINUED | OUTPATIENT
Start: 2024-09-26 | End: 2024-10-01 | Stop reason: HOSPADM

## 2024-09-26 RX ORDER — CEFTRIAXONE 1 G/1
1 INJECTION, POWDER, FOR SOLUTION INTRAMUSCULAR; INTRAVENOUS EVERY 24 HOURS
Status: DISCONTINUED | OUTPATIENT
Start: 2024-09-27 | End: 2024-09-29

## 2024-09-26 RX ORDER — ACETAMINOPHEN 325 MG/1
650 TABLET ORAL EVERY 4 HOURS PRN
Status: DISCONTINUED | OUTPATIENT
Start: 2024-09-26 | End: 2024-10-01 | Stop reason: HOSPADM

## 2024-09-26 RX ORDER — ACETAMINOPHEN 650 MG/1
650 SUPPOSITORY RECTAL EVERY 4 HOURS PRN
Status: DISCONTINUED | OUTPATIENT
Start: 2024-09-26 | End: 2024-10-01 | Stop reason: HOSPADM

## 2024-09-26 RX ORDER — PROCHLORPERAZINE MALEATE 5 MG
5 TABLET ORAL EVERY 6 HOURS PRN
Status: DISCONTINUED | OUTPATIENT
Start: 2024-09-26 | End: 2024-10-01 | Stop reason: HOSPADM

## 2024-09-26 RX ORDER — PREDNISOLONE ACETATE 10 MG/ML
1 SUSPENSION/ DROPS OPHTHALMIC 2 TIMES DAILY
Status: DISCONTINUED | OUTPATIENT
Start: 2024-09-26 | End: 2024-10-01 | Stop reason: HOSPADM

## 2024-09-26 RX ORDER — DORZOLAMIDE HYDROCHLORIDE AND TIMOLOL MALEATE 20; 5 MG/ML; MG/ML
1 SOLUTION/ DROPS OPHTHALMIC 2 TIMES DAILY
Status: DISCONTINUED | OUTPATIENT
Start: 2024-09-26 | End: 2024-10-01 | Stop reason: HOSPADM

## 2024-09-26 RX ORDER — LATANOPROST 50 UG/ML
1 SOLUTION/ DROPS OPHTHALMIC AT BEDTIME
Status: DISCONTINUED | OUTPATIENT
Start: 2024-09-26 | End: 2024-10-01 | Stop reason: HOSPADM

## 2024-09-26 RX ORDER — ONDANSETRON 4 MG/1
4 TABLET, ORALLY DISINTEGRATING ORAL EVERY 6 HOURS PRN
Status: DISCONTINUED | OUTPATIENT
Start: 2024-09-26 | End: 2024-10-01 | Stop reason: HOSPADM

## 2024-09-26 RX ORDER — LIDOCAINE 40 MG/G
CREAM TOPICAL
Status: DISCONTINUED | OUTPATIENT
Start: 2024-09-26 | End: 2024-10-01 | Stop reason: HOSPADM

## 2024-09-26 RX ORDER — SODIUM CHLORIDE 9 MG/ML
INJECTION, SOLUTION INTRAVENOUS CONTINUOUS
Status: DISCONTINUED | OUTPATIENT
Start: 2024-09-26 | End: 2024-09-27

## 2024-09-26 RX ORDER — FUROSEMIDE 20 MG
20 TABLET ORAL EVERY MORNING
Status: DISCONTINUED | OUTPATIENT
Start: 2024-09-27 | End: 2024-10-01 | Stop reason: HOSPADM

## 2024-09-26 RX ORDER — POLYETHYLENE GLYCOL 3350 17 G/17G
17 POWDER, FOR SOLUTION ORAL 2 TIMES DAILY PRN
Status: DISCONTINUED | OUTPATIENT
Start: 2024-09-26 | End: 2024-10-01 | Stop reason: HOSPADM

## 2024-09-26 RX ORDER — PROCHLORPERAZINE 25 MG
12.5 SUPPOSITORY, RECTAL RECTAL EVERY 12 HOURS PRN
Status: DISCONTINUED | OUTPATIENT
Start: 2024-09-26 | End: 2024-10-01 | Stop reason: HOSPADM

## 2024-09-26 RX ORDER — CEFTRIAXONE 1 G/1
1 INJECTION, POWDER, FOR SOLUTION INTRAMUSCULAR; INTRAVENOUS ONCE
Status: COMPLETED | OUTPATIENT
Start: 2024-09-26 | End: 2024-09-26

## 2024-09-26 RX ORDER — AMOXICILLIN 250 MG
2 CAPSULE ORAL 2 TIMES DAILY PRN
Status: DISCONTINUED | OUTPATIENT
Start: 2024-09-26 | End: 2024-10-01 | Stop reason: HOSPADM

## 2024-09-26 RX ORDER — CALCIUM CARBONATE 500 MG/1
1000 TABLET, CHEWABLE ORAL 4 TIMES DAILY PRN
Status: DISCONTINUED | OUTPATIENT
Start: 2024-09-26 | End: 2024-10-01 | Stop reason: HOSPADM

## 2024-09-26 RX ORDER — ONDANSETRON 2 MG/ML
4 INJECTION INTRAMUSCULAR; INTRAVENOUS EVERY 6 HOURS PRN
Status: DISCONTINUED | OUTPATIENT
Start: 2024-09-26 | End: 2024-10-01 | Stop reason: HOSPADM

## 2024-09-26 RX ORDER — METOPROLOL SUCCINATE 25 MG/1
25 TABLET, EXTENDED RELEASE ORAL EVERY MORNING
Status: DISCONTINUED | OUTPATIENT
Start: 2024-09-27 | End: 2024-10-01 | Stop reason: HOSPADM

## 2024-09-26 RX ORDER — LEVOTHYROXINE SODIUM 50 UG/1
50 TABLET ORAL DAILY
Status: DISCONTINUED | OUTPATIENT
Start: 2024-09-27 | End: 2024-10-01 | Stop reason: HOSPADM

## 2024-09-26 RX ADMIN — SODIUM CHLORIDE: 9 INJECTION, SOLUTION INTRAVENOUS at 20:09

## 2024-09-26 RX ADMIN — SODIUM CHLORIDE: 9 INJECTION, SOLUTION INTRAVENOUS at 17:41

## 2024-09-26 RX ADMIN — CEFTRIAXONE SODIUM 1 G: 1 INJECTION, POWDER, FOR SOLUTION INTRAMUSCULAR; INTRAVENOUS at 15:52

## 2024-09-26 RX ADMIN — LATANOPROST 1 DROP: 50 SOLUTION OPHTHALMIC at 23:08

## 2024-09-26 ASSESSMENT — ACTIVITIES OF DAILY LIVING (ADL)
ADLS_ACUITY_SCORE: 61
ADLS_ACUITY_SCORE: 48
ADLS_ACUITY_SCORE: 46
ADLS_ACUITY_SCORE: 61
ADLS_ACUITY_SCORE: 46
ADLS_ACUITY_SCORE: 48

## 2024-09-26 NOTE — ED NOTES
Bed: ED02  Expected date:   Expected time:   Means of arrival:   Comments:  [place] 89[place] memory care/poss infection

## 2024-09-26 NOTE — H&P
INTERNAL MEDICINE HISTORY AND PHYSICAL  [facility] Hospitalist Service      Marley Stewart [MR#: [id number]  : 1932  89 year old female]  Date of Admission:  2024  Primary Care Provider:  Chandra Ortiz      Chief Complaint:   AMS (speech/language impairment) with weakness.    History of Present Illness:   Marley Stewart is a 89 year old female with history including RA; HTN; diastolic CHF/HCM; AF; stroke; hypothyroidism; depression/anxiety; chronically wheelchair bound; and h/o PUD; who presents from memory care with AMS.    Patient lives in memory care facility.  Over the past 4 days she has been speaking less and weaker than at baseline.  Did apparently complain of some dysuria.  Patient has had word finding difficulties and speech/language impairment which is not usual for her.  Given her issues, she was brought to [facility] for further evaluation.    On initial evaluation, pt was afebrile, mildly hypertensive.  Labs notable for CBC with normal white blood cell count; BMP unremarkable; LFTs normal; urinalysis with greater than 182 white blood cells, greater than 182 red blood cells, large leukocyte esterase, large blood, 2 squamous epithelial cells. STAT MRI was ordered, but after discussion by her daughters, it was decided not to perform as it would not change her treatment.    Able to answer questions with some delay in responses. Has not missed any apixaban doses. Denies chest pain or dyspnea. No fevers or chills.    Past Medical History:   RA. On methotrexate.  Hypertension (benign essential).  CHF (diastolic; HFpEF). Echo  showed LVEF 55-60%, significant assymmetric thickening of the left ventricular walls, prominent proximal septal thickening as well as apical hypertrophy, findings suggest variant of hypertrophic obstructive cardiomyopathy, grade III diastolic dysfunction; RV OK; mild AR; mild-moderate MR.moderate TR; moderate pHTN.  Atrial  fibrillation/flutter.  Stroke history.  Hypothyroidism.  Depression/anxiety.  Osteoarthritis.  Glaucoma.  H/o PUD.    From Care Everywhere:  Medical History Date Comments   Spinal stenosis, lumbar region, without neurogenic claudication       Disorder of bone and cartilage, unspecified       Iron deficiency anemia, unspecified   hx   Rheumatoid arthritis(714.0)       Unspecified glaucoma(365.9)   bilateral   HTN (hypertension)       Seborrheic dermatitis, unspecified       Mitral valve disorders(424.0)   murmur, mitral regurgitation   OA (osteoarthritis) of knee   bilateral knees   Hypertrophic cardiomyopathy (HC)      Hypothyroidism 10/2013     Other and unspecified hyperlipidemia       Stroke (HC)       Mild aortic regurgitation 2017 ECHO 2014   S/P knee replacement 2017     Pubic ramus fracture (HC) 2019 Left 2019        Past Medical History:   Diagnosis Date    A-fib -- Chronic     Aortic regurgitation     1-2+ per 2015 Echo    Arthritis     CHF (congestive heart failure) (H)     EF 65-70% on 2015 Echo    CVA (cerebral vascular accident) (H)     Glaucoma     Hypertension     Mitral regurgitation     2+ per 2015 Echo    PUD (peptic ulcer disease)     Pulmonic valve regurgitation     1+ per 2015 Echo    RA (rheumatoid arthritis) (H)     Spinal stenosis     Thyroid disease     Tricuspid regurgitation     2+ per 2015 Echo     Above past medical history reviewed and edited and/or added to as necessary.    Past Surgical History:   From Care Everywhere:  Surgery Date Site/Laterality Comments   femur with plate     left - skiing accident -     tib/fib fx repair     left - in college     bunion and hammer toe foot surgery     left    IMO UNLISTED PROCEDURE EYELIDS          SECTION     x 2    WISDOM TEETH EXTRACTION         CATARACT REMOVAL   Bilateral     TRABECULECTOMY  Right right eye    YAG CAPSULOTOMY 13 Right right eye    DILATION AND CURETTAGE         KNEE  REPLACEMENT     bilateral         Past Surgical History:   Procedure Laterality Date    ORTHOPEDIC SURGERY       Above past medical surgical history reviewed and edited and/or added to as necessary.     Allergies:     Allergies   Allergen Reactions    Amitriptyline     Clonazepam Other (See Comments)     Somnolence at 0.5 mg dose    Erythromycin Other (See Comments)     Other reaction(s): *Unknown - Pt Doesn't Remember  MACROLIDES  PN: MACROLIDES      Indomethacin Other (See Comments)     Other reaction(s): *Unknown  depression  depression  PN: depression      Amoxicillin Rash    Latex Rash        Medications:     Prior to Admission Medications   Prescriptions Last Dose Informant Patient Reported? Taking?   acetaminophen (TYLENOL) 500 MG tablet   No No   Sig: Take 2 tablets (1,000 mg) by mouth every 6 hours as needed   apixaban ANTICOAGULANT (ELIQUIS) 5 MG tablet   No No   Sig: Take 1 tablet (5 mg) by mouth 2 times daily   calcium carb 1250 mg, 500 mg [place],/vitamin D 200 units (OSCAL WITH D) 500-200 MG-UNIT per tablet  Pharmacy Yes No   Sig: Take 1 tablet by mouth 2 times daily (with meals)   dorzolamide-timolol (COSOPT) 2-0.5 % ophthalmic solution   Yes No   Sig: Place 1 drop into the right eye 2 times daily   folic acid (FOLVITE) 1 MG tablet  Pharmacy Yes No   Sig: Take 1 mg by mouth daily   furosemide (LASIX) 20 MG tablet   No No   Sig: Take 1 tablet (20 mg) by mouth every morning   latanoprost (XALATAN) 0.005 % ophthalmic solution  Pharmacy Yes No   Sig: Place 1 drop into the right eye At Bedtime   levothyroxine (SYNTHROID/LEVOTHROID) 50 MCG tablet  Pharmacy Yes No   Sig: Take 50 mcg by mouth daily   methotrexate 2.5 MG tablet  Pharmacy Yes No   Sig: Take 15 mg by mouth every 7 days Wednesday  (6 x 2.5 mg)   metoprolol succinate ER (TOPROL-XL) 25 MG 24 hr tablet  Pharmacy Yes No   Sig: Take 25 mg by mouth every morning   mirtazapine (REMERON) 7.5 MG tablet   Yes No   Sig: Take 7.5 mg by mouth At Bedtime  "  nystatin (MYCOSTATIN) 520400 UNIT/GM external powder   Yes No   Sig: Apply topically 3 times daily   prednisoLONE acetate (PRED FORTE) 1 % ophthalmic suspension   Yes No   Sig: Place 1 drop into the right eye 2 times daily      Facility-Administered Medications: None       Social History:     Social History     Socioeconomic History    Marital status:      Spouse name: Not on file    Number of children: 3    Years of education: Not on file    Highest education level: Not on file   Occupational History    Occupation: Retired    Tobacco Use    Smoking status: Never    Smokeless tobacco: Never   Substance and Sexual Activity    Alcohol use: Yes     Comment: < 1 drink a month    Drug use: No    Sexual activity: Not on file   Other Topics Concern    Parent/sibling w/ CABG, MI or angioplasty before 65F 55M? Not Asked     Service Not Asked    Blood Transfusions Not Asked    Caffeine Concern Not Asked    Occupational Exposure Not Asked    Hobby Hazards Not Asked    Sleep Concern Not Asked    Stress Concern Not Asked    Weight Concern Not Asked    Special Diet Not Asked    Back Care Not Asked    Exercise Yes     Comment: stretches, golf     Bike Helmet Not Asked    Seat Belt Not Asked    Self-Exams Not Asked   Social History Narrative    , 3 children, she lives in a \"double home\" with her  who is slightly forgetful.  She desires DNR/DNI status.  Daughter-in-law, Ashley, is a nurse and checks on them. (last updated 1/4/2019)      Social Determinants of Health     Financial Resource Strain: High Risk (12/30/2021)    Received [facility], [facility]    Financial Resource Strain     Difficulty of Paying Living Expenses: Not on file     Difficulty of Paying Living Expenses: Not on file   Food Insecurity: Not on file   Transportation Needs: Not on file   Physical Activity: Not on file   Stress: Not on file   Social " "Connections: Unknown (12/30/2021)    [place] [facility] [place]note 2[facility]    Social Connections     Frequency of Communication with Friends and Family: Not on file   Interpersonal Safety: Not At Risk (12/14/2023)    Received from [facility]     Threatened: Not on file     Insulted: Not on file     Physically Hurt : Not on file     Scream: Not on file   Housing Stability: At Risk (12/14/2023)    Received from [facility] Housing     Living Situation: Not on file     Housing Problems: Not on file       Family History:   Reviewed.  Family History   Problem Relation Age of Onset    Cerebrovascular Disease Mother     Cancer Father         \"Bone Cancer\"       Review of Systems:   As noted in the HPI; otherwise 10 point review of systems was negative.     Physical Exam:   VITALS:  Blood pressure (!) 145/74, pulse 70, temperature 97.7  F (36.5  C), temperature source Temporal, resp. rate 20, SpO2 97%.  Constitutional: awake, alert, fatigued appearing, able to converse, some delay in responses, appropriate    Head: normocephalic, atraumatic  Eyes: PERRL; no scleral icterus or conjunctival injection  ENT: mouth with dry mucous membranes  Neck: no bruits, JVD or adenopathy  Cardiovascular: regular rate, regular rhythm, +I/VI systolic murmur, no rubs/gallops  Lungs: diminished in the bases, no crackles or wheezes  Gastrointestinal/Abdomen: soft, non-tender, non-distended, positive bowel sounds  :   Musculoskeletal:   Skin/Extremities: no bilateral lower extremity edema  Neurologic:  ?right mouth droop; right eyelid slight droop (per family, may be chronic); no obvious dysarthria; MAD; no gross focal weakness in extremities.  Psychiatric:   Hematologic/Lymphatic/Immunologic:         Labs, Imaging & Other Data:     Results for orders placed or performed during the hospital encounter of 09/26/24   [place] Draw     Status: None    " Narrative    The following orders were created for panel order [place] Draw.  Procedure                               Abnormality         Status                     ---------                               -----------         ------                     Extra Blood Culture Bottle[[id number]]                       Final result               Extra Blue Top Tube[[id number]]                              Final result               Extra Green Top (Lithium...[[id number]]                      Final result               Extra Purple Top Tube[[id number]]                            Final result               Extra Green Top (Lithium...[[id number]]                      Final result                 Please view results for these tests on the individual orders.   Extra Blood Culture Bottle     Status: None   Result Value Ref Range    Hold Specimen JIC    Extra Blue Top Tube     Status: None   Result Value Ref Range    Hold Specimen JIC    Extra Green Top (Lithium Heparin) Tube     Status: None   Result Value Ref Range    Hold Specimen JIC    Extra Purple Top Tube     Status: None   Result Value Ref Range    Hold Specimen JIC    Extra Green Top (Lithium Heparin) ON ICE     Status: None   Result Value Ref Range    Hold Specimen JIC    UA Macroscopic with reflex to Microscopic and Culture     Status: Abnormal    Specimen: Urine, Catheter   Result Value Ref Range    Color Urine Straw Colorless, Straw, Light Yellow, Yellow    Appearance Urine Slightly Cloudy (A) Clear    Glucose Urine Negative Negative mg/dL    Bilirubin Urine Negative Negative    Ketones Urine Negative Negative mg/dL    Specific Gravity Urine 1.014 1.003 - 1.035    Blood Urine Large (A) Negative    pH Urine 7.5 (H) 5.0 - 7.0    Protein Albumin Urine 20 (A) Negative mg/dL    Urobilinogen Urine 2.0 Normal, 2.0 mg/dL    Nitrite Urine Negative Negative    Leukocyte Esterase Urine Large (A) Negative    Bacteria Urine Few (A) None Seen /HPF    WBC Clumps Urine Present (A) None  Seen /HPF    Mucus Urine Present (A) None Seen /LPF    RBC Urine >182 (H) <=2 /HPF    WBC Urine >182 (H) <=5 /HPF    Squamous Epithelials Urine 2 (H) <=1 /HPF    Narrative    Urine Culture ordered based on laboratory criteria   Comprehensive metabolic panel     Status: Abnormal   Result Value Ref Range    Sodium 141 135 - 145 mmol/L    Potassium 4.1 3.4 - 5.3 mmol/L    Carbon Dioxide (CO2) 28 22 - 29 mmol/L    Anion Gap 9 7 - 15 mmol/L    Urea Nitrogen 13.2 8.0 - 23.0 mg/dL    Creatinine 0.95 0.51 - 0.95 mg/dL    GFR Estimate 56 (L) >60 mL/min/1.73m2    Calcium 9.6 8.8 - 10.4 mg/dL    Chloride 104 98 - 107 mmol/L    Glucose 112 (H) 70 - 99 mg/dL    Alkaline Phosphatase 91 40 - 150 U/L    AST 29 0 - 45 U/L    ALT 17 0 - 50 U/L    Protein Total 8.0 6.4 - 8.3 g/dL    Albumin 3.6 3.5 - 5.2 g/dL    Bilirubin Total 0.4 <=1.2 mg/dL   CBC with platelets and differential     Status: None   Result Value Ref Range    WBC Count 10.0 4.0 - 11.0 10e3/uL    RBC Count 4.54 3.80 - 5.20 10e6/uL    Hemoglobin 13.6 11.7 - 15.7 g/dL    Hematocrit 41.0 35.0 - 47.0 %    MCV 90 78 - 100 fL    MCH 30.0 26.5 - 33.0 pg    MCHC 33.2 31.5 - 36.5 g/dL    RDW 12.8 10.0 - 15.0 %    Platelet Count 340 150 - 450 10e3/uL    % Neutrophils 58 %    % Lymphocytes 28 %    % Monocytes 9 %    % Eosinophils 4 %    % Basophils 1 %    % Immature Granulocytes 1 %    NRBCs per 100 WBC 0 <1 /100    Absolute Neutrophils 5.8 1.6 - 8.3 10e3/uL    Absolute Lymphocytes 2.8 0.8 - 5.3 10e3/uL    Absolute Monocytes 0.9 0.0 - 1.3 10e3/uL    Absolute Eosinophils 0.4 0.0 - 0.7 10e3/uL    Absolute Basophils 0.1 0.0 - 0.2 10e3/uL    Absolute Immature Granulocytes 0.1 <=0.4 10e3/uL    Absolute NRBCs 0.0 10e3/uL   CBC with platelets + differential     Status: None    Narrative    The following orders were created for panel order CBC with platelets + differential.  Procedure                               Abnormality         Status                     ---------                                -----------         ------                     CBC with platelets and d...[[id number]]                      Final result                 Please view results for these tests on the individual orders.         ASSESSMENT & PLAN:   Assessment & Plan    Marley Stewart is a 89 year old female with history including RA; HTN; diastolic CHF/HCM; AF; stroke; hypothyroidism; depression/anxiety; and h/o PUD; who presents from memory care with AMS. Found with evidence of UTI.    On initial evaluation, pt was afebrile, mildly hypertensive.  Labs notable for CBC with normal white blood cell count; BMP unremarkable; LFTs normal; urinalysis with greater than 182 white blood cells, greater than 182 red blood cells, large leukocyte esterase, large blood, 2 squamous epithelial cells. STAT MRI was ordered, but after discussion by her daughters, it was decided not to perform as it would not change her treatment.      AMS with speech/language impairment, suspect due to UTI (infectious/septic encephalopathy), less likely stroke.  * From admit: Patient has been in memory care facility.  Over the past 4 days she has been speaking less and weaker than at baseline.  Did apparently complain of some dysuria.  Patient has had word finding difficulties and speech/language impairment which is not usual for her.  Given her issues, she was brought to [facility] for further evaluation.  * Initial presentation as above. Found with signs of UTI. MRI ordered, but ultimately, her daughters declined this as they would not want to be aggressive even if it were a stroke.  - Continue to treat other issues as noted, particularly UTI.  - Re-orient as needed.  - Maintain normal day/night, sleep/wake cycles.  - Minimize sedating medications as able.    UTI.  Microscopic hematuria, suspect related to above.  * Initial presentation as above. Given ceftriaxone in ED. BC's and UC pending.  - Continue ceftriaxone.  - NS at 50 ml/hr.  - Follow-up urine  "cultures.    Concern for stroke.  Stroke history.  Chronic afib.  * Initial presentation as above.  MRI ordered, but ultimately, her daughters declined this as they would not want to be aggressive even if it were a stroke.  * Overall, doubt stroke as pt on apixaban. ?right mouth droop on exam, ?stroke symptom recrudescence related to above.   - Continue apixaban and metoprolol.  - No MRI as it would not change treatment at this point (pt already on anticoagulation; chronically wheelchair bound).    RA.  Spinal stenosis.  Chronic debilitation.  * Chronically mostly wheelchair bound. Can stand/transfer/pivot.  - Hold methotrexate for now.  - Continue PRN acetaminophen.    HTN.  Hypertrophic CM with diastolic CHF.  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  * On admit, appears \"dry\".  - Continue metoprolol ER.  - Hold furosemide for now given above acute issues and being given IVF's.  - Monitor i/o's, daily wts..    Hypothyroidism.  - Continue levothyroxine.    Depression/anxiety.  - Hold mirtazapine for now given encephalopathy.      Clinically Significant Risk Factors Present on Admission               # Drug Induced Coagulation Defect: home medication list includes an anticoagulant medication    # Hypertension: Noted on problem list   # Dementia: noted on problem list                  Prophylaxis.  - PCD's, ambulation, and is on DOAC.      CODE STATUS: DNR-DNI      Az Hurt Jr., MD  [phone] (p)  Text Page  Vocera      "

## 2024-09-26 NOTE — ED PROVIDER NOTES
Emergency Department Note      History of Present Illness     Chief Complaint   No chief complaint on file.      HPI   Marley Stewart is a 89 year old female brought from [facility].  The patient has been declining over the last 4 days.  The patient is speaking less and less and is more weak than normal according to her daughter.  The patient has been complaining of burning with urination.  The family has had a great deal of difficulty getting the patient assessed while in her care facility.  In addition, the patient family is concerned about the patient's recent development of word finding difficulties.  This has been going on for at least 4 days.    Independent Historian   History is provided by the memory care facility staff and the patient's daughter     Review of External Notes   I reviewed a clinic note from 11/1/2023    Past Medical History     Medical History and Problem List   Past Medical History:   Diagnosis Date    A-fib -- Chronic     Aortic regurgitation     Arthritis     CHF (congestive heart failure) (H)     CVA (cerebral vascular accident) (H)     Glaucoma     Hypertension     Mitral regurgitation     PUD (peptic ulcer disease)     Pulmonic valve regurgitation     RA (rheumatoid arthritis) (H)     Spinal stenosis     Thyroid disease     Tricuspid regurgitation        Medications   acetaminophen (TYLENOL) 500 MG tablet  apixaban ANTICOAGULANT (ELIQUIS) 5 MG tablet  calcium carb 1250 mg, 500 mg [place],/vitamin D 200 units (OSCAL WITH D) 500-200 MG-UNIT per tablet  dorzolamide-timolol (COSOPT) 2-0.5 % ophthalmic solution  folic acid (FOLVITE) 1 MG tablet  furosemide (LASIX) 20 MG tablet  latanoprost (XALATAN) 0.005 % ophthalmic solution  levothyroxine (SYNTHROID/LEVOTHROID) 50 MCG tablet  methotrexate 2.5 MG tablet  metoprolol succinate ER (TOPROL-XL) 25 MG 24 hr tablet  mirtazapine (REMERON) 7.5 MG tablet  nystatin (MYCOSTATIN) 023515 UNIT/GM external powder  prednisoLONE  acetate (PRED FORTE) 1 % ophthalmic suspension        Surgical History   Past Surgical History:   Procedure Laterality Date    ORTHOPEDIC SURGERY         Physical Exam     Patient Vitals for the past 24 hrs:   BP Temp Temp src Pulse Resp SpO2   09/26/24 1929 (!) 137/90 98  F (36.7  C) Oral 66 18 96 %   09/26/24 1337 (!) 145/74 97.7  F (36.5  C) Temporal 70 20 97 %     Physical Exam  General: Alert, No distress. Nontoxic appearance  Head: No signs of trauma.   Mouth/Throat: Oropharynx moist.   Eyes: Conjunctivae are normal. Right pupil is irregular.  Left pupil is normal  Neck: Normal range of motion.    CV: Appears well perfused.  Resp:No respiratory distress.   MSK: Normal range of motion. No obvious deformity.   Neuro: The patient is alert and interactive. CAN. Speech normal. GCS 15  Skin: No lesion or sign of trauma noted.   Psych: normal mood and affect. behavior is normal.       Diagnostics     Lab Results   Labs Ordered and Resulted from Time of ED Arrival to Time of ED Departure   UA MACROSCOPIC WITH REFLEX TO MICRO AND CULTURE - Abnormal       Result Value    Color Urine Straw      Appearance Urine Slightly Cloudy (*)     Glucose Urine Negative      Bilirubin Urine Negative      Ketones Urine Negative      Specific Gravity Urine 1.014      Blood Urine Large (*)     pH Urine 7.5 (*)     Protein Albumin Urine 20 (*)     Urobilinogen Urine 2.0      Nitrite Urine Negative      Leukocyte Esterase Urine Large (*)     Bacteria Urine Few (*)     WBC Clumps Urine Present (*)     Mucus Urine Present (*)     RBC Urine >182 (*)     WBC Urine >182 (*)     Squamous Epithelials Urine 2 (*)    COMPREHENSIVE METABOLIC PANEL - Abnormal    Sodium 141      Potassium 4.1      Carbon Dioxide (CO2) 28      Anion Gap 9      Urea Nitrogen 13.2      Creatinine 0.95      GFR Estimate 56 (*)     Calcium 9.6      Chloride 104      Glucose 112 (*)     Alkaline Phosphatase 91      AST 29      ALT 17      Protein Total 8.0      Albumin 3.6       Bilirubin Total 0.4     LACTIC ACID WHOLE BLOOD - Normal    Lactic Acid 1.0     CBC WITH PLATELETS AND DIFFERENTIAL    WBC Count 10.0      RBC Count 4.54      Hemoglobin 13.6      Hematocrit 41.0      MCV 90      MCH 30.0      MCHC 33.2      RDW 12.8      Platelet Count 340      % Neutrophils 58      % Lymphocytes 28      % Monocytes 9      % Eosinophils 4      % Basophils 1      % Immature Granulocytes 1      NRBCs per 100 WBC 0      Absolute Neutrophils 5.8      Absolute Lymphocytes 2.8      Absolute Monocytes 0.9      Absolute Eosinophils 0.4      Absolute Basophils 0.1      Absolute Immature Granulocytes 0.1      Absolute NRBCs 0.0     URINE CULTURE   BLOOD CULTURE       Imaging   Brain MRI -patient and her family refused      Independent Interpretation   None    ED Course      Medications Administered   Medications   cefTRIAXone (ROCEPHIN) 1 g vial to attach to  mL bag for ADULTS or NS 50 mL bag for PEDS (has no administration in time range)   lidocaine 1 % 0.1-1 mL (has no administration in time range)   lidocaine (LMX4) cream (has no administration in time range)   sodium chloride (PF) 0.9% PF flush 3 mL (has no administration in time range)   sodium chloride (PF) 0.9% PF flush 3 mL (has no administration in time range)   acetaminophen (TYLENOL) tablet 650 mg (has no administration in time range)     Or   acetaminophen (TYLENOL) Suppository 650 mg (has no administration in time range)   senna-docusate (SENOKOT-S/PERICOLACE) 8.6-50 MG per tablet 1 tablet (has no administration in time range)     Or   senna-docusate (SENOKOT-S/PERICOLACE) 8.6-50 MG per tablet 2 tablet (has no administration in time range)   polyethylene glycol (MIRALAX) Packet 17 g (has no administration in time range)   ondansetron (ZOFRAN ODT) ODT tab 4 mg (has no administration in time range)     Or   ondansetron (ZOFRAN) injection 4 mg (has no administration in time range)   prochlorperazine (COMPAZINE) injection 5 mg (has no  administration in time range)     Or   prochlorperazine (COMPAZINE) tablet 5 mg (has no administration in time range)     Or   prochlorperazine (COMPAZINE) suppository 12.5 mg (has no administration in time range)   calcium carbonate (TUMS) chewable tablet 1,000 mg (has no administration in time range)   sodium chloride 0.9 % infusion ( Intravenous $New Bag 9/26/24 2766)   cefTRIAXone (ROCEPHIN) 1 g vial to attach to  mL bag for ADULTS or NS 50 mL bag for PEDS (0 g Intravenous Stopped 9/26/24 1716)       Procedures   Procedures     Discussion of Management   I discussed with the admitting hospitalist    ED Course        Additional Documentation  None    Medical Decision Making / Diagnosis       MDM   Marley Stewart is a 89 year old female who presents to the ED with weakness and confusion.  The patient has evidence of a urinary tract infection.  Urine culture is pending.  Antibiotics started in the ED.  In addition the patient and her family are concerned about her recent word finding difficulty that has developed over the last 4 days.  MRI is ordered to evaluate for the possibility of a stroke but the patient and her family are refusing.  The patient will be admitted to the hospital for further evaluation and treatment.    Disposition   The patient was admitted to the hospital.     Diagnosis     ICD-10-CM    1. Weakness  R53.1       2. Delirium  R41.0       3. Acute UTI  N39.0       4. Word finding difficulty  R47.89                MD Aleksey Grissom Todd Roger, MD  09/26/24 1948

## 2024-09-26 NOTE — ED TRIAGE NOTES
Per EMS: Pt from memory care, history provided by staff and daughter. Since Monday, pt has shown general decline; pt speaking less and less, weaker than normal, and c/o burning with voiding.

## 2024-09-26 NOTE — PROGRESS NOTES
RECEIVING UNIT ED HANDOFF REVIEW    ED Nurse Handoff Report was reviewed by: Juan Pablo Shah RN on September 26, 2024 at 6:51 PM

## 2024-09-26 NOTE — ED NOTES
[facility]  ED Nurse Handoff Report    ED Chief complaint: Generalized Weakness      ED Diagnosis:   Final diagnoses:   Weakness   Delirium   Acute UTI   Word finding difficulty       Code Status: To be addressed by admitting provider    Allergies:   Allergies   Allergen Reactions    Amitriptyline     Clonazepam Other (See Comments)     Somnolence at 0.5 mg dose    Erythromycin Other (See Comments)     Other reaction(s): *Unknown - Pt Doesn't Remember  MACROLIDES  PN: MACROLIDES      Indomethacin Other (See Comments)     Other reaction(s): *Unknown  depression  depression  PN: depression      Amoxicillin Rash    Latex Rash       Patient Story: BIBA from memory care unit. Per family, staff pt has been increasingly weak, speaking less, and appears to have difficulty getting out words. Also reports burning with urination.  Focused Assessment:  Alert to self, otherwise confused. Speech improving since arrival, speaking more. Incontinent B/B, BM X 1. UTI, IV abx started. IVF's running. History obtained from daughter. Wheelchair bound at [facility]. Family declined MRI as it would not change course of treatment. Purewick in place with good output.    Treatments and/or interventions provided: Labs, meds (see MAR)  Patient's response to treatments and/or interventions: Speech slightly improved    To be done/followed up on inpatient unit:  Admission orders    Does this patient have any cognitive concerns?:  Alert to self only    Activity level - Baseline/Home:  Wheelchair  Activity Level - Current:   Wheelchair    Patient's Preferred language: English   Needed?: No    Isolation: None and Contact   Infection: Not Applicable  MRSA  Patient tested for COVID 19 prior to admission: NO  Bariatric?: No    Vital Signs:   Vitals:    09/26/24 1337   BP: (!) 145/74   Pulse: 70   Resp: 20   Temp: 97.7  F (36.5  C)   TempSrc: Temporal   SpO2: 97%       Cardiac Rhythm:     Was the PSS-3 completed:   No -Unable to  complete-confused  What interventions are required if any?               Family Comments: Daughter was present at bedside, since went home  OBS brochure/video discussed/provided to patient/family: N/A              Name of person given brochure if not patient: na              Relationship to patient: na    For the majority of the shift this patient's behavior was Green.   Behavioral interventions performed were na.    ED NURSE PHONE NUMBER: [phone]

## 2024-09-27 ENCOUNTER — APPOINTMENT (OUTPATIENT)
Dept: SPEECH THERAPY | Facility: CLINIC | Age: 89
DRG: 690 | End: 2024-09-27
Attending: INTERNAL MEDICINE
Payer: COMMERCIAL

## 2024-09-27 LAB
ANION GAP SERPL CALCULATED.3IONS-SCNC: 12 MMOL/L (ref 7–15)
BASOPHILS # BLD AUTO: 0.1 10E3/UL (ref 0–0.2)
BASOPHILS NFR BLD AUTO: 1 %
BUN SERPL-MCNC: 8.6 MG/DL (ref 8–23)
CALCIUM SERPL-MCNC: 9.2 MG/DL (ref 8.8–10.4)
CHLORIDE SERPL-SCNC: 104 MMOL/L (ref 98–107)
CREAT SERPL-MCNC: 0.74 MG/DL (ref 0.51–0.95)
EGFRCR SERPLBLD CKD-EPI 2021: 75 ML/MIN/1.73M2
EOSINOPHIL # BLD AUTO: 0.5 10E3/UL (ref 0–0.7)
EOSINOPHIL NFR BLD AUTO: 5 %
ERYTHROCYTE [DISTWIDTH] IN BLOOD BY AUTOMATED COUNT: 12.7 % (ref 10–15)
GLUCOSE SERPL-MCNC: 98 MG/DL (ref 70–99)
HCO3 SERPL-SCNC: 22 MMOL/L (ref 22–29)
HCT VFR BLD AUTO: 40.5 % (ref 35–47)
HGB BLD-MCNC: 13.8 G/DL (ref 11.7–15.7)
IMM GRANULOCYTES # BLD: 0 10E3/UL
IMM GRANULOCYTES NFR BLD: 0 %
LYMPHOCYTES # BLD AUTO: 2 10E3/UL (ref 0.8–5.3)
LYMPHOCYTES NFR BLD AUTO: 19 %
MCH RBC QN AUTO: 30.3 PG (ref 26.5–33)
MCHC RBC AUTO-ENTMCNC: 34.1 G/DL (ref 31.5–36.5)
MCV RBC AUTO: 89 FL (ref 78–100)
MONOCYTES # BLD AUTO: 0.8 10E3/UL (ref 0–1.3)
MONOCYTES NFR BLD AUTO: 8 %
NEUTROPHILS # BLD AUTO: 7 10E3/UL (ref 1.6–8.3)
NEUTROPHILS NFR BLD AUTO: 68 %
NRBC # BLD AUTO: 0 10E3/UL
NRBC BLD AUTO-RTO: 0 /100
PLATELET # BLD AUTO: 306 10E3/UL (ref 150–450)
POTASSIUM SERPL-SCNC: 3.7 MMOL/L (ref 3.4–5.3)
RBC # BLD AUTO: 4.56 10E6/UL (ref 3.8–5.2)
SODIUM SERPL-SCNC: 138 MMOL/L (ref 135–145)
WBC # BLD AUTO: 10.4 10E3/UL (ref 4–11)

## 2024-09-27 PROCEDURE — 80048 BASIC METABOLIC PNL TOTAL CA: CPT | Performed by: INTERNAL MEDICINE

## 2024-09-27 PROCEDURE — 36415 COLL VENOUS BLD VENIPUNCTURE: CPT | Performed by: INTERNAL MEDICINE

## 2024-09-27 PROCEDURE — 99232 SBSQ HOSP IP/OBS MODERATE 35: CPT | Performed by: INTERNAL MEDICINE

## 2024-09-27 PROCEDURE — 85025 COMPLETE CBC W/AUTO DIFF WBC: CPT | Performed by: INTERNAL MEDICINE

## 2024-09-27 PROCEDURE — 258N000003 HC RX IP 258 OP 636: Performed by: INTERNAL MEDICINE

## 2024-09-27 PROCEDURE — 92610 EVALUATE SWALLOWING FUNCTION: CPT | Mod: GN

## 2024-09-27 PROCEDURE — 250N000013 HC RX MED GY IP 250 OP 250 PS 637: Performed by: INTERNAL MEDICINE

## 2024-09-27 PROCEDURE — 250N000011 HC RX IP 250 OP 636: Performed by: INTERNAL MEDICINE

## 2024-09-27 PROCEDURE — G0378 HOSPITAL OBSERVATION PER HR: HCPCS

## 2024-09-27 PROCEDURE — 120N000001 HC R&B MED SURG/OB

## 2024-09-27 PROCEDURE — 250N000009 HC RX 250: Performed by: INTERNAL MEDICINE

## 2024-09-27 RX ORDER — LOPERAMIDE HCL 2 MG
2 CAPSULE ORAL PRN
COMMUNITY

## 2024-09-27 RX ORDER — LORATADINE 10 MG/1
10 TABLET ORAL DAILY PRN
COMMUNITY

## 2024-09-27 RX ORDER — SODIUM CHLORIDE 9 MG/ML
INJECTION, SOLUTION INTRAVENOUS CONTINUOUS
Status: DISCONTINUED | OUTPATIENT
Start: 2024-09-27 | End: 2024-09-27

## 2024-09-27 RX ORDER — LORATADINE 10 MG/1
10 TABLET ORAL DAILY PRN
Status: DISCONTINUED | OUTPATIENT
Start: 2024-09-27 | End: 2024-10-01 | Stop reason: HOSPADM

## 2024-09-27 RX ORDER — LACTOBACILLUS RHAMNOSUS GG 10B CELL
1 CAPSULE ORAL DAILY
COMMUNITY

## 2024-09-27 RX ORDER — LIDOCAINE HYDROCHLORIDE 20 MG/ML
15 SOLUTION OROPHARYNGEAL EVERY 6 HOURS PRN
COMMUNITY

## 2024-09-27 RX ORDER — SODIUM CHLORIDE 9 MG/ML
INJECTION, SOLUTION INTRAVENOUS CONTINUOUS
Status: ACTIVE | OUTPATIENT
Start: 2024-09-27 | End: 2024-09-27

## 2024-09-27 RX ORDER — HYDRALAZINE HYDROCHLORIDE 10 MG/1
10 TABLET, FILM COATED ORAL EVERY 6 HOURS PRN
Status: DISCONTINUED | OUTPATIENT
Start: 2024-09-27 | End: 2024-10-01 | Stop reason: HOSPADM

## 2024-09-27 RX ADMIN — PREDNISOLONE ACETATE 1 DROP: 10 SUSPENSION/ DROPS OPHTHALMIC at 09:27

## 2024-09-27 RX ADMIN — PREDNISOLONE ACETATE 1 DROP: 10 SUSPENSION/ DROPS OPHTHALMIC at 21:27

## 2024-09-27 RX ADMIN — APIXABAN 5 MG: 5 TABLET, FILM COATED ORAL at 09:26

## 2024-09-27 RX ADMIN — APIXABAN 5 MG: 5 TABLET, FILM COATED ORAL at 21:26

## 2024-09-27 RX ADMIN — CEFTRIAXONE SODIUM 1 G: 1 INJECTION, POWDER, FOR SOLUTION INTRAMUSCULAR; INTRAVENOUS at 15:50

## 2024-09-27 RX ADMIN — LATANOPROST 1 DROP: 50 SOLUTION OPHTHALMIC at 21:26

## 2024-09-27 RX ADMIN — ACETAMINOPHEN 650 MG: 325 TABLET ORAL at 09:39

## 2024-09-27 RX ADMIN — DORZOLAMIDE HYDROCHLORIDE AND TIMOLOL MALEATE 1 DROP: 22.3; 6.8 SOLUTION/ DROPS OPHTHALMIC at 09:27

## 2024-09-27 RX ADMIN — DORZOLAMIDE HYDROCHLORIDE AND TIMOLOL MALEATE 1 DROP: 22.3; 6.8 SOLUTION/ DROPS OPHTHALMIC at 21:26

## 2024-09-27 RX ADMIN — SODIUM CHLORIDE: 9 INJECTION, SOLUTION INTRAVENOUS at 14:33

## 2024-09-27 RX ADMIN — METOPROLOL SUCCINATE 25 MG: 25 TABLET, EXTENDED RELEASE ORAL at 09:26

## 2024-09-27 ASSESSMENT — ACTIVITIES OF DAILY LIVING (ADL)
ADLS_ACUITY_SCORE: 57
ADLS_ACUITY_SCORE: 61
ADLS_ACUITY_SCORE: 57
ADLS_ACUITY_SCORE: 62
ADLS_ACUITY_SCORE: 61
ADLS_ACUITY_SCORE: 61
ADLS_ACUITY_SCORE: 57
ADLS_ACUITY_SCORE: 57
ADLS_ACUITY_SCORE: 62
ADLS_ACUITY_SCORE: 61
ADLS_ACUITY_SCORE: 62
ADLS_ACUITY_SCORE: 62
ADLS_ACUITY_SCORE: 61
ADLS_ACUITY_SCORE: 61
ADLS_ACUITY_SCORE: 57
DEPENDENT_IADLS:: COOKING;CLEANING;LAUNDRY;SHOPPING;MEAL PREPARATION;MEDICATION MANAGEMENT;MONEY MANAGEMENT;TRANSPORTATION;INCONTINENCE
ADLS_ACUITY_SCORE: 61
ADLS_ACUITY_SCORE: 61

## 2024-09-27 NOTE — PROGRESS NOTES
[facility]    Medicine Progress Note - Hospitalist Service    Date of Admission:  9/26/2024    Assessment & Plan   Marley Stewart is a 89 year old female with history including RA; HTN; diastolic CHF/HCM; AF; stroke; hypothyroidism; depression/anxiety; and h/o PUD; who presents from memory care with AMS. Found with evidence of UTI.     On initial evaluation, pt was afebrile, mildly hypertensive.  Labs notable for CBC with normal white blood cell count; BMP unremarkable; LFTs normal; urinalysis with greater than 182 white blood cells, greater than 182 red blood cells, large leukocyte esterase, large blood, 2 squamous epithelial cells. STAT MRI was ordered, but after discussion by her daughters, it was decided not to perform as it would not change her treatment.       AMS with speech/language impairment, suspect due to UTI (infectious/septic encephalopathy), less likely stroke.  * From admit: Patient has been in memory care facility.  Over the past 4 days she has been speaking less and weaker than at baseline.  Did apparently complain of some dysuria.  Patient has had word finding difficulties and speech/language impairment which is not usual for her.  Given her issues, she was brought to [facility] for further evaluation.  * Initial presentation as above. Found with signs of UTI. MRI ordered on admission, but ultimately, her daughters declined this as they would not want to be aggressive even if it were a stroke.  - Continue to treat other issues as noted, particularly UTI.  - Re-orient as needed.  - Maintain normal day/night, sleep/wake cycles.  - Minimize sedating medications as able.     UTI  Microscopic hematuria, suspect related to above.  *urinalysis with greater than 182 white blood cells, greater than 182 red blood cells, large leukocyte esterase, large blood, 2 squamous epithelial cells.  * Initial presentation as above. Given ceftriaxone in ED. BC's and UC pending.  - Continue  "ceftriaxone.  - NS at 50 ml/hr x 5 more hours, then stop.  - Follow-up urine cultures, so far no growth to date  - follow blood culture, no growth to date     Concern for stroke.  Stroke history.  Chronic afib.  * Initial presentation as above.  MRI ordered, but ultimately, her daughters declined this as they would not want to be aggressive even if it were a stroke.  * Overall, doubt stroke as pt on apixaban. ?right mouth droop on exam, ?stroke symptom recrudescence related to above on admission  - Continue apixaban and metoprolol.  - No MRI as it would not change treatment at this point (pt already on anticoagulation; chronically wheelchair bound).     RA.  Spinal stenosis.  Chronic debilitation.  * Chronically mostly wheelchair bound. Can stand/transfer/pivot.  - Continue PRN acetaminophen.     HTN.  Hypertrophic CM with diastolic CHF.  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  * On admit, appears \"dry\".  - Continue metoprolol ER.  - Hold furosemide for now given above acute issues and being given IVF's.  - BP is on higher side, will have prn PO hydralazine available for SBP >180  - Monitor i/o's, daily wts..     Hypothyroidism.  - Continue levothyroxine.     Depression/anxiety.  - Hold mirtazapine for now given encephalopathy.          Diet: Combination Diet Regular Diet  Room Service    DVT Prophylaxis: DOAC  Armstrong Catheter: Not present  Lines: None     Cardiac Monitoring: None  Code Status: No CPR- Do NOT Intubate      Clinically Significant Risk Factors Present on Admission               # Drug Induced Coagulation Defect: home medication list includes an anticoagulant medication    # Hypertension: Noted on problem list   # " Dementia: noted on problem list                  Family communications: Attempted calling pt's daughter María for update per request, went to . Did not leave a message. Will try to call back as able    ADDENDUM: updated patient's daughter María and Ashley (dtr in law) by phone in the afternoon. María had visited patient earlier in the day and states that her mom has more color in her today than yesterday, however she still has speech issues with longer sentences and does not think that has improved. Confirmed with family, they don't wish to purse MRI for further stroke work up as unlikely to .       Disposition Plan     Medically Ready for Discharge: Anticipated in 2-4 Days             Merry Child MD  Hospitalist Service  [facility]  Securely message with [employer] (more info)  Text page via [employer] Paging/Directory   ______________________________________________________________________    Interval History   Patient sitting up in bed and eating lunch. Denies headache, nausea, vomiting or abdominal pain. She is answering yes and no questions.       Physical Exam   Vital Signs: Temp: 97.9  F (36.6  C) Temp src: Oral BP: (!) 179/85 Pulse: 75   Resp: 16 SpO2: 96 % O2 Device: None (Room air)    Weight: 135 lbs 5.8 oz    General Appearance: Alert, awake and no apparent distress  Respiratory: clear to auscultation bilaterally, no wheezing  Cardiovascular: regular rate and rhythm  GI: soft and non-tender  Skin: warm and dry      Medical Decision Making       MANAGEMENT DISCUSSED with the following over the past 24 hours: patient, and RN   NOTE(S)/MEDICAL RECORDS REVIEWED over the past 24 hours: nursing notes, MAR  Tests ORDERED & REVIEWED in the past 24 hours:  - BMP  - CBC  - urine and blood culture       Data     I have personally reviewed the following data over the past 24 hrs:    10.4  \   13.8   / 306     138 104 8.6 /  98   3.7 22 0.74 \     ALT: 17 AST: 29 AP: 91  TBILI: 0.4   ALB: 3.6 TOT PROTEIN: 8.0 LIPASE: N/A     TSH: N/A T4: N/A A1C: N/A     Procal: N/A CRP: N/A Lactic Acid: 1.0         Imaging results reviewed over the past 24 hrs:   No results found for this or any previous visit (from the past 24 hour(s)).

## 2024-09-27 NOTE — PROGRESS NOTES
89 year old female with UTI and delirium.  On contact precautions.   Very lethargic.  Soft-spoken.  Answers questions in just a few words.  C/o back pain, upper and lower, but refuses tylenol.  VSS on RA.  PIV infusing NS at 50 mL/ hr along with intermittent antibiotics.  Refuses repositioning d/t physical discomfort.  Skin examined with Clare NOEL RN.  Toes are dry and scabbed, patient has a healed wound on her coccyx.

## 2024-09-27 NOTE — PROGRESS NOTES
"Speech-Language Pathology Clinical Swallow Evaluation        09/27/24 0902   Appointment Info   Signing Clinician's Name / Credentials (SLP) Kiki Miranda MS, CCC-SLP   General Information   Onset of Illness/Injury or Date of Surgery 09/26/24   Referring Physician Merry Child MD   Pertinent History of Current Problem Per H&P, \"Marley Stewart is a 89 year old female with history including RA; HTN; diastolic CHF/HCM; AF; stroke; hypothyroidism; depression/anxiety; chronically wheelchair bound; and h/o PUD; who presents from memory care with AMS.     Patient lives in memory care facility.  Over the past 4 days she has been speaking less and weaker than at baseline.  Did apparently complain of some dysuria.  Patient has had word finding difficulties and speech/language impairment which is not usual for her.  Given her issues, she was [facility] for [facility].     On initial evaluation, pt was afebrile, mildly hypertensive.  Labs notable for CBC with normal white blood cell count; BMP unremarkable; LFTs normal; urinalysis with greater than 182 white blood cells, greater than 182 red blood cells, large leukocyte esterase, large blood, 2 squamous epithelial cells. STAT MRI was ordered, but after discussion by her daughters, it was decided not to perform as it would not change her treatment.\"   General Observations Pt asleep and easily roused, lethargic and with poor attention. + inconsistent physical agitation/restlessness. Limited responses to basic questions/commands.   Type of Evaluation   Type of Evaluation Swallow Evaluation   Dentition (Oral Motor)   Dentition (Oral Motor) natural dentition;adequate dentition   Facial Symmetry (Oral Motor)   Facial Symmetry (Oral Motor)   (appeared GWFL)   Lip Function (Oral Motor)   Lip Range of Motion (Oral Motor)   (GWFL)   Lip Strength (Oral Motor) unable/difficult to assess   Tongue Function (Oral Motor)   Tongue Strength (Oral Motor) " unable/difficult to assess   Tongue ROM (Oral Motor) unable/difficult to assess   Jaw Function (Oral Motor)   Jaw Function (Oral Motor) unable/difficult to assess   Cough/Swallow/Gag Reflex (Oral Motor)   Soft Palate/Velum (Oral Motor) unable/difficult to assess   Volitional Throat Clear/Cough (Oral Motor) unable/difficult to assess   Vocal Quality/Secretion Management (Oral Motor)   Vocal Quality (Oral Motor) WFL   Secretion Management (Oral Motor) WNL   General Swallowing Observations   Past History of Dysphagia Pt seen for past SLP services following stroke in 4-5/2015 and clinical swallow evaluations in 12/2015 and 7/2022. Two most recent swallow evaluations with recommendations for regular diet with thin liquid.   Respiratory Support room air   Current Diet/Method of Nutritional Intake (General Swallowing Observations, NIS)   (no order entered)   Swallowing Evaluation Clinical swallow evaluation   Clinical Swallow Evaluation   Clinical Swallow Evaluation Textures Trialed thin liquids;pureed;solid foods   Clinical Swallow Eval: Thin Liquid Texture Trial   Mode of Presentation, Thin Liquids cup;straw   Oral Phase of Swallow WFL   Pharyngeal Phase of Swallow throat clearing  (x1; frequent eructation)   Clinical Swallow Evaluation: Puree Solid Texture Trial   Oral Phase, Puree WFL   Pharyngeal Phase, Puree intact   Clinical Swallow Evaluation: Solid Food Texture Trial   Oral Phase impaired mastication   Oral Residue   (inconsistent, minimal lingual)   Pharyngeal Phase intact   Swallowing Recommendations   Diet Consistency Recommendations regular diet;thin liquids (level 0)   Supervision Level for Intake distant supervision needed   Mode of Delivery Recommendations bolus size, small;slow rate of intake   Swallowing Maneuver Recommendations alternate food and liquid intake   Monitoring/Assistance Required (Eating/Swallowing) stop eating activities when fatigue is present;monitor for cough or change in vocal quality  with intake   Recommended Feeding/Eating Techniques (Swallow Eval) maintain upright sitting position for eating;provide assist with feeding   Medication Administration Recommendations, Swallowing (SLP) whole or crushed in puree   Instrumental Assessment Recommendations instrumental evaluation not recommended at this time   General Therapy Interventions   Planned Therapy Interventions Dysphagia Treatment   Dysphagia treatment Instruction of safe swallow strategies   Clinical Impression   Criteria for Skilled Therapeutic Interventions Met (SLP Eval) Yes, treatment indicated   SLP Diagnosis minimal oral dysphagia and suspected grossly functional pharyngeal swallow   Clinical Impression Comments Pt currently presents with minimal oral dysphagia and suspected grossly functional pharyngeal swallow. + adequate acceptance/containment. Bolus formation/propulsion and lingual coordination appeared grossly adequate. + minimal-mildly reduced, prolonged mastication with increased oral transit time and inconsistent, minimal lingual residue; cleared with liquid wash. Suspect grossly timely swallow with adequate hyolaryngeal elevation. + throat clear following thin liquid via cup x1. No other cough, throat clear, wet vocal quality, eye watering, or increased WOB. Noted frequent/nearly consistent eructation following thin liquid.   SLP Total Evaluation Time   Eval: oral/pharyngeal swallow function, clinical swallow Minutes ([id number]) 19   SLP Goals   Therapy Frequency (SLP Eval) 5 times/week   SLP Predicted Duration/Target Date for Goal Attainment 10/04/24   SLP Discharge Planning   SLP Plan Diet tolerance   SLP Discharge Recommendation extended care facility  (memory care; prior environment)   SLP Rationale for DC Rec Suspect SLP goals will be met during admission   SLP Brief overview of current status  Recommend regular diet with thin liquid with aspiration precautions -- feed only when awake/alert and motivated for PO intake, upright  posture, slow rate, small bites/sips, alternate solid/liquid.   Total Session Time   Total Session Time (sum of timed and untimed services) 19

## 2024-09-27 NOTE — PLAN OF CARE
Goal Outcome Evaluation:      Plan of Care Reviewed With: other (see comments) ([facility] Nurse Aidee)          Outcome Evaluation: Discharge back to [facility]

## 2024-09-27 NOTE — PHARMACY-ADMISSION MEDICATION HISTORY
Pharmacist Admission Medication History    Admission medication history is complete. The information provided in this note is only as accurate as the sources available at the time of the update.    Information Source(s): Facility ([facility]/NH/) medication list/MAR ([facility] Med list) via N/A    Pertinent Information: None    Changes made to PTA medication list:  Added: Culturelle, Cranberry Imodium, Lidocaine solution  Deleted: Methotrexate, Folic acid, Calcium   Changed: None    Allergies reviewed with patient and updates made in EHR: no    Medication History Completed By: Ileana Castro RPH 9/27/2024 10:44 AM    PTA Med List   Medication Sig Last Dose    acetaminophen (TYLENOL) 500 MG tablet Take 2 tablets (1,000 mg) by mouth every 6 hours as needed (Patient taking differently: Take 500 mg by mouth every 8 hours as needed.)     apixaban ANTICOAGULANT (ELIQUIS) 5 MG tablet Take 1 tablet (5 mg) by mouth 2 times daily     Cranberry 500 MG TABS Take 1,000 mg by mouth daily.     dorzolamide-timolol (COSOPT) 2-0.5 % ophthalmic solution Place 1 drop into the right eye 2 times daily     furosemide (LASIX) 20 MG tablet Take 1 tablet (20 mg) by mouth every morning     lactobacillus rhamnosus, GG, (CULTURELL) capsule Take 1 capsule by mouth daily.     latanoprost (XALATAN) 0.005 % ophthalmic solution Place 1 drop into the right eye At Bedtime     levothyroxine (SYNTHROID/LEVOTHROID) 50 MCG tablet Take 50 mcg by mouth daily     lidocaine, viscous, (XYLOCAINE) 2 % solution Swish and spit 15 mLs in mouth every 6 hours as needed for pain. ; Max 8 doses/24 hour period.     loperamide (IMODIUM) 2 MG capsule Take 2 mg by mouth as needed for diarrhea. MAX if 8 capsules per day     loratadine (CLARITIN) 10 MG tablet Take 10 mg by mouth daily as needed for allergies.     metoprolol succinate ER (TOPROL-XL) 25 MG 24 hr tablet Take 25 mg by mouth every morning     mirtazapine (REMERON) 7.5 MG tablet Take 7.5 mg by mouth At  Bedtime     nystatin (MYCOSTATIN) 636579 UNIT/GM external powder Apply topically 3 times daily. To groin     prednisoLONE acetate (PRED FORTE) 1 % ophthalmic suspension Place 1 drop into the right eye 2 times daily

## 2024-09-27 NOTE — CONSULTS
Care Management Initial Consult    General Information  Assessment completed with: Care Team MemberAidee RN  Type of CM/SW Visit: Initial Assessment    Primary Care Provider verified and updated as needed: Yes   Readmission within the last 30 days: no previous admission in last 30 days      Reason for Consult: discharge planning  Advance Care Planning:            Communication Assessment  Patient's communication style: spoken language (English or Bilingual)             Cognitive  Cognitive/Neuro/Behavioral: .WDL except  Level of Consciousness: confused, lethargic  Arousal Level: arouses to voice  Orientation: disoriented to, place, time, situation  Mood/Behavior: cooperative  Best Language: 0 - No aphasia  Speech: spontaneous, clear    Living Environment:   People in home: alone     Current living Arrangements: assisted living  Name of Facility: [facility]   Able to return to prior arrangements: yes       Family/Social Support:  Care provided by: other (see comments) ([facility] staff)  Provides care for: no one, unable/limited ability to care for self  Marital Status:   Support system: , Children, Facility resident(s)/Staff  Fly       Description of Support System: Supportive, Involved         Current Resources:   Patient receiving home care services: No        Community Resources: None  Equipment currently used at home: wheelchair, manual  Supplies currently used at home: Incontinence Supplies    Employment/Financial:  Employment Status: retired        Financial Concerns: none   Referral to Financial Worker: No       Does the patient's insurance plan have a 3 day qualifying hospital stay waiver?  No    Lifestyle & Psychosocial Needs:  Social Determinants of Health     Food Insecurity: Not on file   Depression: Not at risk (10/26/2022)    Received from [facility]    PHQ-2     PHQ-2 TOTAL SCORE: 0   Housing Stability:  At Risk (12/14/2023)    Received from [facility] Housing     Living Situation: Not on file     Housing Problems: Not on file   Tobacco Use: Low Risk  (3/9/2023)    Received from [employer] [facility] [employer] [facility], [facility] [employer] [facility]    Patient History     Smoking Tobacco Use: Never     Smokeless Tobacco Use: Never     Passive Exposure: Not on file   Recent Concern: Tobacco Use - Medium Risk (1/10/2023)    [facility]    Patient History     Smoking Tobacco Use: Former     Smokeless Tobacco Use: Never     Passive Exposure: Not on file   Financial Resource Strain: High Risk (12/30/2021)    Received from [employer] [facility], [facility] [employer] [facility]    Financial Resource Strain     Difficulty of Paying Living Expenses: Not on file     Difficulty of Paying Living Expenses: Not on file   Alcohol Use: Not on file   Transportation Needs: Not on file   Physical Activity: Not on file   Interpersonal Safety: Not At Risk (12/14/2023)    Received from [facility] Safety     Threatened: Not on file     Insulted: Not on file     Physically Hurt : Not on file     Scream: Not on file   Stress: Not on file   Social Connections: Unknown (12/30/2021)    Received from [employer] [facility], [facility] [employer] [facility]    Social Connections     Frequency of Communication with Friends and Family: Not on file   Health Literacy: Not on file       Functional Status:  Prior to admission patient needed assistance:   Dependent ADLs:: Bathing, Dressing, Grooming, Incontinence, Positioning, Transfers, Wheelchair-with assist, Toileting  Dependent IADLs:: Cooking, Cleaning, Laundry, Shopping, Meal Preparation, Medication Management, Money Management, Transportation, Incontinence       Mental Health Status:          Chemical Dependency Status:                 Values/Beliefs:  Spiritual, Cultural Beliefs, Orthodox Practices, Values that affect care: yes (Evangelical)               Discussed  Partnership in Safe Discharge Planning  document with patient/family: No    Additional Information:  Per chart review, [facility] at [facility].  Called [facility]([phone]) and spoke to Aidee RN cell # [phone].  Per Aidee RN, patient receive full cares, assistance of 1 with transfers to wheel chair, bathing, grooming, and the following services medication administration, meals, laundry and cleaning.  Per Aidee, [facility] able to take pt back on Saturday if discharging on po abx.  RNCC would need to call [facility] Manager Rahel on Saturday at [phone].  Aidee requested discharge orders be faxed to [phone] and new medications be filled at [facility]-epic updated.  Per Aidee, there would be no Nurse available at the [facility] if the patient were to discharge on Sunday.  If pt ready on for discharge on Monday, call Aidee to communicate discharge.  Camila NIELSON left pt's son Flaquito a message regarding MOON status.  Next Steps: Await when patient medically ready for discharge.    Inpatient Care Coordinator will continue to follow for discharge planning.   MOSHE Hall RN, BSN, OCN   Inpatient Care Coordination [address]  Office: [phone]

## 2024-09-27 NOTE — PLAN OF CARE
Goal Outcome Evaluation:        Pt alert to self, VSS on room air. Refused Tylenol offered for c/o pain with turns and repo. Turn and reposition, soft spoken, incontinent of urine this shift. Continue with plan of care.

## 2024-09-27 NOTE — PLAN OF CARE
Goal Outcome Evaluation:    Orientation: AO to self only, lethargic and sleeping most of the day, still speaking less than baseline per daughter, answers yes/no questions best  Aggression Stop Light: Green  Activity: A2 w/ lift, WC baseline  Diet/BS Checks: Regular w/ RS, fair appetite, takes pills whole w/ water  Tele:  n/a  IV Access/Drains: L PIV inf NS @ 50ml/hr w/ int abx, R PIV SL  Pain Management: verbally denies pain, but nonverbal signs of pain present. PRN tylenol given x1  Abnormal VS/Results: VSS on RA except HTN  Bowel/Bladder: Incont b/b  Skin/Wounds: Redness around buttocks/perineum, pt itching at times. Cleaned thoroughly and sween cream applied.  Consults: BJ  D/C Disposition: Back to [facility] pending improvement  Other Info: Daughter María at bedside this afternoon.

## 2024-09-28 LAB
BACTERIA UR CULT: ABNORMAL
BACTERIA UR CULT: ABNORMAL

## 2024-09-28 PROCEDURE — 250N000013 HC RX MED GY IP 250 OP 250 PS 637: Performed by: INTERNAL MEDICINE

## 2024-09-28 PROCEDURE — G0378 HOSPITAL OBSERVATION PER HR: HCPCS

## 2024-09-28 PROCEDURE — 250N000011 HC RX IP 250 OP 636: Performed by: INTERNAL MEDICINE

## 2024-09-28 PROCEDURE — 120N000001 HC R&B MED SURG/OB

## 2024-09-28 PROCEDURE — 99232 SBSQ HOSP IP/OBS MODERATE 35: CPT | Performed by: HOSPITALIST

## 2024-09-28 RX ADMIN — ACETAMINOPHEN 650 MG: 325 TABLET ORAL at 09:42

## 2024-09-28 RX ADMIN — APIXABAN 5 MG: 5 TABLET, FILM COATED ORAL at 20:39

## 2024-09-28 RX ADMIN — LEVOTHYROXINE SODIUM 50 MCG: 50 TABLET ORAL at 06:30

## 2024-09-28 RX ADMIN — DORZOLAMIDE HYDROCHLORIDE AND TIMOLOL MALEATE 1 DROP: 22.3; 6.8 SOLUTION/ DROPS OPHTHALMIC at 09:43

## 2024-09-28 RX ADMIN — PREDNISOLONE ACETATE 1 DROP: 10 SUSPENSION/ DROPS OPHTHALMIC at 09:43

## 2024-09-28 RX ADMIN — LATANOPROST 1 DROP: 50 SOLUTION OPHTHALMIC at 23:13

## 2024-09-28 RX ADMIN — PREDNISOLONE ACETATE 1 DROP: 10 SUSPENSION/ DROPS OPHTHALMIC at 20:43

## 2024-09-28 RX ADMIN — DORZOLAMIDE HYDROCHLORIDE AND TIMOLOL MALEATE 1 DROP: 22.3; 6.8 SOLUTION/ DROPS OPHTHALMIC at 20:38

## 2024-09-28 RX ADMIN — CEFTRIAXONE SODIUM 1 G: 1 INJECTION, POWDER, FOR SOLUTION INTRAMUSCULAR; INTRAVENOUS at 16:38

## 2024-09-28 RX ADMIN — ACETAMINOPHEN 650 MG: 325 TABLET ORAL at 00:30

## 2024-09-28 RX ADMIN — APIXABAN 5 MG: 5 TABLET, FILM COATED ORAL at 09:42

## 2024-09-28 RX ADMIN — METOPROLOL SUCCINATE 25 MG: 25 TABLET, EXTENDED RELEASE ORAL at 09:42

## 2024-09-28 ASSESSMENT — ACTIVITIES OF DAILY LIVING (ADL)
ADLS_ACUITY_SCORE: 62
ADLS_ACUITY_SCORE: 65
ADLS_ACUITY_SCORE: 62
ADLS_ACUITY_SCORE: 58
ADLS_ACUITY_SCORE: 58
ADLS_ACUITY_SCORE: 67
ADLS_ACUITY_SCORE: 67
ADLS_ACUITY_SCORE: 61
ADLS_ACUITY_SCORE: 58
ADLS_ACUITY_SCORE: 62
ADLS_ACUITY_SCORE: 58
ADLS_ACUITY_SCORE: 59
ADLS_ACUITY_SCORE: 59
ADLS_ACUITY_SCORE: 62
ADLS_ACUITY_SCORE: 67
ADLS_ACUITY_SCORE: 62
ADLS_ACUITY_SCORE: 58
ADLS_ACUITY_SCORE: 62
ADLS_ACUITY_SCORE: 67
ADLS_ACUITY_SCORE: 67
ADLS_ACUITY_SCORE: 58
ADLS_ACUITY_SCORE: 67

## 2024-09-28 NOTE — PROVIDER NOTIFICATION
MD Notification    Notified Person: MD    Notified Person Name: Dr. Carlson Dionisiomartineztanja     Notification Date/Time: 9/28/24 @ 0025    Notification Interaction: Vocera Page    Purpose of Notification: Temp is 100.5 will give prn tylenol and continue to monitor.     Orders Received: No new orders    Comments:

## 2024-09-28 NOTE — PROGRESS NOTES
[facility]    Medicine Progress Note - Hospitalist Service    Date of Admission:  9/26/2024    Assessment & Plan   Marley Stewart is a 89 year old female with history including RA; HTN; diastolic CHF/HCM; AF; stroke; hypothyroidism; depression/anxiety; and h/o PUD; who presents from memory care with AMS. Found with evidence of UTI.     On initial evaluation, pt was afebrile, mildly hypertensive.  Labs notable for CBC with normal white blood cell count; BMP unremarkable; LFTs normal; urinalysis with greater than 182 white blood cells, greater than 182 red blood cells, large leukocyte esterase, large blood, 2 squamous epithelial cells. STAT MRI was ordered, but after discussion by her daughters, it was decided not to perform as it would not change her treatment.       AMS with speech/language impairment, suspect due to UTI (infectious/septic encephalopathy), less likely stroke.  * From admit: Patient has been in memory care facility.  Over the past 4 days she has been speaking less and weaker than at baseline.  Did apparently complain of some dysuria.  Patient has had word finding difficulties and speech/language impairment which is not usual for her.  Given her issues, she was brought to [facility] for further evaluation.  * Initial presentation as above. Found with signs of UTI. MRI ordered on admission, but ultimately, her daughters declined this as they would not want to be aggressive even if it were a stroke and decided against it (see prior hospitalists notes).  - Continue to treat other issues as noted, particularly UTI.  - Re-orient as needed.  - Maintain normal day/night, sleep/wake cycles.  - Minimize sedating medications as able.     UTI  Microscopic hematuria, suspect related to above.  *urinalysis with greater than 182 white blood cells, greater than 182 red blood cells, large leukocyte esterase, large blood, 2 squamous epithelial cells.  * Initial presentation as above. On  ceftriaxone.  UCX with klebsiella and strep.  BCX NTD.  - Continue ceftriaxone pending sens..     Concern for stroke.  Stroke history.  Chronic afib.  * Initial presentation as above.  MRI ordered, but ultimately, her daughters declined this as they would not want to be aggressive even if it were a stroke.  * Overall, doubt stroke as pt on apixaban. ?right mouth droop on exam, ?stroke symptom recrudescence related to above on admission  - Continue apixaban and metoprolol.  - No MRI as it would not change treatment at this point (pt already on anticoagulation; chronically wheelchair bound).      RA.  Spinal stenosis.  Chronic debilitation.  * Chronically mostly wheelchair bound. Can stand/transfer/pivot.  - Continue PRN acetaminophen.     HTN.  Hypertrophic CM with diastolic CHF.  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  No evidence of volume overload.  - Continue metoprolol ER.  - Hold furosemide for now. Resume as needed/able.  - BP is on higher side, prn PO hydralazine available for SBP >180  - Monitor i/o's, daily wts..     Hypothyroidism.  - Continue levothyroxine.     Depression/anxiety.  - Hold mirtazapine for now given encephalopathy.          Diet: Combination Diet Regular Diet  Room Service    DVT Prophylaxis: DOAC  Armstrong Catheter: Not present  Lines: None     Cardiac Monitoring: None  Code Status: No CPR- Do NOT Intubate          Disposition Plan   Medically Ready for Discharge: Anticipated in 2-4 Days             Hunter Jacobson MD  Hospitalist Service  [facility]    ______________________________________________________________________    Interval History   Answering yes or no questions. Denies  [ain. Denies sob.      Physical Exam   Vital Signs: Temp: 99.3  F (37.4  C) Temp src: Oral BP: (!) 167/78 Pulse: 71   Resp: 20 SpO2: 95 % O2 Device: None (Room air)    Weight: 134 lbs 0 oz    General Appearance: Alert, awake and no apparent distress  Respiratory: clear to auscultation bilaterally, no wheezing  Cardiovascular: regular rate and rhythm  GI: soft and non-tender  Skin: warm and dry        Data     I have personally reviewed the following data over the past 24 hrs:    10.4  \   13.8   / 306     138 104 8.6 /  98   3.7 22 0.74 \       Imaging results reviewed over the past 24 hrs:   No results found for this or any previous visit (from the past 24 hour(s)).

## 2024-09-28 NOTE — PROGRESS NOTES
DATE & TIME:9/27/24  Cognitive Concerns/ Orientation:Alert to self  BEHAVIOR & AGGRESSION TOOL COLOR:Green  ABNL VS/O2:VSS on RA  MOBILITY:Lift  PAIN MANAGMENT:Denies  DIET:Regular  BOWEL/BLADDER:Incontinent of B/B-purewick in place  DRAIN/DEVICES:PIV SL  TESTS/PROCEDURES:none  D/C DATE:  Pending

## 2024-09-28 NOTE — PLAN OF CARE
Goal Outcome Evaluation:    Orientation: AO to self only, sleeping most of the day, answers yes/no questions best  Aggression Stop Light: Green  Activity: A2 w/ lift, WC baseline  Diet/BS Checks: Regular w/ RS, poor appetite, fluids encouraged but minimal intake today  Tele:  n/a  IV Access/Drains: L and R PIV SL  Pain Management: verbally denies pain, but nonverbal signs of pain present. PRN tylenol given x1  Abnormal VS/Results: VSS on RA except HTN  Bowel/Bladder: Incont b/b, purewick in place w/ low UOP, bladder scan 57ml  Skin/Wounds: Redness around buttocks/perineum, pt itching at times.  Consults: BJ  D/C Disposition: Back to [facility] pending improvement  Other Info: Daughter and son visited today.

## 2024-09-28 NOTE — PLAN OF CARE
[id number]    Orientation: A&O to self only, answers yes/no questions best  Aggression Stop Light: Green  Activity: A2 w/ lift, WC baseline. Changes position independently at times.   Diet/BS Checks: Regular w/ RS, fair appetite.   Tele: n/a  IV Access/Drains: 2 PIV SL with intermittent abx  Pain Management: denies pain this shift  Abnormal VS/Results: VSS on RA except HTN  Bowel/Bladder: Incontinent of b/b. No BM this shift.   Skin/Wounds: Redness around buttocks/perineum, pt itching at times.  Consults: BJ  D/C Disposition: Back to [facility] pending improvement    Other Info:   - Takes pills whole with water, one at a time.   - prn Tylenol given x1 this shift for fever

## 2024-09-29 ENCOUNTER — APPOINTMENT (OUTPATIENT)
Dept: OCCUPATIONAL THERAPY | Facility: CLINIC | Age: 89
DRG: 690 | End: 2024-09-29
Attending: HOSPITALIST
Payer: COMMERCIAL

## 2024-09-29 ENCOUNTER — APPOINTMENT (OUTPATIENT)
Dept: SPEECH THERAPY | Facility: CLINIC | Age: 89
DRG: 690 | End: 2024-09-29
Payer: COMMERCIAL

## 2024-09-29 ENCOUNTER — APPOINTMENT (OUTPATIENT)
Dept: PHYSICAL THERAPY | Facility: CLINIC | Age: 89
DRG: 690 | End: 2024-09-29
Attending: HOSPITALIST
Payer: COMMERCIAL

## 2024-09-29 PROCEDURE — 97161 PT EVAL LOW COMPLEX 20 MIN: CPT | Mod: GP | Performed by: PHYSICAL THERAPIST

## 2024-09-29 PROCEDURE — 258N000003 HC RX IP 258 OP 636: Performed by: STUDENT IN AN ORGANIZED HEALTH CARE EDUCATION/TRAINING PROGRAM

## 2024-09-29 PROCEDURE — 250N000013 HC RX MED GY IP 250 OP 250 PS 637: Performed by: STUDENT IN AN ORGANIZED HEALTH CARE EDUCATION/TRAINING PROGRAM

## 2024-09-29 PROCEDURE — 120N000001 HC R&B MED SURG/OB

## 2024-09-29 PROCEDURE — 250N000013 HC RX MED GY IP 250 OP 250 PS 637: Performed by: INTERNAL MEDICINE

## 2024-09-29 PROCEDURE — 97165 OT EVAL LOW COMPLEX 30 MIN: CPT | Mod: GO | Performed by: OCCUPATIONAL THERAPIST

## 2024-09-29 PROCEDURE — 99232 SBSQ HOSP IP/OBS MODERATE 35: CPT | Performed by: STUDENT IN AN ORGANIZED HEALTH CARE EDUCATION/TRAINING PROGRAM

## 2024-09-29 PROCEDURE — 97530 THERAPEUTIC ACTIVITIES: CPT | Mod: GP | Performed by: PHYSICAL THERAPIST

## 2024-09-29 PROCEDURE — 92526 ORAL FUNCTION THERAPY: CPT | Mod: GN

## 2024-09-29 RX ORDER — SODIUM CHLORIDE, SODIUM LACTATE, POTASSIUM CHLORIDE, CALCIUM CHLORIDE 600; 310; 30; 20 MG/100ML; MG/100ML; MG/100ML; MG/100ML
INJECTION, SOLUTION INTRAVENOUS CONTINUOUS
Status: DISCONTINUED | OUTPATIENT
Start: 2024-09-29 | End: 2024-10-01 | Stop reason: HOSPADM

## 2024-09-29 RX ORDER — CIPROFLOXACIN 500 MG/1
500 TABLET, FILM COATED ORAL EVERY 12 HOURS SCHEDULED
Status: DISCONTINUED | OUTPATIENT
Start: 2024-09-29 | End: 2024-10-01 | Stop reason: HOSPADM

## 2024-09-29 RX ADMIN — LEVOTHYROXINE SODIUM 50 MCG: 50 TABLET ORAL at 06:01

## 2024-09-29 RX ADMIN — SODIUM CHLORIDE, POTASSIUM CHLORIDE, SODIUM LACTATE AND CALCIUM CHLORIDE: 600; 310; 30; 20 INJECTION, SOLUTION INTRAVENOUS at 13:23

## 2024-09-29 RX ADMIN — PREDNISOLONE ACETATE 1 DROP: 10 SUSPENSION/ DROPS OPHTHALMIC at 20:44

## 2024-09-29 RX ADMIN — CIPROFLOXACIN HYDROCHLORIDE 500 MG: 500 TABLET, FILM COATED ORAL at 20:41

## 2024-09-29 RX ADMIN — LATANOPROST 1 DROP: 50 SOLUTION OPHTHALMIC at 22:43

## 2024-09-29 RX ADMIN — CIPROFLOXACIN HYDROCHLORIDE 500 MG: 500 TABLET, FILM COATED ORAL at 11:42

## 2024-09-29 RX ADMIN — APIXABAN 2.5 MG: 2.5 TABLET, FILM COATED ORAL at 20:41

## 2024-09-29 RX ADMIN — METOPROLOL SUCCINATE 25 MG: 25 TABLET, EXTENDED RELEASE ORAL at 10:53

## 2024-09-29 RX ADMIN — DORZOLAMIDE HYDROCHLORIDE AND TIMOLOL MALEATE 1 DROP: 22.3; 6.8 SOLUTION/ DROPS OPHTHALMIC at 20:42

## 2024-09-29 RX ADMIN — PREDNISOLONE ACETATE 1 DROP: 10 SUSPENSION/ DROPS OPHTHALMIC at 10:55

## 2024-09-29 RX ADMIN — MIRTAZAPINE 7.5 MG: 7.5 TABLET, FILM COATED ORAL at 22:43

## 2024-09-29 RX ADMIN — DORZOLAMIDE HYDROCHLORIDE AND TIMOLOL MALEATE 1 DROP: 22.3; 6.8 SOLUTION/ DROPS OPHTHALMIC at 10:54

## 2024-09-29 ASSESSMENT — ACTIVITIES OF DAILY LIVING (ADL)
ADLS_ACUITY_SCORE: 64
ADLS_ACUITY_SCORE: 63
ADLS_ACUITY_SCORE: 62
ADLS_ACUITY_SCORE: 67
ADLS_ACUITY_SCORE: 62
ADLS_ACUITY_SCORE: 62
ADLS_ACUITY_SCORE: 63
ADLS_ACUITY_SCORE: 62
ADLS_ACUITY_SCORE: 63
ADLS_ACUITY_SCORE: 63
ADLS_ACUITY_SCORE: 62
ADLS_ACUITY_SCORE: 62
ADLS_ACUITY_SCORE: 63
ADLS_ACUITY_SCORE: 63
ADLS_ACUITY_SCORE: 62
ADLS_ACUITY_SCORE: 64
ADLS_ACUITY_SCORE: 67
ADLS_ACUITY_SCORE: 63
ADLS_ACUITY_SCORE: 62

## 2024-09-29 NOTE — PROGRESS NOTES
"   09/29/24 1200   Appointment Info   Signing Clinician's Name / Credentials (PT) Estrellita Leal PT, DPT   Living Environment   People in Home alone   Current Living Arrangements extended care facility  (memory)   Home Accessibility no concerns   Self-Care   Activity/Exercise/Self-Care Comment Per daughter in law the pt does stand and pivot bed>wc>toilet and back with A x 1 in the NH. Have done trials of PT, but pt unable demonstrate progress. Uses her wc for locmotion, with assist.   General Information   Onset of Illness/Injury or Date of Surgery 09/26/24   Referring Physician Hunter Jacobson MD   Patient/Family Therapy Goals Statement (PT) None stated.   Pertinent History of Current Problem (include personal factors and/or comorbidities that impact the POC) 89 year old female with history including RA wtih spinal stenosis; HTN; diastolic CHF/HCM; a-fib; stroke; hypothyroidism; depression/anxiety; and h/o PUD; who presents from memory care with AMS. Found with evidence of UTI.   Existing Precautions/Restrictions fall;cardiac   General Observations Constantly picking and scratching, nose is raw and finger tips are bloody from picking at the nose.   Cognition   Affect/Mental Status (Cognition) confused   Orientation Status (Cognition) person   Follows Commands (Cognition) follows one-step commands;25-49% accuracy   Behavioral Issues overwhelmed easily;difficulty managing stress;verbal outbursts   Safety Deficit (Cognition) moderate deficit;impulsivity;insight into deficits/self-awareness;judgment;problem-solving;safety precautions awareness;ability to follow commands;safety precautions follow-through/compliance   Memory Deficit (Cognition)   (Resides in memory care; pt with significant deficits at baseline.)   Pain Assessment   Patient Currently in Pain Yes, see Vital Sign flowsheet  (States \"Owie\" with movement, but doesn't states where it hurts.)   Integumentary/Edema   Integumentary/Edema Comments Scratches " and abrasions all over, fingernails are moderately long with dirt/dried blood beneath. Provided washcloth to help.   Posture    Posture Forward head position;Protracted shoulders   Posture Comments Sits with preference forslight L lean. ? if postural or related to wanting to go back to bed. Unable to fully view backside as pt with significant fear of falling once reaching the EOB.   Range of Motion (ROM)   ROM Comment B UEs and LEs WFL as obs via functional mobility, sllight knee crouching in standing-but knees are straight in bed. Tight HS.   Strength (Manual Muscle Testing)   Strength Comments Demonstrates antigravity strength with mobility and resistive strength as well. Limtied in activity tolerance and functional strength.   Bed Mobility   Comment, (Bed Mobility) Sup>sit able to initate, Mod A at the trunk to fully sit.   Transfers   Comment, (Transfers) Sit>Stand via kojo steady; Min A.   Gait/Stairs (Locomotion)   Comment, (Gait/Stairs) NA pt only piovts at baseline; not following cues well enough to stand and pivot-pt is a fall risk.   Balance   Balance Comments Sitting balance impaired; drifts L and R without support. In the chair, able to manitain midline.   Coordination   Coordination Comments Dysmeteria noted with reaching for kojo steady barf   Muscle Tone   Muscle Tone Comments Muscle atrophy noted.   Clinical Impression   Criteria for Skilled Therapeutic Intervention Yes, treatment indicated   PT Diagnosis (PT) Impaired activity tolerance   Influenced by the following impairments Generalized weakness, fatigue, impaired command following, cognitive impairment exacerbated by new environment.   Functional limitations due to impairments Decreased functional independence from baseline.   Clinical Presentation (PT Evaluation Complexity) stable   Clinical Presentation Rationale see MR   Clinical Decision Making (Complexity) low complexity   Planned Therapy Interventions (PT) balance training;bed mobility  training;home exercise program;neuromuscular re-education;patient/family education;ROM (range of motion);stair training;strengthening;stretching;transfer training;progressive activity/exercise   Risk & Benefits of therapy have been explained evaluation/treatment results reviewed;care plan/treatment goals reviewed;risks/benefits reviewed;current/potential barriers reviewed;participants voiced agreement with care plan;participants included;patient;daughter  (daughter in law)   PT Total Evaluation Time   PT Eval, Low Complexity Minutes (85310) 10   Physical Therapy Goals   PT Frequency 3x/week   PT Predicted Duration/Target Date for Goal Attainment 10/13/24   PT Goals Bed Mobility;Transfers   PT: Bed Mobility Supervision/stand-by assist;Supine to/from sit   PT: Transfers Moderate assist;Sit to/from stand;Bed to/from chair   Interventions   Interventions Quick Adds Therapeutic Activity   Therapeutic Activity   Therapeutic Activities: dynamic activities to improve functional performance Minutes (35393) 25   Treatment Detail/Skilled Intervention Lunch arrived, edu on up to chair for meals. DIL present and attempts to give pt stuff to drink whle the pt is supine in the bed. Dangled at the EOB with inital Max A to prevent slide from the bed, pt panics and stiffens the body, sates she is falling. Calm voice, becker asking pt to please sit upright and look at therapist. Pt able to look ahead, get feet on the floor and center self, cues down into the LEs/thighs for feedback, pt receptive and able to sit tall and calm with hands at sides. Pt then with continued picking at the nose. Increased time for wash up, including handwash-needs set up and constant cues to complete. Use of kojo steady bed>chair and stand>sit Mod A. Pt not fully cooperative stand>sit. Therefore, placed sling in room for nursing use prn. Use of seated repositioner for  improved posture; placed table for lunch. Daughter in law present to assist with eating. MD  present states pt likley to return to Memory Care tomorrow.   PT Discharge Planning   PT Plan Work on stand pivot transfers as able   PT Discharge Recommendation (DC Rec) Long term care facility  (Return to Memory Care)   PT Rationale for DC Rec Pt is near baseline with functional mobility, per family pt has [facility] wtih all [facility]. Today use of kojo steady for stand pivot. Family states pt has had trials of PT in the past, but unable to make functional progress.   PT Brief overview of current status A 1-2 with kojo steady or use of sling with nursing staff. Goals of therapy will be to address safe mobility and make recs for d/c to next level of care. Pt and RN will continue to follow all falls risk precautions as documented by RN staff while hospitalized.   Total Session Time   Timed Code Treatment Minutes 25   Total Session Time (sum of timed and untimed services) 35

## 2024-09-29 NOTE — PROGRESS NOTES
09/29/24 1005   Appointment Info   Signing Clinician's Name / Credentials (OT) Juan Canchola, Rd, OTR/L   Rehab Comments (OT) Initial evaluation   Living Environment   People in Home alone   Current Living Arrangements extended care facility  (memory care)   Home Accessibility no concerns   Transportation Anticipated health plan transportation   Self-Care   Usual Activity Tolerance moderate   Current Activity Tolerance fair   Equipment Currently Used at Home wheelchair, manual   General Information   Onset of Illness/Injury or Date of Surgery 09/28/24   Referring Physician Hunter Jacobson   Patient/Family Therapy Goal Statement (OT) not stated   Additional Occupational Profile Info/Pertinent History of Current Problem Marley Stewart is a 89 year old female with history including RA; HTN; diastolic CHF/HCM; AF; stroke; hypothyroidism; depression/anxiety; and h/o PUD; who presents from memory care with AMS. Found with evidence of UTI.   Existing Precautions/Restrictions fall   General Observations and Info Pt in bed, sleeping.  Moderate stimulation to wake up, partially cooperative with activity once awake   Cognitive Status Examination   Orientation Status person   Cognitive Status Comments general confusion.  Answering in one word responses or short phrases   Visual Perception   Visual Impairment/Limitations WFL   Pain Assessment   Patient Currently in Pain No   Range of Motion Comprehensive   General Range of Motion bilateral upper extremity ROM WFL   Comment, General Range of Motion able to mimic my movements for most ROM   Strength Comprehensive (MMT)   General Manual Muscle Testing (MMT) Assessment other (see comments)   Comment, General Manual Muscle Testing (MMT) Assessment pt too confused to try to do MMT.  Not able to follow through to give a good effort at grasp when asked.   Coordination   Upper Extremity Coordination No deficits were identified   Bed Mobility   Bed Mobility supine-sit;sit-supine   Supine-Sit  Bucks (Bed Mobility) supervision;verbal cues;contact guard   Sit-Supine Bucks (Bed Mobility) supervision;verbal cues;contact guard   Assistive Device (Bed Mobility) bed rails   Comment (Bed Mobility) Once pt up on EOB it was noted she had had a BM in her diaper.  Pt started reaching into diaper to pull out feces.  Needed physical restaint and cleaning, nursing notified to clean pt up.   Activities of Daily Living   BADL Assessment/Intervention other (see comments)  (chart notes indicate pt is dependent for basic cares and A of 1 from bed to  at [facility] care [facility])   Clinical Impression   Criteria for Skilled Therapeutic Interventions Met (OT) Evaluation only   OT Diagnosis decreased ability to assist caregivers with daily cares   OT Problem List-Impairments impacting ADL problems related to;activity tolerance impaired;balance;cognition;inability to direct their own care   OT Total Evaluation Time   OT Genna Low Complexity Minutes (42987) 15   OT Discharge Planning   OT Plan No IP OT needs at this time, pt appears to be at baseline level for assistance for ADLS.  Will discharge from IP OT.   OT Discharge Recommendation (DC Rec) Long term care facility  (return to [facility] with previous level of assistance by staff)   OT Rationale for DC Rec Pt currently appears very forgetful, inconsistent follow though with commands.  Needs direction and suprvision for basic ADLS including managing bowels and bathing.  Would not be able to manage herself in an independent situdation, needs 24/7 support and safety for daily cares and safe mobility.   OT Brief overview of current status Following some single step commands but needing supervision. Goals of therapy will be to address safe mobility and ADLS and make recommendations for discharge to the next level of care.  Pt and RN will continue to follow all fall risk precautions as documented by RN staff while hospitalized.   Total Session Time   Total Session  Time (sum of timed and untimed services) 15

## 2024-09-29 NOTE — PLAN OF CARE
Goal Outcome Evaluation:    Orientation: AO to self only, sleeping most of the day, answers yes/no questions best  Aggression Stop Light: Green  Activity: A2 w/ kojo steady, WC baseline, was up in chair most of the afternoon  Diet/BS Checks: Regular w/ RS, poor appetite, fluids encouraged but continued minimal intake  Tele:  n/a  IV Access/Drains: L PIV inf LR at 50ml/hr  Pain Management: verbally denies pain, but says ow w/ movement at times  Abnormal VS/Results: VSS on RA except HTN  Bowel/Bladder: Incont b/b, purewick in place w/ low UOP  Skin/Wounds: Redness around buttocks/perineum, mepilex to sacrum. R PIV infiltrated this evening, arm elevated w/ warm pack.  Consults: BJ  D/C Disposition: Back to [facility]  Other Info: Daughter at bedside this afternoon.

## 2024-09-29 NOTE — PLAN OF CARE
Speech Language Therapy Discharge Summary    Reason for therapy discharge:    All goals and outcomes met, no further needs identified.    Progress towards therapy goal(s). See goals on Care Plan in [facility] electronic health record for goal details.  Goals met    Therapy recommendation(s):    No further therapy is recommended. Pt demonstrating clinical tolerance of regular diet with thin liquid.

## 2024-09-29 NOTE — PLAN OF CARE
[id number]    Orientation: A&O to self only, sleeping most of the day, answers yes/no questions best  Aggression Stop Light: Green  Activity: A2 w/ lift, WC baseline. T/R q2h as needed  Diet/BS Checks: Regular w/ RS, poor appetite, fluids encouraged but minimal intake today  Tele:  n/a  IV Access/Drains: R PIV SL  Pain Management: denies pain this shift  Abnormal VS/Results: VSS on RA except HTN  Bowel/Bladder: Incont b/b, purewick in place w/ low UOP, bladder scan 65ml  Skin/Wounds: Redness around buttocks/perineum, pt itching at times.  Consults: BJ  D/C Disposition: Back to [facility] pending improvement    Other Info:   - Pills whole one at a time with water

## 2024-09-29 NOTE — PROGRESS NOTES
[facility]    Medicine Progress Note - Hospitalist Service    Date of Admission:  9/26/2024  Date of Service: 09/29/2024    Assessment & Plan   Marley Stewart is a 89 year old female with history including RA; HTN; diastolic CHF/HCM; AF; stroke; hypothyroidism; depression/anxiety; and h/o PUD; who presents from memory care with AMS. Found with evidence of UTI.     On initial evaluation, pt was afebrile, mildly hypertensive.  Labs notable for CBC with normal white blood cell count; BMP unremarkable; LFTs normal; urinalysis with greater than 182 white blood cells, greater than 182 red blood cells, large leukocyte esterase, large blood, 2 squamous epithelial cells. STAT MRI was ordered, but after discussion by her daughters, it was decided not to perform as it would not change her treatment.       AMS with speech/language impairment, suspect due to UTI (infectious/septic encephalopathy), less likely stroke.  * From admit: Patient has been in memory care facility.  Over the past 4 days she has been speaking less and weaker than at baseline.  Did apparently complain of some dysuria.  Patient has had word finding difficulties and speech/language impairment which is not usual for her.  Given her issues, she was brought to [facility] for further evaluation.  * Initial presentation as above. Found with signs of UTI. MRI ordered on admission, but ultimately, her daughters declined this as they would not want to be aggressive even if it were a stroke and decided against it (see prior hospitalists notes).  - Continue to treat other issues as noted, particularly UTI.  - Re-orient as needed.  - Maintain normal day/night, sleep/wake cycles.  - Minimize sedating medications as able.     UTI  Microscopic hematuria, suspect related to above.  *urinalysis with greater than 182 white blood cells, greater than 182 red blood cells, large leukocyte esterase, large blood, 2 squamous epithelial cells.  * Initial  presentation as above. On ceftriaxone.  UCX with klebsiella and strep.  BCX NTD.  - Continue ceftriaxone  -> Cipro BID     Concern for stroke.  Stroke history.  Chronic afib.  * Initial presentation as above.  MRI ordered, but ultimately, her daughters declined this as they would not want to be aggressive even if it were a stroke.  * Overall, doubt stroke as pt on apixaban. ?right mouth droop on exam, ?stroke symptom recrudescence related to above on admission  - Continue apixaban and metoprolol.  - No MRI as it would not change treatment at this point (pt already on anticoagulation; chronically wheelchair bound).      RA.  Spinal stenosis.  Chronic debilitation.  * Chronically mostly wheelchair bound. Can stand/transfer/pivot.  - Continue PRN acetaminophen.     HTN.  Hypertrophic CM with diastolic CHF.  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  No evidence of volume overload.  - Continue metoprolol ER.  - Hold furosemide for now. Resume as needed/able.  - BP is on higher side, prn PO hydralazine available for SBP >180  - Monitor i/o's, daily wts..     Hypothyroidism.  - Continue levothyroxine.     Depression/anxiety.  - Hold mirtazapine for now given encephalopathy.          Diet: Combination Diet Regular Diet  Room Service    DVT Prophylaxis: DOAC  Armstrong Catheter: Not present  Lines: None     Cardiac Monitoring: None  Code Status: No CPR- Do NOT Intubate          Disposition Plan   Medically Ready for Discharge: Anticipated Tomorrow             Neville Meade MD  Hospitalist Service  [facility]    ______________________________________________________________________    Interval History     No acute events  overnight.   No fevers  Denies chest pain. Denies sob.  No nausea / vomiting  No new complaints      Physical Exam   Vital Signs: Temp: 97.8  F (36.6  C) Temp src: Oral BP: (!) 169/79 Pulse: 76   Resp: 16 SpO2: 96 % O2 Device: None (Room air)    Weight: 127 lbs 3.29 oz    General Appearance: Alert, awake and no apparent distress  Respiratory: clear to auscultation bilaterally, no wheezing  Cardiovascular: regular rate and rhythm  GI: soft and non-tender  Skin: warm and dry    Data         Imaging results reviewed over the past 24 hrs:   No results found for this or any previous visit (from the past 24 hour(s)).

## 2024-09-29 NOTE — PROGRESS NOTES
Occupational Therapy Discharge Summary    Reason for therapy discharge:    All goals and outcomes met, no further needs identified.  No further expectations of functional progress.    Progress towards therapy goal(s). See goals on Care Plan in [facility] electronic health record for goal details.  Goals met    Therapy recommendation(s):    No further therapy is recommended.  Anticipate pt will be returning to memory care once medically stable.

## 2024-09-30 PROCEDURE — 250N000013 HC RX MED GY IP 250 OP 250 PS 637: Performed by: INTERNAL MEDICINE

## 2024-09-30 PROCEDURE — 120N000001 HC R&B MED SURG/OB

## 2024-09-30 PROCEDURE — 250N000013 HC RX MED GY IP 250 OP 250 PS 637: Performed by: STUDENT IN AN ORGANIZED HEALTH CARE EDUCATION/TRAINING PROGRAM

## 2024-09-30 PROCEDURE — 258N000003 HC RX IP 258 OP 636: Performed by: STUDENT IN AN ORGANIZED HEALTH CARE EDUCATION/TRAINING PROGRAM

## 2024-09-30 PROCEDURE — 99239 HOSP IP/OBS DSCHRG MGMT >30: CPT | Performed by: STUDENT IN AN ORGANIZED HEALTH CARE EDUCATION/TRAINING PROGRAM

## 2024-09-30 RX ORDER — CIPROFLOXACIN 500 MG/1
500 TABLET, FILM COATED ORAL EVERY 12 HOURS
Qty: 10 TABLET | Refills: 0 | Status: SHIPPED | OUTPATIENT
Start: 2024-09-30 | End: 2024-10-05

## 2024-09-30 RX ADMIN — LATANOPROST 1 DROP: 50 SOLUTION OPHTHALMIC at 21:41

## 2024-09-30 RX ADMIN — METOPROLOL SUCCINATE 25 MG: 25 TABLET, EXTENDED RELEASE ORAL at 08:13

## 2024-09-30 RX ADMIN — LEVOTHYROXINE SODIUM 50 MCG: 50 TABLET ORAL at 06:17

## 2024-09-30 RX ADMIN — APIXABAN 2.5 MG: 2.5 TABLET, FILM COATED ORAL at 08:13

## 2024-09-30 RX ADMIN — SODIUM CHLORIDE, POTASSIUM CHLORIDE, SODIUM LACTATE AND CALCIUM CHLORIDE: 600; 310; 30; 20 INJECTION, SOLUTION INTRAVENOUS at 08:25

## 2024-09-30 RX ADMIN — FUROSEMIDE 20 MG: 20 TABLET ORAL at 08:13

## 2024-09-30 RX ADMIN — DORZOLAMIDE HYDROCHLORIDE AND TIMOLOL MALEATE 1 DROP: 22.3; 6.8 SOLUTION/ DROPS OPHTHALMIC at 08:20

## 2024-09-30 RX ADMIN — CIPROFLOXACIN HYDROCHLORIDE 500 MG: 500 TABLET, FILM COATED ORAL at 08:13

## 2024-09-30 RX ADMIN — CIPROFLOXACIN HYDROCHLORIDE 500 MG: 500 TABLET, FILM COATED ORAL at 19:41

## 2024-09-30 RX ADMIN — PREDNISOLONE ACETATE 1 DROP: 10 SUSPENSION/ DROPS OPHTHALMIC at 20:42

## 2024-09-30 RX ADMIN — MIRTAZAPINE 7.5 MG: 7.5 TABLET, FILM COATED ORAL at 21:41

## 2024-09-30 RX ADMIN — DORZOLAMIDE HYDROCHLORIDE AND TIMOLOL MALEATE 1 DROP: 22.3; 6.8 SOLUTION/ DROPS OPHTHALMIC at 20:41

## 2024-09-30 RX ADMIN — APIXABAN 2.5 MG: 2.5 TABLET, FILM COATED ORAL at 20:41

## 2024-09-30 ASSESSMENT — ACTIVITIES OF DAILY LIVING (ADL)
ADLS_ACUITY_SCORE: 63
ADLS_ACUITY_SCORE: 67
ADLS_ACUITY_SCORE: 67
ADLS_ACUITY_SCORE: 63
ADLS_ACUITY_SCORE: 62
ADLS_ACUITY_SCORE: 67
ADLS_ACUITY_SCORE: 67
ADLS_ACUITY_SCORE: 61
ADLS_ACUITY_SCORE: 67
ADLS_ACUITY_SCORE: 63
ADLS_ACUITY_SCORE: 63
ADLS_ACUITY_SCORE: 67
ADLS_ACUITY_SCORE: 62
ADLS_ACUITY_SCORE: 67
ADLS_ACUITY_SCORE: 63
ADLS_ACUITY_SCORE: 67
ADLS_ACUITY_SCORE: 63
ADLS_ACUITY_SCORE: 67
ADLS_ACUITY_SCORE: 67
ADLS_ACUITY_SCORE: 61

## 2024-09-30 NOTE — DISCHARGE SUMMARY
[facility]  Hospitalist Discharge Summary      Date of Admission:  9/26/2024  Date of Discharge:  9/30/2024  Discharging Provider: Neville Meade MD  Discharge Service: Hospitalist Service    Discharge Diagnoses     AMS  Acute UTI    Clinically Significant Risk Factors          Follow-ups Needed After Discharge   Follow-up Appointments     Follow-up and recommended labs and tests       Follow up with primary care provider, Taz Vergara, within 7 days for   hospital follow- up.  No follow up labs or test are needed.            Unresulted Labs Ordered in the Past 30 Days of this Admission       Date and Time Order Name Status Description    9/26/2024  3:16 PM Blood Culture Arm, Left Preliminary           Discharge Disposition   Discharged to home  Condition at discharge: Stable    Hospital Course   Marley Stewart is a 89 year old female with history including RA; HTN; diastolic CHF/HCM; AF; stroke; hypothyroidism; depression/anxiety; and h/o PUD; who presents from memory care with AMS. Found with evidence of UTI.     On initial evaluation, pt was afebrile, mildly hypertensive.  Labs notable for CBC with normal white blood cell count; BMP unremarkable; LFTs normal; urinalysis with greater than 182 white blood cells, greater than 182 red blood cells, large leukocyte esterase, large blood, 2 squamous epithelial cells. STAT MRI was ordered, but after discussion by her daughters, it was decided not to perform as it would not change her treatment.       AMS with speech/language impairment, suspect due to UTI (infectious/septic encephalopathy), less likely stroke.  * From admit: Patient has been in memory care facility.  Over the past 4 days she has been speaking less and weaker than at baseline.  Did apparently complain of some dysuria.  Patient has had word finding difficulties and speech/language impairment which is not usual for her.  Given her issues, she was brought to [facility] for further  evaluation.  * Initial presentation as above. Found with signs of UTI. MRI ordered on admission, but ultimately, her daughters declined this as they would not want to be aggressive even if it were a stroke and decided against it (see prior hospitalists notes).  - Continue to treat other issues as noted, particularly UTI.  - Re-orient as needed.  - Maintain normal day/night, sleep/wake cycles.  - Minimize sedating medications as able.     Acute UTI  Microscopic hematuria, suspect related to above.  *urinalysis with greater than 182 white blood cells, greater than 182 red blood cells, large leukocyte esterase, large blood, 2 squamous epithelial cells.  * Initial presentation as above. On ceftriaxone.  UCX with klebsiella and strep.  BCX NTD.  - Continue ceftriaxone  -> Cipro BID at discharge     Concern for stroke.  Stroke history.  Chronic afib.  * Initial presentation as above.  MRI ordered, but ultimately, her daughters declined this as they would not want to be aggressive even if it were a stroke.  * Overall, doubt stroke as pt on apixaban. ?right mouth droop on exam, ?stroke symptom recrudescence related to above on admission  Plan:  - Continue apixaban and metoprolol.  - No MRI as it would not change treatment at this point (pt already on anticoagulation; chronically wheelchair bound).     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 Given age / DNR / DNI. Does not need battery replacement.      RA.  Spinal stenosis.  Chronic debilitation.  * Chronically mostly wheelchair bound. Can stand/transfer/pivot.  - Continue PRN acetaminophen.     HTN.  Hypertrophic CM with diastolic CHF.  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  No evidence of volume overload.  Plan:  - Continue metoprolol ER.  - Continue furosemide PTA      Hypothyroidism.  - Continue levothyroxine.     Depression/anxiety.  - Resume mirtazapine     Consultations This Hospital Stay   CARE MANAGEMENT / SOCIAL WORK IP CONSULT  SPEECH LANGUAGE PATH ADULT IP CONSULT  PHYSICAL THERAPY ADULT IP CONSULT  OCCUPATIONAL THERAPY ADULT IP CONSULT    Code Status   No CPR- Do NOT Intubate    Time Spent on this Encounter   I, Neville Meade MD, personally saw the patient today and spent greater than 30 minutes discharging this patient.       Neville Meade MD  [place] [address] [facility]  [address] [place], [place] [address], [address]  [facility] [address]  Phone: [phone]  ______________________________________________________________________    Physical Exam   Vital Signs: Temp: 97.7  F (36.5  C) Temp src: Oral BP: (!) 149/92 Pulse: 80   Resp: 16 SpO2: 98 % O2 Device: None (Room air)    Weight: 126 lbs 15.76 oz  ----------------------------------------------------------------------------------------       Primary Care Physician   Taz Vergara    Discharge Orders      Reason for your hospital stay    You had confusion from a urinary tract infection.     Follow-up and recommended labs and tests     Follow up with primary care provider, Taz Vergara, within 7 days for hospital follow- up.  No follow up labs or test are needed.     Activity    Your activity upon discharge: activity as tolerated     Diet    Follow this diet upon discharge: Current Diet:Orders Placed This Encounter      Room Service      Combination Diet Regular Diet       Significant Results and Procedures   Most Recent 3 CBC's:  Recent Labs   Lab Test 09/27/24  0757 09/26/24  1336 09/26/24  1110   WBC 10.4 10.0 9.9   HGB 13.8 13.6 13.3   MCV 89 90 92    340 319     Most Recent 3 BMP's:  Recent Labs   Lab Test 09/27/24  0757 09/26/24  1336 09/26/24  1110     141 139   POTASSIUM 3.7 4.1 3.7   CHLORIDE 104 104 103   CO2 22 28 25   BUN 8.6 13.2 13.3   CR 0.74 0.95 0.95   ANIONGAP 12 9 11   LONNIE 9.2 9.6 9.4   GLC 98 112* 121*     Most Recent 2 LFT's:  Recent Labs   Lab Test 09/26/24  1336 07/01/22  0929   AST 29 15   ALT 17 18   ALKPHOS 91 67   BILITOTAL 0.4 0.9     Most Recent 3 INR's:  Recent Labs   Lab Test 08/05/19  1317 06/27/18  1615 05/27/17  0955   INR 1.42* 1.29* 1.54*     Most Recent 3 Troponin's:  Recent Labs   Lab Test 05/27/17  0955 11/02/16  1042   TROPI 0.022 0.026     Most Recent 3 BNP's:  Recent Labs   Lab Test 06/19/22  0755 01/09/18  1949 05/27/17  0955   NTBNPI 5,932* 1,509 1,091     Most Recent D-dimer:No lab results found.  Most Recent Cholesterol Panel:No lab results found.  7-Day Micro Results       Collected Updated Procedure Result Status      09/26/2024 1349 09/28/2024 2158 Urine Culture [[id number]]    (Abnormal)   Urine, Catheter    Final result Component Value   Culture 50,000-100,000 CFU/mL Klebsiella pneumoniae    10,000-50,000 CFU/mL Streptococcus anginosus    This organism is susceptible to ampicillin, penicillin, vancomycin and the cephalosporins. If treatment is required and your patient is allergic to penicillin, contact the microbiology lab within 5 days to request susceptibility testing.        Susceptibility        Klebsiella pneumoniae      NAI      Ampicillin  Resistant  [1]       Ampicillin/ Sulbactam <=2 ug/mL Susceptible      Cefazolin <=4 ug/mL Susceptible  [2]       Cefepime <=1 ug/mL Susceptible      Cefoxitin <=4 ug/mL Susceptible      Ceftazidime <=1 ug/mL Susceptible      Ceftriaxone <=1 ug/mL Susceptible      Ciprofloxacin <=0.25 ug/mL Susceptible      Gentamicin <=1 ug/mL Susceptible      Levofloxacin <=0.12 ug/mL Susceptible      Nitrofurantoin 64 ug/mL Intermediate      Piperacillin/Tazobactam <=4 ug/mL Susceptible      Tobramycin <=1 ug/mL Susceptible      Trimethoprim/Sulfamethoxazole <=1/19 ug/mL Susceptible                    [1]  Intrinsically Resistant     [2]  Cefazolin NAI breakpoints are for the treatment of uncomplicated urinary tract infections. For the treatment of systemic infections, please contact the laboratory for additional testing.                    09/26/2024 1336 09/29/2024 1901 Blood Culture Arm, Left [[id number]]   Blood from Arm, Left    Preliminary result Component Value   Culture No growth after 3 days  [P]                      Most Recent TSH and T4:  Recent Labs   Lab Test 09/26/24  1110 03/07/24  1125   TSH 0.71 0.78   T4  --  1.70     Most Recent Urinalysis:  Recent Labs   Lab Test 09/26/24  1349   COLOR Straw   APPEARANCE Slightly Cloudy*   URINEGLC Negative   URINEBILI Negative   URINEKETONE Negative   SG 1.014   UBLD Large*   URINEPH 7.5*   PROTEIN 20*   NITRITE Negative   LEUKEST Large*   RBCU >182*   WBCU >182*     Most Recent ESR & CRP:  Recent Labs   Lab Test 07/02/22  1346   .0*   ,   Results for orders placed or performed during the hospital encounter of 02/08/23   US EVARISTO Doppler No Exercise 1-2 Levels Bilateral    Narrative    US EVARISTO DOPPLER NO EXERCISE, 1-2 LEVELS, BILATERAL   2/8/2023 11:31 AM     HISTORY: PAD (peripheral artery disease) (H)    COMPARISON: None.    FINDINGS:  Right EVARISTO:   PT: 0.62.  DP: 0.59    Left EVARISTO:   PT: 1.01.  DP: 1.08     Right Digital brachial index: 0.21.  Left Digital Brachial index: 0.46    Waveforms:   Right: Monophasic  Left: Biphasic       Impression    IMPRESSION:   1. Right EVARISTO shows moderate arterial insufficiency.   2. Left EVARISTO is normal without arterial insufficiency.    EVARISTO CRITERIA:  >1.4 NC  0.95-1.4 Normal  0.90 - 0.94 Mild  0.5 - 0.89 Moderate  0.2 - 0.49 Severe  <0.2 Critical    PATTIE BRYAN DO         SYSTEM ID:  [id number]       Discharge Medications   Current Discharge Medication List        START taking these medications    Details   ciprofloxacin (CIPRO) 500 MG tablet Take 1 tablet (500 mg) by mouth every 12 hours for 5  days.  Qty: 10 tablet, Refills: 0    Associated Diagnoses: Acute UTI           CONTINUE these medications which have CHANGED    Details   apixaban ANTICOAGULANT (ELIQUIS) 2.5 MG tablet Take 1 tablet (2.5 mg) by mouth 2 times daily.  Qty: 120 tablet, Refills: 0    Associated Diagnoses: Nonrheumatic aortic valve insufficiency; Chronic atrial fibrillation (H)           CONTINUE these medications which have NOT CHANGED    Details   acetaminophen (TYLENOL) 500 MG tablet Take 2 tablets (1,000 mg) by mouth every 6 hours as needed    Associated Diagnoses: Closed fracture of fourth lumbar vertebra, unspecified fracture morphology, initial encounter (H)      Cranberry 500 MG TABS Take 1,000 mg by mouth daily.      dorzolamide-timolol (COSOPT) 2-0.5 % ophthalmic solution Place 1 drop into the right eye 2 times daily      furosemide (LASIX) 20 MG tablet Take 1 tablet (20 mg) by mouth every morning  Qty: 30 tablet, Refills: 0    Associated Diagnoses: Acute on chronic diastolic congestive heart failure (H)      lactobacillus rhamnosus, GG, (CULTURELL) capsule Take 1 capsule by mouth daily.      latanoprost (XALATAN) 0.005 % ophthalmic solution Place 1 drop into the right eye At Bedtime      levothyroxine (SYNTHROID/LEVOTHROID) 50 MCG tablet Take 50 mcg by mouth daily      lidocaine, viscous, (XYLOCAINE) 2 % solution Swish and spit 15 mLs in mouth every 6 hours as needed for pain. ; Max 8 doses/24 hour period.      loperamide (IMODIUM) 2 MG capsule Take 2 mg by mouth as needed for diarrhea. MAX if 8 capsules per day      loratadine (CLARITIN) 10 MG tablet Take 10 mg by mouth daily as needed for allergies.      metoprolol succinate ER (TOPROL-XL) 25 MG 24 hr tablet Take 25 mg by mouth every morning      mirtazapine (REMERON) 7.5 MG tablet Take 7.5 mg by mouth At Bedtime      nystatin (MYCOSTATIN) 443104 UNIT/GM external powder Apply topically 3 times daily. To groin      prednisoLONE acetate (PRED FORTE) 1 % ophthalmic suspension  Place 1 drop into the right eye 2 times daily           Allergies   Allergies   Allergen Reactions    Amitriptyline     Clonazepam Other (See Comments)     Somnolence at 0.5 mg dose    Erythromycin Other (See Comments)     Other reaction(s): *Unknown - Pt Doesn't Remember  MACROLIDES  PN: MACROLIDES      Indomethacin Other (See Comments)     Other reaction(s): *Unknown  depression  depression  PN: depression      Amoxicillin Rash    Latex Rash

## 2024-09-30 NOTE — PLAN OF CARE
[id number]    Orientation: A&O to self only, sleeping most of the day, answers yes/no questions best  Aggression Stop Light: Green  Activity: A2 w/ kojo steady, WC baseline.   Diet/BS Checks: Regular w/ RS, poor appetite, fluids encouraged but continued minimal intake  Tele:  n/a  IV Access/Drains: L PIV infusing LR at 50ml/hr  Pain Management: verbally denies pain, but says ow w/ movement at times  Abnormal VS/Results: VSS on RA except HTN  Bowel/Bladder: Incontinent of  b/b, purewick in place w/ low UOP  Skin/Wounds: Redness around buttocks/perineum, mepilex to sacrum. R PIV infiltrated yesterday early evening, arm elevated w/ warm pack.  Consults: BJ  D/C Disposition: Back to [facility]

## 2024-09-30 NOTE — PROGRESS NOTES
Care Management Discharge Note    Address:  Willian Kumar [address] [address]  [phone]  RN: Aidee [phone]  Fax#[phone]        Discharge Date: 09/30/2024       Discharge Disposition: Assisted Living    Discharge Services: [facility] Stretcher Ride 09/30 between 16:40-17:20    Discharge DME: None    Discharge Transportation: health plan transportation    Private pay costs discussed: Not applicable    Does the patient's insurance plan have a 3 day qualifying hospital stay waiver?  No    PAS Confirmation Code:  n/a  Patient/family educated on Medicare website which has current facility and service quality ratings:  no    Education Provided on the Discharge Plan:  yes  Persons Notified of Discharge Plans: Discharging provider, bedside RN, care home RN Aidee, Maninder Gaytan (left message 09/30 12:14) and Daughter in Law Ramirez by phone   Patient/Family in Agreement with the Plan: unable to assess    Handoff Referral Completed: No, handoff not indicated or clinically appropriate    Additional Information:  Writer sent a page to rounding provider on web consule. Per [facility] [phone] they need discharge orders ASAP faxed to [phone]  and patient needs to be back to their facility no later then 14:00 for readmission.  Writer got discharge orders and faxed to Aidee.  Writer informed Aidee that we have an [facility] stretcher ride between 16:40-17:20 she is okay with this plan.    Per Aidee she will review the discharge orders and call this writer back to confirm receipt and answer additional questions if needed.   Writer left a message for Maninder Gaytan by phone and talked with Daughter in Law Ramirez by phone to go over discharge time and answer questions.  No further needs identified.  Updated bedside RN and NST regarding ride and discharge.  Addendum: 9/30 12:44  Writer received a call back from care home [facility] she is in receipt of discharge orders and has no further questions.    Elvi Richmond  RN, BSN, ACM   Care Transitions Specialist  [facility]  Care Transitions Specialist  [address] [place] [address]  [email or web address]  Office: [phone] Fax: [phone]  [facility]

## 2024-09-30 NOTE — PLAN OF CARE
Goal Outcome Evaluation:    Shift Summary [id number]    Admitting Diagnosis: Delirium [R41.0]  Weakness [R53.1]  Acute UTI [N39.0]  Word finding difficulty [R47.89]   Vitals - stable on RA  Pain - pt does not appear in pain during shift  A&O to self   Voiding - incont. Pwick   Mobility - Ax2, kojo steady   Tele - n/a  CMS - intact  GI - one bm during shift   Dressing - mepi on bottom, CDI    Orders Placed This Encounter      Combination Diet Regular Diet      Diet       Plan: Anticipating discharge back to assisted living via stretcher between 5271-0802.

## 2024-10-01 VITALS
HEART RATE: 74 BPM | HEIGHT: 64 IN | SYSTOLIC BLOOD PRESSURE: 174 MMHG | WEIGHT: 126.98 LBS | BODY MASS INDEX: 21.68 KG/M2 | TEMPERATURE: 97.3 F | DIASTOLIC BLOOD PRESSURE: 89 MMHG | OXYGEN SATURATION: 96 % | RESPIRATION RATE: 16 BRPM

## 2024-10-01 LAB — BACTERIA BLD CULT: NO GROWTH

## 2024-10-01 PROCEDURE — 250N000013 HC RX MED GY IP 250 OP 250 PS 637: Performed by: INTERNAL MEDICINE

## 2024-10-01 PROCEDURE — 250N000013 HC RX MED GY IP 250 OP 250 PS 637: Performed by: STUDENT IN AN ORGANIZED HEALTH CARE EDUCATION/TRAINING PROGRAM

## 2024-10-01 PROCEDURE — 99239 HOSP IP/OBS DSCHRG MGMT >30: CPT | Performed by: HOSPITALIST

## 2024-10-01 RX ADMIN — DORZOLAMIDE HYDROCHLORIDE AND TIMOLOL MALEATE 1 DROP: 22.3; 6.8 SOLUTION/ DROPS OPHTHALMIC at 08:15

## 2024-10-01 RX ADMIN — FUROSEMIDE 20 MG: 20 TABLET ORAL at 08:09

## 2024-10-01 RX ADMIN — ACETAMINOPHEN 650 MG: 325 TABLET ORAL at 08:10

## 2024-10-01 RX ADMIN — CIPROFLOXACIN HYDROCHLORIDE 500 MG: 500 TABLET, FILM COATED ORAL at 08:09

## 2024-10-01 RX ADMIN — APIXABAN 2.5 MG: 2.5 TABLET, FILM COATED ORAL at 08:09

## 2024-10-01 RX ADMIN — METOPROLOL SUCCINATE 25 MG: 25 TABLET, EXTENDED RELEASE ORAL at 08:10

## 2024-10-01 RX ADMIN — LEVOTHYROXINE SODIUM 50 MCG: 50 TABLET ORAL at 06:39

## 2024-10-01 RX ADMIN — PREDNISOLONE ACETATE 1 DROP: 10 SUSPENSION/ DROPS OPHTHALMIC at 08:16

## 2024-10-01 ASSESSMENT — ACTIVITIES OF DAILY LIVING (ADL)
ADLS_ACUITY_SCORE: 66
ADLS_ACUITY_SCORE: 67
ADLS_ACUITY_SCORE: 66

## 2024-10-01 NOTE — PROGRESS NOTES
1500 - 2330     Vitals - Stable on RA  Pain -  Patient does not appear in pain during shift  A&O to self   Voiding - Incontinent,  Purewick in place  Mobility - Ax2, kojo steady   Tele - n/a  CMS - Intact  GI - No bm during shift   Dressing - mepi on bottom, CDI  Plan: Discharge postponed  due to [facility] Transportation delays.    Transport rescheduled for 9am  Note:  Patient has no IV access pending discharge tomorrow morning. MD aware.

## 2024-10-01 NOTE — PROGRESS NOTES
Patient's discharge delayed 9/30 due to later then expected transport via [facility]. Per rounding provider patient is cleared for return to [facility]. NST to print off new packet with medications administered.  Writer left a message with [facility] phone [phone] refaxed signed orders for 10/01 to [phone].   Per [facility] new stretcher ride time is between 08:40-09:20 a.m.  Writer will call Daughter in Law Beltran with the update.  No other needs identified.  Addendum: 10/01 11:38 a.m.  Writer called Daughter in Law Beltran and updated her on discharge. Ruby noted in the future it would be nice to keep family updated if a discharge falls through, writer apologized and noted we would bring this to the attn of the unit.  Elvi Richmond RN, BSN, ACM   Care Transitions Specialist  [facility]  Care Transitions Specialist  [address] [place] [address]  [email or web address]  Office: [phone] Fax: [phone]  [facility]

## 2024-10-01 NOTE — DISCHARGE SUMMARY
[facility]    Discharge Summary  Hospitalist    Date of Admission:  9/26/2024  Date of Discharge:  10/1/2024    Discharge Diagnoses      Weakness  Delirium  Acute UTI  Word finding difficulty  Atrial fibrillation (H)  Nonrheumatic aortic valve insufficiency  Chronic atrial fibrillation (H)    History of Present Illness   Marley Stewart is an 89 year old female who presented with altered mental status secondary to UTI    Hospital Course   Marley Stewart was admitted on 9/26/2024.  The following problems were addressed during her hospitalization:    Marley Stewart is a 89 year old female with history including RA; HTN; diastolic CHF/HCM; AF; stroke; hypothyroidism; depression/anxiety; and h/o PUD; who presents from memory care with AMS. Found with evidence of UTI.     On initial evaluation, pt was afebrile, mildly hypertensive.  Labs notable for CBC with normal white blood cell count; BMP unremarkable; LFTs normal; urinalysis with greater than 182 white blood cells, greater than 182 red blood cells, large leukocyte esterase, large blood, 2 squamous epithelial cells. STAT MRI was ordered, but after discussion by her daughters, it was decided not to perform as it would not change her treatment.       AMS with speech/language impairment, suspect due to UTI (infectious/septic encephalopathy), less likely stroke.  * From admit: Patient has been in memory care facility.  Over the past 4 days she has been speaking less and weaker than at baseline.  Did apparently complain of some dysuria.  Patient has had word finding difficulties and speech/language impairment which is not usual for her.  Given her issues, she was brought to [facility] for further evaluation.  * Initial presentation as above. Found with signs of UTI. MRI ordered on admission, but ultimately, her daughters declined this as they would not want to be aggressive even if it were a stroke and decided against it (see prior hospitalists  notes).  - Continue to treat other issues as noted, particularly UTI.  - Re-orient as needed.  - Maintain normal day/night, sleep/wake cycles.  - Minimize sedating medications as able.     Acute UTI  Microscopic hematuria, suspect related to above.  *urinalysis with greater than 182 white blood cells, greater than 182 red blood cells, large leukocyte esterase, large blood, 2 squamous epithelial cells.  * Initial presentation as above. On ceftriaxone.  UCX with klebsiella and strep.  BCX NTD.  - Continue ceftriaxone  -> Cipro BID at discharge     Concern for stroke.  Stroke history.  Chronic afib.  * Initial presentation as above.  MRI ordered, but ultimately, her daughters declined this as they would not want to be aggressive even if it were a stroke.  * Overall, doubt stroke as pt on apixaban. ?right mouth droop on exam, ?stroke symptom recrudescence related to above on admission  Plan:  - Continue apixaban and metoprolol.  - No MRI as it would not change treatment at this point (pt already on anticoagulation; chronically wheelchair bound).      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 Given age / DNR / DNI. Does not need battery replacement.      RA.  Spinal stenosis.  Chronic debilitation.  * Chronically mostly wheelchair bound. Can stand/transfer/pivot.  - Continue PRN acetaminophen.     HTN.  Hypertrophic CM with diastolic CHF.  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  No evidence of volume overload.  Plan:  - Continue metoprolol ER.  - Continue furosemide PTA      Hypothyroidism.  - Continue levothyroxine.     Depression/anxiety.  - Resume mirtazapine        Clinically Significant Risk  Factors                  # Hypertension: Noted on problem list    # Dementia: noted on problem list            # Financial/Environmental Concerns: none          Ellen Mcclellan MD, MD    Pending Results   These results will be followed up by pcp  Unresulted Labs Ordered in the Past 30 Days of this Admission       Date and Time Order Name Status Description    9/26/2024  3:16 PM Blood Culture Arm, Left Preliminary           Code Status   DNR / DNI       Primary Care Physician   Taz Vergara    Physical Exam   Temp: 97.3  F (36.3  C) Temp src: Oral BP: (!) 174/89 Pulse: 74   Resp: 16 SpO2: 96 % O2 Device: None (Room air)    Vitals:    09/28/24 0554 09/29/24 0555 09/30/24 0615   Weight: 60.8 kg (134 lb) 57.7 kg (127 lb 3.3 oz) 57.6 kg (126 lb 15.8 oz)     Vital Signs with Ranges  Temp:  [97.3  F (36.3  C)] 97.3  F (36.3  C)  Pulse:  [74-75] 74  Resp:  [16] 16  BP: (165-191)/(84-98) 174/89  SpO2:  [96 %] 96 %  I/O last 3 completed shifts:  In: 50 [P.O.:50]  Out: 750 [Urine:750]    Physical Exam  Constitutional:       Appearance: She is obese.   Cardiovascular:      Rate and Rhythm: Normal rate and regular rhythm.      Pulses: Normal pulses.      Heart sounds: Normal heart sounds.   Pulmonary:      Effort: Pulmonary effort is normal. No respiratory distress.      Breath sounds: Normal breath sounds.   Abdominal:      General: Abdomen is flat. Bowel sounds are normal. There is no distension.      Tenderness: There is no abdominal tenderness. There is no guarding.   Skin:     General: Skin is warm and dry.   Neurological:      General: No focal deficit present.           Discharge Disposition   Discharged to home  Condition at discharge: Stable    Consultations This Hospital Stay   CARE MANAGEMENT / SOCIAL WORK IP CONSULT  SPEECH LANGUAGE PATH ADULT IP CONSULT  PHYSICAL THERAPY ADULT IP CONSULT  OCCUPATIONAL THERAPY ADULT IP CONSULT    Time Spent on this Encounter   Ellen ORTIZ MD, personally saw the patient  today and spent greater than 30 minutes discharging this patient.    Discharge Orders      Reason for your hospital stay    You had confusion from a urinary tract infection.     Follow-up and recommended labs and tests     Follow up with primary care provider, Taz Vergara, within 7 days for hospital follow- up.  No follow up labs or test are needed.     Activity    Your activity upon discharge: activity as tolerated     Diet    Follow this diet upon discharge: Current Diet:Orders Placed This Encounter      Room Service      Combination Diet Regular Diet     Discharge Medications   Discharge Medication List as of 10/1/2024  8:49 AM        START taking these medications    Details   ciprofloxacin (CIPRO) 500 MG tablet Take 1 tablet (500 mg) by mouth every 12 hours for 5 days., Disp-10 tablet, R-0, E-Prescribe           CONTINUE these medications which have CHANGED    Details   apixaban ANTICOAGULANT (ELIQUIS) 2.5 MG tablet Take 1 tablet (2.5 mg) by mouth 2 times daily., Disp-120 tablet, R-0, E-Prescribe           CONTINUE these medications which have NOT CHANGED    Details   acetaminophen (TYLENOL) 500 MG tablet Take 2 tablets (1,000 mg) by mouth every 6 hours as needed, Transitional      Cranberry 500 MG TABS Take 1,000 mg by mouth daily., Historical      dorzolamide-timolol (COSOPT) 2-0.5 % ophthalmic solution Place 1 drop into the right eye 2 times daily, Historical      furosemide (LASIX) 20 MG tablet Take 1 tablet (20 mg) by mouth every morning, Disp-30 tablet, R-0, E-Prescribe      lactobacillus rhamnosus, GG, (CULTURELL) capsule Take 1 capsule by mouth daily., Historical      latanoprost (XALATAN) 0.005 % ophthalmic solution Place 1 drop into the right eye At Bedtime, Historical      levothyroxine (SYNTHROID/LEVOTHROID) 50 MCG tablet Take 50 mcg by mouth daily, Historical      lidocaine, viscous, (XYLOCAINE) 2 % solution Swish and spit 15 mLs in mouth every 6 hours as needed for pain. ; Max 8 doses/24 hour  period., Historical      loperamide (IMODIUM) 2 MG capsule Take 2 mg by mouth as needed for diarrhea. MAX if 8 capsules per day, Historical      loratadine (CLARITIN) 10 MG tablet Take 10 mg by mouth daily as needed for allergies., Historical      metoprolol succinate ER (TOPROL-XL) 25 MG 24 hr tablet Take 25 mg by mouth every morning, Historical      mirtazapine (REMERON) 7.5 MG tablet Take 7.5 mg by mouth At Bedtime, Historical      nystatin (MYCOSTATIN) 297846 UNIT/GM external powder Apply topically 3 times daily. To groinHistorical      prednisoLONE acetate (PRED FORTE) 1 % ophthalmic suspension Place 1 drop into the right eye 2 times daily, Historical           Allergies   Allergies   Allergen Reactions    Amitriptyline     Clonazepam Other (See Comments)     Somnolence at 0.5 mg dose    Erythromycin Other (See Comments)     Other reaction(s): *Unknown - Pt Doesn't Remember  MACROLIDES  PN: MACROLIDES      Indomethacin Other (See Comments)     Other reaction(s): *Unknown  depression  depression  PN: depression      Amoxicillin Rash    Latex Rash     Data   Recent Labs   Lab Test 09/27/24  0757 09/26/24  1336 09/26/24  1110 06/14/22  1807 08/05/19  1317 06/27/18  1656 06/27/18  1615 05/28/17  0546 05/27/17  0955   WBC 10.4 10.0 9.9   < > 9.7   < >  --    < > 9.5   HGB 13.8 13.6 13.3   < > 14.5   < >  --    < > 12.9   MCV 89 90 92   < > 93   < >  --    < > 93    340 319   < > 264   < >  --    < > 363   INR  --   --   --   --  1.42*  --  1.29*  --  1.54*    < > = values in this interval not displayed.      Recent Labs   Lab Test 09/27/24  0757 09/26/24  1336 09/26/24  1110    141 139   POTASSIUM 3.7 4.1 3.7   CHLORIDE 104 104 103   CO2 22 28 25   BUN 8.6 13.2 13.3   CR 0.74 0.95 0.95   ANIONGAP 12 9 11   LONNIE 9.2 9.6 9.4   GLC 98 112* 121*         Results for orders placed or performed during the hospital encounter of 02/08/23   US EVARISTO Doppler No Exercise 1-2 Levels Bilateral    Narrative    US EVARISTO  DOPPLER NO EXERCISE, 1-2 LEVELS, BILATERAL   2/8/2023 11:31 AM     HISTORY: PAD (peripheral artery disease) (H)    COMPARISON: None.    FINDINGS:  Right EVARISTO:   PT: 0.62.  DP: 0.59    Left EVARISTO:   PT: 1.01.  DP: 1.08     Right Digital brachial index: 0.21.  Left Digital Brachial index: 0.46    Waveforms:   Right: Monophasic  Left: Biphasic       Impression    IMPRESSION:   1. Right EVARISTO shows moderate arterial insufficiency.   2. Left EVARISTO is normal without arterial insufficiency.    EVARISTO CRITERIA:  >1.4 NC  0.95-1.4 Normal  0.90 - 0.94 Mild  0.5 - 0.89 Moderate  0.2 - 0.49 Severe  <0.2 Critical    PATTIE BRYAN DO         SYSTEM ID:  [id number]

## 2024-10-01 NOTE — PLAN OF CARE
Physical Therapy Discharge Summary    Reason for therapy discharge:    Discharged to home.    Progress towards therapy goal(s). See goals on Care Plan in [facility] electronic health record for goal details.  Goals partially met.  Barriers to achieving goals:   discharge from facility.    Therapy recommendation(s):    No further therapy is recommended. PT Discharge Planning:    PT Plan: Work on stand pivot transfers as able  PT Discharge Recommendation (DC Rec): Long term care facility (Return to Memory Care)  PT Rationale for DC Rec: Pt is near baseline with functional mobility, per family pt has assist wtih all mobility and cares in her [facility] care unit. Today use of kojo steady for stand pivot. Family states pt has had trials of PT in the past, but unable to make functional progress.  PT Brief overview of current status: A 1-2 with kojo steady or use of sling with nursing staff. Goals of therapy will be to address safe mobility and make recs for d/c to next level of care. Pt and RN will continue to follow all falls risk precautions as documented by RN staff while hospitalized.    Recommendation above provided by last treating therapist.

## 2024-10-01 NOTE — PROGRESS NOTES
MD Notification    Notified Person: MD    Notified Person Name:  Tosha    Notification Date/Time:  20;15  9/30/24    Notification Interaction:  Jay    Purpose of Notification:  Patient will have to stay in hospital one more night because [place] [facility] is running 4 hours late.  Facility cannot accept patient at this hour of night.    Orders Received:  OKAY    Comments:  now scheduled for 9am Tuesday.

## 2024-10-01 NOTE — PLAN OF CARE
[id number]    Orientation: A&O to self only, answers yes/no questions   Aggression Stop Light: Green  Activity: A2 w/ kojo steady, WC baseline.   Diet/BS Checks: Regular,  poor appetite, fluids encouraged.   Tele:  n/a  IV Access/Drains: No IV access  Pain Management: Denies pain but verbalizes discomfort with cares.  Abnormal VS/Results: HTN on RA, PRN Hydralazine not needed.  Bowel/Bladder: Incontinent of  b/b, purewick in place.  No BM  Skin/Wounds: Redness around buttocks/perineum, mepilex to sacrum.   Consults: BJ  D/C Disposition: Back to [facility] today

## 2024-10-01 NOTE — PROGRESS NOTES
[id number]    Orientation: A&O to self only, answers yes/no questions   Aggression Stop Light: Green  Activity: A2 w/ kojo steady, WC baseline.   Diet/BS Checks: Regular,  poor appetite, fluids encouraged.   Tele:  n/a  IV Access/Drains: No IV access  Pain Management: Denies pain but verbalizes discomfort with cares.  Abnormal VS/Results: N/A  Bowel/Bladder: Incontinent of  b/b, purewick in place.  No BM  Skin/Wounds: Redness around buttocks/perineum, mepilex to sacrum.   Consults: BJ  D/C Disposition: Back to [facility] today

## 2024-10-01 NOTE — PROGRESS NOTES
Discharge Note    Patient discharged to Assisted Living via EMS/BLS and transportation service .  IV: Discontinued  Prescriptions e-prescribed to pharmacy.   Belongings reviewed and sent with patient.   Home medications returned to patient: NA  Equipment sent with: N/A.   family (Ashley, daughter in law) verbalizes understanding of discharge instructions via phone. AVS given to  transport .